# Patient Record
Sex: FEMALE | Race: WHITE | HISPANIC OR LATINO | Employment: FULL TIME | URBAN - METROPOLITAN AREA
[De-identification: names, ages, dates, MRNs, and addresses within clinical notes are randomized per-mention and may not be internally consistent; named-entity substitution may affect disease eponyms.]

---

## 2023-05-21 ENCOUNTER — HOSPITAL ENCOUNTER (INPATIENT)
Facility: HOSPITAL | Age: 63
LOS: 15 days | Discharge: HOME | DRG: 988 | End: 2023-06-05
Attending: EMERGENCY MEDICINE | Admitting: INTERNAL MEDICINE
Payer: COMMERCIAL

## 2023-05-21 ENCOUNTER — APPOINTMENT (EMERGENCY)
Dept: CT IMAGING | Facility: HOSPITAL | Age: 63
DRG: 988 | End: 2023-05-21
Payer: COMMERCIAL

## 2023-05-21 DIAGNOSIS — Z79.899 ON HIGH DOSE OPIOID DRUG THERAPY: ICD-10-CM

## 2023-05-21 DIAGNOSIS — R10.9 ABDOMINAL PAIN: Primary | ICD-10-CM

## 2023-05-21 DIAGNOSIS — E83.42 HYPOMAGNESEMIA: ICD-10-CM

## 2023-05-21 DIAGNOSIS — D72.829 LEUKOCYTOSIS: ICD-10-CM

## 2023-05-21 DIAGNOSIS — R93.89 ABNORMAL CT SCAN: ICD-10-CM

## 2023-05-21 DIAGNOSIS — G89.3 CANCER ASSOCIATED PAIN: ICD-10-CM

## 2023-05-21 DIAGNOSIS — D50.9 IRON DEFICIENCY ANEMIA, UNSPECIFIED IRON DEFICIENCY ANEMIA TYPE: ICD-10-CM

## 2023-05-21 DIAGNOSIS — R11.2 NAUSEA AND VOMITING, UNSPECIFIED VOMITING TYPE: ICD-10-CM

## 2023-05-21 DIAGNOSIS — D64.9 ANEMIA: ICD-10-CM

## 2023-05-21 PROBLEM — R19.7 DIARRHEA: Status: ACTIVE | Noted: 2023-05-21

## 2023-05-21 LAB
ALBUMIN SERPL BCP-MCNC: 4.2 G/DL (ref 3.5–5)
ALP SERPL-CCNC: 65 U/L (ref 34–104)
ALT SERPL W P-5'-P-CCNC: 7 U/L (ref 7–52)
ANION GAP SERPL CALCULATED.3IONS-SCNC: 11 MMOL/L (ref 4–13)
APTT PPP: 26 SECONDS (ref 23–37)
AST SERPL W P-5'-P-CCNC: 9 U/L (ref 13–39)
ATRIAL RATE: 108 BPM
BACTERIA UR QL AUTO: ABNORMAL /HPF
BASOPHILS # BLD AUTO: 0.1 THOUSANDS/ÂΜL (ref 0–0.1)
BASOPHILS NFR BLD AUTO: 1 % (ref 0–1)
BILIRUB SERPL-MCNC: 0.24 MG/DL (ref 0.2–1)
BILIRUB UR QL STRIP: NEGATIVE
BUN SERPL-MCNC: 13 MG/DL (ref 5–25)
CALCIUM SERPL-MCNC: 9.8 MG/DL (ref 8.4–10.2)
CARDIAC TROPONIN I PNL SERPL HS: 3 NG/L
CHLORIDE SERPL-SCNC: 100 MMOL/L (ref 96–108)
CLARITY UR: CLEAR
CO2 SERPL-SCNC: 23 MMOL/L (ref 21–32)
COLOR UR: YELLOW
CREAT SERPL-MCNC: 0.67 MG/DL (ref 0.6–1.3)
EOSINOPHIL # BLD AUTO: 0.03 THOUSAND/ÂΜL (ref 0–0.61)
EOSINOPHIL NFR BLD AUTO: 0 % (ref 0–6)
ERYTHROCYTE [DISTWIDTH] IN BLOOD BY AUTOMATED COUNT: 15.5 % (ref 11.6–15.1)
GFR SERPL CREATININE-BSD FRML MDRD: 94 ML/MIN/1.73SQ M
GLUCOSE SERPL-MCNC: 195 MG/DL (ref 65–140)
GLUCOSE UR STRIP-MCNC: NEGATIVE MG/DL
HCT VFR BLD AUTO: 32.2 % (ref 34.8–46.1)
HGB BLD-MCNC: 9.5 G/DL (ref 11.5–15.4)
HGB UR QL STRIP.AUTO: ABNORMAL
IMM GRANULOCYTES # BLD AUTO: 0.1 THOUSAND/UL (ref 0–0.2)
IMM GRANULOCYTES NFR BLD AUTO: 1 % (ref 0–2)
INR PPP: 1.03 (ref 0.84–1.19)
KETONES UR STRIP-MCNC: NEGATIVE MG/DL
LACTATE SERPL-SCNC: 1.4 MMOL/L (ref 0.5–2)
LEUKOCYTE ESTERASE UR QL STRIP: NEGATIVE
LIPASE SERPL-CCNC: 197 U/L (ref 11–82)
LYMPHOCYTES # BLD AUTO: 2.19 THOUSANDS/ÂΜL (ref 0.6–4.47)
LYMPHOCYTES NFR BLD AUTO: 10 % (ref 14–44)
MAGNESIUM SERPL-MCNC: 1.5 MG/DL (ref 1.9–2.7)
MCH RBC QN AUTO: 23.2 PG (ref 26.8–34.3)
MCHC RBC AUTO-ENTMCNC: 29.5 G/DL (ref 31.4–37.4)
MCV RBC AUTO: 79 FL (ref 82–98)
MONOCYTES # BLD AUTO: 0.89 THOUSAND/ÂΜL (ref 0.17–1.22)
MONOCYTES NFR BLD AUTO: 4 % (ref 4–12)
MUCOUS THREADS UR QL AUTO: ABNORMAL
NEUTROPHILS # BLD AUTO: 18.6 THOUSANDS/ÂΜL (ref 1.85–7.62)
NEUTS SEG NFR BLD AUTO: 84 % (ref 43–75)
NITRITE UR QL STRIP: NEGATIVE
NON-SQ EPI CELLS URNS QL MICRO: ABNORMAL /HPF
NRBC BLD AUTO-RTO: 0 /100 WBCS
P AXIS: 60 DEGREES
PH UR STRIP.AUTO: 6.5 [PH]
PLATELET # BLD AUTO: 573 THOUSANDS/UL (ref 149–390)
PMV BLD AUTO: 8.4 FL (ref 8.9–12.7)
POTASSIUM SERPL-SCNC: 4 MMOL/L (ref 3.5–5.3)
PR INTERVAL: 128 MS
PROCALCITONIN SERPL-MCNC: <0.05 NG/ML
PROT SERPL-MCNC: 7.6 G/DL (ref 6.4–8.4)
PROT UR STRIP-MCNC: ABNORMAL MG/DL
PROTHROMBIN TIME: 13.5 SECONDS (ref 11.6–14.5)
QRS AXIS: 76 DEGREES
QRSD INTERVAL: 92 MS
QT INTERVAL: 358 MS
QTC INTERVAL: 479 MS
RBC # BLD AUTO: 4.1 MILLION/UL (ref 3.81–5.12)
RBC #/AREA URNS AUTO: ABNORMAL /HPF
SODIUM SERPL-SCNC: 134 MMOL/L (ref 135–147)
SP GR UR STRIP.AUTO: 1.01
T WAVE AXIS: 43 DEGREES
UROBILINOGEN UR QL STRIP.AUTO: 0.2 E.U./DL
VENTRICULAR RATE: 108 BPM
WBC # BLD AUTO: 21.91 THOUSAND/UL (ref 4.31–10.16)
WBC #/AREA URNS AUTO: ABNORMAL /HPF

## 2023-05-21 PROCEDURE — 80053 COMPREHEN METABOLIC PANEL: CPT | Performed by: EMERGENCY MEDICINE

## 2023-05-21 PROCEDURE — 74177 CT ABD & PELVIS W/CONTRAST: CPT

## 2023-05-21 PROCEDURE — 83735 ASSAY OF MAGNESIUM: CPT | Performed by: EMERGENCY MEDICINE

## 2023-05-21 PROCEDURE — 36415 COLL VENOUS BLD VENIPUNCTURE: CPT | Performed by: EMERGENCY MEDICINE

## 2023-05-21 PROCEDURE — 84484 ASSAY OF TROPONIN QUANT: CPT | Performed by: EMERGENCY MEDICINE

## 2023-05-21 PROCEDURE — G1004 CDSM NDSC: HCPCS

## 2023-05-21 PROCEDURE — 83605 ASSAY OF LACTIC ACID: CPT | Performed by: EMERGENCY MEDICINE

## 2023-05-21 PROCEDURE — 87040 BLOOD CULTURE FOR BACTERIA: CPT | Performed by: EMERGENCY MEDICINE

## 2023-05-21 PROCEDURE — 83690 ASSAY OF LIPASE: CPT | Performed by: EMERGENCY MEDICINE

## 2023-05-21 PROCEDURE — 96375 TX/PRO/DX INJ NEW DRUG ADDON: CPT

## 2023-05-21 PROCEDURE — 85730 THROMBOPLASTIN TIME PARTIAL: CPT | Performed by: EMERGENCY MEDICINE

## 2023-05-21 PROCEDURE — 99223 1ST HOSP IP/OBS HIGH 75: CPT

## 2023-05-21 PROCEDURE — 93010 ELECTROCARDIOGRAM REPORT: CPT | Performed by: INTERNAL MEDICINE

## 2023-05-21 PROCEDURE — 99285 EMERGENCY DEPT VISIT HI MDM: CPT

## 2023-05-21 PROCEDURE — 93005 ELECTROCARDIOGRAM TRACING: CPT

## 2023-05-21 PROCEDURE — 81001 URINALYSIS AUTO W/SCOPE: CPT | Performed by: EMERGENCY MEDICINE

## 2023-05-21 PROCEDURE — 85610 PROTHROMBIN TIME: CPT | Performed by: EMERGENCY MEDICINE

## 2023-05-21 PROCEDURE — 85025 COMPLETE CBC W/AUTO DIFF WBC: CPT | Performed by: EMERGENCY MEDICINE

## 2023-05-21 PROCEDURE — 71260 CT THORAX DX C+: CPT

## 2023-05-21 PROCEDURE — 96365 THER/PROPH/DIAG IV INF INIT: CPT

## 2023-05-21 PROCEDURE — C9113 INJ PANTOPRAZOLE SODIUM, VIA: HCPCS

## 2023-05-21 PROCEDURE — 84145 PROCALCITONIN (PCT): CPT | Performed by: EMERGENCY MEDICINE

## 2023-05-21 RX ORDER — MAGNESIUM SULFATE HEPTAHYDRATE 40 MG/ML
4 INJECTION, SOLUTION INTRAVENOUS ONCE
Status: COMPLETED | OUTPATIENT
Start: 2023-05-21 | End: 2023-05-22

## 2023-05-21 RX ORDER — VALSARTAN AND HYDROCHLOROTHIAZIDE 160; 12.5 MG/1; MG/1
1 TABLET, FILM COATED ORAL DAILY
COMMUNITY
End: 2023-06-05

## 2023-05-21 RX ORDER — LOSARTAN POTASSIUM 50 MG/1
100 TABLET ORAL DAILY
Status: DISCONTINUED | OUTPATIENT
Start: 2023-05-22 | End: 2023-06-05 | Stop reason: HOSPADM

## 2023-05-21 RX ORDER — BUTALBITAL, ASPIRIN, AND CAFFEINE 50; 325; 40 MG/1; MG/1; MG/1
1 CAPSULE ORAL EVERY 4 HOURS PRN
COMMUNITY

## 2023-05-21 RX ORDER — HYDROCODONE BITARTRATE AND ACETAMINOPHEN 5; 325 MG/1; MG/1
1 TABLET ORAL EVERY 6 HOURS PRN
Status: DISCONTINUED | OUTPATIENT
Start: 2023-05-21 | End: 2023-05-24

## 2023-05-21 RX ORDER — PRAVASTATIN SODIUM 40 MG
80 TABLET ORAL
Status: DISCONTINUED | OUTPATIENT
Start: 2023-05-21 | End: 2023-06-05 | Stop reason: HOSPADM

## 2023-05-21 RX ORDER — CEFEPIME HYDROCHLORIDE 2 G/50ML
2000 INJECTION, SOLUTION INTRAVENOUS ONCE
Status: COMPLETED | OUTPATIENT
Start: 2023-05-21 | End: 2023-05-21

## 2023-05-21 RX ORDER — ACETAMINOPHEN 325 MG/1
650 TABLET ORAL EVERY 6 HOURS PRN
Status: DISCONTINUED | OUTPATIENT
Start: 2023-05-21 | End: 2023-06-05 | Stop reason: HOSPADM

## 2023-05-21 RX ORDER — PANTOPRAZOLE SODIUM 40 MG/10ML
40 INJECTION, POWDER, LYOPHILIZED, FOR SOLUTION INTRAVENOUS
Status: DISCONTINUED | OUTPATIENT
Start: 2023-05-21 | End: 2023-06-05 | Stop reason: HOSPADM

## 2023-05-21 RX ORDER — ROSUVASTATIN CALCIUM 10 MG/1
10 TABLET, COATED ORAL DAILY
COMMUNITY

## 2023-05-21 RX ORDER — SODIUM CHLORIDE, SODIUM GLUCONATE, SODIUM ACETATE, POTASSIUM CHLORIDE, MAGNESIUM CHLORIDE, SODIUM PHOSPHATE, DIBASIC, AND POTASSIUM PHOSPHATE .53; .5; .37; .037; .03; .012; .00082 G/100ML; G/100ML; G/100ML; G/100ML; G/100ML; G/100ML; G/100ML
100 INJECTION, SOLUTION INTRAVENOUS CONTINUOUS
Status: DISCONTINUED | OUTPATIENT
Start: 2023-05-21 | End: 2023-05-26

## 2023-05-21 RX ORDER — FENTANYL CITRATE 50 UG/ML
50 INJECTION, SOLUTION INTRAMUSCULAR; INTRAVENOUS ONCE
Status: COMPLETED | OUTPATIENT
Start: 2023-05-21 | End: 2023-05-21

## 2023-05-21 RX ORDER — ONDANSETRON 2 MG/ML
4 INJECTION INTRAMUSCULAR; INTRAVENOUS ONCE
Status: COMPLETED | OUTPATIENT
Start: 2023-05-21 | End: 2023-05-21

## 2023-05-21 RX ORDER — BUTALBITAL, ACETAMINOPHEN AND CAFFEINE 50; 325; 40 MG/1; MG/1; MG/1
1 TABLET ORAL EVERY 4 HOURS PRN
Status: DISCONTINUED | OUTPATIENT
Start: 2023-05-21 | End: 2023-06-05 | Stop reason: HOSPADM

## 2023-05-21 RX ORDER — ONDANSETRON 2 MG/ML
4 INJECTION INTRAMUSCULAR; INTRAVENOUS EVERY 6 HOURS PRN
Status: DISCONTINUED | OUTPATIENT
Start: 2023-05-21 | End: 2023-05-31

## 2023-05-21 RX ADMIN — BUTALBITAL, ACETAMINOPHEN AND CAFFEINE 1 TABLET: 50; 325; 40 TABLET ORAL at 16:31

## 2023-05-21 RX ADMIN — FENTANYL CITRATE 50 MCG: 50 INJECTION, SOLUTION INTRAMUSCULAR; INTRAVENOUS at 13:47

## 2023-05-21 RX ADMIN — HYDROCODONE BITARTRATE AND ACETAMINOPHEN 1 TABLET: 5; 325 TABLET ORAL at 21:46

## 2023-05-21 RX ADMIN — MAGNESIUM SULFATE HEPTAHYDRATE 4 G: 40 INJECTION, SOLUTION INTRAVENOUS at 20:55

## 2023-05-21 RX ADMIN — PANTOPRAZOLE SODIUM 40 MG: 40 INJECTION, POWDER, FOR SOLUTION INTRAVENOUS at 15:56

## 2023-05-21 RX ADMIN — CEFEPIME HYDROCHLORIDE 2000 MG: 2 INJECTION, SOLUTION INTRAVENOUS at 13:51

## 2023-05-21 RX ADMIN — ONDANSETRON 4 MG: 2 INJECTION INTRAMUSCULAR; INTRAVENOUS at 21:46

## 2023-05-21 RX ADMIN — SODIUM CHLORIDE 1000 ML: 0.9 INJECTION, SOLUTION INTRAVENOUS at 13:21

## 2023-05-21 RX ADMIN — SODIUM CHLORIDE, SODIUM GLUCONATE, SODIUM ACETATE, POTASSIUM CHLORIDE AND MAGNESIUM CHLORIDE 100 ML/HR: 526; 502; 368; 37; 30 INJECTION, SOLUTION INTRAVENOUS at 15:45

## 2023-05-21 RX ADMIN — VANCOMYCIN HYDROCHLORIDE 1500 MG: 1 INJECTION, POWDER, LYOPHILIZED, FOR SOLUTION INTRAVENOUS at 14:21

## 2023-05-21 RX ADMIN — ONDANSETRON 4 MG: 2 INJECTION INTRAMUSCULAR; INTRAVENOUS at 13:47

## 2023-05-21 RX ADMIN — MORPHINE SULFATE 2 MG: 2 INJECTION, SOLUTION INTRAMUSCULAR; INTRAVENOUS at 20:27

## 2023-05-21 RX ADMIN — PRAVASTATIN SODIUM 80 MG: 40 TABLET ORAL at 16:31

## 2023-05-21 RX ADMIN — IOHEXOL 100 ML: 350 INJECTION, SOLUTION INTRAVENOUS at 13:15

## 2023-05-21 RX ADMIN — MORPHINE SULFATE 2 MG: 2 INJECTION, SOLUTION INTRAMUSCULAR; INTRAVENOUS at 16:31

## 2023-05-21 NOTE — ASSESSMENT & PLAN NOTE
· Patient presented to ED with complaints of right upper and lower quadrant abdominal pain with nausea, vomiting and diarrhea that started this a m    · CT scan chest abdomen pelvis see below  · For assessment/treatment plan see abnormal CT scan

## 2023-05-21 NOTE — ASSESSMENT & PLAN NOTE
· Patient complains of frequent bouts of diarrhea since 6 AM today  · Obtain stool studies  · IV fluids as ordered

## 2023-05-21 NOTE — H&P
[facility] [address]  H&P  Name: Wolf Clifford 58 y o  female I MRN: [id number]  Unit/Bed#: -01 I Date of Admission: 5/21/2023   Date of Service: 5/21/2023 I Hospital Day: 0      Assessment/Plan   * Abdominal pain  Assessment & Plan  · Patient presented to ED with complaints of right upper and lower quadrant abdominal pain with nausea, vomiting and diarrhea that started this a m  · CT scan chest abdomen pelvis see below  · For assessment/treatment plan see abnormal CT scan    Abnormal CT scan  Assessment & Plan  · Patient presented to the ED with plaints of right upper lower quadrant abdominal pain with nausea vomiting and diarrhea that started at 6 AM today  · Leukocytosis with WBC 21 9  · Procalcitonin 0 05  · Lactic acid 1 4  · Serum lipase 197  · CT chest abdomen pelvis: Heterogeneously enhancing right suprahilar lesion with abrupt cut off of the right mainstem bronchus suspicious for malignancy    There is secondary complete atelectasis of the right lung with heterogeneous density  Small to moderate right pleural effusion is noted  Few subcentimeter left lung nodules are noted  Evidence of mediastinal and right supraclavicular adenopathy  Heterogeneous, rim-enhancing lesion at the proximal pancreatic body, possible metastatic focus  Pathologically enlarged celiac axis lymph nodes  Focal annular lesion involving the ascending colon, suspicious for metastatic focus versus primary malignancy  · Patient received IV cefepime and Vanco in the ED, will hold antibiotic for now given negative procalcitonin and afebrile  · Repeat procalcitonin in a m , repeat CBC in a m    · Consult GI  · Consult pulmonology  · Consult hematology/oncology    Hypomagnesemia  Assessment & Plan  · Presented with serum magnesium 1 5  · Likely secondary to frequent nausea vomiting and diarrhea  · Replete with 4 g IV magnesium   · Repeat magnesium at 2000    Diarrhea  Assessment & Plan  · Patient complains of frequent "bouts of diarrhea since 6 AM today  · Obtain stool studies  · IV fluids as ordered    Nausea & vomiting  Assessment & Plan  · Patient states started with nausea and vomiting 6 AM today, with right upper and lower quadrant pain  · CT imaging see above  · Zofran as needed  · IV fluids  · Clear liquids as tolerated        VTE Prophylaxis: VTE prophylaxis not indicated, ambulatory  / sequential compression device   Code Status: Full  POLST: POLST is not applicable to this patient  Discussion with family: Treatment plan discussed with patient understands the plan and assessment explained is agreeable to plan as stated  All questions answered to satisfaction  Anticipated Length of Stay:  Patient will be admitted on an Inpatient basis with an anticipated length of stay of greater than 2 midnights  Justification for Hospital Stay: Abdominal pain with nausea vomiting and diarrhea, abnormal CT chest abdomen pelvis, pending consults to GI, pulmonary and heme-onc    Total Time for Visit, including Counseling / Coordination of Care: 45 minutes  Greater than 50% of this total time spent on direct patient counseling and coordination of care  Chief Complaint:   Abdominal pain, nausea vomiting and diarrhea    History of Present Illness:    Cholo Mishra is a 58 y o  female who presents with abdominal pain that started 2 days ago and nausea vomiting and diarrhea that started at 6 AM today  States that abdominal pain is right upper and lower quadrant, cramping in nature, varying in intensity but \"always there\"  Denies any fever or chills, denies loss of appetite, denies any recent weight loss  Patient states she started with nausea vomiting and diarrhea 6 AM today  CT scan abdomen pelvis on arrival showed lesion right suprahilar region with a abrupt cut off of right mainstem bronchus suspicious for malignancy as well as atelectasis of the right lower lung with heterogeneous density    Small to moderate right pleural " effusion and left lung nodules noted as well as mediastinal and right supraclavicular adenopathy  It also noted a rim-enhancing lesion of the proximal pancreatic body suspicious for metastasis and enlarged celiac lymph nodes with focal annular lesion involving the ascending colon suspicious for metastatic focus versus primary malignancy  Patient states she has never had a colonoscopy, EGD, Pap smear or mammogram   Does not have a primary care physician  She reports she is a former smoker quit 6 months ago prior to that smoked just under a pack a day for 15 years  Consults were placed to pulmonology, GI, and hematology oncology  Patient was also noted to have hypomagnesemia with serum magnesium 1 5 arrival, replete and repeat labs this evening  Patient will be admitted to [facility] service  Review of Systems:    Review of Systems   Constitutional: Positive for chills  Negative for activity change, appetite change, diaphoresis, fatigue, fever and unexpected weight change  HENT: Negative  Eyes: Negative  Respiratory: Negative  Negative for cough, chest tightness, shortness of breath and wheezing  Cardiovascular: Negative  Negative for chest pain and palpitations  Gastrointestinal: Positive for abdominal pain, diarrhea, nausea and vomiting  Negative for blood in stool  Endocrine: Negative  Genitourinary: Negative  Musculoskeletal: Negative  Skin: Negative  Allergic/Immunologic: Negative  Neurological: Negative  Hematological: Negative  Psychiatric/Behavioral: Negative  Past Medical and Surgical History:     Past Medical History:   Diagnosis Date   • Asthma    • Diabetes mellitus ([facility] [address] )        History reviewed  No pertinent surgical history  Meds/Allergies:    Prior to Admission medications    Not on File     I have reviewed home medications with patient personally  Allergies:    Allergies   Allergen Reactions   • Penicillins Rash       Social History:     Marital "Status: /Civil Union   Occupation:   Patient Pre-hospital Living Situation: Home alone  Patient Pre-hospital Level of Mobility: Independent without assistive devices   Patient Pre-hospital Diet Restrictions: None  Substance Use History:   Social History     Substance and Sexual Activity   Alcohol Use Yes    Comment: couple times a year     Social History     Tobacco Use   Smoking Status Former   • Packs/day: 0 50   • Years: 15 00   • Pack years: 7 50   • Types: Cigarettes   • Quit date: 2022   • Years since quittin 5   Smokeless Tobacco Never     Social History     Substance and Sexual Activity   Drug Use Never       Family History:    History reviewed  No pertinent family history  Physical Exam:     Vitals:   Blood Pressure: 150/72 (23 1534)  Pulse: 103 (23 1534)  Temperature: 99 1 °F (37 3 °C) (23 1534)  Temp Source: Tympanic (23 1207)  Respirations: 20 (23 1534)  Height: 5' 2\" (157 5 cm) (23 1535)  Weight - Scale: 59 7 kg (131 lb 9 8 oz) (23 1535)  SpO2: 97 % (23 1535)    Physical Exam  Vitals and nursing note reviewed  Constitutional:       General: She is not in acute distress  Appearance: She is normal weight  She is ill-appearing  HENT:      Head: Normocephalic and atraumatic  Nose: Nose normal       Mouth/Throat:      Mouth: Mucous membranes are dry  Cardiovascular:      Rate and Rhythm: Normal rate and regular rhythm  Pulses: Normal pulses  Heart sounds: Normal heart sounds  Pulmonary:      Effort: Pulmonary effort is normal  No respiratory distress  Breath sounds: Normal breath sounds  No stridor  No wheezing or rales  Abdominal:      General: Bowel sounds are normal  There is no distension  Palpations: Abdomen is soft  There is no mass  Tenderness: There is abdominal tenderness  There is no guarding        Comments: Right upper or lower quadrant tenderness on palpation   Musculoskeletal:         " General: Normal range of motion  Cervical back: Normal range of motion and neck supple  Skin:     General: Skin is warm and dry  Capillary Refill: Capillary refill takes less than 2 seconds  Neurological:      General: No focal deficit present  Mental Status: She is alert and oriented to person, place, and time  Mental status is at baseline  Cranial Nerves: No cranial nerve deficit  Sensory: No sensory deficit  Motor: No weakness  Gait: Gait normal    Psychiatric:         Mood and Affect: Mood normal          Behavior: Behavior normal          Thought Content: Thought content normal          Judgment: Judgment normal            Additional Data:     Lab Results: I have personally reviewed pertinent reports  Results from last 7 days   Lab Units 05/21/23  1209   WBC Thousand/uL 21 91*   HEMOGLOBIN g/dL 9 5*   HEMATOCRIT % 32 2*   PLATELETS Thousands/uL 573*   NEUTROS PCT % 84*   LYMPHS PCT % 10*   MONOS PCT % 4   EOS PCT % 0     Results from last 7 days   Lab Units 05/21/23  1209   SODIUM mmol/L 134*   POTASSIUM mmol/L 4 0   CHLORIDE mmol/L 100   CO2 mmol/L 23   BUN mg/dL 13   CREATININE mg/dL 0 67   ANION GAP mmol/L 11   CALCIUM mg/dL 9 8   ALBUMIN g/dL 4 2   TOTAL BILIRUBIN mg/dL 0 24   ALK PHOS U/L 65   ALT U/L 7   AST U/L 9*   GLUCOSE RANDOM mg/dL 195*     Results from last 7 days   Lab Units 05/21/23  1209   INR  1 03             Results from last 7 days   Lab Units 05/21/23  1228 05/21/23  1209   LACTIC ACID mmol/L 1 4  --    PROCALCITONIN ng/ml  --  <0 05       Imaging: I have personally reviewed pertinent reports  CT chest abdomen pelvis w contrast   Final Result by Yanet Bhat MD (05/21 7822)   1  Heterogeneously enhancing right suprahilar lesion with abrupt cut off of the right mainstem bronchus suspicious for malignancy    There is secondary complete atelectasis of the right lung with heterogeneous density   Small to moderate right pleural    effusion is noted  Few subcentimeter left lung nodules are noted  Evidence of mediastinal and right supraclavicular adenopathy  2  Heterogeneous, rim-enhancing lesion at the proximal pancreatic body, possible metastatic focus  Pathologically enlarged celiac axis lymph nodes  3  Focal annular lesion involving the ascending colon, suspicious for metastatic focus versus primary malignancy           I personally discussed this study with [id number] y III on 5/21/2023 1:56 PM             Workstation performed: [id number]             EKG, Pathology, and Other Studies Reviewed on Admission:   · EKG: No EKG on this visit to review    Allscripts / Epic Records Reviewed: No     ** Please Note: This note has been constructed using a voice recognition syste

## 2023-05-21 NOTE — ASSESSMENT & PLAN NOTE
· Patient presented to the ED with plaints of right upper lower quadrant abdominal pain with nausea vomiting and diarrhea that started at 6 AM today  · Leukocytosis with WBC 21 9  · Procalcitonin 0 05  · Lactic acid 1 4  · Serum lipase 197  · CT chest abdomen pelvis: Heterogeneously enhancing right suprahilar lesion with abrupt cut off of the right mainstem bronchus suspicious for malignancy    There is secondary complete atelectasis of the right lung with heterogeneous density  Small to moderate right pleural effusion is noted  Few subcentimeter left lung nodules are noted  Evidence of mediastinal and right supraclavicular adenopathy  Heterogeneous, rim-enhancing lesion at the proximal pancreatic body, possible metastatic focus  Pathologically enlarged celiac axis lymph nodes  Focal annular lesion involving the ascending colon, suspicious for metastatic focus versus primary malignancy  · Patient received IV cefepime and Vanco in the ED, will hold antibiotic for now given negative procalcitonin and afebrile  · Repeat procalcitonin in a m , repeat CBC in a m    · Consult GI  · Consult pulmonology  · Consult hematology/oncology

## 2023-05-21 NOTE — ED NOTES
Pt ambulating to bathroom at this time, steady gait noted       Georgi Zhong, [facility]  05/21/23 [id number]

## 2023-05-21 NOTE — ED PROVIDER NOTES
"History  Chief Complaint   Patient presents with   • Abdominal Pain   • Vomiting     Diagnosed with gastritis 2 weeks ago ( similar event) vomiting and diarrhea started this morning around 6 am      HPI      This is a pleasant, 66-year-old female, appears older than stated age, presents emergency department via EMS, S, with a chief complaint of abrupt onset of generalized abdominal pain confined to the right side of the abdomen with no nauseated migration of pain with nausea vomiting diarrhea which started at 6 AM today  Patient was feeling \"queasy\" yesterday  Patient lives in [place] and is currently residing in a camp locally  She was seen evaluated for \"gastritis\" 2 weeks ago at an outside urgent care  Patient is a diabetic with a history of hypertension high cholesterol, no relevant abdominal surgeries  Patient's had numerous episodes of nausea vomiting diarrhea since the onset at 6 AM   She took Zofran which was leftover from her urgent care evaluation 2 weeks ago with minimal relief  No evidence by history of bloody diarrhea  Denies any chest pain, syncope, upper back pain or dysuria or frequency of urination  Patient consumes alcohol on a very infrequent basis approximately 2-3 times a year if that  No recent antibiotic use  Prior to Admission Medications   Prescriptions Last Dose Informant Patient Reported? Taking?    butalbital-aspirin-caffeine (FIORINAL) -40 mg per capsule   Yes Yes   Sig: Take 1 capsule by mouth every 4 (four) hours as needed for headaches   metFORMIN (GLUCOPHAGE) 1000 MG tablet 5/20/2023  Yes Yes   Sig: Take 1,000 mg by mouth 2 (two) times a day with meals   rosuvastatin (CRESTOR) 10 MG tablet Past Week  Yes Yes   Sig: Take 10 mg by mouth daily   valsartan-hydrochlorothiazide (DIOVAN-HCT) 160-12 5 MG per tablet Past Week  Yes Yes   Sig: Take 1 tablet by mouth daily      Facility-Administered Medications: None       Past Medical History:   Diagnosis Date   • Asthma  " • Diabetes mellitus ([facility] [address] )        History reviewed  No pertinent surgical history  History reviewed  No pertinent family history  I have reviewed and agree with the history as documented  E-Cigarette/Vaping     E-Cigarette/Vaping Substances     Social History     Tobacco Use   • Smoking status: Former     Packs/day: 0 50     Years: 15 00     Pack years: 7 50     Types: Cigarettes     Quit date: 2022     Years since quittin 5   • Smokeless tobacco: Never   Substance Use Topics   • Alcohol use: Yes     Comment: couple times a year   • Drug use: Never       Review of Systems   Constitutional: Negative  Negative for chills, fever and unexpected weight change  HENT: Negative  Negative for dental problem  Eyes: Negative  Respiratory: Negative  Negative for cough, chest tightness and stridor  Cardiovascular: Negative  Negative for chest pain, palpitations and leg swelling  Gastrointestinal: Positive for abdominal pain, diarrhea and vomiting  Negative for nausea  Endocrine: Negative  Genitourinary: Negative  Musculoskeletal: Negative  Skin: Negative  Negative for rash  Allergic/Immunologic: Negative  Neurological: Negative  Hematological: Negative  Psychiatric/Behavioral: Negative  Physical Exam  Physical Exam  Vitals and nursing note reviewed  Constitutional:       General: She is not in acute distress  Appearance: She is well-developed  She is obese  She is ill-appearing and diaphoretic  She is not toxic-appearing  HENT:      Head: Normocephalic and atraumatic  Eyes:      Extraocular Movements: Extraocular movements intact  Pupils: Pupils are equal, round, and reactive to light  Cardiovascular:      Rate and Rhythm: Normal rate and regular rhythm  Heart sounds: Normal heart sounds  Pulmonary:      Effort: Pulmonary effort is normal       Breath sounds: Normal breath sounds  Abdominal:      General: Abdomen is flat   Bowel sounds are normal       Palpations: Abdomen is soft  Tenderness: There is abdominal tenderness in the right upper quadrant, right lower quadrant, epigastric area and periumbilical area  Skin:     General: Skin is warm  Capillary Refill: Capillary refill takes less than 2 seconds  Neurological:      General: No focal deficit present  Mental Status: She is alert and oriented to person, place, and time     Psychiatric:         Mood and Affect: Mood normal          Behavior: Behavior normal          Vital Signs  ED Triage Vitals   Temperature Pulse Respirations Blood Pressure SpO2   05/21/23 1207 05/21/23 1207 05/21/23 1207 05/21/23 1207 05/21/23 1207   (!) 97 °F (36 1 °C) (!) 108 22 (!) 200/87 97 %      Temp Source Heart Rate Source Patient Position - Orthostatic VS BP Location FiO2 (%)   05/21/23 1207 05/21/23 1207 05/21/23 1300 05/21/23 1207 --   Tympanic Monitor Lying Left arm       Pain Score       05/21/23 1207       8           Vitals:    05/21/23 1400 05/21/23 1430 05/21/23 1534 05/21/23 1534   BP: 158/87 (!) 174/85 150/72 150/72   Pulse: 102 103  103   Patient Position - Orthostatic VS: Lying Lying           Visual Acuity      ED Medications  Medications   magnesium sulfate 4 g/100 mL IVPB (premix) 4 g (has no administration in time range)   multi-electrolyte (PLASMALYTE-A/ISOLYTE-S PH 7 4) IV solution (100 mL/hr Intravenous New Bag 5/21/23 1545)   acetaminophen (TYLENOL) tablet 650 mg (has no administration in time range)   ondansetron (ZOFRAN) injection 4 mg (has no administration in time range)   pantoprazole (PROTONIX) injection 40 mg (40 mg Intravenous Given 5/21/23 1556)   HYDROcodone-acetaminophen (NORCO) 5-325 mg per tablet 1 tablet (has no administration in time range)   morphine injection 2 mg (2 mg Intravenous Given 5/21/23 1631)   pravastatin (PRAVACHOL) tablet 80 mg (80 mg Oral Given 5/21/23 1631)   losartan (COZAAR) tablet 100 mg (has no administration in time range) butalbital-acetaminophen-caffeine (FIORICET,ESGIC) -40 mg per tablet 1 tablet (1 tablet Oral Given 5/21/23 1631)   sodium chloride 0 9 % bolus 1,000 mL (0 mL Intravenous Stopped 5/21/23 1421)   iohexol (OMNIPAQUE) 350 MG/ML injection (SINGLE-DOSE) 100 mL (100 mL Intravenous Given 5/21/23 1315)   fentanyl citrate (PF) 100 MCG/2ML 50 mcg (50 mcg Intravenous Given 5/21/23 1347)   ondansetron (ZOFRAN) injection 4 mg (4 mg Intravenous Given 5/21/23 1347)   cefepime (MAXIPIME) IVPB (premix in dextrose) 2,000 mg 50 mL (0 mg Intravenous Stopped 5/21/23 1421)   vancomycin (VANCOCIN) 1500 mg in sodium chloride 0 9% 250 mL IVPB (1,500 mg Intravenous New Bag 5/21/23 1421)       Diagnostic Studies  Results Reviewed     Procedure Component Value Units Date/Time    UA w Reflex to Microscopic w Reflex to Culture [[id number]] Collected: 05/21/23 1555    Lab Status: No result Specimen: Urine, Clean Catch     Stool Enteric Bacterial Panel by PCR [[id number]]     Lab Status: No result Specimen: Stool     Clostridium difficile toxin by PCR with EIA [[id number]]     Lab Status: No result Specimen: Stool     Giardia lamblia, EIA and Ova and Parasites Examination [[id number]]     Lab Status: No result Specimen: Stool     Procalcitonin [[id number]]  (Normal) Collected: 05/21/23 1209    Lab Status: Final result Specimen: Blood from Arm, Right Updated: 05/21/23 1308     Procalcitonin <0 05 ng/ml     Lactic acid [[id number]]  (Normal) Collected: 05/21/23 1228    Lab Status: Final result Specimen: Blood from Arm, Right Updated: 05/21/23 1259     LACTIC ACID 1 4 mmol/L     Narrative:      Result may be elevated if tourniquet was used during collection  HS Troponin 0hr (reflex protocol) [[id number]]  (Normal) Collected: 05/21/23 1209    Lab Status: Final result Specimen: Blood from Arm, Right Updated: 05/21/23 1256     hs TnI 0hr 3 ng/L     Blood culture #1 [[id number]] Collected: 05/21/23 1236    Lab Status:  In process Specimen: Blood from Hand, Left Updated: 05/21/23 1248    Comprehensive metabolic panel [[id number]]  (Abnormal) Collected: 05/21/23 1209    Lab Status: Final result Specimen: Blood from Arm, Right Updated: 05/21/23 1247     Sodium 134 mmol/L      Potassium 4 0 mmol/L      Chloride 100 mmol/L      CO2 23 mmol/L      ANION GAP 11 mmol/L      BUN 13 mg/dL      Creatinine 0 67 mg/dL      Glucose 195 mg/dL      Calcium 9 8 mg/dL      AST 9 U/L      ALT 7 U/L      Alkaline Phosphatase 65 U/L      Total Protein 7 6 g/dL      Albumin 4 2 g/dL      Total Bilirubin 0 24 mg/dL      eGFR 94 ml/min/1 73sq m     Narrative:      [place] guidelines for Chronic Kidney Disease (CKD):   •  Stage 1 with normal or high GFR (GFR > 90 mL/min/1 73 square meters)  •  Stage 2 Mild CKD (GFR = 60-89 mL/min/1 73 square meters)  •  Stage 3A Moderate CKD (GFR = 45-59 mL/min/1 73 square meters)  •  Stage 3B Moderate CKD (GFR = 30-44 mL/min/1 73 square meters)  •  Stage 4 Severe CKD (GFR = 15-29 mL/min/1 73 square meters)  •  Stage 5 End Stage CKD (GFR <15 mL/min/1 73 square meters)  Note: GFR calculation is accurate only with a steady state creatinine    Magnesium [[id number]]  (Abnormal) Collected: 05/21/23 1209    Lab Status: Final result Specimen: Blood from Arm, Right Updated: 05/21/23 1247     Magnesium 1 5 mg/dL     Lipase [[id number]]  (Abnormal) Collected: 05/21/23 1209    Lab Status: Final result Specimen: Blood from Arm, Right Updated: 05/21/23 1247     Lipase 197 u/L     Blood culture #2 [[id number]] Collected: 05/21/23 1228    Lab Status:  In process Specimen: Blood from Hand, Right Updated: 05/21/23 1247    Protime-INR [[id number]]  (Normal) Collected: 05/21/23 1209    Lab Status: Final result Specimen: Blood from Arm, Right Updated: 05/21/23 1242     Protime 13 5 seconds      INR 1 03    APTT [[id number]]  (Normal) Collected: 05/21/23 1209    Lab Status: Final result Specimen: Blood from Arm, Right Updated: 05/21/23 1242     PTT 26 seconds     CBC and differential [[id number]]  (Abnormal) Collected: 05/21/23 1209    Lab Status: Final result Specimen: Blood from Arm, Right Updated: 05/21/23 1219     WBC 21 91 Thousand/uL      RBC 4 10 Million/uL      Hemoglobin 9 5 g/dL      Hematocrit 32 2 %      MCV 79 fL      MCH 23 2 pg      MCHC 29 5 g/dL      RDW 15 5 %      MPV 8 4 fL      Platelets 267 Thousands/uL      nRBC 0 /100 WBCs      Neutrophils Relative 84 %      Immat GRANS % 1 %      Lymphocytes Relative 10 %      Monocytes Relative 4 %      Eosinophils Relative 0 %      Basophils Relative 1 %      Neutrophils Absolute 18 60 Thousands/µL      Immature Grans Absolute 0 10 Thousand/uL      Lymphocytes Absolute 2 19 Thousands/µL      Monocytes Absolute 0 89 Thousand/µL      Eosinophils Absolute 0 03 Thousand/µL      Basophils Absolute 0 10 Thousands/µL                  CT chest abdomen pelvis w contrast   Final Result by Hilda Ortiz MD (05/21 2960)   1  Heterogeneously enhancing right suprahilar lesion with abrupt cut off of the right mainstem bronchus suspicious for malignancy    There is secondary complete atelectasis of the right lung with heterogeneous density  Small to moderate right pleural    effusion is noted  Few subcentimeter left lung nodules are noted  Evidence of mediastinal and right supraclavicular adenopathy  2  Heterogeneous, rim-enhancing lesion at the proximal pancreatic body, possible metastatic focus  Pathologically enlarged celiac axis lymph nodes  3  Focal annular lesion involving the ascending colon, suspicious for metastatic focus versus primary malignancy           I personally discussed this study with Ángel Sanchez III on 5/21/2023 1:56 PM             Workstation performed: [id number]                    Procedures  ECG 12 Lead Documentation Only    Date/Time: 5/21/2023 12:10 PM  Performed by: Cyndie Bone DO  Authorized by: Alana Arriaga III, DO     Indications / Diagnosis: Abdominal pain  ECG reviewed by me, the ED Provider: yes    Patient location:  ED  Comments:      I personally reviewed this EKG that was performed the patient May 21, 2023, sinus tachycardia with a ventricular rate of 108 bpm, portion intervals within normal limits, appears the patient has a incomplete right bundle branch block with no acute ST abnormalities  No Prior EKGs to compare to  No diffuse elevations to indicate pericarditis  No coved ST elevations greater than 2mm with negative T waves in V1-3 to indicate concern for brugada  No biphasic T waves in V2, V3 to indicate Wellens (critical stenosis of LAD)  No elevation in aVR or deviation when compared to V1 (can be associated with ST depression in I,II, V4-6 when left main occlusion is present)  ED Course  ED Course as of 05/21/23 1639   Sun May 21, 2023   1205 Patient seen and evaluated  Abrupt onset of nausea vomiting diarrhea without fever, open surgical history  Differential diagnosis in this patient is as follows: Nonspecific nausea vomiting diarrheal illness, viral gastritis versus biliary colic gallbladder disease versus infectious diarrhea versus pancreatitis  1223 Noted patient is tachycardic and has significant leukocytosis, sepsis alert initiated  1305 At this point the etiology of the patient's sensations unclear warrants antibiotics, the elevated white count could be acute phase reactant  CT results to determine if antibiotics are warranted  1400 Received call from radiologist Jose Mercado MD called, see report, there is that a primary lung cancer could be the waiting factor to the significant CT findings  26 Reviewed CT imaging results with the patient and her wife at the bedside, wife reports that the patient's had a gradual decline in terms of her physical capacity over the last 3 years, quit smoking approximately 6 months ago    They are aware that the primary finding on the CAT scan is suspicious for cancer  [address] text sent to general surgery: Dr Kaykay Gonzalez  1425 As per Dr Kaykay Gonzalez from general surgery: I agree with plan  Wandy Suarez will need biopsies of pancreas, potential bronchoscopy/[place] for biopsy of all the lung stuff or potential, just biopsy of the pleural fluid with either IR, pulmonology, or thoracic  And then G  I  will need to scope her to see what's going on the lesion in the colon  Agree with omit the medicine and they can get everybody else on board to figure out what's primary and what's metastatic  I will pass this along to the team for tomorrow  1431 Case reviewed: [facility], set the patient onto the hospital service under the care of Dr Junior Salguero  HEART Risk Score    Flowsheet Row Most Recent Value   Heart Score Risk Calculator    History 0 Filed at: 05/21/2023 1259   ECG 0 Filed at: 05/21/2023 1259   Age 1 Filed at: 05/21/2023 1259   Risk Factors 1 Filed at: 05/21/2023 1259   Troponin 0 Filed at: 05/21/2023 1259   HEART Score 2 Filed at: 05/21/2023 1259                        SBIRT 20yo+    Flowsheet Row Most Recent Value   Initial Alcohol Screen: US AUDIT-C     1  How often do you have a drink containing alcohol? 1 Filed at: 05/21/2023 1216   2  How many drinks containing alcohol do you have on a typical day you are drinking? 0 Filed at: 05/21/2023 1216   3a  Male UNDER 65: How often do you have five or more drinks on one occasion? 0 Filed at: 05/21/2023 1216   3b  FEMALE Any Age, or MALE 65+: How often do you have 4 or more drinks on one occassion? 0 Filed at: 05/21/2023 1216   Audit-C Score 1 Filed at: 05/21/2023 1216   NENA: How many times in the past year have you    Used an illegal drug or used a prescription medication for non-medical reasons?  Never Filed at: 05/21/2023 1216                    Medical Decision Making  Prior right-sided abdominal pain over the last 2 weeks, seen evaluated in outside urgent care clinic diagnosed with gastritis, patient has "significant CT findings which was reviewed with general surgery and the hospital service, patient will require IR biopsy of the pleural effusion that was documented on CT imaging, both significant other and patient aware of the significant CT findings  Portions of the record may have been created with voice recognition software  Occasional wrong word or \"sound a like\" substitutions may have occurred due to the inherent limitations of voice recognition software  Read the chart carefully and recognize, using context, where substitutions have occurred  Amount and/or Complexity of Data Reviewed  Labs: ordered  Radiology: ordered  Risk  OTC drugs  Prescription drug management  Decision regarding hospitalization  Disposition  Final diagnoses:   Abdominal pain   Leukocytosis   Anemia   Abnormal CT scan     Time reflects when diagnosis was documented in both MDM as applicable and the Disposition within this note     Time User Action Codes Description Comment    5/21/2023 12:27 PM Jose Crisp Add [R10 9] Abdominal pain     5/21/2023 12:27 PM Jose GuÃ¡nica Add [E06 279] Leukocytosis     5/21/2023 12:27 PM Flakita Chi [D64 9] Anemia     5/21/2023  3:14 PM Rubina Kirk [R93 89] Abnormal CT scan       ED Disposition     ED Disposition   Admit    Condition   Stable    Date/Time   Sun May 21, 2023  2:33 PM    Comment   Case was discussed with BRITTNY Sheppard and the patient's admission status was agreed to be Admission Status: inpatient status to the service of Dr Wu Lacks              Follow-up Information    None         Current Discharge Medication List      CONTINUE these medications which have NOT CHANGED    Details   butalbital-aspirin-caffeine (FIORINAL) -40 mg per capsule Take 1 capsule by mouth every 4 (four) hours as needed for headaches      metFORMIN (GLUCOPHAGE) 1000 MG tablet Take 1,000 mg by mouth 2 (two) times a day with meals      rosuvastatin (CRESTOR) 10 MG " tablet Take 10 mg by mouth daily      valsartan-hydrochlorothiazide (DIOVAN-HCT) 160-12 5 MG per tablet Take 1 tablet by mouth daily             No discharge procedures on file      PDMP Review     None          ED Provider  Electronically Signed by           Taylor Paulson III, DO  05/21/23 8248

## 2023-05-21 NOTE — ASSESSMENT & PLAN NOTE
· Patient states started with nausea and vomiting 6 AM today, with right upper and lower quadrant pain  · CT imaging see above  · Zofran as needed  · IV fluids  · Clear liquids as tolerated

## 2023-05-21 NOTE — ASSESSMENT & PLAN NOTE
· Presented with serum magnesium 1 5  · Likely secondary to frequent nausea vomiting and diarrhea  · Replete with 4 g IV magnesium   · Repeat magnesium at 2000

## 2023-05-21 NOTE — ED NOTES
Delay in obtaining blood culture as patient currently in bathroom - will obtain weight and height when able       Kd Saha RN  05/21/23 4184

## 2023-05-21 NOTE — ED NOTES
Patient transported to [address] [place] [address] [address], RN  05/21/23 1907

## 2023-05-22 ENCOUNTER — APPOINTMENT (INPATIENT)
Dept: INTERVENTIONAL RADIOLOGY/VASCULAR | Facility: HOSPITAL | Age: 63
DRG: 988 | End: 2023-05-22
Attending: RADIOLOGY
Payer: COMMERCIAL

## 2023-05-22 LAB
ALBUMIN SERPL BCP-MCNC: 3.8 G/DL (ref 3.5–5)
ALP SERPL-CCNC: 59 U/L (ref 34–104)
ALT SERPL W P-5'-P-CCNC: 6 U/L (ref 7–52)
ANION GAP SERPL CALCULATED.3IONS-SCNC: 11 MMOL/L (ref 4–13)
AST SERPL W P-5'-P-CCNC: 11 U/L (ref 13–39)
BASOPHILS # BLD AUTO: 0.05 THOUSANDS/ÂΜL (ref 0–0.1)
BASOPHILS NFR BLD AUTO: 0 % (ref 0–1)
BILIRUB SERPL-MCNC: 0.24 MG/DL (ref 0.2–1)
BUN SERPL-MCNC: 12 MG/DL (ref 5–25)
CALCIUM SERPL-MCNC: 8.8 MG/DL (ref 8.4–10.2)
CHLORIDE SERPL-SCNC: 101 MMOL/L (ref 96–108)
CO2 SERPL-SCNC: 23 MMOL/L (ref 21–32)
CREAT SERPL-MCNC: 0.67 MG/DL (ref 0.6–1.3)
EOSINOPHIL # BLD AUTO: 0.02 THOUSAND/ÂΜL (ref 0–0.61)
EOSINOPHIL NFR BLD AUTO: 0 % (ref 0–6)
ERYTHROCYTE [DISTWIDTH] IN BLOOD BY AUTOMATED COUNT: 15.5 % (ref 11.6–15.1)
FERRITIN SERPL-MCNC: 173 NG/ML (ref 11–307)
GFR SERPL CREATININE-BSD FRML MDRD: 94 ML/MIN/1.73SQ M
GLUCOSE SERPL-MCNC: 133 MG/DL (ref 65–140)
HCT VFR BLD AUTO: 27.7 % (ref 34.8–46.1)
HGB BLD-MCNC: 8.4 G/DL (ref 11.5–15.4)
IMM GRANULOCYTES # BLD AUTO: 0.05 THOUSAND/UL (ref 0–0.2)
IMM GRANULOCYTES NFR BLD AUTO: 0 % (ref 0–2)
IRON SATN MFR SERPL: 5 % (ref 15–50)
IRON SERPL-MCNC: 21 UG/DL (ref 50–170)
LIPASE SERPL-CCNC: 139 U/L (ref 11–82)
LYMPHOCYTES # BLD AUTO: 2.48 THOUSANDS/ÂΜL (ref 0.6–4.47)
LYMPHOCYTES NFR BLD AUTO: 16 % (ref 14–44)
MAGNESIUM SERPL-MCNC: 3 MG/DL (ref 1.9–2.7)
MCH RBC QN AUTO: 23.5 PG (ref 26.8–34.3)
MCHC RBC AUTO-ENTMCNC: 30.3 G/DL (ref 31.4–37.4)
MCV RBC AUTO: 78 FL (ref 82–98)
MONOCYTES # BLD AUTO: 1.73 THOUSAND/ÂΜL (ref 0.17–1.22)
MONOCYTES NFR BLD AUTO: 11 % (ref 4–12)
NEUTROPHILS # BLD AUTO: 11.34 THOUSANDS/ÂΜL (ref 1.85–7.62)
NEUTS SEG NFR BLD AUTO: 73 % (ref 43–75)
NRBC BLD AUTO-RTO: 0 /100 WBCS
PHOSPHATE SERPL-MCNC: 3.9 MG/DL (ref 2.3–4.1)
PLATELET # BLD AUTO: 453 THOUSANDS/UL (ref 149–390)
PMV BLD AUTO: 8.5 FL (ref 8.9–12.7)
POTASSIUM SERPL-SCNC: 4 MMOL/L (ref 3.5–5.3)
PROCALCITONIN SERPL-MCNC: 0.23 NG/ML
PROT SERPL-MCNC: 6.9 G/DL (ref 6.4–8.4)
RBC # BLD AUTO: 3.57 MILLION/UL (ref 3.81–5.12)
SODIUM SERPL-SCNC: 135 MMOL/L (ref 135–147)
TIBC SERPL-MCNC: 390 UG/DL (ref 250–450)
WBC # BLD AUTO: 15.67 THOUSAND/UL (ref 4.31–10.16)

## 2023-05-22 PROCEDURE — 38505 NEEDLE BIOPSY LYMPH NODES: CPT | Performed by: RADIOLOGY

## 2023-05-22 PROCEDURE — NC001 PR NO CHARGE: Performed by: RADIOLOGY

## 2023-05-22 PROCEDURE — 88342 IMHCHEM/IMCYTCHM 1ST ANTB: CPT | Performed by: PATHOLOGY

## 2023-05-22 PROCEDURE — 83735 ASSAY OF MAGNESIUM: CPT

## 2023-05-22 PROCEDURE — 80053 COMPREHEN METABOLIC PANEL: CPT

## 2023-05-22 PROCEDURE — 88341 IMHCHEM/IMCYTCHM EA ADD ANTB: CPT | Performed by: PATHOLOGY

## 2023-05-22 PROCEDURE — C9113 INJ PANTOPRAZOLE SODIUM, VIA: HCPCS

## 2023-05-22 PROCEDURE — 38505 NEEDLE BIOPSY LYMPH NODES: CPT

## 2023-05-22 PROCEDURE — 76942 ECHO GUIDE FOR BIOPSY: CPT | Performed by: RADIOLOGY

## 2023-05-22 PROCEDURE — 99233 SBSQ HOSP IP/OBS HIGH 50: CPT | Performed by: NURSE PRACTITIONER

## 2023-05-22 PROCEDURE — 0DBN8ZZ EXCISION OF SIGMOID COLON, VIA NATURAL OR ARTIFICIAL OPENING ENDOSCOPIC: ICD-10-PCS | Performed by: INTERNAL MEDICINE

## 2023-05-22 PROCEDURE — 84145 PROCALCITONIN (PCT): CPT

## 2023-05-22 PROCEDURE — 07B13ZX EXCISION OF RIGHT NECK LYMPHATIC, PERCUTANEOUS APPROACH, DIAGNOSTIC: ICD-10-PCS | Performed by: RADIOLOGY

## 2023-05-22 PROCEDURE — 85025 COMPLETE CBC W/AUTO DIFF WBC: CPT

## 2023-05-22 PROCEDURE — 83690 ASSAY OF LIPASE: CPT

## 2023-05-22 PROCEDURE — 99448 NTRPROF PH1/NTRNET/EHR 21-30: CPT | Performed by: NURSE PRACTITIONER

## 2023-05-22 PROCEDURE — 82728 ASSAY OF FERRITIN: CPT | Performed by: FAMILY MEDICINE

## 2023-05-22 PROCEDURE — 84100 ASSAY OF PHOSPHORUS: CPT

## 2023-05-22 PROCEDURE — 83540 ASSAY OF IRON: CPT | Performed by: FAMILY MEDICINE

## 2023-05-22 PROCEDURE — 99223 1ST HOSP IP/OBS HIGH 75: CPT | Performed by: INTERNAL MEDICINE

## 2023-05-22 PROCEDURE — 76942 ECHO GUIDE FOR BIOPSY: CPT

## 2023-05-22 PROCEDURE — 99222 1ST HOSP IP/OBS MODERATE 55: CPT | Performed by: INTERNAL MEDICINE

## 2023-05-22 PROCEDURE — 88305 TISSUE EXAM BY PATHOLOGIST: CPT | Performed by: PATHOLOGY

## 2023-05-22 PROCEDURE — 83550 IRON BINDING TEST: CPT | Performed by: FAMILY MEDICINE

## 2023-05-22 RX ORDER — LORAZEPAM 2 MG/ML
1 INJECTION INTRAMUSCULAR ONCE
Status: COMPLETED | OUTPATIENT
Start: 2023-05-22 | End: 2023-05-23

## 2023-05-22 RX ORDER — LIDOCAINE HYDROCHLORIDE 10 MG/ML
INJECTION, SOLUTION EPIDURAL; INFILTRATION; INTRACAUDAL; PERINEURAL AS NEEDED
Status: COMPLETED | OUTPATIENT
Start: 2023-05-22 | End: 2023-05-22

## 2023-05-22 RX ORDER — BISACODYL 5 MG/1
10 TABLET, DELAYED RELEASE ORAL ONCE
Status: COMPLETED | OUTPATIENT
Start: 2023-05-22 | End: 2023-05-22

## 2023-05-22 RX ADMIN — BUTALBITAL, ACETAMINOPHEN AND CAFFEINE 1 TABLET: 50; 325; 40 TABLET ORAL at 06:12

## 2023-05-22 RX ADMIN — MORPHINE SULFATE 2 MG: 2 INJECTION, SOLUTION INTRAMUSCULAR; INTRAVENOUS at 09:37

## 2023-05-22 RX ADMIN — PANTOPRAZOLE SODIUM 40 MG: 40 INJECTION, POWDER, FOR SOLUTION INTRAVENOUS at 09:38

## 2023-05-22 RX ADMIN — LIDOCAINE HYDROCHLORIDE 10 ML: 10 INJECTION, SOLUTION EPIDURAL; INFILTRATION; INTRACAUDAL; PERINEURAL at 14:43

## 2023-05-22 RX ADMIN — BUTALBITAL, ACETAMINOPHEN AND CAFFEINE 1 TABLET: 50; 325; 40 TABLET ORAL at 11:41

## 2023-05-22 RX ADMIN — MORPHINE SULFATE 2 MG: 2 INJECTION, SOLUTION INTRAMUSCULAR; INTRAVENOUS at 18:02

## 2023-05-22 RX ADMIN — ONDANSETRON 4 MG: 2 INJECTION INTRAMUSCULAR; INTRAVENOUS at 06:19

## 2023-05-22 RX ADMIN — LOSARTAN POTASSIUM 100 MG: 50 TABLET, FILM COATED ORAL at 09:37

## 2023-05-22 RX ADMIN — MORPHINE SULFATE 2 MG: 2 INJECTION, SOLUTION INTRAMUSCULAR; INTRAVENOUS at 22:21

## 2023-05-22 RX ADMIN — MORPHINE SULFATE 2 MG: 2 INJECTION, SOLUTION INTRAMUSCULAR; INTRAVENOUS at 04:06

## 2023-05-22 RX ADMIN — POLYETHYLENE GLYCOL 3350, SODIUM SULFATE ANHYDROUS, SODIUM BICARBONATE, SODIUM CHLORIDE, POTASSIUM CHLORIDE 4000 ML: 236; 22.74; 6.74; 5.86; 2.97 POWDER, FOR SOLUTION ORAL at 17:36

## 2023-05-22 RX ADMIN — SODIUM CHLORIDE, SODIUM GLUCONATE, SODIUM ACETATE, POTASSIUM CHLORIDE AND MAGNESIUM CHLORIDE 100 ML/HR: 526; 502; 368; 37; 30 INJECTION, SOLUTION INTRAVENOUS at 09:41

## 2023-05-22 RX ADMIN — HYDROCODONE BITARTRATE AND ACETAMINOPHEN 1 TABLET: 5; 325 TABLET ORAL at 20:08

## 2023-05-22 RX ADMIN — PRAVASTATIN SODIUM 80 MG: 40 TABLET ORAL at 16:28

## 2023-05-22 RX ADMIN — BISACODYL 10 MG: 5 TABLET, COATED ORAL at 16:28

## 2023-05-22 RX ADMIN — MORPHINE SULFATE 2 MG: 2 INJECTION, SOLUTION INTRAMUSCULAR; INTRAVENOUS at 12:57

## 2023-05-22 RX ADMIN — SODIUM CHLORIDE, SODIUM GLUCONATE, SODIUM ACETATE, POTASSIUM CHLORIDE AND MAGNESIUM CHLORIDE 100 ML/HR: 526; 502; 368; 37; 30 INJECTION, SOLUTION INTRAVENOUS at 20:08

## 2023-05-22 NOTE — CONSULTS
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  This is most apparent on coronal images  This is the classic appearance of a primary, advanced stage, lesion of the colon  It would be very unusual for metastatic disease to have such an appearance  There is also a very large lesion on the right mediastinum/lung  This is occluding the right mainstem bronchus  The entire lung has a chronic drowned appearance  I do not think there is any hope that this lung will undergo normal gas exchange of endobronchial obstruction is relieved  This mass is large and there is associated mediastinal and neck adenopathy  The chest mass is significantly larger than the colon mass  This makes metastatic disease less likely  Colon cancer should typically metastasized to the regional lymph nodes in the mesentery, then to the liver  It is possible, but uncommon for colon and rectal cancer to skip the liver  Therefore, it is possible, and probably unlikely, that she has a primary lung and a primary colon lesion  Given the complexity of evaluating multiple sites, I think it is reasonable to do the neck biopsy as an inpatient  The colon processes probably near occlusive, and would probably warrant colonoscopy and biopsy, even if the neck biopsy shows lung cancer  Medical Problems     Problem List     * (Principal) Abdominal pain    Nausea & vomiting    Diarrhea    Hypomagnesemia    Abnormal CT scan            Subjective:     Patient ID: Katarina Montenegro is a 58 y o  female  History of Present Illness  Masses of the right chest which would be consistent with a 3B non-small cell lung cancer  Mass of the right colon which is circumferential, and probably have advanced age as well      Review of Systems      Past Medical History:   Diagnosis Date   • Asthma    • [facility]       " History reviewed  No pertinent surgical history       Social History     Tobacco Use   Smoking Status Former   • Packs/day: 0 50   • Years: 15 00   • Pack years: 7 50   • Types: Cigarettes   • Quit date: 2022   • Years since quittin 5   Smokeless Tobacco Never        Social History     Substance and Sexual Activity   Alcohol Use Yes    Comment: couple times a year        Social History     Substance and Sexual Activity   Drug Use Never        Allergies   Allergen Reactions   • Penicillins Rash       Current Facility-Administered Medications   Medication Dose Route Frequency Provider Last Rate Last Admin   • acetaminophen (TYLENOL) tablet 650 mg  650 mg Oral Q6H PRN BRITTNY Sheppard       • butalbital-acetaminophen-caffeine (FIORICET,ESGIC) -40 mg per tablet 1 tablet  1 tablet Oral Q4H PRN BRITTNY Sheppard   1 tablet at 23 1141   • HYDROcodone-acetaminophen (NORCO) 5-325 mg per tablet 1 tablet  1 tablet Oral Q6H PRN BRITTNY Sheppard   1 tablet at 23 2146   • losartan (COZAAR) tablet 100 mg  100 mg Oral Daily BRITTNY Sheppard   100 mg at 23 2497   • morphine injection 2 mg  2 mg Intravenous Q4H PRN BRITTNY Sheppard   2 mg at 23 0986   • multi-electrolyte (PLASMALYTE-A/ISOLYTE-S PH 7 4) IV solution  100 mL/hr Intravenous Continuous BRITTNY Sheppard 100 mL/hr at 23 0941 100 mL/hr at 23 0941   • ondansetron (ZOFRAN) injection 4 mg  4 mg Intravenous Q6H PRN BRITTNY Sheppard   4 mg at 23 0552   • pantoprazole (PROTONIX) injection 40 mg  40 mg Intravenous Q24H [address] BRITTNY Sheppard   40 mg at 23 0403   • pravastatin (PRAVACHOL) tablet 80 mg  80 mg Oral Daily With [employer], CRNP   80 mg at 23 1631          Objective:    Vitals:    23 1534 23 1535 23 2234 23 0750   BP: 150/72  146/68 140/75   BP Location:       Pulse: 103  (!) 107 93   Resp: 20  19 20   Temp: 99 1 "°F (37 3 °C)  98 7 °F (37 1 °C) 98 °F (36 7 °C)   TempSrc:       SpO2: 97% 97% 95% 95%   Weight:  59 7 kg (131 lb 9 8 oz)     Height:  5' 2\" (1 575 m)          Physical Exam  I spent about 35 minutes communicating with the clinical team to coordinate care  No results found for: BNP   Lab Results   Component Value Date    WBC 15 67 (H) 05/22/2023    HGB 8 4 (L) 05/22/2023    HCT 27 7 (L) 05/22/2023    MCV 78 (L) 05/22/2023     (H) 05/22/2023     Lab Results   Component Value Date    INR 1 03 05/21/2023    PROTIME 13 5 05/21/2023     Lab Results   Component Value Date    PTT 26 05/21/2023         I have personally reviewed pertinent imaging and laboratory results  Code Status: Level 1 - Full Code  Advance Directive and Living Will:      Power of :    POLST:      IR has been consulted to evaluate the patient, determine the appropriate procedure, and whether or not a procedure can and should be performed  Thank you for allowing me to participate in the care of Alin Bryan  Please don't hesitate to call, text, email, or TigerText with any questions  This text is generated with voice recognition software  There may be translation, syntax,  or grammatical errors  If you have any questions, please contact the dictating provider         "

## 2023-05-22 NOTE — PLAN OF CARE
Problem: Potential for Falls  Goal: Patient will remain free of falls  Description: INTERVENTIONS:  - Educate patient/family on patient safety including physical limitations  - Instruct patient to call for assistance with activity   - Consult OT/PT to assist with strengthening/mobility   - Keep Call bell within reach  - Keep bed low and locked with side rails adjusted as appropriate  - Keep care items and personal belongings within reach  - Initiate and maintain comfort rounds  - Make Fall Risk Sign visible to staff  - Offer Toileting in advance of need  - Initiate/Maintain bed alarm  - Obtain necessary fall risk management equipment  - Apply yellow socks and bracelet for high fall risk patients  - Consider moving patient to room near nurses station  Outcome: Progressing     Problem: SAFETY ADULT  Goal: Patient will remain free of falls  Description: INTERVENTIONS:  - Educate patient/family on patient safety including physical limitations  - Instruct patient to call for assistance with activity   - Consult OT/PT to assist with strengthening/mobility   - Keep Call bell within reach  - Keep bed low and locked with side rails adjusted as appropriate  - Keep care items and personal belongings within reach  - Initiate and maintain comfort rounds  - Make Fall Risk Sign visible to staff  - Offer Toileting in advance of need  - Initiate/Maintain bed alarm  - Obtain necessary fall risk management equipment  - Apply yellow socks and bracelet for high fall risk patients  - Consider moving patient to room near nurses station  Outcome: Progressing  Goal: Maintain or return to baseline ADL function  Description: INTERVENTIONS:  - Educate patient/family on patient safety including physical limitations  - Instruct patient to call for assistance with activity   - Consult OT/PT to assist with strengthening/mobility   - Keep Call bell within reach  - Keep bed low and locked with side rails adjusted as appropriate  - Keep care items and personal belongings within reach  - Initiate and maintain comfort rounds  - Make Fall Risk Sign visible to staff  - Offer Toileting in advance of need  - Initiate/Maintain bed alarm  - Obtain necessary fall risk management equipment:   - Apply yellow socks and bracelet for high fall risk patients  - Consider moving patient to room near nurses station  Outcome: Progressing

## 2023-05-22 NOTE — PLAN OF CARE
Problem: Potential for Falls  Goal: Patient will remain free of falls  Description: INTERVENTIONS:  - Educate patient/family on patient safety including physical limitations  - Instruct patient to call for assistance with activity   - Consult OT/PT to assist with strengthening/mobility   - Keep Call bell within reach  - Keep bed low and locked with side rails adjusted as appropriate  - Keep care items and personal belongings within reach  - Initiate and maintain comfort rounds  - Make Fall Risk Sign visible to staff  - Offer Toileting in advance of need  - Initiate/Maintain bed alarm  - Obtain necessary fall risk management equipment  - Apply yellow socks and bracelet for high fall risk patients  - Consider moving patient to room near nurses station  Outcome: Progressing     Problem: PAIN - ADULT  Goal: Verbalizes/displays adequate comfort level or baseline comfort level  Description: Interventions:  - Encourage patient to monitor pain and request assistance  - Assess pain using appropriate pain scale  - Administer analgesics based on type and severity of pain and evaluate response  - Implement non-pharmacological measures as appropriate and evaluate response  - Consider cultural and social influences on pain and pain management  - Notify physician/advanced practitioner if interventions unsuccessful or patient reports new pain  Outcome: Progressing     Problem: INFECTION - ADULT  Goal: Absence or prevention of progression during hospitalization  Description: INTERVENTIONS:  - Assess and monitor for signs and symptoms of infection  - Monitor lab/diagnostic results  - Monitor all insertion sites, i e  indwelling lines, tubes, and drains  - Monitor endotracheal if appropriate and nasal secretions for changes in amount and color  - Simms appropriate cooling/warming therapies per order  - Administer medications as ordered  - Instruct and encourage patient and family to use good hand hygiene technique  - Identify and instruct in appropriate isolation precautions for identified infection/condition  Outcome: Progressing  Goal: Absence of fever/infection during neutropenic period  Description: INTERVENTIONS:  - Monitor WBC    Outcome: Progressing     Problem: SAFETY ADULT  Goal: Patient will remain free of falls  Description: INTERVENTIONS:  - Educate patient/family on patient safety including physical limitations  - Instruct patient to call for assistance with activity   - Consult OT/PT to assist with strengthening/mobility   - Keep Call bell within reach  - Keep bed low and locked with side rails adjusted as appropriate  - Keep care items and personal belongings within reach  - Initiate and maintain comfort rounds  - Make Fall Risk Sign visible to staff  - Offer Toileting in advance of need  - Initiate/Maintain bed alarm  - Obtain necessary fall risk management equipment  - Apply yellow socks and bracelet for high fall risk patients  - Consider moving patient to room near nurses station  Outcome: Progressing  Goal: Maintain or return to baseline ADL function  Description: INTERVENTIONS:  - Educate patient/family on patient safety including physical limitations  - Instruct patient to call for assistance with activity   - Consult OT/PT to assist with strengthening/mobility   - Keep Call bell within reach  - Keep bed low and locked with side rails adjusted as appropriate  - Keep care items and personal belongings within reach  - Initiate and maintain comfort rounds  - Make Fall Risk Sign visible to staff  - Offer Toileting in advance of need  - Initiate/Maintain bed alarm  - Obtain necessary fall risk management equipment:   - Apply yellow socks and bracelet for high fall risk patients  - Consider moving patient to room near nurses station  Outcome: Progressing     Problem: DISCHARGE PLANNING  Goal: Discharge to home or other facility with appropriate resources  Description: INTERVENTIONS:  - Identify barriers to discharge w/patient and caregiver  - Arrange for needed discharge resources and transportation as appropriate  - Identify discharge learning needs (meds, wound care, etc )  - Refer to Case Management Department for coordinating discharge planning if the patient needs post-hospital services based on physician/advanced practitioner order or complex needs related to functional status, cognitive ability, or social support system  Outcome: Progressing     Problem: Knowledge Deficit  Goal: Patient/family/caregiver demonstrates understanding of disease process, treatment plan, medications, and discharge instructions  Description: Complete learning assessment and assess knowledge base    Interventions:  - Provide teaching at level of understanding  - Provide teaching via preferred learning methods  Outcome: Progressing     Problem: GASTROINTESTINAL - ADULT  Goal: Minimal or absence of nausea and/or vomiting  Description: INTERVENTIONS:  - Administer IV fluids if ordered to ensure adequate hydration  - Maintain NPO status until nausea and vomiting are resolved  - Nasogastric tube if ordered  - Administer ordered antiemetic medications as needed  - Provide nonpharmacologic comfort measures as appropriate  - Advance diet as tolerated, if ordered  - Consider nutrition services referral to assist patient with adequate nutrition and appropriate food choices  Outcome: Progressing  Goal: Maintains or returns to baseline bowel function  Description: INTERVENTIONS:  - Assess bowel function  - Encourage oral fluids to ensure adequate hydration  - Administer IV fluids if ordered to ensure adequate hydration  - Administer ordered medications as needed  - Encourage mobilization and activity  - Consider nutritional services referral to assist patient with adequate nutrition and appropriate food choices  Outcome: Progressing  Goal: Maintains adequate nutritional intake  Description: INTERVENTIONS:  - Monitor percentage of each meal consumed  - Identify factors contributing to decreased intake, treat as appropriate  - Assist with meals as needed  - Monitor I&O, weight, and lab values if indicated  - Obtain nutrition services referral as needed  Outcome: Progressing  Goal: Oral mucous membranes remain intact  Description: INTERVENTIONS  - Assess oral mucosa and hygiene practices  - Implement preventative oral hygiene regimen  - Implement oral medicated treatments as ordered  - Initiate Nutrition services referral as needed  Outcome: Progressing     Problem: GENITOURINARY - ADULT  Goal: Maintains or returns to baseline urinary function  Description: INTERVENTIONS:  - Assess urinary function  - Encourage oral fluids to ensure adequate hydration if ordered  - Administer IV fluids as ordered to ensure adequate hydration  - Administer ordered medications as needed  - Offer frequent toileting  - Follow urinary retention protocol if ordered  Outcome: Progressing     Problem: METABOLIC, FLUID AND ELECTROLYTES - ADULT  Goal: Electrolytes maintained within normal limits  Description: INTERVENTIONS:  - Monitor labs and assess patient for signs and symptoms of electrolyte imbalances  - Administer electrolyte replacement as ordered  - Monitor response to electrolyte replacements, including repeat lab results as appropriate  - Instruct patient on fluid and nutrition as appropriate  Outcome: Progressing  Goal: Fluid balance maintained  Description: INTERVENTIONS:  - Monitor labs   - Monitor I/O and WT  - Instruct patient on fluid and nutrition as appropriate  - Assess for signs & symptoms of volume excess or deficit  Outcome: Progressing  Goal: Glucose maintained within target range  Description: INTERVENTIONS:  - Monitor Blood Glucose as ordered  - Assess for signs and symptoms of hyperglycemia and hypoglycemia  - Administer ordered medications to maintain glucose within target range  - Assess nutritional intake and initiate nutrition service referral as needed  Outcome: Progressing

## 2023-05-22 NOTE — ASSESSMENT & PLAN NOTE
· Presentedwith nausea and vomiting and right upper and lower quadrant pain  · CT imaging see above  · Zofran as needed  · IV fluids  · Clear liquids as tolerated

## 2023-05-22 NOTE — ASSESSMENT & PLAN NOTE
· Presented with serum magnesium 1 5  · Likely secondary to vomiting and diarrhea  · Repleted with 4 g IV magnesium   · Monitor magnesium periodically

## 2023-05-22 NOTE — NUTRITION
"   05/22/23 1557   Biochemical Data,Medical Tests, and Procedures   Biochemical Data/Medical Tests/Procedures Lab values reviewed; Meds reviewed   Labs (Comment) 5/22/23 iron saturation 5, iron 21, AST 11, ALT 6, Mg 3 0, lipase 139, H&H 8 4/27 7   Meds (Comment) protonix, pravastatin, magnesium sulfate   Nutrition-Focused Physical Exam   Nutrition-Focused Physical Exam Findings RN skin assessment reviewed; No edema documented; No skin issues documented   Nutrition-Focused Physical Exam Findings pt with blankets pulled up, lights dim, experiencing abdominal pain - NFPE deferred to follow up   Medical-Related Concerns gastritis vs gastroenteritis, new cancer suspected, type 2 diabetes   Current PO Intake   Current Diet Order Clear Liquids   Current Meal Intake Other (Comment)  (Clear liquid diet)   Estimated calorie intake compared to estimated need Nutrient needs are not met  PES Statement   Oral or Nutritional Support Intake (2) Inadequate oral intake NI-2 1   Related to Increased energy needs; Increased PRO needs   As evidenced by: Intake < estimated needs; Other (Comment)  (new cancer diagnosis)   Recommendations/Interventions   Malnutrition/BMI Present No  (will continue to monitor)   Summary Nutrition Risk - weight loss, poor PO  Presents with abdominal pain and vomiting - diagnosed with gastritis 2 weeks ago  Past medical history significant for diabetes, HTN  Unknown krish history per EMR  Per 5/22 GI note, \"She has had an approximate 40 lb weight loss over the last 4 years, but with the use of Ozempic  \" Skin integrity intact, no pressure areas noted per flow sheets  Ordered for Clear Liquid diet  Pt reports her appetite is fine usually  Decreased at present  She has small consistent meals throughout the day, \"I graze  \" Pt endorses food allergy to shellfish and stone fruits  Pt with type 2 diabetes, on home regimen of Metformin and is mindful for carbohydrate intake  Reports usual body weight at 120#   States she " was ~115# around [place] time and has gained 5# in past months - desirable weight increase  Pt is agreeable to all nutrition supplements as this time - prefers chocolate flavor  Will order Ensure Clear BID  When diet is advanced, recommend Glucerna as tolerated  RD to follow closely  Interventions/Recommendations Diet to advance; Supplement initiate;Monitor I & O's   Education Assessment   Education Education not indicated at this time;Patient/caregiver not appropriate for education at this time   Patient Nutrition Goals   Goal Tolerate PO diet; Adequate hydration

## 2023-05-22 NOTE — ASSESSMENT & PLAN NOTE
· Presented for right upper lower quadrant abdominal pain with nausea vomiting and diarrhea   · Leukocytosis with WBC 21 9  · Procalcitonin 0 05  · Lactic acid 1 4  · Serum lipase 197  · CT chest abdomen pelvis: Heterogeneously enhancing right suprahilar lesion with abrupt cut off of the right mainstem bronchus suspicious for malignancy    There is secondary complete atelectasis of the right lung with heterogeneous density  Small to moderate right pleural effusion is noted  Few subcentimeter left lung nodules are noted  Evidence of mediastinal and right supraclavicular adenopathy  Heterogeneous, rim-enhancing lesion at the proximal pancreatic body, possible metastatic focus  Pathologically enlarged celiac axis lymph nodes  Focal annular lesion involving the ascending colon, suspicious for metastatic focus versus primary malignancy  · Patient received IV cefepime and Vanco in the ED, will hold antibiotic for now given negative procalcitonin and afebrile  · Repeat procalcitonin 0 23  This is within defined limits, increased value could be part due to malignancy  Will recheck again tomorrow  · Monitor labs and vital signs  · GI recommendations appreciated  Patient to undergo EGD and colonoscopy  · Pulmonology recommendations appreciated  Recommendation is biopsy of large right supraclavicular node  Discussed with IR  · Oncology recommendations appreciated    Due to headaches obtain MRI brain with and without contrast

## 2023-05-22 NOTE — UTILIZATION REVIEW
"Initial Clinical Review    Admission: Date/Time/Statement:   Admission Orders (From admission, onward)     Ordered        05/21/23 1434  [address] [place] [place]  Once                      Orders Placed This Encounter   Procedures   • INPATIENT ADMISSION     Standing Status:   Standing     Number of Occurrences:   1     Order Specific Question:   Level of Care     Answer:   Med Surg [16]     Order Specific Question:   Estimated length of stay     Answer:   More than 2 Midnights     Order Specific Question:   Certification     Answer:   I certify that inpatient services are medically necessary for this patient for a duration of greater than two midnights  See H&P and MD Progress Notes for additional information about the patient's course of treatment  ED Arrival Information     Expected   -    Arrival   5/21/2023 12:03    Acuity   Urgent            Means of arrival   Ambulance    Escorted by   [facility]    Service   Hospitalist    Admission type   Emergency            Arrival complaint   Abdominal pain           Chief Complaint   Patient presents with   • Abdominal Pain   • Vomiting     Diagnosed with gastritis 2 weeks ago ( similar event) vomiting and diarrhea started this morning around 6 am        Initial Presentation: 58 y o  female with PMH of asthma and DM who presents with abdominal pain that started 2 days ago and nausea vomiting and diarrhea that started at 6 AM today  States that abdominal pain is right upper and lower quadrant, cramping in nature, varying in intensity but \"always there\"  Denies any fever or chills, denies loss of appetite, denies any recent weight loss  Patient states she has never had a colonoscopy, EGD, Pap smear or mammogram  Does not have a primary care physician  She reports she is a former smoker quit 6 months ago prior to that smoked just under a pack a day for 15 years  CT chest/abd/pelvis suspicious for malignancy   Plan: Inpatient admission for evaluation and treatment of " "abdominal pain, abnormal CT scan, hypomagnesemia, diarrhea, nausea/vopmiting: repeat procal and CBC in am, consult GI, Pulmonology and Hem/Onc, replete Mg and recheck level, obtain stool studies, IV fluids, clear liquid diet  Date: 5/22   Day 2:     GI consult: clear liquid diet, IV fluids, anti emetics, follow blood cultures, plan for colonoscopy, monitor Hgb and transfuse for Hgb < 7, abd exams, monitor stools for bleeding  Pulmonology consult: There is a large right supraclavicular lymph node that is likely the best target for biopsy  Recommend IR consultation for core biopsy or surgical consult for excisional biopsy which would also be least invasive  I recommend Radiation oncology consultation for the right lung given the complete collapse  Also recommend MRI brain w/ and w/o contrast since she is complaining of migraines  IR consult: Classic \"apple core\" lesion of the right colon  This is most apparent on coronal images  This is the classic appearance of a primary, advanced stage, lesion of the colon  It would be very unusual for metastatic disease to have such an appearance  There is also a very large lesion on the right mediastinum/lung  This is occluding the right mainstem bronchus  The entire lung has a chronic drowned appearance  I do not think there is any hope that this lung will undergo normal gas exchange of endobronchial obstruction is relieved  This mass is large and there is associated mediastinal and neck adenopathy  The chest mass is significantly larger than the colon mass  This makes metastatic disease less likely  Colon cancer should typically metastasized to the regional lymph nodes in the mesentery, then to the liver  It is possible, but uncommon for colon and rectal cancer to skip the liver  Therefore, it is possible, and probably likely, that she has a primary lung and a primary colon lesion   Given the complexity of evaluating multiple sites, I think it is reasonable to do the neck " biopsy as an inpatient  Hem/Onc consult: MRI brain w/ and w/o contrast since she is complaining of migraines  Will need colonoscopy with biopsy to obtain tissue diagnosis  GI also planning to pursue upper endoscopy as well  Follow-up on pancreatic lesion which could be metastatic versus primary  Agree with checking tumor markers CEA, CA 19-9 and alpha-fetoprotein which were already ordered and pending  ED Triage Vitals   Temperature Pulse Respirations Blood Pressure SpO2   05/21/23 1207 05/21/23 1207 05/21/23 1207 05/21/23 1207 05/21/23 1207   (!) 97 °F (36 1 °C) (!) 108 22 (!) 200/87 97 %      Temp Source Heart Rate Source Patient Position - Orthostatic VS BP Location FiO2 (%)   05/21/23 1207 05/21/23 1207 05/21/23 1300 05/21/23 1207 --   Tympanic Monitor Lying Left arm       Pain Score       05/21/23 1207       8          Wt Readings from Last 1 Encounters:   05/21/23 59 7 kg (131 lb 9 8 oz)     Additional Vital Signs:     Date/Time Temp Pulse Resp BP MAP (mmHg) SpO2 O2 Device   05/22/23 07:50:35 98 °F (36 7 °C) 93 20 140/75 97 95 % --   05/21/23 22:34:27 98 7 °F (37 1 °C) 107 Abnormal  19 146/68 94 95 % --   05/21/23 1535 -- -- -- -- -- 97 % None (Room air)   05/21/23 15:34:47 99 1 °F (37 3 °C) 103 20 150/72 98 97 % --   05/21/23 15:34:28 99 1 °F (37 3 °C) -- 20 150/72 98 -- --   05/21/23 1430 -- 103 16 174/85 Abnormal  122 95 % None (Room air)   05/21/23 1400 -- 102 18 158/87 -- 98 % None (Room air)   05/21/23 1330 -- 107 Abnormal  18 160/73 105 97 % None (Room air)   05/21/23 1300 -- 101 18 161/71 102 97 % None (Room air)   05/21/23 1236 -- 107 Abnormal  19 180/85 Abnormal  122 -- --     Pertinent Labs/Diagnostic Test Results:   CT chest abdomen pelvis w contrast   Final Result by Luis Brar MD (05/21 5868)   1  Heterogeneously enhancing right suprahilar lesion with abrupt cut off of the right mainstem bronchus suspicious for malignancy    There is secondary complete atelectasis of the right lung with heterogeneous density  Small to moderate right pleural    effusion is noted  Few subcentimeter left lung nodules are noted  Evidence of mediastinal and right supraclavicular adenopathy  2  Heterogeneous, rim-enhancing lesion at the proximal pancreatic body, possible metastatic focus  Pathologically enlarged celiac axis lymph nodes  3  Focal annular lesion involving the ascending colon, suspicious for metastatic focus versus primary malignancy           I personally discussed this study with Clara Reed III on 5/21/2023 1:56 PM             Workstation performed: [id number]           5/21 EKG:  Sinus tachycardia  Incomplete right bundle branch block  Low voltage QRS  Otherwise normal ECG  No previous ECGs available      Results from last 7 days   Lab Units 05/22/23  0421 05/21/23  1209   WBC Thousand/uL 15 67* 21 91*   HEMOGLOBIN g/dL 8 4* 9 5*   HEMATOCRIT % 27 7* 32 2*   PLATELETS Thousands/uL 453* 573*   NEUTROS ABS Thousands/µL 11 34* 18 60*         Results from last 7 days   Lab Units 05/22/23  0421 05/21/23  1209   SODIUM mmol/L 135 134*   POTASSIUM mmol/L 4 0 4 0   CHLORIDE mmol/L 101 100   CO2 mmol/L 23 23   ANION GAP mmol/L 11 11   BUN mg/dL 12 13   CREATININE mg/dL 0 67 0 67   EGFR ml/min/1 73sq m 94 94   CALCIUM mg/dL 8 8 9 8   MAGNESIUM mg/dL 3 0* 1 5*   PHOSPHORUS mg/dL 3 9  --      Results from last 7 days   Lab Units 05/22/23  0421 05/21/23  1209   AST U/L 11* 9*   ALT U/L 6* 7   ALK PHOS U/L 59 65   TOTAL PROTEIN g/dL 6 9 7 6   ALBUMIN g/dL 3 8 4 2   TOTAL BILIRUBIN mg/dL 0 24 0 24         Results from last 7 days   Lab Units 05/22/23  0421 05/21/23  1209   GLUCOSE RANDOM mg/dL 133 195*           Results from last 7 days   Lab Units 05/21/23  1209   HS TNI 0HR ng/L 3         Results from last 7 days   Lab Units 05/21/23  1209   PROTIME seconds 13 5   INR  1 03   PTT seconds 26         Results from last 7 days   Lab Units 05/22/23  0421 05/21/23  1209   PROCALCITONIN ng/ml 0 23 <0 05     Results from last 7 days   Lab Units 05/21/23  1228   LACTIC ACID mmol/L 1 4           Results from last 7 days   Lab Units 05/22/23  0421 05/21/23  1209   LIPASE u/L 139* 197*           Results from last 7 days   Lab Units 05/21/23  1555   CLARITY UA  Clear   COLOR UA  Yellow   SPEC GRAV UA  1 010   PH UA  6 5   GLUCOSE UA mg/dl Negative   KETONES UA mg/dl Negative   BLOOD UA  Trace-Intact*   PROTEIN UA mg/dl Trace*   NITRITE UA  Negative   BILIRUBIN UA  Negative   UROBILINOGEN UA E U /dl 0 2   LEUKOCYTES UA  Negative   WBC UA /hpf None Seen   RBC UA /hpf 0-1   BACTERIA UA /hpf None Seen   EPITHELIAL CELLS WET PREP /hpf Occasional   MUCUS THREADS  Occasional*           Results from last 7 days   Lab Units 05/21/23  1236 05/21/23  1228   BLOOD CULTURE  Received in Microbiology Lab  Culture in Progress  Received in Microbiology Lab  Culture in Progress           ED Treatment:   Medication Administration from 05/21/2023 1203 to 05/21/2023 1523       Date/Time Order Dose Route Action     05/21/2023 1321 EDT sodium chloride 0 9 % bolus 1,000 mL 1,000 mL Intravenous New Bag     05/21/2023 1315 EDT iohexol (OMNIPAQUE) 350 MG/ML injection (SINGLE-DOSE) 100 mL 100 mL Intravenous Given     05/21/2023 1347 EDT fentanyl citrate (PF) 100 MCG/2ML 50 mcg 50 mcg Intravenous Given     05/21/2023 1347 EDT ondansetron (ZOFRAN) injection 4 mg 4 mg Intravenous Given     05/21/2023 1351 EDT cefepime (MAXIPIME) IVPB (premix in dextrose) 2,000 mg 50 mL 2,000 mg Intravenous New Bag     05/21/2023 1421 EDT vancomycin (VANCOCIN) 1500 mg in sodium chloride 0 9% 250 mL IVPB 1,500 mg Intravenous New Bag        Past Medical History:   Diagnosis Date   • Asthma    • Diabetes mellitus ([facility] [address] )      Present on Admission:  • Abdominal pain  • Abnormal CT scan      Admitting Diagnosis: Leukocytosis [D72 829]  Vomiting [R11 10]  Abdominal pain [R10 9]  Anemia [D64 9]  Abnormal CT scan [R93 89]  Age/Sex: 58 y o  female  Admission Orders:  Scheduled Medications:  losartan, 100 mg, Oral, Daily  pantoprazole, 40 mg, Intravenous, Q24H MAGALI  pravastatin, 80 mg, Oral, Daily With Dinner      Continuous IV Infusions:  multi-electrolyte, 100 mL/hr, Intravenous, Continuous      PRN Meds:  acetaminophen, 650 mg, Oral, Q6H PRN  butalbital-acetaminophen-caffeine, 1 tablet, Oral, Q4H PRN  HYDROcodone-acetaminophen, 1 tablet, Oral, Q6H PRN  morphine injection, 2 mg, Intravenous, Q4H PRN  ondansetron, 4 mg, Intravenous, Q6H PRN        IP CONSULT TO GASTROENTEROLOGY  IP CONSULT TO PULMONOLOGY  IP CONSULT TO ONCOLOGY  INPATIENT CONSULT TO IR    Network Utilization Review Department  ATTENTION: Please call with any questions or concerns to [phone] and carefully listen to the prompts so that you are directed to the right person  All voicemails are confidential   Young Adjutant all requests for admission clinical reviews, approved or denied determinations and any other requests to dedicated fax number below belonging to the campus where the patient is receiving treatment   List of dedicated fax numbers for the Facilities:  [address] DENIALS (Administrative/Medical Necessity) [phone]   [address] [place] [address] (Maternity/NICU/Pediatrics) [phone]   [address] V[address]leta [address]man [phone]   [facility] [phone]   [address] [address] [facility] [phone] 1550 [facility] 134 815 [facility] [phone]

## 2023-05-22 NOTE — CONSULTS
e-Consult (IPC)   Shahla Lilly 58 y o  female MRN: [id number]  Unit/Bed#: -01 Encounter: [id number]      Reason for Consult / Principal Problem: Abnormal CT scan highly suspicious for malignant process    Consults  05/22/23      ASSESSMENT:  Patient is a 60-year-old female with past medical history of diabetes, asthma, hypertension, hyperlipidemia and history of tobacco abuse x15 years quit about 6 months ago 11/2022  Reportedly according to H&P has never had screening colonoscopy, GYN exam or screening mammography  Resides in [place] and is camping locally at present moment  Presented to the emergency department yesterday with reports of significant right abdominal pain, nausea, vomiting and diarrhea that started yesterday morning  Reportedly had similar episode about 2 weeks ago was evaluated in [place] and told she had gastroenteritis  CT scan chest abdomen and pelvis with contrast during ER evaluation 5/21/2023 showed:  IMPRESSION:  1  Heterogeneously enhancing right suprahilar lesion with abrupt cut off of the right mainstem bronchus suspicious for malignancy    There is secondary complete atelectasis of the right lung with heterogeneous density  Small to moderate right pleural   effusion is noted  Few subcentimeter left lung nodules are noted  Evidence of mediastinal and right supraclavicular adenopathy  2  Heterogeneous, rim-enhancing lesion at the proximal pancreatic body, possible metastatic focus  Pathologically enlarged celiac axis lymph nodes  3  Focal annular lesion involving the ascending colon, suspicious for metastatic focus versus primary malignancy  Labs this morning showed leukocytosis trending down WBC 15 67 with neutrophilia/monocytosis, she has microcytic hypochromic anemia H&H 8 4/27 7, MCV 78, MCH 23 5, mild thrombocytosis platelet count 594  Creatinine 0 67, GFR 94, liver enzymes are essentially normal   Lipase was slightly elevated    Complete iron panel is still pending however she seems to be iron deficient iron saturation 5%, serum iron 21, ferritin pending  Coags yesterday were normal     RECOMMENDATIONS:  1  Lung mass: Revealed right suprahilar mass measuring about 6 7 cm highly suspicious for primary lung malignancy  Also noted left lung nodules, small to moderate pleural effusion and mediastinal lymphadenopathy  Was seen by pulmonology  Already evaluated by IR for biopsy who is planning to pursue lymph node biopsy from the neck today  Agree with pulmonology- would recommend MRI of the brain with and without contrast to rule out metastasis particularly since she is reporting migraines  She will need to follow-up with us once tissue diagnosis is established to complete work-up and discuss/pursue treatment options  PET CT scan will likely need to be pursued as an outpatient  2   Ascending colon mass: Patient reported with GI symptoms abdominal pain, nausea, vomiting and diarrhea  Highly suspicious for synchronous primary colon malignancy  Seen by GI  Will need colonoscopy with biopsy to obtain tissue diagnosis  They are also planning to pursue upper endoscopy as well  Follow-up on pancreatic lesion which could be metastatic versus primary  Agree with checking tumor markers CEA, CA 19-9 and alpha-fetoprotein which were already ordered and pending  3   Pancreatic lesion    4  Microcytic anemia: She seems to be iron deficient according to her preliminary iron panel which is the likely reason for her microcytic anemia  Possibly due to blood loss from colon mass  If infection is ruled out consider few doses of IV Venofer while inpatient  Will order limited additional anemia work-up to rule out other nutritional deficiencies/hemolysis  5   Leukocytosis: Trending downward  Infectious work-up in process  Suspect this is likely reactive in nature due to inflammatory process/malignancy  Check inflammatory markers  Continue to monitor      6  Thrombocytosis: Likely reactive in nature due to iron deficiency/inflammatory process  Reviewed patient's case and imaging/studies with Dr Celeste Ramirez who agrees with the above assessment and plan  We can assist with arranging for outpatient hematology/oncology follow-up once pathology/diagnosis is established- if she decides to get treated locally  Is reported patient lives in [place]  Please feel free to contact us with any further questions or concerns in the interim  21-30 minutes, >50% of the total time devoted to medical consultative verbal/EMR discussion between providers  Written report will be generated in the EMR      BRITTNY Irizarry

## 2023-05-22 NOTE — CONSULTS
Consultation - [facility]  Yamilet Oliva 58 y o  female MRN: [id number]  Unit/Bed#: -01 Encounter: [id number]        Inpatient consult to gastroenterology  Consult performed by: Maricel Jensen PA-C  Consult ordered by: BRITTNY Sheppard          Reason for Consult / Principal Problem:     Abdominal pain  Abnormal CT scan      ASSESSMENT AND PLAN:      Patient is a 58 y o  female with PMH significant for asthma, DM2 and hx of tobacco use admitted on 5/21 for right upper and lower abdominal pain with nausea, NB/NB vomiting and diarrhea  Patient had imaging notable for a right suprahilar lesion suspicious for malignancy in addition to a lesion of the proximal pancreas body c/f metastasis and a focal annular lesion of the ascending colon c/f metastasis vs primary malignancy  Also noted to have atelectasis, small-moderate right pleural effusion and mediastinal and right supraclavicular adenopathy  Serologies on admission notable for leukocytosis (21 91), microcytic anemia (hgb 9 5), thrombocytosis (573 K), hypomagnesia (1 5), elevated lipase (197)  Stool studies including stool enteric bacterial panel, C  difficile, Giardia and O&P ordered but not yet collected  BC x2 collected and in process  1  Abdominal pain  2  Nausea, vomiting and diarrhea  3  Elevated lipase  4  Elevated white count  5  Microcytic anemia  6  Abnormal CT scan of lungs, pancreas, and colon  Clinical picture is most concerning for a primary lung malignancy with metastasis to both the pancreas and colon vs additional primary colonic malignancy  Presenting symptoms are most likely a sequale of suspected malignancy with metastasis, but cannot definitively exclude an alternative etiology such as a viral gastroenteritis or other infectious etiology  Infectious stool studies were ordered upon admission   Elevated lipase is likely reactive in the absence of focal epigastric abdominal pain or radiographic findings of acute pancreatitis  Also noted to have new microcytic anemia in the absence of overt GI bleeding possibly secondary to occult GI bleeding  Ordered iron studies in addition to CEA and CA 19-9  Reports improved abdominal pain but persistent nausea  Given no previous endoscopic evaluation of any kind, she will likely require a colonoscopy with biopsies for confirmation of diagnosis  However, with ongoing nausea drinking a bowel prep may prove difficult  Pulmonology and oncology also consulted, appreciate recommendations  If oncology agrees with the need for a colonoscopy, will tentatively plan for an extended Golytely bowel prep in anticipation of colonoscopy either tomorrow or Wednesday - Depending on the quality of patient's bowel prep  - Clear liquid diet  - Continue IV fluid hydration  - Continue antiemetics/analgesics PRN  - Received abx in the ED but currently being held as per primary team  - Collect and follow-up stool studies if able  - Follow-up BCx2  - Ordered iron studies, CEA and CA 19-9  - Tentatively plan for extended Golytely bowel prep in anticipation of colonoscopy (timing TBD) following discussion with pulmonology, oncology and attending physician   - Monitor fever and WBC curve closely  - Monitor abdominal exams closely  - Monitor hgb; Transfuse for hgb <7 0   - Monitor stools for evidence of overt GI bleeding  - Pulmonology following, appreciate recommendations  - Oncology following, appreciate recommendations    GI will continue to follow  ______________________________________________________________________    HPI: Patient is a 58 y o  female with PMH significant for asthma and DM2 who presented to the ED on 5/21 for abdominal pain with nausea/vomiting and diarrhea  Patient reports right upper and lower quadrant abdominal pain x2 days  States her pain waxes and wanes in severity, but is always constant  Describes this as a cramping sensation   Also developed nausea with nonbloody/nonbilious vomiting and diarrhea prompting evaluation in the ED  States she had a similar episode 2 weeks prior, which was attributed to gastroenteritis  Denies recent travel outside the country, recent antibiotic use, eating undercooked/poorly prepared foods or others in the house with similar symptoms  Denies jaundice, fever/chills, constipation, rectal bleeding or other changes in her bowel habits  She has had an approximate 40 lb weight loss over the last 4 years, but with the use of Ozempic  Her wife, present at bedside, does state that she has noticed patient's health deteriorate over the last 3 years  Denies any previous endoscopic evaluation (EGD or colonoscopy)  States she is adopted and her family history is unknown, but did complete a 23 and Me notable for a genetic predisposition to ?blood disorders  +Hx of tobacco use, previously smoking 1PPD x15 years, quit 6 months prior  CT C/A/P notable for a heterogeneously enhancing right suprahilar lesion with abrupt cut off of the right mainstream bronchus suspicious for malignancy, atelectasis and small-moderate right pleural effusion, mediastinal and right supraclavicular adenopathy, heterogeneous rim-enhancing lesion in the proximal pancreas body c/f metastasis and a focal annular lesion involving the ascending colon c/f metastasis vs primary malignancy  Serologies on admission notable for leukocytosis (21 91), microcytic anemia (hgb 9 5), thrombocytosis (573 K), hypomagnesia (1 5), elevated lipase (197)  Stool studies including stool enteric bacterial panel, C  difficile, Giardia and O&P ordered but not yet collected  BC x2 collected and in process  Previous labs (2021) grossly within normal limits with the exception of leukocytosis (14K)  REVIEW OF SYSTEMS:    CONSTITUTIONAL: Denies any fever, chills, rigors, and weight loss  HEENT: No earache or tinnitus  Denies hearing loss or visual disturbances    CARDIOVASCULAR: No chest pain or palpitations  RESPIRATORY: Denies any cough, hemoptysis, shortness of breath or dyspnea on exertion  GASTROINTESTINAL: As noted in the History of Present Illness  GENITOURINARY: No problems with urination  Denies any hematuria or dysuria  NEUROLOGIC: No dizziness or vertigo, denies headaches  MUSCULOSKELETAL: Denies any muscle or joint pain  SKIN: Denies skin rashes or itching  ENDOCRINE: Denies excessive thirst  Denies intolerance to heat or cold  PSYCHOSOCIAL: Denies depression or anxiety  Denies any recent memory loss  Historical Information   Past Medical History:   Diagnosis Date   • Asthma    • Diabetes mellitus ([facility] [address] )      History reviewed  No pertinent surgical history  Social History   Social History     Substance and Sexual Activity   Alcohol Use Yes    Comment: couple times a year     Social History     Substance and Sexual Activity   Drug Use Never     Social History     Tobacco Use   Smoking Status Former   • Packs/day: 0 50   • Years: 15 00   • Pack years: 7 50   • Types: Cigarettes   • Quit date: 2022   • Years since quittin 5   Smokeless Tobacco Never     History reviewed  No pertinent family history      Meds/Allergies     Medications Prior to Admission   Medication   • butalbital-aspirin-caffeine (FIORINAL) -40 mg per capsule   • metFORMIN (GLUCOPHAGE) 1000 MG tablet   • rosuvastatin (CRESTOR) 10 MG tablet   • valsartan-hydrochlorothiazide (DIOVAN-HCT) 160-12 5 MG per tablet     Current Facility-Administered Medications   Medication Dose Route Frequency   • acetaminophen (TYLENOL) tablet 650 mg  650 mg Oral Q6H PRN   • butalbital-acetaminophen-caffeine (FIORICET,ESGIC) -40 mg per tablet 1 tablet  1 tablet Oral Q4H PRN   • HYDROcodone-acetaminophen (NORCO) 5-325 mg per tablet 1 tablet  1 tablet Oral Q6H PRN   • losartan (COZAAR) tablet 100 mg  100 mg Oral Daily   • morphine injection 2 mg  2 mg Intravenous Q4H PRN   • multi-electrolyte "(PLASMALYTE-A/ISOLYTE-S PH 7 4) IV solution  100 mL/hr Intravenous Continuous   • ondansetron (ZOFRAN) injection 4 mg  4 mg Intravenous Q6H PRN   • pantoprazole (PROTONIX) injection 40 mg  40 mg Intravenous Q24H MAGALI   • pravastatin (PRAVACHOL) tablet 80 mg  80 mg Oral Daily With Dinner       Allergies   Allergen Reactions   • Penicillins Rash           Objective     Blood pressure 140/75, pulse 93, temperature 98 °F (36 7 °C), resp  rate 20, height 5' 2\" (1 575 m), weight 59 7 kg (131 lb 9 8 oz), SpO2 95 %  Body mass index is 24 07 kg/m²  Intake/Output Summary (Last 24 hours) at 5/22/2023 0834  Last data filed at 5/22/2023 0750  Gross per 24 hour   Intake 1170 ml   Output 100 ml   Net 1070 ml         PHYSICAL EXAM:      General Appearance:   Alert, cooperative, no distress   HEENT:   Normocephalic, atraumatic, anicteric  Neck:  Supple, symmetrical, trachea midline   Lungs:   Clear to auscultation bilaterally; no rales, rhonchi or wheezing; respirations unlabored    Heart[de-identified]   Regular rate and rhythm; no murmur, rub, or gallop     Abdomen:   Soft, non-tender, non-distended; normal bowel sounds; no masses, no organomegaly    Genitalia:   Deferred    Rectal:   Deferred    Extremities:  No cyanosis, clubbing or edema    Pulses:  2+ and symmetric all extremities    Skin:  No jaundice, rashes, or lesions    Lymph nodes:  No palpable cervical lymphadenopathy        Lab Results:   Admission on 05/21/2023   Component Date Value   • Protime 05/21/2023 13 5    • INR 05/21/2023 1 03    • PTT 05/21/2023 26    • WBC 05/21/2023 21 91 (H)    • RBC 05/21/2023 4 10    • Hemoglobin 05/21/2023 9 5 (L)    • Hematocrit 05/21/2023 32 2 (L)    • MCV 05/21/2023 79 (L)    • MCH 05/21/2023 23 2 (L)    • MCHC 05/21/2023 29 5 (L)    • RDW 05/21/2023 15 5 (H)    • MPV 05/21/2023 8 4 (L)    • Platelets 38/68/8931 573 (H)    • nRBC 05/21/2023 0    • Neutrophils Relative 05/21/2023 84 (H)    • Immat GRANS % 05/21/2023 1    • Lymphocytes " Relative 05/21/2023 10 (L)    • Monocytes Relative 05/21/2023 4    • Eosinophils Relative 05/21/2023 0    • Basophils Relative 05/21/2023 1    • Neutrophils Absolute 05/21/2023 18 60 (H)    • Immature Grans Absolute 05/21/2023 0 10    • Lymphocytes Absolute 05/21/2023 2 19    • Monocytes Absolute 05/21/2023 0 89    • Eosinophils Absolute 05/21/2023 0 03    • Basophils Absolute 05/21/2023 0 10    • Sodium 05/21/2023 134 (L)    • Potassium 05/21/2023 4 0    • Chloride 05/21/2023 100    • CO2 05/21/2023 23    • ANION GAP 05/21/2023 11    • BUN 05/21/2023 13    • Creatinine 05/21/2023 0 67    • Glucose 05/21/2023 195 (H)    • Calcium 05/21/2023 9 8    • AST 05/21/2023 9 (L)    • ALT 05/21/2023 7    • Alkaline Phosphatase 05/21/2023 65    • Total Protein 05/21/2023 7 6    • Albumin 05/21/2023 4 2    • Total Bilirubin 05/21/2023 0 24    • eGFR 05/21/2023 94    • Magnesium 05/21/2023 1 5 (L)    • Lipase 05/21/2023 197 (H)    • hs TnI 0hr 05/21/2023 3    • Color, UA 05/21/2023 Yellow    • Clarity, UA 05/21/2023 Clear    • Specific Gravity, UA 05/21/2023 1 010    • pH, UA 05/21/2023 6 5    • Leukocytes, UA 05/21/2023 Negative    • Nitrite, UA 05/21/2023 Negative    • Protein, UA 05/21/2023 Trace (A)    • Glucose, UA 05/21/2023 Negative    • Ketones, UA 05/21/2023 Negative    • Urobilinogen, UA 05/21/2023 0 2    • Bilirubin, UA 05/21/2023 Negative    • Occult Blood, UA 05/21/2023 Trace-Intact (A)    • Ventricular Rate 05/21/2023 108    • Atrial Rate 05/21/2023 108    • [place] Interval 05/21/2023 128    • QRSD Interval 05/21/2023 92    • QT Interval 05/21/2023 358    • QTC Interval 05/21/2023 479    • P Axis 05/21/2023 60    • QRS Axis 05/21/2023 76    • T Wave Axis 05/21/2023 43    • LACTIC ACID 05/21/2023 1 4    • Procalcitonin 05/21/2023 <0 05    • Blood Culture 05/21/2023 Received in Microbiology Lab  Culture in Progress  • Blood Culture 05/21/2023 Received in Microbiology Lab  Culture in Progress      • RBC, UA 05/21/2023 0-1    • WBC, UA 05/21/2023 None Seen    • Epithelial Cells 05/21/2023 Occasional    • Bacteria, UA 05/21/2023 None Seen    • MUCUS THREADS 05/21/2023 Occasional (A)    • Sodium 05/22/2023 135    • Potassium 05/22/2023 4 0    • Chloride 05/22/2023 101    • CO2 05/22/2023 23    • ANION GAP 05/22/2023 11    • BUN 05/22/2023 12    • Creatinine 05/22/2023 0 67    • Glucose 05/22/2023 133    • Calcium 05/22/2023 8 8    • AST 05/22/2023 11 (L)    • ALT 05/22/2023 6 (L)    • Alkaline Phosphatase 05/22/2023 59    • Total Protein 05/22/2023 6 9    • Albumin 05/22/2023 3 8    • Total Bilirubin 05/22/2023 0 24    • eGFR 05/22/2023 94    • Magnesium 05/22/2023 3 0 (H)    • Phosphorus 05/22/2023 3 9    • Lipase 05/22/2023 139 (H)    • Procalcitonin 05/22/2023 0 23    • WBC 05/22/2023 15 67 (H)    • RBC 05/22/2023 3 57 (L)    • Hemoglobin 05/22/2023 8 4 (L)    • Hematocrit 05/22/2023 27 7 (L)    • MCV 05/22/2023 78 (L)    • MCH 05/22/2023 23 5 (L)    • MCHC 05/22/2023 30 3 (L)    • RDW 05/22/2023 15 5 (H)    • MPV 05/22/2023 8 5 (L)    • Platelets 40/97/4914 453 (H)    • nRBC 05/22/2023 0    • Neutrophils Relative 05/22/2023 73    • Immat GRANS % 05/22/2023 0    • Lymphocytes Relative 05/22/2023 16    • Monocytes Relative 05/22/2023 11    • Eosinophils Relative 05/22/2023 0    • Basophils Relative 05/22/2023 0    • Neutrophils Absolute 05/22/2023 11 34 (H)    • Immature Grans Absolute 05/22/2023 0 05    • Lymphocytes Absolute 05/22/2023 2 48    • Monocytes Absolute 05/22/2023 1 73 (H)    • Eosinophils Absolute 05/22/2023 0 02    • Basophils Absolute 05/22/2023 0 05        Imaging Studies: I have personally reviewed pertinent imaging studies  **Please note:  Dictation voice to text software may have been used in the creation of this record  Occasional wrong word or “sound alike” substitutions may have occurred due to the inherent limitations of voice recognition software    Read the chart carefully and recognize, using context, where substitutions have occurred  **

## 2023-05-22 NOTE — CASE MANAGEMENT
Case Management Assessment & Discharge Planning Note    Patient name Kleber Davies  Location /-01 MRN [id number]  : 1960 Date 2023       Current Admission Date: 2023  Current Admission Diagnosis:Abdominal pain   Patient Active Problem List    Diagnosis Date Noted   • Abdominal pain 2023   • Nausea & vomiting 2023   • Diarrhea 2023   • Hypomagnesemia 2023   • Abnormal CT scan 2023      LOS (days): 1  Geometric Mean LOS (GMLOS) (days):   Days to GMLOS:     OBJECTIVE:    Risk of Unplanned Readmission Score: 9 63     Current admission status: Inpatient  Preferred Pharmacy: No Pharmacies Listed  Primary Care Provider: No primary care provider on file  Primary Insurance: [employer]  Secondary Insurance:     ASSESSMENT:  [place] Proxies    There are no active Health Care Proxies on file  Advance Directives  Does patient have a [address]?: Yes  Does patient have Advance Directives?: Yes  Advance Directives: Living will  Primary Contact: Carolina RAI  Readmission Root Cause  30 Day Readmission: No    Patient Information  Admitted from[de-identified] Home  Mental Status: Alert  During Assessment patient was accompanied by: Spouse  Assessment information provided by[de-identified] Patient  Primary Caregiver: Self  Support Systems: Spouse/significant other  What city do you live in?: [facility] entry access options   Select all that apply : No steps to enter home  Type of Current Residence: [place]  In the last 12 months, was there a time when you were not able to pay the mortgage or rent on time?: No  In the last 12 months, how many places have you lived?: 1  In the last 12 months, was there a time when you did not have a steady place to sleep or slept in a shelter (including now)?: No  Homeless/housing insecurity resource given?: N/A  Living Arrangements: Lives w/ Spouse/significant other  Is patient a ?: No    Activities of Daily Living Prior to Admission  Functional Status: Independent  Completes ADLs independently?: Yes  Ambulates independently?: Yes  Does patient use assisted devices?: No  Does patient currently own DME?: No  Does patient have a history of Outpatient Therapy (PT/OT)?: No  Does the patient have a history of Short-Term Rehab?: No  Does patient have a history of HHC?: No  Does patient currently have [address]?: No    Patient Information Continued  Income Source: Employed  Does patient have prescription coverage?: Yes  Within the past 12 months, you worried that your food would run out before you got the money to buy more : Never true  Within the past 12 months, the food you bought just didn't last and you didn't have money to get more : Never true  Food insecurity resource given?: N/A  Does patient receive dialysis treatments?: No  Does patient have a history of Mental Health Diagnosis?: No    PHQ 2/9 Screening   Reviewed PHQ 2/9 Depression Screening Score?: No    Means of Transportation  Means of Transport to Appts[de-identified] Drives Self  In the past 12 months, has lack of transportation kept you from medical appointments or from getting medications?: No  In the past 12 months, has lack of transportation kept you from meetings, work, or from getting things needed for daily living?: No  Was application for public transport provided?: N/A  DISCHARGE DETAILS:    Discharge planning discussed with[de-identified] Pt, SO and daughter    Contacts  Patient Contacts: Carolina RAI  Relationship to Patient[de-identified] Family  Contact Method:  In Person  Reason/Outcome: Discharge [address]         Is the patient interested in [address] at discharge?: No    DME Referral Provided  Referral made for DME?: No    Would you like to participate in our [address] service program?  : No - Declined    Treatment Team Recommendation: Home  Discharge Destination Plan[de-identified] Home  Transport at Discharge : Family

## 2023-05-22 NOTE — CONSULTS
Consult Note - Pulmonary   Yesy Vera 58 y o  female MRN: [id number]  Unit/Bed#: -01 Encounter: [id number]      Reason for consultation: Abnormal CT chest    Requesting physician: BRITTNY Cristina    Assessment:    1  Heterogenous right lung mass- suspicious for malignancy w/ complete collapse of the right lung  2  Obstructive atelectasis of the right lung- from the lung mass  3  Lymphadenopathy- mediastinum, abdominal and right supraclavicular  4  History of tobacco abuse- 15 pack years and quit in 11/2022    Plan:    · There is a large right supraclavicular lymph node that is likely the best target for biopsy  Recommend IR consultation for core biopsy or surgical consult for excisional biopsy which would also be least invasive  · CEA, CA 19-9 pending, I will add on AFP and CA 27-29  · I recommend Radiation oncology consultation for the right lung given the complete collapse   · Also recommend MRI brain w/ and w/o contrast since she is complaining of migraines  · Recommend palliative care consult as well, family bedside agreeable    History of Present Illness   HPI:  Yesy Vera is a 58 y o  female who presents to the hospital with complaint of diarrhea and abdominal pain for 2 weeks  She had a CT performed in the ED which showed multiple masses in the pancreat, colon, lung with lymphadenopathy all suspicious for malignancy  Pulmonary was consulted for the lung mass  She has a 15 pack year smoking  History and quit in 11/2022  She works as a , denies drugs or alcohol abuse  She is adopted and unfamiliar with her family history  She reports minimal shortness of breath on exertion, no cough, denies hemopytsis, no hematochezia or melena reported  Review of Systems   Constitutional: Positive for fatigue  Negative for chills and fever  HENT: Negative for congestion, postnasal drip and rhinorrhea  Eyes: Negative for itching  Respiratory: Positive for shortness of breath  Negative for cough, wheezing and stridor  Cardiovascular: Negative for chest pain, palpitations and leg swelling  Gastrointestinal: Positive for abdominal pain  Negative for abdominal distention, nausea and vomiting  Genitourinary: Negative for dysuria and flank pain  Musculoskeletal: Negative for arthralgias and myalgias  Skin: Negative for color change  Neurological: Negative for dizziness, light-headedness and headaches  Psychiatric/Behavioral: Negative  Historical Information   Past Medical History:   Diagnosis Date   • Asthma    • Diabetes mellitus ([facility] [address] )      History reviewed  No pertinent surgical history  History reviewed  No pertinent family history       Family history: adopted and therefore unable to obtain family medical history per patient    Occupational History:  for drug and alcohol    Social History: 15 pack year smoking history and quit in 11/2022, denies drugs or alcohol abuse    Meds/Allergies   Current Facility-Administered Medications   Medication Dose Route Frequency   • acetaminophen (TYLENOL) tablet 650 mg  650 mg Oral Q6H PRN   • butalbital-acetaminophen-caffeine (FIORICET,ESGIC) -40 mg per tablet 1 tablet  1 tablet Oral Q4H PRN   • HYDROcodone-acetaminophen (NORCO) 5-325 mg per tablet 1 tablet  1 tablet Oral Q6H PRN   • losartan (COZAAR) tablet 100 mg  100 mg Oral Daily   • morphine injection 2 mg  2 mg Intravenous Q4H PRN   • multi-electrolyte (PLASMALYTE-A/ISOLYTE-S PH 7 4) IV solution  100 mL/hr Intravenous Continuous   • ondansetron (ZOFRAN) injection 4 mg  4 mg Intravenous Q6H PRN   • pantoprazole (PROTONIX) injection 40 mg  40 mg Intravenous Q24H [facility]   • pravastatin (PRAVACHOL) tablet 80 mg  80 mg Oral Daily With [employer]     Medications Prior to Admission   Medication   • butalbital-aspirin-caffeine (FIORINAL) -40 mg per capsule   • metFORMIN (GLUCOPHAGE) 1000 MG tablet   • rosuvastatin (CRESTOR) 10 MG tablet   • "valsartan-hydrochlorothiazide (DIOVAN-HCT) 160-12 5 MG per tablet     Allergies   Allergen Reactions   • Penicillins Rash       Vitals:    05/21/23 1534 05/21/23 1535 05/21/23 2234 05/22/23 0750   BP: 150/72  146/68 140/75   BP Location:       Pulse: 103  (!) 107 93   Resp: 20  19 20   Temp: 99 1 °F (37 3 °C)  98 7 °F (37 1 °C) 98 °F (36 7 °C)   TempSrc:       SpO2: 97% 97% 95% 95%   Weight:  59 7 kg (131 lb 9 8 oz)     Height:  5' 2\" (1 575 m)         Physical Exam  Constitutional:       General: She is not in acute distress  Appearance: She is not diaphoretic  HENT:      Head: Normocephalic and atraumatic  Nose: Nose normal       Mouth/Throat:      Pharynx: No oropharyngeal exudate  Eyes:      General: No scleral icterus  Conjunctiva/sclera: Conjunctivae normal       Pupils: Pupils are equal, round, and reactive to light  Neck:      Thyroid: No thyromegaly  Vascular: No JVD  Trachea: No tracheal deviation  Cardiovascular:      Rate and Rhythm: Normal rate and regular rhythm  Heart sounds: Normal heart sounds  No murmur heard  No friction rub  No gallop  Pulmonary:      Effort: Pulmonary effort is normal  No respiratory distress  Breath sounds: No stridor  No wheezing or rales  Comments: Absent right sided breath sounds  Abdominal:      General: Bowel sounds are normal  There is no distension  Palpations: Abdomen is soft  Tenderness: There is generalized abdominal tenderness  There is no guarding or rebound  Musculoskeletal:         General: No deformity  Normal range of motion  Cervical back: Normal range of motion and neck supple  Lymphadenopathy:      Cervical: No cervical adenopathy  Skin:     General: Skin is warm  Findings: No erythema or rash  Neurological:      Mental Status: She is alert and oriented to person, place, and time  Cranial Nerves: No cranial nerve deficit  Sensory: No sensory deficit  Labs:  I have " personally reviewed pertinent lab results  Results from last 7 days   Lab Units 05/22/23  0421 05/21/23  1209   WBC Thousand/uL 15 67* 21 91*   HEMOGLOBIN g/dL 8 4* 9 5*   HEMATOCRIT % 27 7* 32 2*   PLATELETS Thousands/uL 453* 573*   NEUTROS PCT % 73 84*   MONOS PCT % 11 4      Results from last 7 days   Lab Units 05/22/23  0421 05/21/23  1209   POTASSIUM mmol/L 4 0 4 0   CHLORIDE mmol/L 101 100   CO2 mmol/L 23 23   BUN mg/dL 12 13   CREATININE mg/dL 0 67 0 67   CALCIUM mg/dL 8 8 9 8   ALK PHOS U/L 59 65   ALT U/L 6* 7   AST U/L 11* 9*     Results from last 7 days   Lab Units 05/22/23  0421 05/21/23  1209   MAGNESIUM mg/dL 3 0* 1 5*     Results from last 7 days   Lab Units 05/22/23  0421   PHOSPHORUS mg/dL 3 9      Results from last 7 days   Lab Units 05/21/23  1209   INR  1 03   PTT seconds 26     Results from last 7 days   Lab Units 05/21/23  1228   LACTIC ACID mmol/L 1 4     No results found for: TROPONINI    Imaging and other studies: I have personally reviewed pertinent reports  and I have personally reviewed pertinent films in PACS    Pulmonary function testing: none on file    EKG, Pathology, and Other Studies: I have personally reviewed pertinent reports     and I have personally reviewed pertinent films in PACS    Code Status: Level 1 - Full Radha Mcbride MD  Pulmonary & Critical Care Fellow, [facility]

## 2023-05-22 NOTE — ASSESSMENT & PLAN NOTE
· Patient complains of frequent bouts of diarrhea since 6 AM  · Obtain stool studies  · IV fluids as ordered

## 2023-05-22 NOTE — PROGRESS NOTES
[facility] 45  Progress Note  Name: Frederick Rodrigues  MRN: [id number]  Unit/Bed#: -01 I Date of Admission: 5/21/2023   Date of Service: 5/22/2023 I Hospital Day: 1    Assessment/Plan      Abnormal CT scan  Assessment & Plan  · Presented for right upper lower quadrant abdominal pain with nausea vomiting and diarrhea   · Leukocytosis with WBC 21 9  · Procalcitonin 0 05  · Lactic acid 1 4  · Serum lipase 197  · CT chest abdomen pelvis: Heterogeneously enhancing right suprahilar lesion with abrupt cut off of the right mainstem bronchus suspicious for malignancy    There is secondary complete atelectasis of the right lung with heterogeneous density  Small to moderate right pleural effusion is noted  Few subcentimeter left lung nodules are noted  Evidence of mediastinal and right supraclavicular adenopathy  Heterogeneous, rim-enhancing lesion at the proximal pancreatic body, possible metastatic focus  Pathologically enlarged celiac axis lymph nodes  Focal annular lesion involving the ascending colon, suspicious for metastatic focus versus primary malignancy  · Patient received IV cefepime and Vanco in the ED, will hold antibiotic for now given negative procalcitonin and afebrile  · Repeat procalcitonin 0 23  This is within defined limits, increased value could be part due to malignancy  Will recheck again tomorrow  · Monitor labs and vital signs  · GI recommendations appreciated  Patient to undergo EGD and colonoscopy  · Pulmonology recommendations appreciated  Recommendation is biopsy of large right supraclavicular node  Discussed with IR  · Oncology recommendations appreciated    Due to headaches obtain MRI brain with and without contrast    Hypomagnesemia  Assessment & Plan  · Presented with serum magnesium 1 5  · Likely secondary to vomiting and diarrhea  · Repleted with 4 g IV magnesium   · Monitor magnesium periodically    Diarrhea  Assessment & Plan  · Patient complains of frequent bouts of diarrhea since 6 AM  · Obtain stool studies  · IV fluids as ordered    Nausea & vomiting  Assessment & Plan  · Presentedwith nausea and vomiting and right upper and lower quadrant pain  · CT imaging see above  · Zofran as needed  · IV fluids  · Clear liquids as tolerated    * Abdominal pain  Assessment & Plan  · Patient presented to ED with complaints of right upper and lower quadrant abdominal pain with nausea, vomiting and diarrhea that started this a m  · CT scan chest abdomen pelvis see below  · For assessment/treatment plan see abnormal CT scan         VTE Pharmacologic Prophylaxis: VTE Score: 2 Moderate Risk (Score 3-4) - Pharmacological DVT Prophylaxis Contraindicated  Sequential Compression Devices Ordered  Patient Centered Rounds: I performed bedside rounds with nursing staff today  Discussions with Specialists or Other Care Team Provider: IR, pulmonology    Education and Discussions with Family / Patient: Patient declined call to   Total Time Spent on Date of Encounter in care of patient: 55 minutes This time was spent on one or more of the following: performing physical exam; counseling and coordination of care; obtaining or reviewing history; documenting in the medical record; reviewing/ordering tests, medications or procedures; communicating with other healthcare professionals and discussing with patient's family/caregivers  Current Length of Stay: 1 day(s)  Current Patient Status: Inpatient   Certification Statement: The patient will continue to require additional inpatient hospital stay due to ongoing work-up  Discharge Plan: Anticipate discharge in 24-48 hrs to home  Code Status: Level 1 - Full Code    Subjective:   Patient seen and examined  She is tolerating clear liquids but says she feels nauseous and does not want to try any food  Diarrhea has resolved  Zofran helped with the nausea       Objective:     Vitals:   Temp (24hrs), Av 6 °F (37 °C), Min:98 °F (36 7 °C), Max:99 1 °F (37 3 °C)    Temp:  [98 °F (36 7 °C)-99 1 °F (37 3 °C)] 99 1 °F (37 3 °C)  HR:  [] 98  Resp:  [19-20] 20  BP: (115-146)/(58-75) 115/58  SpO2:  [92 %-95 %] 92 %  Body mass index is 24 07 kg/m²  Input and Output Summary (last 24 hours): Intake/Output Summary (Last 24 hours) at 5/22/2023 1804  Last data filed at 5/22/2023 1700  Gross per 24 hour   Intake 2033 33 ml   Output --   Net 2033 33 ml       Physical Exam:   Physical Exam  Vitals and nursing note reviewed  Constitutional:       Appearance: She is ill-appearing  HENT:      Head: Normocephalic and atraumatic  Mouth/Throat:      Mouth: Mucous membranes are dry  Pharynx: Oropharynx is clear  Eyes:      Pupils: Pupils are equal, round, and reactive to light  Cardiovascular:      Rate and Rhythm: Normal rate  Pulmonary:      Effort: Pulmonary effort is normal  No respiratory distress  Breath sounds: Normal breath sounds  Abdominal:      General: Bowel sounds are normal       Palpations: Abdomen is soft  Tenderness: There is abdominal tenderness  Musculoskeletal:      Cervical back: Neck supple  Skin:     General: Skin is warm and dry  Capillary Refill: Capillary refill takes less than 2 seconds  Neurological:      General: No focal deficit present  Mental Status: She is alert          Additional Data:     Labs:  Results from last 7 days   Lab Units 05/22/23  0421   WBC Thousand/uL 15 67*   HEMOGLOBIN g/dL 8 4*   HEMATOCRIT % 27 7*   PLATELETS Thousands/uL 453*   NEUTROS PCT % 73   LYMPHS PCT % 16   MONOS PCT % 11   EOS PCT % 0     Results from last 7 days   Lab Units 05/22/23  0421   SODIUM mmol/L 135   POTASSIUM mmol/L 4 0   CHLORIDE mmol/L 101   CO2 mmol/L 23   BUN mg/dL 12   CREATININE mg/dL 0 67   ANION GAP mmol/L 11   CALCIUM mg/dL 8 8   ALBUMIN g/dL 3 8   TOTAL BILIRUBIN mg/dL 0 24   ALK PHOS U/L 59   ALT U/L 6*   AST U/L 11*   GLUCOSE RANDOM mg/dL 133     Results from last 7 days   Lab Units 05/21/23  1209   INR  1 03             Results from last 7 days   Lab Units 05/22/23  0421 05/21/23  1228 05/21/23  1209   LACTIC ACID mmol/L  --  1 4  --    PROCALCITONIN ng/ml 0 23  --  <0 05       Lines/Drains:  Invasive Devices     Peripheral Intravenous Line  Duration           Peripheral IV 05/21/23 Left Forearm 1 day    Peripheral IV 05/21/23 Right Antecubital 1 day                      Imaging: Reviewed radiology reports from this admission including: abdominal/pelvic CT    Recent Cultures (last 7 days):   Results from last 7 days   Lab Units 05/21/23  1236 05/21/23  1228   BLOOD CULTURE  Received in Microbiology Lab  Culture in Progress  Received in Microbiology Lab  Culture in Progress  Last 24 Hours Medication List:   Current Facility-Administered Medications   Medication Dose Route Frequency Provider Last Rate   • acetaminophen  650 mg Oral Q6H PRN BRITTNY Sheppard     • butalbital-acetaminophen-caffeine  1 tablet Oral Q4H PRN BRITTNY Sheppard     • HYDROcodone-acetaminophen  1 tablet Oral Q6H PRN BRITTNY Sheppard     • lidocaine (PF)    PRN Nadia Galo MD     • losartan  100 mg Oral Daily BRITTNY Sheppard     • morphine injection  2 mg Intravenous Q4H PRN BRITTNY Sheppard     • multi-electrolyte  100 mL/hr Intravenous Continuous BRITTNY Sheppard 100 mL/hr (05/22/23 0941)   • ondansetron  4 mg Intravenous Q6H PRN BRITTNY Sheppard     • pantoprazole  40 mg Intravenous Q24H [facility] BRITTNY Sheppard     • pravastatin  80 mg Oral Daily With [employer]          Today, Patient Was Seen By: BRITTNY Murcia    **Please Note: This note may have been constructed using a voice recognition system  **

## 2023-05-23 ENCOUNTER — APPOINTMENT (INPATIENT)
Dept: MRI IMAGING | Facility: HOSPITAL | Age: 63
DRG: 988 | End: 2023-05-23
Payer: COMMERCIAL

## 2023-05-23 PROBLEM — D50.9 IRON DEFICIENCY ANEMIA: Status: ACTIVE | Noted: 2023-05-23

## 2023-05-23 LAB
AFP-TM SERPL-MCNC: 1.07 NG/ML (ref 0–9)
CEA SERPL-MCNC: 7.1 NG/ML (ref 0–3)
CRP SERPL QL: 42 MG/L
DAT POLY-SP REAG RBC QL: NEGATIVE
ERYTHROCYTE [DISTWIDTH] IN BLOOD BY AUTOMATED COUNT: 15.5 % (ref 11.6–15.1)
ERYTHROCYTE [SEDIMENTATION RATE] IN BLOOD: 49 MM/HOUR (ref 0–29)
FOLATE SERPL-MCNC: 7.4 NG/ML
HCT VFR BLD AUTO: 24.5 % (ref 34.8–46.1)
HGB BLD-MCNC: 7.3 G/DL (ref 11.5–15.4)
LDH SERPL-CCNC: 136 U/L (ref 140–271)
LYMPHOCYTES NFR BLD: 21 % (ref 14–44)
MCH RBC QN AUTO: 23 PG (ref 26.8–34.3)
MCHC RBC AUTO-ENTMCNC: 29.8 G/DL (ref 31.4–37.4)
MCV RBC AUTO: 77 FL (ref 82–98)
MONOCYTES NFR BLD AUTO: 8 % (ref 4–12)
NEUTS SEG NFR BLD AUTO: 70 % (ref 45–77)
NRBC BLD AUTO-RTO: 0 /100 WBCS
PLATELET # BLD AUTO: 435 THOUSANDS/UL (ref 149–390)
PLATELET BLD QL SMEAR: ABNORMAL
PMV BLD AUTO: 8.3 FL (ref 8.9–12.7)
PROCALCITONIN SERPL-MCNC: 0.25 NG/ML
RBC # BLD AUTO: 3.17 MILLION/UL (ref 3.81–5.12)
RBC MORPH BLD: NORMAL
RETICS # AUTO: NORMAL 10*3/UL (ref 14097–95744)
RETICS # CALC: 1.25 % (ref 0.37–1.87)
TOTAL CELLS COUNTED SPEC: 99
VIT B12 SERPL-MCNC: 305 PG/ML (ref 180–914)
WBC # BLD AUTO: 13.53 THOUSAND/UL (ref 4.31–10.16)

## 2023-05-23 PROCEDURE — 86880 COOMBS TEST DIRECT: CPT | Performed by: NURSE PRACTITIONER

## 2023-05-23 PROCEDURE — 84145 PROCALCITONIN (PCT): CPT | Performed by: NURSE PRACTITIONER

## 2023-05-23 PROCEDURE — 83010 ASSAY OF HAPTOGLOBIN QUANT: CPT | Performed by: NURSE PRACTITIONER

## 2023-05-23 PROCEDURE — 99223 1ST HOSP IP/OBS HIGH 75: CPT | Performed by: PHYSICIAN ASSISTANT

## 2023-05-23 PROCEDURE — 86300 IMMUNOASSAY TUMOR CA 15-3: CPT | Performed by: INTERNAL MEDICINE

## 2023-05-23 PROCEDURE — 85007 BL SMEAR W/DIFF WBC COUNT: CPT | Performed by: NURSE PRACTITIONER

## 2023-05-23 PROCEDURE — 83615 LACTATE (LD) (LDH) ENZYME: CPT | Performed by: NURSE PRACTITIONER

## 2023-05-23 PROCEDURE — 99497 ADVNCD CARE PLAN 30 MIN: CPT | Performed by: NURSE PRACTITIONER

## 2023-05-23 PROCEDURE — 70553 MRI BRAIN STEM W/O & W/DYE: CPT

## 2023-05-23 PROCEDURE — A9585 GADOBUTROL INJECTION: HCPCS | Performed by: NURSE PRACTITIONER

## 2023-05-23 PROCEDURE — 99232 SBSQ HOSP IP/OBS MODERATE 35: CPT | Performed by: INTERNAL MEDICINE

## 2023-05-23 PROCEDURE — 86301 IMMUNOASSAY TUMOR CA 19-9: CPT | Performed by: FAMILY MEDICINE

## 2023-05-23 PROCEDURE — 82607 VITAMIN B-12: CPT | Performed by: NURSE PRACTITIONER

## 2023-05-23 PROCEDURE — 86140 C-REACTIVE PROTEIN: CPT | Performed by: NURSE PRACTITIONER

## 2023-05-23 PROCEDURE — 82105 ALPHA-FETOPROTEIN SERUM: CPT | Performed by: INTERNAL MEDICINE

## 2023-05-23 PROCEDURE — 85045 AUTOMATED RETICULOCYTE COUNT: CPT | Performed by: NURSE PRACTITIONER

## 2023-05-23 PROCEDURE — C9113 INJ PANTOPRAZOLE SODIUM, VIA: HCPCS

## 2023-05-23 PROCEDURE — 99233 SBSQ HOSP IP/OBS HIGH 50: CPT | Performed by: NURSE PRACTITIONER

## 2023-05-23 PROCEDURE — 85652 RBC SED RATE AUTOMATED: CPT | Performed by: NURSE PRACTITIONER

## 2023-05-23 PROCEDURE — 82378 CARCINOEMBRYONIC ANTIGEN: CPT | Performed by: FAMILY MEDICINE

## 2023-05-23 PROCEDURE — 82746 ASSAY OF FOLIC ACID SERUM: CPT | Performed by: NURSE PRACTITIONER

## 2023-05-23 RX ORDER — HYDROXYZINE PAMOATE 25 MG/1
25 CAPSULE ORAL DAILY PRN
COMMUNITY
Start: 2023-04-25

## 2023-05-23 RX ORDER — BUPROPION HYDROCHLORIDE 300 MG/1
300 TABLET ORAL EVERY MORNING
COMMUNITY
Start: 2023-05-06

## 2023-05-23 RX ORDER — BUPROPION HYDROCHLORIDE 100 MG/1
150 TABLET ORAL 2 TIMES DAILY
Status: DISCONTINUED | OUTPATIENT
Start: 2023-05-23 | End: 2023-06-05 | Stop reason: HOSPADM

## 2023-05-23 RX ORDER — HYDROMORPHONE HCL/PF 1 MG/ML
1 SYRINGE (ML) INJECTION ONCE
Status: COMPLETED | OUTPATIENT
Start: 2023-05-23 | End: 2023-05-23

## 2023-05-23 RX ORDER — LANOLIN ALCOHOL/MO/W.PET/CERES
400 CREAM (GRAM) TOPICAL DAILY
Status: DISCONTINUED | OUTPATIENT
Start: 2023-05-24 | End: 2023-06-05 | Stop reason: HOSPADM

## 2023-05-23 RX ORDER — HYDROXYZINE HYDROCHLORIDE 25 MG/1
25 TABLET, FILM COATED ORAL
Status: DISCONTINUED | OUTPATIENT
Start: 2023-05-23 | End: 2023-05-27

## 2023-05-23 RX ADMIN — MORPHINE SULFATE 2 MG: 2 INJECTION, SOLUTION INTRAMUSCULAR; INTRAVENOUS at 21:39

## 2023-05-23 RX ADMIN — BUPROPION HYDROCHLORIDE TABLETS 150 MG: 100 TABLET, FILM COATED ORAL at 18:41

## 2023-05-23 RX ADMIN — LORAZEPAM 1 MG: 2 INJECTION INTRAMUSCULAR; INTRAVENOUS at 09:22

## 2023-05-23 RX ADMIN — PANTOPRAZOLE SODIUM 40 MG: 40 INJECTION, POWDER, FOR SOLUTION INTRAVENOUS at 08:36

## 2023-05-23 RX ADMIN — MORPHINE SULFATE 2 MG: 2 INJECTION, SOLUTION INTRAMUSCULAR; INTRAVENOUS at 06:08

## 2023-05-23 RX ADMIN — LOSARTAN POTASSIUM 100 MG: 50 TABLET, FILM COATED ORAL at 08:36

## 2023-05-23 RX ADMIN — PRAVASTATIN SODIUM 80 MG: 40 TABLET ORAL at 18:41

## 2023-05-23 RX ADMIN — MORPHINE SULFATE 2 MG: 2 INJECTION, SOLUTION INTRAMUSCULAR; INTRAVENOUS at 17:11

## 2023-05-23 RX ADMIN — SODIUM CHLORIDE, SODIUM GLUCONATE, SODIUM ACETATE, POTASSIUM CHLORIDE AND MAGNESIUM CHLORIDE 100 ML/HR: 526; 502; 368; 37; 30 INJECTION, SOLUTION INTRAVENOUS at 23:20

## 2023-05-23 RX ADMIN — GADOBUTROL 7 ML: 604.72 INJECTION INTRAVENOUS at 09:56

## 2023-05-23 RX ADMIN — HYDROCODONE BITARTRATE AND ACETAMINOPHEN 1 TABLET: 5; 325 TABLET ORAL at 20:40

## 2023-05-23 RX ADMIN — HYDROMORPHONE HYDROCHLORIDE 1 MG: 1 INJECTION, SOLUTION INTRAMUSCULAR; INTRAVENOUS; SUBCUTANEOUS at 07:13

## 2023-05-23 RX ADMIN — SODIUM CHLORIDE, SODIUM GLUCONATE, SODIUM ACETATE, POTASSIUM CHLORIDE AND MAGNESIUM CHLORIDE 100 ML/HR: 526; 502; 368; 37; 30 INJECTION, SOLUTION INTRAVENOUS at 08:44

## 2023-05-23 RX ADMIN — ONDANSETRON 4 MG: 2 INJECTION INTRAMUSCULAR; INTRAVENOUS at 06:37

## 2023-05-23 RX ADMIN — MORPHINE SULFATE 2 MG: 2 INJECTION, SOLUTION INTRAMUSCULAR; INTRAVENOUS at 02:09

## 2023-05-23 RX ADMIN — HYDROXYZINE HYDROCHLORIDE 25 MG: 25 TABLET, FILM COATED ORAL at 21:39

## 2023-05-23 NOTE — CONSULTS
Consultation - Palliative and Supportive Care   Sada Stewart 58 y o  female [id number]  Assessment  Abnormal CT scan highly suspicious for malignancy  Abdominal pain  Nausea and vomiting  Diarrhea  Palliative care consult  Goals of care counseling    Plan  1  Symptom management  • Defer to primary treatment team    2  Goals  • Level 1 code status  Full code  Disease focused care  No limits placed  o Patient would like continued disease focused care at this time  She wants to know more about her condition and is agreeable to all interventions at this point  o Patient wants time to rethink her goals and code status  Per wife she was DNR/DNI in her living will but patient is unsure of this and would like to remain full code  o Discussed palliative and hospice care  Will revisit [address] on Thursday with patient and family  She would like continued follow up with palliative care  o Patient was still sedate from her medications today for her pain and MRI but able to answer questions appropriately  She welcomes further [address] counseling when she is more alert  3   Social support  • Patient is supported by her wife Sarah Henson, her daughter, mother, 2 brothers and 1 sister, and uncle  · Supportive listening provided  · Normalized experience of patient/family  · Provided anxiety containment  · Provided anticipatory guidance    4  Follow up    • Palliative Care will continue to follow for ongoing goals of care discussions  Will return Thursday if patient is still admitted  Patient appropriate for outpatient follow up and will be added to hospital follow up list    • Please reach out to on-call provider via [employer] if questions or concerns arise  I have reviewed the patient's controlled substance dispensing history in the Prescription Drug Monitoring Program in compliance with the [facility] regulations before prescribing any controlled substances    Last refills  No recent data    Decisional apparatus: "Patient is competent on exam today  If competency is lost, patient's substitute decision maker would default to spouse by [place]  Advance Directive/Living Will: Yes, daughter will email copy to patients spouse today  POLST: No  POA Forms: No    We appreciate the invitation to be involved in this patient's care  We will continue to follow throughout this hospitalization  Please do not hesitate to reach our on call provider through our clinic answering service at  should you have acute symptom control concerns  Bisi Pride PA-C  Palliative and Supportive Care  Clinic/Answering Service: [phone]  You can find me on [employer] Identification:  Inpatient consult to Palliative Care  Consult performed by: Bisi Pride PA-C  Consult ordered by: BRITTNY Silva      Physician Requesting Consult:Eveline Díaz MD  Reason for Consult / Principal Problem: GOC counseling and SM secondary to newly discovered widespread disease concerning for malignancy  Pancreas, lung, colon lesions  Date of admission:5/21/2023  Length of stay:2    History of Present Illness    Sanjuanita Mays is a 58 y o  female who presents with a palliative diagnosis of newly discovered widespread disease concerning for malignancy  Pancreas, lung, colon lesions on CT scan  She was brought into the ED at [facility] on 05/21/2023 secondary to abdominal pain, nausea and vomiting  CT scan revealed lesion obstructing right mainstem bronchus suspicious for malignancy with secondary complete atelectasis of right lung and pleural effusion  Also noted is mediastinal and supraclavicular lymphadenopathy, lesion of pancreas and ascending colon concerning for mets  When visiting patient was very sedated after receiving medications for pain and for her MRI  MRI of brain ordered to r/o intracranial pathology  Patient is visiting from [place] with her wife of 23 years   [place] [facility] [place] that they visit every weekend  They stay in a 5th wheel camper with access to all utilities  Her wife has noted a decline in the patients health both mentally and physically over the past 3 years  Patient does not seek regular medical follow up and does not have a PCP  Spouse says that she has a Living Will that states the patient is DNR/DNI  The patient has states previously that if she were to be offered cancer treatments that she would accept radiation but not chemo  Patient currently unsure about her goals of care because she feels she needs more information on the disease process before making anymore decisions and is accepting of all indicated treatments at this point  Discussed hospice and palliative services  Patient is interested in following with palliative care and/or hospice care going forward depending on the outcomes of her hospital workup  She would like to stay here most likely rather than returning to [place] though waiting to know more to make further decisions  Review of Systems   Constitutional: Negative for fever  HENT: Negative  Respiratory: Negative for shortness of breath  Cardiovascular: Negative for chest pain  Gastrointestinal: Positive for abdominal pain, diarrhea, nausea and vomiting  Negative for constipation  Genitourinary: Negative  Musculoskeletal: Negative  Skin: Negative  Neurological: Negative  Hematological: Negative  Psychiatric/Behavioral: Positive for agitation (spouse reports increased agitation over the past 3 years)  Past Medical History:   Diagnosis Date   • Asthma    • Diabetes mellitus ([facility] [address] )      History reviewed  No pertinent surgical history    Social History     Socioeconomic History   • Marital status: /Civil Union     Spouse name: Not on file   • Number of children: Not on file   • Years of education: Not on file   • Highest education level: Not on file   Occupational History   • Not on file   Tobacco Use   • " Smoking status: Former     Packs/day: 0 50     Years: 15 00     Pack years: 7 50     Types: Cigarettes     Quit date: 2022     Years since quittin 5   • Smokeless tobacco: Never   Substance and Sexual Activity   • Alcohol use: Yes     Comment: couple times a year   • Drug use: Never   • Sexual activity: Not on file   Other Topics Concern   • Not on file   Social History Narrative   • Not on file     Social Determinants of Health     Financial Resource Strain: Not on file   Food Insecurity: No Food Insecurity   • Worried About [place] in the Last Year: Never true   • Ran Out of Food in the Last Year: Never true   Transportation Needs: No Transportation Needs   • Lack of Transportation (Medical): No   • Lack of Transportation (Non-Medical): No   Physical Activity: Not on file   Stress: Not on file   Social Connections: Not on file   Intimate Partner Violence: Not on file   Housing Stability: Low Risk    • Unable to Pay for Housing in the Last Year: No   • Number of Places Lived in the Last Year: 1   • Unstable Housing in the Last Year: No     History reviewed  No pertinent family history      Medications:  all current active meds have been reviewed and current meds:   Current Facility-Administered Medications   Medication Dose Route Frequency   • acetaminophen (TYLENOL) tablet 650 mg  650 mg Oral Q6H PRN   • butalbital-acetaminophen-caffeine (FIORICET,ESGIC) -40 mg per tablet 1 tablet  1 tablet Oral Q4H PRN   • HYDROcodone-acetaminophen (NORCO) 5-325 mg per tablet 1 tablet  1 tablet Oral Q6H PRN   • lidocaine (PF) (XYLOCAINE-MPF) 1 % injection    PRN   • losartan (COZAAR) tablet 100 mg  100 mg Oral Daily   • morphine injection 2 mg  2 mg Intravenous Q4H PRN   • multi-electrolyte (PLASMALYTE-A/ISOLYTE-S PH 7 4) IV solution  100 mL/hr Intravenous Continuous   • ondansetron (ZOFRAN) injection 4 mg  4 mg Intravenous Q6H PRN   • pantoprazole (PROTONIX) injection 40 mg  40 mg Intravenous Q24H [address] "  • pravastatin (PRAVACHOL) tablet 80 mg  80 mg Oral Daily With Dinner       Allergies   Allergen Reactions   • Neosporin [Bacitracin-Polymyxin B] Rash   • Penicillins Rash   • Shellfish-Derived Products - Food Allergy Rash and Facial Swelling       Objective:  /78 (BP Location: Left arm)   Pulse (!) 127   Temp 98 3 °F (36 8 °C) (Oral)   Resp 17   Ht 5' 2\" (1 575 m)   Wt 59 7 kg (131 lb 9 8 oz)   SpO2 (!) 85%   BMI 24 07 kg/m²     Physical Exam  Vitals and nursing note reviewed  Constitutional:       General: She is not in acute distress  Appearance: She is well-developed  She is not ill-appearing  HENT:      Head: Normocephalic and atraumatic  Right Ear: External ear normal       Left Ear: External ear normal       Nose: Nose normal       Mouth/Throat:      Pharynx: Oropharynx is clear  Eyes:      Extraocular Movements: Extraocular movements intact  Cardiovascular:      Rate and Rhythm: Normal rate  Pulmonary:      Effort: Pulmonary effort is normal    Abdominal:      Tenderness: There is abdominal tenderness  Musculoskeletal:         General: Normal range of motion  Skin:     General: Skin is warm and dry  Coloration: Skin is not pale  Findings: No rash  Neurological:      Mental Status: She is oriented to person, place, and time  She is lethargic  Psychiatric:         Mood and Affect: Mood normal          Behavior: Behavior normal  Behavior is cooperative  Thought Content: Thought content normal          Judgment: Judgment normal          Lab Results:   I have personally reviewed pertinent labs    CBC:   Lab Results   Component Value Date    WBC 13 53 (H) 05/23/2023    HGB 7 3 (L) 05/23/2023    HCT 24 5 (L) 05/23/2023    MCV 77 (L) 05/23/2023     (H) 05/23/2023    MCH 23 0 (L) 05/23/2023    MCHC 29 8 (L) 05/23/2023    RDW 15 5 (H) 05/23/2023    MPV 8 3 (L) 05/23/2023    NRBC 0 05/23/2023   Imaging Studies: I have personally reviewed pertinent " "reports  CT chest abdomen pelvis w contrast  Result Date: 5/21/2023  Impression: 1  Heterogeneously enhancing right suprahilar lesion with abrupt cut off of the right mainstem bronchus suspicious for malignancy    There is secondary complete atelectasis of the right lung with heterogeneous density  Small to moderate right pleural effusion is noted  Few subcentimeter left lung nodules are noted  Evidence of mediastinal and right supraclavicular adenopathy  2  Heterogeneous, rim-enhancing lesion at the proximal pancreatic body, possible metastatic focus  Pathologically enlarged celiac axis lymph nodes  3  Focal annular lesion involving the ascending colon, suspicious for metastatic focus versus primary malignancy  I personally discussed this study with Dieudonne Melendrez III on 5/21/2023 1:56 PM  Workstation performed: [id number]        EKG, Pathology, and Other Studies: I have personally reviewed pertinent reports  Latest Reference Range & Units 05/23/23 04:30   AFP-TUMOR MARKER 0 00 - 9 00 ng/mL 1 07   CARCINOEMBRYONIC ANTIGEN 0 0 - 3 0 ng/mL 7 1 (H)      Latest Reference Range & Units 05/23/23 04:30   C-REACTIVE PROTEIN <3 0 mg/L 42 0 (H)        05/22/23 14:53   TISSUE EXAM in process       Counseling / Coordination of Care  Total floor / unit time spent today 60 minutes  Greater than 50% of total time was spent with the patient and / or family counseling and / or coordination of care  A description of the counseling / coordination of care: Reviewed chart, provided medical updates, determined goals of care, discussed palliative care and symptom management, discussed comfort care and hospice care, discussed code status, provided anticipatory guidance, determined competency and POA/HCA, determined social/family support, provided psychosocial support  Reviewed with AHSAN GUADARRAMA      Portions of this document may have been created using dictation software and as such some \"sound alike\" terms may have been generated by " the system  Do not hesitate to contact me with any questions or clarifications

## 2023-05-23 NOTE — PLAN OF CARE
Problem: PAIN - ADULT  Goal: Verbalizes/displays adequate comfort level or baseline comfort level  Description: Interventions:  - Encourage patient to monitor pain and request assistance  - Assess pain using appropriate pain scale  - Administer analgesics based on type and severity of pain and evaluate response  - Implement non-pharmacological measures as appropriate and evaluate response  - Consider cultural and social influences on pain and pain management  - Notify physician/advanced practitioner if interventions unsuccessful or patient reports new pain  Outcome: Progressing     Problem: INFECTION - ADULT  Goal: Absence or prevention of progression during hospitalization  Description: INTERVENTIONS:  - Assess and monitor for signs and symptoms of infection  - Monitor lab/diagnostic results  - Monitor all insertion sites, i e  indwelling lines, tubes, and drains  - Monitor endotracheal if appropriate and nasal secretions for changes in amount and color  - [place] appropriate cooling/warming therapies per order  - Administer medications as ordered  - Instruct and encourage patient and family to use good hand hygiene technique  - Identify and instruct in appropriate isolation precautions for identified infection/condition  Outcome: Progressing     Problem: SAFETY ADULT  Goal: Patient will remain free of falls  Description: INTERVENTIONS:  - Educate patient/family on patient safety including physical limitations  - Instruct patient to call for assistance with activity   - Consult OT/PT to assist with strengthening/mobility   - Keep Call bell within reach  - Keep bed low and locked with side rails adjusted as appropriate  - Keep care items and personal belongings within reach  - Initiate and maintain comfort rounds  - Make Fall Risk Sign visible to staff  - Offer Toileting in advance of need  - Initiate/Maintain bed alarm  - Obtain necessary fall risk management equipment  - Apply yellow socks and bracelet for high fall risk patients  - Consider moving patient to room near nurses station  Outcome: Progressing

## 2023-05-23 NOTE — ASSESSMENT & PLAN NOTE
· Presented for right upper lower quadrant abdominal pain with nausea vomiting and diarrhea   · Leukocytosis with WBC 21 9  · CT chest abdomen pelvis: Heterogeneously enhancing right suprahilar lesion with abrupt cut off of the right mainstem bronchus suspicious for malignancy    There is secondary complete atelectasis of the right lung with heterogeneous density  Small to moderate right pleural effusion is noted  Few subcentimeter left lung nodules are noted  Evidence of mediastinal and right supraclavicular adenopathy  Heterogeneous, rim-enhancing lesion at the proximal pancreatic body, possible metastatic focus  Pathologically enlarged celiac axis lymph nodes  Focal annular lesion involving the ascending colon, suspicious for metastatic focus versus primary malignancy  · GI recommendations appreciated  Patient to undergo EGD and colonoscopy  GoLytely prep has been started  · Pulmonology recommendations appreciated  Recommendation is biopsy of large right supraclavicular node  Discussed with IR  Results pending  · Oncology recommendations appreciated  Due to headaches MRI brain with and without contrast obtained: No acute infarction, edema, or mass effect  Few punctate hyperintense T2/FLAIR foci are noted within the periventricular and subcortical white matter which are nonspecific and can be seen with vasculitis, migrainous angiopathy, Lyme's disease or microangiopathic changes     · Palliative input appreciated

## 2023-05-23 NOTE — UTILIZATION REVIEW
Continued Stay Review    Date: 5/23/23                          Current Patient Class: IP  Current Level of Care: Med Surg    HPI:62 y o  female initially admitted on 5/21     Assessment/Plan: Patient to undergo EGD and colonoscopy  GoLytely prep has been started  Pulmonology recommendation is  biopsy of large right supraclavicular node  Discussed with IR  Hemoglobin trended down from 9 5 to 7 3  Daily CBC and trend hemoglobin  Transfuse with PRBCs for below 7  Stool studies pending  Continue IV fluids  Vital Signs:     Date/Time Temp Pulse Resp BP MAP (mmHg) SpO2 O2 Device   05/23/23 1128 -- 118 Abnormal  -- -- -- -- --   05/23/23 08:00:05 98 3 °F (36 8 °C) 127 Abnormal  17 132/78 96 85 % Abnormal  None (Room air)   05/22/23 22:05:30 98 8 °F (37 1 °C) 102 16 125/54 78 92 % --   05/22/23 15:57:08 99 1 °F (37 3 °C) 98 20 115/58 77 92 % --   05/22/23 07:50:35 98 °F (36 7 °C) 93 20 140/75 97 95 % --   05/21/23 22:34:27 98 7 °F (37 1 °C) 107 Abnormal  19 146/68 94 95 % --   05/21/23 1535 -- -- -- -- -- 97 % None (Room air)   05/21/23 15:34:47 99 1 °F (37 3 °C) 103 20 150/72 98 97 % --   05/21/23 15:34:28 99 1 °F (37 3 °C) -- 20 150/72 98 -- --       Pertinent Labs/Diagnostic Results:     5/23 MRI brain:  IMPRESSION:     1  No acute infarction, edema, or mass effect      2  Few punctate hyperintense T2/FLAIR foci are noted within the periventricular and subcortical white matter which are nonspecific and can be seen with vasculitis, migrainous angiopathy, Lyme's disease or microangiopathic changes        Results from last 7 days   Lab Units 05/23/23  0430 05/22/23  0421 05/21/23  1209   WBC Thousand/uL 13 53* 15 67* 21 91*   HEMOGLOBIN g/dL 7 3* 8 4* 9 5*   HEMATOCRIT % 24 5* 27 7* 32 2*   PLATELETS Thousands/uL 435* 453* 573*   NEUTROS ABS Thousands/µL  --  11 34* 18 60*     Results from last 7 days   Lab Units 05/23/23  0430   RETIC CT ABS  39,600   RETIC CT PCT % 1 25     Results from last 7 days   Lab Units 05/22/23  0421 05/21/23  1209   SODIUM mmol/L 135 134*   POTASSIUM mmol/L 4 0 4 0   CHLORIDE mmol/L 101 100   CO2 mmol/L 23 23   ANION GAP mmol/L 11 11   BUN mg/dL 12 13   CREATININE mg/dL 0 67 0 67   EGFR ml/min/1 73sq m 94 94   CALCIUM mg/dL 8 8 9 8   MAGNESIUM mg/dL 3 0* 1 5*   PHOSPHORUS mg/dL 3 9  --      Results from last 7 days   Lab Units 05/22/23  0421 05/21/23  1209   AST U/L 11* 9*   ALT U/L 6* 7   ALK PHOS U/L 59 65   TOTAL PROTEIN g/dL 6 9 7 6   ALBUMIN g/dL 3 8 4 2   TOTAL BILIRUBIN mg/dL 0 24 0 24         Results from last 7 days   Lab Units 05/22/23  0421 05/21/23  1209   GLUCOSE RANDOM mg/dL 133 195*           Results from last 7 days   Lab Units 05/21/23  1209   HS TNI 0HR ng/L 3         Results from last 7 days   Lab Units 05/21/23  1209   PROTIME seconds 13 5   INR  1 03   PTT seconds 26         Results from last 7 days   Lab Units 05/23/23  0430 05/22/23  0421 05/21/23  1209   PROCALCITONIN ng/ml 0 25 0 23 <0 05     Results from last 7 days   Lab Units 05/21/23  1228   LACTIC ACID mmol/L 1 4                 Results from last 7 days   Lab Units 05/22/23  0421   FERRITIN ng/mL 173   IRON SATURATION % 5*   IRON ug/dL 21*   TIBC ug/dL 390                 Results from last 7 days   Lab Units 05/22/23  0421 05/21/23  1209   LIPASE u/L 139* 197*     Results from last 7 days   Lab Units 05/23/23  0430   CRP mg/L 42 0*   SED RATE mm/hour 49*     Results from last 7 days   Lab Units 05/23/23  0430   CEA ng/mL 7 1*         Results from last 7 days   Lab Units 05/21/23  1555   CLARITY UA  Clear   COLOR UA  Yellow   SPEC GRAV UA  1 010   PH UA  6 5   GLUCOSE UA mg/dl Negative   KETONES UA mg/dl Negative   BLOOD UA  Trace-Intact*   PROTEIN UA mg/dl Trace*   NITRITE UA  Negative   BILIRUBIN UA  Negative   UROBILINOGEN UA E U /dl 0 2   LEUKOCYTES UA  Negative   WBC UA /hpf None Seen   RBC UA /hpf 0-1   BACTERIA UA /hpf None Seen   EPITHELIAL CELLS WET PREP /hpf Occasional   MUCUS THREADS  Occasional* Results from last 7 days   Lab Units 05/21/23  1236 05/21/23  1228   BLOOD CULTURE  No Growth at 24 hrs  No Growth at 24 hrs  Results from last 7 days   Lab Units 05/23/23  0430   TOTAL COUNTED  99             Medications:   Scheduled Medications:  losartan, 100 mg, Oral, Daily  pantoprazole, 40 mg, Intravenous, Q24H MAGALI  pravastatin, 80 mg, Oral, Daily With Dinner      Continuous IV Infusions:  multi-electrolyte, 100 mL/hr, Intravenous, Continuous      PRN Meds:  acetaminophen, 650 mg, Oral, Q6H PRN  butalbital-acetaminophen-caffeine, 1 tablet, Oral, Q4H PRN  HYDROcodone-acetaminophen, 1 tablet, Oral, Q6H PRN  lidocaine (PF), , , PRN  morphine injection, 2 mg, Intravenous, Q4H PRN  ondansetron, 4 mg, Intravenous, Q6H PRN        Discharge Plan: D    Network Utilization Review Department  ATTENTION: Please call with any questions or concerns to [phone] and carefully listen to the prompts so that you are directed to the right person  All voicemails are confidential   Ignacio Curran all requests for admission clinical reviews, approved or denied determinations and any other requests to dedicated fax number below belonging to the campus where the patient is receiving treatment   List of dedicated fax numbers for the Facilities:  [address] DENIALS (Administrative/Medical Necessity) [phone]   [address] (Maternity/NICU/Pediatrics) [phone]   [address] [phone]   [place] [address] [phone]   [facility] [address] [address] [address] [phone] [address] [facility] [phone] 9204 [facility] [phone]

## 2023-05-23 NOTE — PLAN OF CARE
Problem: Potential for Falls  Goal: Patient will remain free of falls  Description: INTERVENTIONS:  - Educate patient/family on patient safety including physical limitations  - Instruct patient to call for assistance with activity   - Consult OT/PT to assist with strengthening/mobility   - Keep Call bell within reach  - Keep bed low and locked with side rails adjusted as appropriate  - Keep care items and personal belongings within reach  - Initiate and maintain comfort rounds  - Make Fall Risk Sign visible to staff  - Offer Toileting in advance of need  - Initiate/Maintain bed alarm  - Obtain necessary fall risk management equipment  - Apply yellow socks and bracelet for high fall risk patients  - Consider moving patient to room near nurses station  Outcome: Progressing     Problem: PAIN - ADULT  Goal: Verbalizes/displays adequate comfort level or baseline comfort level  Description: Interventions:  - Encourage patient to monitor pain and request assistance  - Assess pain using appropriate pain scale  - Administer analgesics based on type and severity of pain and evaluate response  - Implement non-pharmacological measures as appropriate and evaluate response  - Consider cultural and social influences on pain and pain management  - Notify physician/advanced practitioner if interventions unsuccessful or patient reports new pain  Outcome: Progressing     Problem: INFECTION - ADULT  Goal: Absence or prevention of progression during hospitalization  Description: INTERVENTIONS:  - Assess and monitor for signs and symptoms of infection  - Monitor lab/diagnostic results  - Monitor all insertion sites, i e  indwelling lines, tubes, and drains  - Monitor endotracheal if appropriate and nasal secretions for changes in amount and color  - [place] appropriate cooling/warming therapies per order  - Administer medications as ordered  - Instruct and encourage patient and family to use good hand hygiene technique  - Identify and instruct in appropriate isolation precautions for identified infection/condition  Outcome: Progressing  Goal: Absence of fever/infection during neutropenic period  Description: INTERVENTIONS:  - Monitor WBC    Outcome: Progressing     Problem: SAFETY ADULT  Goal: Patient will remain free of falls  Description: INTERVENTIONS:  - Educate patient/family on patient safety including physical limitations  - Instruct patient to call for assistance with activity   - Consult OT/PT to assist with strengthening/mobility   - Keep Call bell within reach  - Keep bed low and locked with side rails adjusted as appropriate  - Keep care items and personal belongings within reach  - Initiate and maintain comfort rounds  - Make Fall Risk Sign visible to staff  - Offer Toileting in advance of need  - Initiate/Maintain bed alarm  - Obtain necessary fall risk management equipment  - Apply yellow socks and bracelet for high fall risk patients  - Consider moving patient to room near nurses station  Outcome: Progressing  Goal: Maintain or return to baseline ADL function  Description: INTERVENTIONS:  - Educate patient/family on patient safety including physical limitations  - Instruct patient to call for assistance with activity   - Consult OT/PT to assist with strengthening/mobility   - Keep Call bell within reach  - Keep bed low and locked with side rails adjusted as appropriate  - Keep care items and personal belongings within reach  - Initiate and maintain comfort rounds  - Make Fall Risk Sign visible to staff  - Offer Toileting in advance of need  - Initiate/Maintain bed alarm  - Obtain necessary fall risk management equipment:   - Apply yellow socks and bracelet for high fall risk patients  - Consider moving patient to room near nurses station  Outcome: Progressing     Problem: DISCHARGE PLANNING  Goal: Discharge to home or other facility with appropriate resources  Description: INTERVENTIONS:  - Identify barriers to discharge w/patient and caregiver  - Arrange for needed discharge resources and transportation as appropriate  - Identify discharge learning needs (meds, wound care, etc )  - Refer to Case Management Department for coordinating discharge planning if the patient needs post-hospital services based on physician/advanced practitioner order or complex needs related to functional status, cognitive ability, or social support system  Outcome: Progressing     Problem: Knowledge Deficit  Goal: Patient/family/caregiver demonstrates understanding of disease process, treatment plan, medications, and discharge instructions  Description: Complete learning assessment and assess knowledge base    Interventions:  - Provide teaching at level of understanding  - Provide teaching via preferred learning methods  Outcome: Progressing     Problem: GASTROINTESTINAL - ADULT  Goal: Minimal or absence of nausea and/or vomiting  Description: INTERVENTIONS:  - Administer IV fluids if ordered to ensure adequate hydration  - Maintain NPO status until nausea and vomiting are resolved  - Nasogastric tube if ordered  - Administer ordered antiemetic medications as needed  - Provide nonpharmacologic comfort measures as appropriate  - Advance diet as tolerated, if ordered  - Consider nutrition services referral to assist patient with adequate nutrition and appropriate food choices  Outcome: Progressing  Goal: Maintains or returns to baseline bowel function  Description: INTERVENTIONS:  - Assess bowel function  - Encourage oral fluids to ensure adequate hydration  - Administer IV fluids if ordered to ensure adequate hydration  - Administer ordered medications as needed  - Encourage mobilization and activity  - Consider nutritional services referral to assist patient with adequate nutrition and appropriate food choices  Outcome: Progressing  Goal: Maintains adequate nutritional intake  Description: INTERVENTIONS:  - Monitor percentage of each meal consumed  - Identify factors contributing to decreased intake, treat as appropriate  - Assist with meals as needed  - Monitor I&O, weight, and lab values if indicated  - Obtain nutrition services referral as needed  Outcome: Progressing  Goal: Oral mucous membranes remain intact  Description: INTERVENTIONS  - Assess oral mucosa and hygiene practices  - Implement preventative oral hygiene regimen  - Implement oral medicated treatments as ordered  - Initiate Nutrition services referral as needed  Outcome: Progressing     Problem: GENITOURINARY - ADULT  Goal: Maintains or returns to baseline urinary function  Description: INTERVENTIONS:  - Assess urinary function  - Encourage oral fluids to ensure adequate hydration if ordered  - Administer IV fluids as ordered to ensure adequate hydration  - Administer ordered medications as needed  - Offer frequent toileting  - Follow urinary retention protocol if ordered  Outcome: Progressing     Problem: METABOLIC, FLUID AND ELECTROLYTES - ADULT  Goal: Electrolytes maintained within normal limits  Description: INTERVENTIONS:  - Monitor labs and assess patient for signs and symptoms of electrolyte imbalances  - Administer electrolyte replacement as ordered  - Monitor response to electrolyte replacements, including repeat lab results as appropriate  - Instruct patient on fluid and nutrition as appropriate  Outcome: Progressing  Goal: Fluid balance maintained  Description: INTERVENTIONS:  - Monitor labs   - Monitor I/O and WT  - Instruct patient on fluid and nutrition as appropriate  - Assess for signs & symptoms of volume excess or deficit  Outcome: Progressing  Goal: Glucose maintained within target range  Description: INTERVENTIONS:  - Monitor Blood Glucose as ordered  - Assess for signs and symptoms of hyperglycemia and hypoglycemia  - Administer ordered medications to maintain glucose within target range  - Assess nutritional intake and initiate nutrition service referral as needed  Outcome: Progressing     Problem: Nutrition/Hydration-ADULT  Goal: Nutrient/Hydration intake appropriate for improving, restoring or maintaining nutritional needs  Description: Monitor and assess patient's nutrition/hydration status for malnutrition  Collaborate with interdisciplinary team and initiate plan and interventions as ordered  Monitor patient's weight and dietary intake as ordered or per policy  Utilize nutrition screening tool and intervene as necessary  Determine patient's food preferences and provide high-protein, high-caloric foods as appropriate       INTERVENTIONS:  - Monitor oral intake, urinary output, labs, and treatment plans  - Assess nutrition and hydration status and recommend course of action  - Evaluate amount of meals eaten  - Assist patient with eating if necessary   - Allow adequate time for meals  - Recommend/ encourage appropriate diets, oral nutritional supplements, and vitamin/mineral supplements  - Order, calculate, and assess calorie counts as needed  - Recommend, monitor, and adjust tube feedings and TPN/PPN based on assessed needs  - Assess need for intravenous fluids  - Provide specific nutrition/hydration education as appropriate  - Include patient/family/caregiver in decisions related to nutrition  Outcome: Progressing

## 2023-05-23 NOTE — PROGRESS NOTES
Progress Note- Beltran Gusman 58 y o  female MRN: [id number]    Unit/Bed#: -01 Encounter: [id number]      Assessment and Plan:    Patient is a 58 y o  female with PMH significant for asthma, DM2 and hx of tobacco use admitted on 5/21 for right upper and lower abdominal pain with nausea, NB/NB vomiting and diarrhea  Patient had imaging notable for a right suprahilar lesion suspicious for malignancy in addition to a lesion of the proximal pancreas body and a focal annular lesion of the ascending colon  Also noted to have atelectasis, small-moderate right pleural effusion and mediastinal and right supraclavicular adenopathy  Serologies on admission notable for leukocytosis (21 91), microcytic anemia (hgb 9 5), thrombocytosis (573 K)  Stool studies including stool enteric bacterial panel and Giardia and O&P ordered but not yet collected       1  Abdominal pain  2  Nausea, vomiting and diarrhea  5  Microcytic anemia  6  Abnormal CT scan of lungs, pancreas, and colon  Patient with improving leukocytosis and downtrending hgb (8 4-7 3)  CEA elevated (7 1)  BC with NGx24  No evidence of overt GI bleeding  Patient has been evaluated by pulmonology and oncology, appreciate recommendations  Palliative care has also been consulted  She scheduled for lymph node biopsy from the neck by IR today and MRI brain to evaluate for additional metastasis  Per pulmonology consult, 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  Hold GoLytely bowel prep for discussion between attending and pulmonology regarding patient's respiratory status and previously planned endoscopic procedures  Further recommendations to follow   In regards to sampling of her pancreatic lesion, this can be done via EUS with FNA in the outpatient setting      - Clear liquid diet  - Continue IV fluid hydration  - Continue antiemetics/analgesics PRN  - Collect and follow-up stool studies if able  - CA 19-9, CA 27 29 in process  - Follow-up MRI cristobal and IR lymph node bx  - Monitor fever and WBC curve closely  - Monitor abdominal exams closely  - Monitor hgb; Transfuse for hgb <7 0   - Monitor stools for evidence of overt GI bleeding  - Pulmonology following, appreciate recommendations  - Oncology following, appreciate recommendations     GI will continue to follow  ______________________________________________________________________    Subjective:     Patient found resting comfortably in bed  No acute overnight events  Patient was on her way to MRI during initial interview  Provider returned and patient was asleep and difficult to arouse after Ativan required for MRI brain  Patient could not participate in ROS   Patient's wife was present at bedisde but states patient has been sleeping since her arrival      Medication Administration - last 24 hours from 05/22/2023 0853 to 05/23/2023 0853       Date/Time Order Dose Route Action Action by     05/23/2023 0844 EDT multi-electrolyte (PLASMALYTE-A/ISOLYTE-S PH 7 4) IV solution 100 mL/hr Intravenous New Bag Michelle Rehabdiel     05/22/2023 2008 EDT multi-electrolyte (PLASMALYTE-A/ISOLYTE-S PH 7 4) IV solution 100 mL/hr Intravenous New Bag Dolores Session, RN     05/22/2023 3540 EDT multi-electrolyte (PLASMALYTE-A/ISOLYTE-S PH 7 4) IV solution 100 mL/hr Intravenous New Bag Jackye Smoke, RN     05/22/2023 1140 EDT acetaminophen (TYLENOL) tablet 650 mg 650 mg Oral Refused Jackye Smoke, RN     05/23/2023 9833 EDT ondansetron (ZOFRAN) injection 4 mg 4 mg Intravenous Given Dolores Session, RN     05/23/2023 9057 EDT pantoprazole (PROTONIX) injection 40 mg 40 mg Intravenous Given Michelle Rehrig     05/22/2023 8016 EDT pantoprazole (PROTONIX) injection 40 mg 40 mg Intravenous Given Jackye Smoke, RN     05/22/2023 2008 EDT HYDROcodone-acetaminophen (NORCO) 5-325 mg per tablet 1 tablet 1 tablet Oral Given Dolores Session, RN     05/23/2023 2028 EDT morphine injection 2 mg 2 mg Intravenous Given Karina Flores "Belle Patel RN     05/23/2023 0209 EDT morphine injection 2 mg 2 mg Intravenous Given Ally Mulligan RN     05/22/2023 2221 EDT morphine injection 2 mg 2 mg Intravenous Given Ally Mulligan RN     05/22/2023 1802 EDT morphine injection 2 mg 2 mg Intravenous Given Magaly Mike RN     05/22/2023 0065 EDT morphine injection 2 mg 2 mg Intravenous Given Magaly Mike RN     05/22/2023 1628 EDT pravastatin (PRAVACHOL) tablet 80 mg 80 mg Oral Given Magaly Mike RN     05/23/2023 9283 EDT losartan (COZAAR) tablet 100 mg 100 mg Oral Given Michelle Gibson     05/22/2023 0795 EDT losartan (COZAAR) tablet 100 mg 100 mg Oral Given Magaly Mike RN     05/22/2023 1141 EDT butalbital-acetaminophen-caffeine (FIORICET,ESGIC) -40 mg per tablet 1 tablet 1 tablet Oral Given Magaly Mike RN     05/22/2023 1257 EDT morphine injection 2 mg 2 mg Intravenous Given Magaly Mike RN     05/22/2023 1736 EDT polyethylene glycol (GOLYTELY) bowel prep 4,000 mL 4,000 mL Oral Given Magaly Mike RN     05/22/2023 1628 EDT bisacodyl (DULCOLAX) EC tablet 10 mg 10 mg Oral Given Magaly Mike RN     05/22/2023 1443 EDT lidocaine (PF) (XYLOCAINE-MPF) 1 % injection 10 mL Infiltration Given Ishmael Paulson MD     05/22/2023 1942 EDT LORazepam (ATIVAN) injection 1 mg 0 mg Intravenous Hold Ally Mulligan RN     05/23/2023 5550 EDT HYDROmorphone (DILAUDID) injection 1 mg 1 mg Intravenous Given Ally Mulligan RN          Objective:     Vitals: Blood pressure 132/78, pulse (!) 127, temperature 98 3 °F (36 8 °C), temperature source Oral, resp  rate 17, height 5' 2\" (1 575 m), weight 59 7 kg (131 lb 9 8 oz), SpO2 (!) 85 %  ,Body mass index is 24 07 kg/m²        Intake/Output Summary (Last 24 hours) at 5/23/2023 0853  Last data filed at 5/23/2023 0844  Gross per 24 hour   Intake 3958 33 ml   Output 400 ml   Net 3558 33 ml       Physical Exam:   General Appearance: Awake and alert, in no acute distress  Abdomen: Soft, non-tender, " non-distended; bowel sounds normal; no masses or no organomegaly    Invasive Devices     Peripheral Intravenous Line  Duration           Peripheral IV 05/21/23 Left Forearm 1 day    Peripheral IV 05/21/23 Right Antecubital 1 day                Lab Results:  Admission on 05/21/2023   Component Date Value   • Protime 05/21/2023 13 5    • INR 05/21/2023 1 03    • PTT 05/21/2023 26    • WBC 05/21/2023 21 91 (H)    • RBC 05/21/2023 4 10    • Hemoglobin 05/21/2023 9 5 (L)    • Hematocrit 05/21/2023 32 2 (L)    • MCV 05/21/2023 79 (L)    • MCH 05/21/2023 23 2 (L)    • MCHC 05/21/2023 29 5 (L)    • RDW 05/21/2023 15 5 (H)    • MPV 05/21/2023 8 4 (L)    • Platelets 52/56/0623 573 (H)    • nRBC 05/21/2023 0    • Neutrophils Relative 05/21/2023 84 (H)    • Immat GRANS % 05/21/2023 1    • Lymphocytes Relative 05/21/2023 10 (L)    • Monocytes Relative 05/21/2023 4    • Eosinophils Relative 05/21/2023 0    • Basophils Relative 05/21/2023 1    • Neutrophils Absolute 05/21/2023 18 60 (H)    • Immature Grans Absolute 05/21/2023 0 10    • Lymphocytes Absolute 05/21/2023 2 19    • Monocytes Absolute 05/21/2023 0 89    • Eosinophils Absolute 05/21/2023 0 03    • Basophils Absolute 05/21/2023 0 10    • Sodium 05/21/2023 134 (L)    • Potassium 05/21/2023 4 0    • Chloride 05/21/2023 100    • CO2 05/21/2023 23    • ANION GAP 05/21/2023 11    • BUN 05/21/2023 13    • Creatinine 05/21/2023 0 67    • Glucose 05/21/2023 195 (H)    • Calcium 05/21/2023 9 8    • AST 05/21/2023 9 (L)    • ALT 05/21/2023 7    • Alkaline Phosphatase 05/21/2023 65    • Total Protein 05/21/2023 7 6    • Albumin 05/21/2023 4 2    • Total Bilirubin 05/21/2023 0 24    • eGFR 05/21/2023 94    • Magnesium 05/21/2023 1 5 (L)    • Lipase 05/21/2023 197 (H)    • hs TnI 0hr 05/21/2023 3    • Color, UA 05/21/2023 Yellow    • Clarity, UA 05/21/2023 Clear    • Specific Gravity, UA 05/21/2023 1 010    • pH, UA 05/21/2023 6 5    • Leukocytes, UA 05/21/2023 Negative    • Nitrite, UA 05/21/2023 Negative    • Protein, UA 05/21/2023 Trace (A)    • Glucose, UA 05/21/2023 Negative    • Ketones, UA 05/21/2023 Negative    • Urobilinogen, UA 05/21/2023 0 2    • Bilirubin, UA 05/21/2023 Negative    • Occult Blood, UA 05/21/2023 Trace-Intact (A)    • Ventricular Rate 05/21/2023 108    • Atrial Rate 05/21/2023 108    • CA Interval 05/21/2023 128    • QRSD Interval 05/21/2023 92    • QT Interval 05/21/2023 358    • QTC Interval 05/21/2023 479    • P Axis 05/21/2023 60    • QRS Axis 05/21/2023 76    • T Wave Axis 05/21/2023 43    • LACTIC ACID 05/21/2023 1 4    • Procalcitonin 05/21/2023 <0 05    • Blood Culture 05/21/2023 No Growth at 24 hrs  • Blood Culture 05/21/2023 No Growth at 24 hrs      • RBC, UA 05/21/2023 0-1    • WBC, UA 05/21/2023 None Seen    • Epithelial Cells 05/21/2023 Occasional    • Bacteria, UA 05/21/2023 None Seen    • MUCUS THREADS 05/21/2023 Occasional (A)    • Sodium 05/22/2023 135    • Potassium 05/22/2023 4 0    • Chloride 05/22/2023 101    • CO2 05/22/2023 23    • ANION GAP 05/22/2023 11    • BUN 05/22/2023 12    • Creatinine 05/22/2023 0 67    • Glucose 05/22/2023 133    • Calcium 05/22/2023 8 8    • AST 05/22/2023 11 (L)    • ALT 05/22/2023 6 (L)    • Alkaline Phosphatase 05/22/2023 59    • Total Protein 05/22/2023 6 9    • Albumin 05/22/2023 3 8    • Total Bilirubin 05/22/2023 0 24    • eGFR 05/22/2023 94    • Magnesium 05/22/2023 3 0 (H)    • Phosphorus 05/22/2023 3 9    • Lipase 05/22/2023 139 (H)    • Procalcitonin 05/22/2023 0 23    • WBC 05/22/2023 15 67 (H)    • RBC 05/22/2023 3 57 (L)    • Hemoglobin 05/22/2023 8 4 (L)    • Hematocrit 05/22/2023 27 7 (L)    • MCV 05/22/2023 78 (L)    • MCH 05/22/2023 23 5 (L)    • MCHC 05/22/2023 30 3 (L)    • RDW 05/22/2023 15 5 (H)    • MPV 05/22/2023 8 5 (L)    • Platelets 36/39/9869 453 (H)    • nRBC 05/22/2023 0    • Neutrophils Relative 05/22/2023 73    • Immat GRANS % 05/22/2023 0    • Lymphocytes Relative 05/22/2023 16    • Monocytes Relative 05/22/2023 11    • Eosinophils Relative 05/22/2023 0    • Basophils Relative 05/22/2023 0    • Neutrophils Absolute 05/22/2023 11 34 (H)    • Immature Grans Absolute 05/22/2023 0 05    • Lymphocytes Absolute 05/22/2023 2 48    • Monocytes Absolute 05/22/2023 1 73 (H)    • Eosinophils Absolute 05/22/2023 0 02    • Basophils Absolute 05/22/2023 0 05    • Iron Saturation 05/22/2023 5 (L)    • TIBC 05/22/2023 390    • Iron 05/22/2023 21 (L)    • Ferritin 05/22/2023 173    • AFP TUMOR MARKER 05/23/2023 1 07    • LD 05/23/2023 136 (L)    • WBC 05/23/2023 13 53 (H)    • RBC 05/23/2023 3 17 (L)    • Hemoglobin 05/23/2023 7 3 (L)    • Hematocrit 05/23/2023 24 5 (L)    • MCV 05/23/2023 77 (L)    • MCH 05/23/2023 23 0 (L)    • MCHC 05/23/2023 29 8 (L)    • RDW 05/23/2023 15 5 (H)    • MPV 05/23/2023 8 3 (L)    • Platelets 25/58/3977 435 (H)    • nRBC 05/23/2023 0    • Sed Rate 05/23/2023 49 (H)    • CRP 05/23/2023 42 0 (H)    • Retic Ct Abs 05/23/2023 39,600    • Retic Ct Pct 05/23/2023 1 25    • Segmented Neutrophils Ma* 05/23/2023 70    • Lymphocytes Manual 05/23/2023 21    • Monocytes Manual 05/23/2023 8    • Total Counted 05/23/2023 99    • RBC Morphology 05/23/2023 Normal    • Platelet Estimate 38/86/5752 Increased (A)    • Procalcitonin 05/23/2023 0 25    • DIRECT TAVO 05/23/2023 Negative        Imaging Studies: I have personally reviewed pertinent imaging studies  **Please note:  Dictation voice to text software may have been used in the creation of this record  Occasional wrong word or “sound alike” substitutions may have occurred due to the inherent limitations of voice recognition software  Read the chart carefully and recognize, using context, where substitutions have occurred  **

## 2023-05-23 NOTE — PROGRESS NOTES
[facility]  Progress Note  Name: Kyle Wilks  MRN: [id number]  Unit/Bed#: -01 I Date of Admission: 5/21/2023   Date of Service: 5/23/2023 I Hospital Day: 2    Assessment/Plan      Abdominal pain  Assessment & Plan  · Patient presented to ED with complaints of right upper and lower quadrant abdominal pain with nausea, vomiting and diarrhea that started this a m  · CT scan chest abdomen pelvis see below  · For assessment/treatment plan see abnormal CT scan    * Abnormal CT scan  Assessment & Plan  · Presented for right upper lower quadrant abdominal pain with nausea vomiting and diarrhea   · Leukocytosis with WBC 21 9  · CT chest abdomen pelvis: Heterogeneously enhancing right suprahilar lesion with abrupt cut off of the right mainstem bronchus suspicious for malignancy    There is secondary complete atelectasis of the right lung with heterogeneous density  Small to moderate right pleural effusion is noted  Few subcentimeter left lung nodules are noted  Evidence of mediastinal and right supraclavicular adenopathy  Heterogeneous, rim-enhancing lesion at the proximal pancreatic body, possible metastatic focus  Pathologically enlarged celiac axis lymph nodes  Focal annular lesion involving the ascending colon, suspicious for metastatic focus versus primary malignancy  · GI recommendations appreciated  Patient to undergo EGD and colonoscopy  GoLytely prep has been started  · Pulmonology recommendations appreciated  Recommendation is biopsy of large right supraclavicular node  Discussed with IR  Results pending  · Oncology recommendations appreciated  Due to headaches MRI brain with and without contrast obtained: No acute infarction, edema, or mass effect   Few punctate hyperintense T2/FLAIR foci are noted within the periventricular and subcortical white matter which are nonspecific and can be seen with vasculitis, migrainous angiopathy, Lyme's disease or microangiopathic changes  · Palliative input appreciated    Iron deficiency anemia  Assessment & Plan  · Hemoglobin trended down from 9 5 to 7 3  This could be due to chronic blood loss and/or hemodilution  · Daily CBC and trend hemoglobin  · Iron panel results reviewed  · Patient to undergo EGD and colonoscopy tomorrow  · Obtain type and screen  Transfuse with PRBCs for below 7  · Monitor closely for bleeding    Hypomagnesemia  Assessment & Plan  · Presented with serum magnesium 1 5  · Likely secondary to vomiting and diarrhea  · Repleted with 4 g IV magnesium   · Monitor magnesium periodically    Diarrhea  Assessment & Plan  · Patient complains of frequent bouts of diarrhea   · Stool studies pending  · IV fluids as ordered    Nausea & vomiting  Assessment & Plan  · Presented with nausea and vomiting and right upper and lower quadrant pain  · CT imaging see above  · Zofran as needed  · IV fluids  · Clear liquids as tolerated         VTE Pharmacologic Prophylaxis: VTE Score: 2 Moderate Risk (Score 3-4) - Pharmacological DVT Prophylaxis Contraindicated  Sequential Compression Devices Ordered  Patient Centered Rounds: I performed bedside rounds with nursing staff today  Discussions with Specialists or Other Care Team Provider: Palliative    Education and Discussions with Family / Patient: Updated  (wife) at bedside  Total Time Spent on Date of Encounter in care of patient: 55 minutes This time was spent on one or more of the following: performing physical exam; counseling and coordination of care; obtaining or reviewing history; documenting in the medical record; reviewing/ordering tests, medications or procedures; communicating with other healthcare professionals and discussing with patient's family/caregivers      Current Length of Stay: 2 day(s)  Current Patient Status: Inpatient   Certification Statement: The patient will continue to require additional inpatient hospital stay due to anemia, EGD and colonoscopy, abnormal CAT scan  Care coordination  Discharge Plan: to be determined  Code Status: Level 1 - Full Code    Subjective:   Patient seen and examined  She is very sleepy from medication  Revisited her later in the afternoon to discuss findings and goals of care and offer emotional support  Objective:     Vitals:   Temp (24hrs), Av 7 °F (37 1 °C), Min:98 3 °F (36 8 °C), Max:99 °F (37 2 °C)    Temp:  [98 3 °F (36 8 °C)-99 °F (37 2 °C)] 99 °F (37 2 °C)  HR:  [102-127] 119  Resp:  [16-17] 17  BP: (125-142)/(54-82) 142/82  SpO2:  [85 %-92 %] 89 %  Body mass index is 24 07 kg/m²  Input and Output Summary (last 24 hours): Intake/Output Summary (Last 24 hours) at 2023 1913  Last data filed at 2023 1843  Gross per 24 hour   Intake 2625 ml   Output 650 ml   Net 1975 ml       Physical Exam:   Physical Exam  Vitals and nursing note reviewed  Constitutional:       Appearance: She is ill-appearing  HENT:      Head: Normocephalic and atraumatic  Mouth/Throat:      Mouth: Mucous membranes are dry  Pharynx: Oropharynx is clear  Eyes:      Pupils: Pupils are equal, round, and reactive to light  Cardiovascular:      Rate and Rhythm: Normal rate  Pulses: Normal pulses  Pulmonary:      Effort: Pulmonary effort is normal  No respiratory distress  Breath sounds: Normal breath sounds  Abdominal:      General: Bowel sounds are normal       Palpations: Abdomen is soft  Tenderness: There is abdominal tenderness  Musculoskeletal:      Cervical back: Neck supple  Right lower leg: No edema  Left lower leg: No edema  Skin:     General: Skin is warm and dry  Capillary Refill: Capillary refill takes less than 2 seconds  Neurological:      General: No focal deficit present  Mental Status: She is alert  Mental status is at baseline            Additional Data:     Labs:  Results from last 7 days   Lab Units 23  0430 23  0421   WBC Thousand/uL 13 53* 15 67*   HEMOGLOBIN g/dL 7 3* 8 4*   HEMATOCRIT % 24 5* 27 7*   PLATELETS Thousands/uL 435* 453*   NEUTROS PCT %  --  73   LYMPHS PCT %  --  16   MONOS PCT %  --  11   EOS PCT %  --  0     Results from last 7 days   Lab Units 05/22/23  0421   SODIUM mmol/L 135   POTASSIUM mmol/L 4 0   CHLORIDE mmol/L 101   CO2 mmol/L 23   BUN mg/dL 12   CREATININE mg/dL 0 67   ANION GAP mmol/L 11   CALCIUM mg/dL 8 8   ALBUMIN g/dL 3 8   TOTAL BILIRUBIN mg/dL 0 24   ALK PHOS U/L 59   ALT U/L 6*   AST U/L 11*   GLUCOSE RANDOM mg/dL 133     Results from last 7 days   Lab Units 05/21/23  1209   INR  1 03             Results from last 7 days   Lab Units 05/23/23  0430 05/22/23  0421 05/21/23  1228 05/21/23  1209   LACTIC ACID mmol/L  --   --  1 4  --    PROCALCITONIN ng/ml 0 25 0 23  --  <0 05       Lines/Drains:  Invasive Devices     Peripheral Intravenous Line  Duration           Peripheral IV 05/21/23 Left Forearm 2 days    Peripheral IV 05/21/23 Right Antecubital 2 days                      Imaging: Reviewed radiology reports from this admission including: MRI brain    Recent Cultures (last 7 days):   Results from last 7 days   Lab Units 05/21/23  1236 05/21/23  1228   BLOOD CULTURE  No Growth at 24 hrs  No Growth at 24 hrs         Last 24 Hours Medication List:   Current Facility-Administered Medications   Medication Dose Route Frequency Provider Last Rate   • acetaminophen  650 mg Oral Q6H PRN BRITTNY Sheppard     • buPROPion  150 mg Oral BID BRITTNY Kaba     • butalbital-acetaminophen-caffeine  1 tablet Oral Q4H PRN BRITTNY Sheppard     • [START ON 5/24/2023] cyanocobalamin  500 mcg Oral Daily BRITTNY Platt     • [START ON 1/53/1380] folic acid  738 mcg Oral Daily BRITTNY Platt     • HYDROcodone-acetaminophen  1 tablet Oral Q6H PRN BRITTNY Sheppard     • hydrOXYzine HCL  25 mg Oral HS BRITTNY Kaba     • lidocaine (PF)    PRN Vangie Rangel MD     • losartan  100 mg Oral Daily BRITTNY Sehppard     • morphine injection  2 mg Intravenous Q4H PRN BRITTNY Sheppard     • multi-electrolyte  100 mL/hr Intravenous Continuous BRITTNY Sheppard 100 mL/hr (05/23/23 0844)   • ondansetron  4 mg Intravenous Q6H PRN BRITTNY Sheppard     • pantoprazole  40 mg Intravenous Q24H [address] BRITTNY Sheppard     • pravastatin  80 mg Oral Daily With [employer], BRITTNY          Today, Patient Was Seen By: BRITTNY Garza    **Please Note: This note may have been constructed using a voice recognition system  **

## 2023-05-23 NOTE — ASSESSMENT & PLAN NOTE
· Hemoglobin trended down from 9 5 to 7 3  This could be due to chronic blood loss and/or hemodilution  · Daily CBC and trend hemoglobin  · Iron panel results reviewed  · Patient to undergo EGD and colonoscopy tomorrow  · Obtain type and screen    Transfuse with PRBCs for below 7  · Monitor closely for bleeding

## 2023-05-23 NOTE — ACP (ADVANCE CARE PLANNING)
Advanced Care Planning Progress Note  Serious Illness Conversation      1  What is your understanding now of where you are with your illness? Prognostic Understanding: appropriate understanding of prognosis     2  How much information about what is likely to be ahead with your illness would you like to have? Information: patient wants to be fully informed     3  What did you (clinician) communicate to the patient? Prognostic Communication: Time - I wish we were not in this situation, but I am worried that time may be as short as days to weeks  4  If your health situation worsens, what are your most important goals? Goals: have my medical decisions respected, be physically comfortable, be at home, provide support for family     5  What are the biggest fears and worries about the future and your health? Fears/Worries: other symptoms, pain, preparing for death, getting treatments I do not want     6  What abilities are so critical to your life that you cannot imagine living without them? Unacceptable Function: being in chronic severe pain, being in pain or very uncomfortable, being chronically confused or not being myself     7  What gives you strength as you think about the future with your illness? Family     8  If you become sicker, how much are you willing to go through for the possibility of gaining more time? Still determining goals  Patient previously had DNR, now thinking about changing it  Had a discussion with patient about palliation of symptoms to increase quality of remaining time  Encouraged her to explore options and consider opinions of specialists  9  How much does your proxy and family know about your priorities and wishes? 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 I will do everything I can to help you through this  Advanced directives         Serious Illness Conversation    No data filed         Jt Nieves, [address]

## 2023-05-23 NOTE — ASSESSMENT & PLAN NOTE
· Presented with nausea and vomiting and right upper and lower quadrant pain  · CT imaging see above  · Zofran as needed  · IV fluids  · Clear liquids as tolerated

## 2023-05-24 ENCOUNTER — APPOINTMENT (OUTPATIENT)
Dept: GASTROENTEROLOGY | Facility: HOSPITAL | Age: 63
DRG: 988 | End: 2023-05-24
Payer: COMMERCIAL

## 2023-05-24 LAB
ABO GROUP BLD: NORMAL
ABO GROUP BLD: NORMAL
ANION GAP SERPL CALCULATED.3IONS-SCNC: 10 MMOL/L (ref 4–13)
BASOPHILS # BLD AUTO: 0.05 THOUSANDS/ÂΜL (ref 0–0.1)
BASOPHILS NFR BLD AUTO: 0 % (ref 0–1)
BLD GP AB SCN SERPL QL: NEGATIVE
BUN SERPL-MCNC: 10 MG/DL (ref 5–25)
CALCIUM SERPL-MCNC: 8.2 MG/DL (ref 8.4–10.2)
CANCER AG19-9 SERPL-ACNC: 151 U/ML (ref 0–35)
CANCER AG27-29 SERPL-ACNC: 120.4 U/ML (ref 0–38.6)
CHLORIDE SERPL-SCNC: 99 MMOL/L (ref 96–108)
CO2 SERPL-SCNC: 26 MMOL/L (ref 21–32)
CREAT SERPL-MCNC: 0.57 MG/DL (ref 0.6–1.3)
EOSINOPHIL # BLD AUTO: 0.19 THOUSAND/ÂΜL (ref 0–0.61)
EOSINOPHIL NFR BLD AUTO: 1 % (ref 0–6)
ERYTHROCYTE [DISTWIDTH] IN BLOOD BY AUTOMATED COUNT: 15.4 % (ref 11.6–15.1)
GFR SERPL CREATININE-BSD FRML MDRD: 99 ML/MIN/1.73SQ M
GLUCOSE SERPL-MCNC: 138 MG/DL (ref 65–140)
HAPTOGLOB SERPL-MCNC: 404 MG/DL (ref 37–355)
HCT VFR BLD AUTO: 23 % (ref 34.8–46.1)
HGB BLD-MCNC: 7 G/DL (ref 11.5–15.4)
IMM GRANULOCYTES # BLD AUTO: 0.06 THOUSAND/UL (ref 0–0.2)
IMM GRANULOCYTES NFR BLD AUTO: 0 % (ref 0–2)
LYMPHOCYTES # BLD AUTO: 2.52 THOUSANDS/ÂΜL (ref 0.6–4.47)
LYMPHOCYTES NFR BLD AUTO: 17 % (ref 14–44)
MCH RBC QN AUTO: 23.4 PG (ref 26.8–34.3)
MCHC RBC AUTO-ENTMCNC: 30.4 G/DL (ref 31.4–37.4)
MCV RBC AUTO: 77 FL (ref 82–98)
MONOCYTES # BLD AUTO: 1.58 THOUSAND/ÂΜL (ref 0.17–1.22)
MONOCYTES NFR BLD AUTO: 11 % (ref 4–12)
NEUTROPHILS # BLD AUTO: 10.44 THOUSANDS/ÂΜL (ref 1.85–7.62)
NEUTS SEG NFR BLD AUTO: 71 % (ref 43–75)
NRBC BLD AUTO-RTO: 0 /100 WBCS
PLATELET # BLD AUTO: 429 THOUSANDS/UL (ref 149–390)
PMV BLD AUTO: 8 FL (ref 8.9–12.7)
POTASSIUM SERPL-SCNC: 3.3 MMOL/L (ref 3.5–5.3)
RBC # BLD AUTO: 2.99 MILLION/UL (ref 3.81–5.12)
RH BLD: POSITIVE
RH BLD: POSITIVE
SODIUM SERPL-SCNC: 135 MMOL/L (ref 135–147)
SPECIMEN EXPIRATION DATE: NORMAL
WBC # BLD AUTO: 14.84 THOUSAND/UL (ref 4.31–10.16)

## 2023-05-24 PROCEDURE — 85025 COMPLETE CBC W/AUTO DIFF WBC: CPT | Performed by: NURSE PRACTITIONER

## 2023-05-24 PROCEDURE — C9113 INJ PANTOPRAZOLE SODIUM, VIA: HCPCS

## 2023-05-24 PROCEDURE — 86901 BLOOD TYPING SEROLOGIC RH(D): CPT | Performed by: NURSE PRACTITIONER

## 2023-05-24 PROCEDURE — 99231 SBSQ HOSP IP/OBS SF/LOW 25: CPT | Performed by: INTERNAL MEDICINE

## 2023-05-24 PROCEDURE — 99233 SBSQ HOSP IP/OBS HIGH 50: CPT | Performed by: NURSE PRACTITIONER

## 2023-05-24 PROCEDURE — 80048 BASIC METABOLIC PNL TOTAL CA: CPT | Performed by: NURSE PRACTITIONER

## 2023-05-24 PROCEDURE — 86900 BLOOD TYPING SEROLOGIC ABO: CPT | Performed by: NURSE PRACTITIONER

## 2023-05-24 PROCEDURE — 86850 RBC ANTIBODY SCREEN: CPT | Performed by: NURSE PRACTITIONER

## 2023-05-24 RX ORDER — POLYETHYLENE GLYCOL 3350 17 G/17G
238 POWDER, FOR SOLUTION ORAL ONCE
Status: COMPLETED | OUTPATIENT
Start: 2023-05-24 | End: 2023-05-24

## 2023-05-24 RX ORDER — HYDROMORPHONE HCL/PF 1 MG/ML
0.5 SYRINGE (ML) INJECTION EVERY 4 HOURS PRN
Status: DISCONTINUED | OUTPATIENT
Start: 2023-05-24 | End: 2023-05-26

## 2023-05-24 RX ORDER — OXYCODONE HYDROCHLORIDE 5 MG/1
5 TABLET ORAL EVERY 4 HOURS PRN
Status: DISCONTINUED | OUTPATIENT
Start: 2023-05-24 | End: 2023-05-26

## 2023-05-24 RX ADMIN — MORPHINE SULFATE 2 MG: 2 INJECTION, SOLUTION INTRAMUSCULAR; INTRAVENOUS at 09:42

## 2023-05-24 RX ADMIN — HYDROCODONE BITARTRATE AND ACETAMINOPHEN 1 TABLET: 5; 325 TABLET ORAL at 10:54

## 2023-05-24 RX ADMIN — BUPROPION HYDROCHLORIDE TABLETS 150 MG: 100 TABLET, FILM COATED ORAL at 09:42

## 2023-05-24 RX ADMIN — ONDANSETRON 4 MG: 2 INJECTION INTRAMUSCULAR; INTRAVENOUS at 19:57

## 2023-05-24 RX ADMIN — BUPROPION HYDROCHLORIDE TABLETS 150 MG: 100 TABLET, FILM COATED ORAL at 17:21

## 2023-05-24 RX ADMIN — PANTOPRAZOLE SODIUM 40 MG: 40 INJECTION, POWDER, FOR SOLUTION INTRAVENOUS at 09:42

## 2023-05-24 RX ADMIN — OXYCODONE HYDROCHLORIDE 5 MG: 5 TABLET ORAL at 20:03

## 2023-05-24 RX ADMIN — HYDROCODONE BITARTRATE AND ACETAMINOPHEN 1 TABLET: 5; 325 TABLET ORAL at 04:46

## 2023-05-24 RX ADMIN — Medication 238 G: at 17:22

## 2023-05-24 RX ADMIN — HYDROMORPHONE HYDROCHLORIDE 0.5 MG: 1 INJECTION, SOLUTION INTRAMUSCULAR; INTRAVENOUS; SUBCUTANEOUS at 22:26

## 2023-05-24 RX ADMIN — SODIUM CHLORIDE, SODIUM GLUCONATE, SODIUM ACETATE, POTASSIUM CHLORIDE AND MAGNESIUM CHLORIDE 100 ML/HR: 526; 502; 368; 37; 30 INJECTION, SOLUTION INTRAVENOUS at 20:04

## 2023-05-24 RX ADMIN — OXYCODONE HYDROCHLORIDE 5 MG: 5 TABLET ORAL at 15:56

## 2023-05-24 RX ADMIN — PRAVASTATIN SODIUM 80 MG: 40 TABLET ORAL at 15:56

## 2023-05-24 RX ADMIN — HYDROMORPHONE HYDROCHLORIDE 0.5 MG: 1 INJECTION, SOLUTION INTRAMUSCULAR; INTRAVENOUS; SUBCUTANEOUS at 18:24

## 2023-05-24 RX ADMIN — LOSARTAN POTASSIUM 100 MG: 50 TABLET, FILM COATED ORAL at 09:42

## 2023-05-24 RX ADMIN — MORPHINE SULFATE 2 MG: 2 INJECTION, SOLUTION INTRAMUSCULAR; INTRAVENOUS at 02:40

## 2023-05-24 RX ADMIN — ONDANSETRON 4 MG: 2 INJECTION INTRAMUSCULAR; INTRAVENOUS at 10:54

## 2023-05-24 RX ADMIN — HYDROXYZINE HYDROCHLORIDE 25 MG: 25 TABLET, FILM COATED ORAL at 21:30

## 2023-05-24 RX ADMIN — ONDANSETRON 4 MG: 2 INJECTION INTRAMUSCULAR; INTRAVENOUS at 04:46

## 2023-05-24 RX ADMIN — SODIUM CHLORIDE, SODIUM GLUCONATE, SODIUM ACETATE, POTASSIUM CHLORIDE AND MAGNESIUM CHLORIDE 100 ML/HR: 526; 502; 368; 37; 30 INJECTION, SOLUTION INTRAVENOUS at 09:42

## 2023-05-24 NOTE — ASSESSMENT & PLAN NOTE
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  CXR Reviewed.  Leads in good postion.  Ready for discharge.  F/U reviewed in detail.

## 2023-05-24 NOTE — ASSESSMENT & PLAN NOTE
· Hemoglobin trended down from 9 5 to 7 0 This could be due to chronic blood loss and/or hemodilution  · Daily CBC and trend hemoglobin  · Iron panel results reviewed  · Patient to undergo EGD and colonoscopy tomorrow  · Obtain type and screen    Transfuse with PRBCs for hemoglobin below 7  · Monitor closely for bleeding

## 2023-05-24 NOTE — ASSESSMENT & PLAN NOTE
· Patient presented to ED with complaints of right upper and lower quadrant abdominal pain with nausea, vomiting and diarrhea  · CT scan chest abdomen pelvis see below  · For assessment/treatment plan see abnormal CT scan

## 2023-05-24 NOTE — QUICK NOTE
Quick Note- Daniel Huerta 58 y o  female MRN: [id number]    24-year-old female with past medical history significant for asthma, DM 2, history of tobacco use, who was admitted on 05/21/2023 with right upper and lower quadrant abdominal pain with nausea, nonbloody emesis, nonbloody diarrhea  Imaging on admission with right suprahilar lesion suspicious for malignancy in addition to a lesion in proximal pancreas body and a focal annular lesion of a ascending colon, with several additional incidental findings  EGD and colonoscopy were recommended for evaluation of CT findings as well as microcytic anemia  As of 5/24/2023, patient has been unable to complete GoLytely bowel preparation  Stool is loose and dark brown  Assessment and plan:     Microcytic anemia  Abnormal CT scan A/P     · Maintain IV access, monitor Hb, transfuse per protocol or for hemodynamic instability  Monitor for overt GI bleeding  · Defer bidirectional endoscopic evaluation until 5/25/2023, and we will trial the Gatorade/MiraLAX bowel prep today  · Okay for clear liquids until midnight tonight  · Continue with supportive care to include IV fluid hydration, analgesics, antiemetics etc   · May benefit from eventual EUS with biopsy of panc lesion in the future pending results of investigation thus far       Nicole Ferraro PA-C

## 2023-05-24 NOTE — UTILIZATION REVIEW
Continued Stay Review    Date: 5/24/23                          Current Patient Class: IP  Current Level of Care: Med Surg    HPI:62 y o  female initially admitted on 5/21     Assessment/Plan: Imaging on admission with right suprahilar lesion suspicious for malignancy in addition to a lesion in proximal pancreas body and a focal annular lesion of a ascending colon, with several additional incidental findings  EGD and colonoscopy were recommended for evaluation of CT findings as well as microcytic anemia  As of 5/24/2023, patient has been unable to complete GoLytely bowel preparation  Stool is loose and dark brown  Defer bidirectional endoscopic evaluation until 5/25/2023, and we will trial the Gatorade/MiraLAX bowel prep today  Okay for clear liquids until midnight tonight  Continue with supportive care to include IV fluid hydration, analgesics, antiemetics      Vital Signs:     Date/Time Temp Pulse Resp BP MAP (mmHg) SpO2 O2 Device   05/24/23 0942 -- -- -- -- -- -- None (Room air)   05/24/23 07:42:10 98 4 °F (36 9 °C) 112 Abnormal  20 130/87 101 93 % --   05/23/23 22:26:57 99 4 °F (37 4 °C) 103 18 108/67 81 91 % --   05/23/23 15:33:25 99 °F (37 2 °C) 119 Abnormal  17 142/82 102 89 % Abnormal  None (Room air)   05/23/23 1128 -- 118 Abnormal  -- -- -- -- --       Pertinent Labs/Diagnostic Results:       Results from last 7 days   Lab Units 05/24/23  0435 05/23/23  0430 05/22/23  0421 05/21/23  1209   HEMATOCRIT % 23 0* 24 5* 27 7* 32 2*   HEMOGLOBIN g/dL 7 0* 7 3* 8 4* 9 5*   NEUTROS ABS Thousands/µL 10 44*  --  11 34* 18 60*   PLATELETS Thousands/uL 429* 435* 453* 573*   WBC Thousand/uL 14 84* 13 53* 15 67* 21 91*     Results from last 7 days   Lab Units 05/23/23  0430   RETIC CT ABS  39,600   RETIC CT PCT % 1 25     Results from last 7 days   Lab Units 05/24/23  0435 05/22/23  0421 05/21/23  1209   ANION GAP mmol/L 10 11 11   BUN mg/dL 10 12 13   CALCIUM mg/dL 8 2* 8 8 9 8   CHLORIDE mmol/L 99 101 100   CO2 mmol/L 26 23 23   CREATININE mg/dL 0 57* 0 67 0 67   EGFR ml/min/1 73sq m 99 94 94   POTASSIUM mmol/L 3 3* 4 0 4 0   MAGNESIUM mg/dL  --  3 0* 1 5*   PHOSPHORUS mg/dL  --  3 9  --    SODIUM mmol/L 135 135 134*     Results from last 7 days   Lab Units 05/22/23  0421 05/21/23  1209   ALBUMIN g/dL 3 8 4 2   ALK PHOS U/L 59 65   ALT U/L 6* 7   AST U/L 11* 9*   TOTAL BILIRUBIN mg/dL 0 24 0 24   TOTAL PROTEIN g/dL 6 9 7 6         Results from last 7 days   Lab Units 05/24/23  0435 05/22/23  0421 05/21/23  1209   GLUCOSE RANDOM mg/dL 138 133 195*           Results from last 7 days   Lab Units 05/21/23  1209   HS TNI 0HR ng/L 3         Results from last 7 days   Lab Units 05/21/23  1209   INR  1 03   PROTIME seconds 13 5   PTT seconds 26         Results from last 7 days   Lab Units 05/23/23  0430 05/22/23  0421 05/21/23  1209   PROCALCITONIN ng/ml 0 25 0 23 <0 05     Results from last 7 days   Lab Units 05/21/23  1228   LACTIC ACID mmol/L 1 4           Results from last 7 days   Lab Units 05/22/23  0421   IRON SATURATION % 5*   FERRITIN ng/mL 173   IRON ug/dL 21*   TIBC ug/dL 390           Results from last 7 days   Lab Units 05/22/23  0421 05/21/23  1209   LIPASE u/L 139* 197*     Results from last 7 days   Lab Units 05/23/23  0430   CRP mg/L 42 0*   SED RATE mm/hour 49*     Results from last 7 days   Lab Units 05/23/23  0430   CEA ng/mL 7 1*         Results from last 7 days   Lab Units 05/21/23  1555   BACTERIA UA /hpf None Seen   BILIRUBIN UA  Negative   BLOOD UA  Trace-Intact*   CLARITY UA  Clear   COLOR UA  Yellow   EPITHELIAL CELLS WET PREP /hpf Occasional   GLUCOSE UA mg/dl Negative   KETONES UA mg/dl Negative   LEUKOCYTES UA  Negative   MUCUS THREADS  Occasional*   NITRITE UA  Negative   PH UA  6 5   PROTEIN UA mg/dl Trace*   RBC UA /hpf 0-1   SPEC GRAV UA  1 010   UROBILINOGEN UA E U /dl 0 2   WBC UA /hpf None Seen           Results from last 7 days   Lab Units 05/21/23  1236 05/21/23  1228   BLOOD CULTURE  No Growth at 48 hrs  No Growth at 48 hrs  Results from last 7 days   Lab Units 05/23/23  0430   TOTAL COUNTED  99         Medications:   Scheduled Medications:  buPROPion, 150 mg, Oral, BID  cyanocobalamin, 500 mcg, Oral, Daily  folic acid, 333 mcg, Oral, Daily  hydrOXYzine HCL, 25 mg, Oral, HS  losartan, 100 mg, Oral, Daily  pantoprazole, 40 mg, Intravenous, Q24H MAGALI  polyethylene glycol, 238 g, Oral, Once  pravastatin, 80 mg, Oral, Daily With Dinner      Continuous IV Infusions:  multi-electrolyte, 100 mL/hr, Intravenous, Continuous      PRN Meds:  acetaminophen, 650 mg, Oral, Q6H PRN  butalbital-acetaminophen-caffeine, 1 tablet, Oral, Q4H PRN  HYDROmorphone, 0 5 mg, Intravenous, Q4H PRN  lidocaine (PF), , , PRN  ondansetron, 4 mg, Intravenous, Q6H PRN  oxyCODONE, 5 mg, Oral, Q4H PRN  oxyCODONE, 2 5 mg, Oral, Q4H PRN        Discharge Plan: D    Network Utilization Review Department  ATTENTION: Please call with any questions or concerns to [phone] and carefully listen to the prompts so that you are directed to the right person  All voicemails are confidential   Cyntha Fairly all requests for admission clinical reviews, approved or denied determinations and any other requests to dedicated fax number below belonging to the campus where the patient is receiving treatment   List of dedicated fax numbers for the Facilities:  [address] DENIALS (Administrative/Medical Necessity) [phone]   [address] (Maternity/NICU/Pediatrics) [phone]   [address] Violeta Bowman [phone]   [facility] [phone]   [address] [address] [address] [place] [place] [address] Tracy Pinzon 28 311 [facility] - [address] [phone]

## 2023-05-24 NOTE — PROGRESS NOTES
[facility] 45  Progress Note  Name: Alisson Pierre  MRN: [id number]  Unit/Bed#: -01 I Date of Admission: 5/21/2023   Date of Service: 5/24/2023 I Hospital Day: 3    Assessment/Plan      Abdominal pain  Assessment & Plan  · Patient presented to ED with complaints of right upper and lower quadrant abdominal pain with nausea, vomiting and diarrhea  · CT scan chest abdomen pelvis see below  · For assessment/treatment plan see abnormal CT scan    * Abnormal CT scan  Assessment & Plan  · Presented for right upper lower quadrant abdominal pain with nausea vomiting and diarrhea   · Leukocytosis with WBC 21 9  · CT chest abdomen pelvis: Heterogeneously enhancing right suprahilar lesion with abrupt cut off of the right mainstem bronchus suspicious for malignancy    There is secondary complete atelectasis of the right lung with heterogeneous density  Small to moderate right pleural effusion is noted  Few subcentimeter left lung nodules are noted  Evidence of mediastinal and right supraclavicular adenopathy  Heterogeneous, rim-enhancing lesion at the proximal pancreatic body, possible metastatic focus  Pathologically enlarged celiac axis lymph nodes  Focal annular lesion involving the ascending colon, suspicious for metastatic focus versus primary malignancy  · GI recommendations appreciated  Patient to undergo EGD and colonoscopy  Was not able to complete GoLytely prep, so MiraLAX Gatorade prep was initiated   · Pulmonology recommendations appreciated  Recommendation is biopsy of large right supraclavicular node  Discussed with IR  Results pending  · Oncology recommendations appreciated  Due to headaches MRI brain with and without contrast obtained: No acute infarction, edema, or mass effect   Few punctate hyperintense T2/FLAIR foci are noted within the periventricular and subcortical white matter which are nonspecific and can be seen with vasculitis, migrainous angiopathy, Lyme's disease or microangiopathic changes  · Palliative input appreciated    Iron deficiency anemia  Assessment & Plan  · Hemoglobin trended down from 9 5 to 7 0 This could be due to chronic blood loss and/or hemodilution  · Daily CBC and trend hemoglobin  · Iron panel results reviewed  · Patient to undergo EGD and colonoscopy tomorrow  · Obtain type and screen  Transfuse with PRBCs for hemoglobin below 7  · Monitor closely for bleeding    Hypomagnesemia  Assessment & Plan  · Presented with serum magnesium 1 5  · Likely secondary to vomiting and diarrhea  · Repleted with 4 g IV magnesium   · Monitor magnesium periodically    Diarrhea  Assessment & Plan  · Patient complains of frequent bouts of diarrhea   · Stool studies pending  · IV fluids as ordered    Nausea & vomiting  Assessment & Plan  · Presented with nausea and vomiting and right upper and lower quadrant pain  · CT imaging see above  · Zofran as needed  · IV fluids  · Clear liquids as tolerated         VTE Pharmacologic Prophylaxis: VTE Score: 2 Low Risk (Score 0-2) - Encourage Ambulation  Patient Centered Rounds: I performed bedside rounds with nursing staff today  Discussions with Specialists or Other Care Team Provider: GI    Education and Discussions with Family / Patient: Updated  (wife) at bedside  Total Time Spent on Date of Encounter in care of patient: 55 minutes This time was spent on one or more of the following: performing physical exam; counseling and coordination of care; obtaining or reviewing history; documenting in the medical record; reviewing/ordering tests, medications or procedures; communicating with other healthcare professionals and discussing with patient's family/caregivers  Current Length of Stay: 3 day(s)  Current Patient Status: Inpatient   Certification Statement: The patient will continue to require additional inpatient hospital stay due to EGD, colonoscopy    Discharge Plan: Anticipate discharge in 48 hrs to TBD     Code Status: Level 1 - Full Code    Subjective:   Patient seen and examined  She feels a little better than yesterday, not so tired  Still nauseous though and says the morphine is not really working for her pain  Objective:     Vitals:   Temp (24hrs), Av 9 °F (37 2 °C), Min:98 4 °F (36 9 °C), Max:99 4 °F (37 4 °C)    Temp:  [98 4 °F (36 9 °C)-99 4 °F (37 4 °C)] 98 8 °F (37 1 °C)  HR:  [103-112] 107  Resp:  [18-20] 20  BP: (108-153)/(67-87) 153/83  SpO2:  [91 %-93 %] 92 %  Body mass index is 24 07 kg/m²  Input and Output Summary (last 24 hours): Intake/Output Summary (Last 24 hours) at 2023 1705  Last data filed at 2023 0900  Gross per 24 hour   Intake 1580 ml   Output 500 ml   Net 1080 ml       Physical Exam:   Physical Exam  Vitals and nursing note reviewed  HENT:      Head: Normocephalic and atraumatic  Mouth/Throat:      Mouth: Mucous membranes are dry  Pharynx: Oropharynx is clear  Eyes:      Pupils: Pupils are equal, round, and reactive to light  Cardiovascular:      Rate and Rhythm: Normal rate and regular rhythm  Pulses: Normal pulses  Pulmonary:      Effort: Pulmonary effort is normal  No respiratory distress  Breath sounds: Normal breath sounds  Abdominal:      General: Bowel sounds are normal       Palpations: Abdomen is soft  Tenderness: There is abdominal tenderness  Musculoskeletal:      Cervical back: Neck supple  Right lower leg: No edema  Left lower leg: No edema  Skin:     General: Skin is warm and dry  Capillary Refill: Capillary refill takes less than 2 seconds  Neurological:      General: No focal deficit present  Mental Status: She is alert and oriented to person, place, and time            Additional Data:     Labs:  Results from last 7 days   Lab Units 23  0435   EOS PCT % 1   HEMATOCRIT % 23 0*   HEMOGLOBIN g/dL 7 0*   LYMPHS PCT % 17   MONOS PCT % 11   NEUTROS PCT % 71   PLATELETS Thousands/uL 429*   WBC Thousand/uL 14 84*     Results from last 7 days   Lab Units 05/24/23  0435 05/22/23  0421   ANION GAP mmol/L 10 11   ALBUMIN g/dL  --  3 8   ALK PHOS U/L  --  59   ALT U/L  --  6*   AST U/L  --  11*   BUN mg/dL 10 12   CALCIUM mg/dL 8 2* 8 8   CHLORIDE mmol/L 99 101   CO2 mmol/L 26 23   CREATININE mg/dL 0 57* 0 67   GLUCOSE RANDOM mg/dL 138 133   POTASSIUM mmol/L 3 3* 4 0   SODIUM mmol/L 135 135   TOTAL BILIRUBIN mg/dL  --  0 24     Results from last 7 days   Lab Units 05/21/23  1209   INR  1 03             Results from last 7 days   Lab Units 05/23/23  0430 05/22/23  0421 05/21/23  1228 05/21/23  1209   LACTIC ACID mmol/L  --   --  1 4  --    PROCALCITONIN ng/ml 0 25 0 23  --  <0 05       Lines/Drains:  Invasive Devices     Peripheral Intravenous Line  Duration           Peripheral IV 05/21/23 Right Antecubital 3 days                      Imaging: Reviewed radiology reports from this admission including: abdominal/pelvic CT    Recent Cultures (last 7 days):   Results from last 7 days   Lab Units 05/21/23  1236 05/21/23  1228   BLOOD CULTURE  No Growth at 48 hrs  No Growth at 48 hrs         Last 24 Hours Medication List:   Current Facility-Administered Medications   Medication Dose Route Frequency Provider Last Rate   • acetaminophen  650 mg Oral Q6H PRN BRITTNY Sheppard     • buPROPion  150 mg Oral BID BRITTNY Wray     • butalbital-acetaminophen-caffeine  1 tablet Oral Q4H PRN BRITTNY Sheppard     • cyanocobalamin  500 mcg Oral Daily BRITTNY Sanabria     • folic acid  649 mcg Oral Daily BRITTNY Sanabria     • HYDROmorphone  0 5 mg Intravenous Q4H PRN BRITTNY Wray     • hydrOXYzine HCL  25 mg Oral HS BRITTNY Wray     • lidocaine (PF)    PRN Cornelio Doty MD     • losartan  100 mg Oral Daily BRITTNY Sheppard     • multi-electrolyte  100 mL/hr Intravenous Continuous BRITTNY Sheppard 100 mL/hr (05/24/23 7078)   • ondansetron  4 mg Intravenous Q6H PRN BRITTNY Sheppard     • oxyCODONE  5 mg Oral Q4H PRN BRITTNY Pino     • oxyCODONE  2 5 mg Oral Q4H PRN BRITTNY Pino     • pantoprazole  40 mg Intravenous Q24H [facility] BRITTNY Sheppard     • polyethylene glycol  238 g Oral Once Daryle Husbands, DENG     • pravastatin  80 mg Oral Daily With [employer], BRITTNY          Today, Patient Was Seen By: BRITTNY Pino    **Please Note: This note may have been constructed using a voice recognition system  **

## 2023-05-24 NOTE — PLAN OF CARE
Problem: Potential for Falls  Goal: Patient will remain free of falls  Description: INTERVENTIONS:  - Educate patient/family on patient safety including physical limitations  - Instruct patient to call for assistance with activity   - Consult OT/PT to assist with strengthening/mobility   - Keep Call bell within reach  - Keep bed low and locked with side rails adjusted as appropriate  - Keep care items and personal belongings within reach  - Initiate and maintain comfort rounds  - Make Fall Risk Sign visible to staff  - Offer Toileting in advance of need  - Initiate/Maintain bed alarm  - Obtain necessary fall risk management equipment  - Apply yellow socks and bracelet for high fall risk patients  - Consider moving patient to room near nurses station  Outcome: Progressing     Problem: PAIN - ADULT  Goal: Verbalizes/displays adequate comfort level or baseline comfort level  Description: Interventions:  - Encourage patient to monitor pain and request assistance  - Assess pain using appropriate pain scale  - Administer analgesics based on type and severity of pain and evaluate response  - Implement non-pharmacological measures as appropriate and evaluate response  - Consider cultural and social influences on pain and pain management  - Notify physician/advanced practitioner if interventions unsuccessful or patient reports new pain  Outcome: Progressing     Problem: INFECTION - ADULT  Goal: Absence or prevention of progression during hospitalization  Description: INTERVENTIONS:  - Assess and monitor for signs and symptoms of infection  - Monitor lab/diagnostic results  - Monitor all insertion sites, i e  indwelling lines, tubes, and drains  - Monitor endotracheal if appropriate and nasal secretions for changes in amount and color  - [place] appropriate cooling/warming therapies per order  - Administer medications as ordered  - Instruct and encourage patient and family to use good hand hygiene technique  - Identify and instruct in appropriate isolation precautions for identified infection/condition  Outcome: Progressing  Goal: Absence of fever/infection during neutropenic period  Description: INTERVENTIONS:  - Monitor WBC    Outcome: Progressing     Problem: SAFETY ADULT  Goal: Patient will remain free of falls  Description: INTERVENTIONS:  - Educate patient/family on patient safety including physical limitations  - Instruct patient to call for assistance with activity   - Consult OT/PT to assist with strengthening/mobility   - Keep Call bell within reach  - Keep bed low and locked with side rails adjusted as appropriate  - Keep care items and personal belongings within reach  - Initiate and maintain comfort rounds  - Make Fall Risk Sign visible to staff  - Offer Toileting in advance of need  - Initiate/Maintain bed alarm  - Obtain necessary fall risk management equipment  - Apply yellow socks and bracelet for high fall risk patients  - Consider moving patient to room near nurses station  Outcome: Progressing  Goal: Maintain or return to baseline ADL function  Description: INTERVENTIONS:  - Educate patient/family on patient safety including physical limitations  - Instruct patient to call for assistance with activity   - Consult OT/PT to assist with strengthening/mobility   - Keep Call bell within reach  - Keep bed low and locked with side rails adjusted as appropriate  - Keep care items and personal belongings within reach  - Initiate and maintain comfort rounds  - Make Fall Risk Sign visible to staff  - Offer Toileting in advance of need  - Initiate/Maintain bed alarm  - Obtain necessary fall risk management equipment:   - Apply yellow socks and bracelet for high fall risk patients  - Consider moving patient to room near nurses station  Outcome: Progressing     Problem: DISCHARGE PLANNING  Goal: Discharge to home or other facility with appropriate resources  Description: INTERVENTIONS:  - Identify barriers to discharge w/patient and caregiver  - Arrange for needed discharge resources and transportation as appropriate  - Identify discharge learning needs (meds, wound care, etc )  - Refer to Case Management Department for coordinating discharge planning if the patient needs post-hospital services based on physician/advanced practitioner order or complex needs related to functional status, cognitive ability, or social support system  Outcome: Progressing     Problem: Knowledge Deficit  Goal: Patient/family/caregiver demonstrates understanding of disease process, treatment plan, medications, and discharge instructions  Description: Complete learning assessment and assess knowledge base    Interventions:  - Provide teaching at level of understanding  - Provide teaching via preferred learning methods  Outcome: Progressing     Problem: GASTROINTESTINAL - ADULT  Goal: Minimal or absence of nausea and/or vomiting  Description: INTERVENTIONS:  - Administer IV fluids if ordered to ensure adequate hydration  - Maintain NPO status until nausea and vomiting are resolved  - Nasogastric tube if ordered  - Administer ordered antiemetic medications as needed  - Provide nonpharmacologic comfort measures as appropriate  - Advance diet as tolerated, if ordered  - Consider nutrition services referral to assist patient with adequate nutrition and appropriate food choices  Outcome: Progressing  Goal: Maintains or returns to baseline bowel function  Description: INTERVENTIONS:  - Assess bowel function  - Encourage oral fluids to ensure adequate hydration  - Administer IV fluids if ordered to ensure adequate hydration  - Administer ordered medications as needed  - Encourage mobilization and activity  - Consider nutritional services referral to assist patient with adequate nutrition and appropriate food choices  Outcome: Progressing  Goal: Maintains adequate nutritional intake  Description: INTERVENTIONS:  - Monitor percentage of each meal consumed  - Identify factors contributing to decreased intake, treat as appropriate  - Assist with meals as needed  - Monitor I&O, weight, and lab values if indicated  - Obtain nutrition services referral as needed  Outcome: Progressing  Goal: Oral mucous membranes remain intact  Description: INTERVENTIONS  - Assess oral mucosa and hygiene practices  - Implement preventative oral hygiene regimen  - Implement oral medicated treatments as ordered  - Initiate Nutrition services referral as needed  Outcome: Progressing     Problem: GENITOURINARY - ADULT  Goal: Maintains or returns to baseline urinary function  Description: INTERVENTIONS:  - Assess urinary function  - Encourage oral fluids to ensure adequate hydration if ordered  - Administer IV fluids as ordered to ensure adequate hydration  - Administer ordered medications as needed  - Offer frequent toileting  - Follow urinary retention protocol if ordered  Outcome: Progressing     Problem: METABOLIC, FLUID AND ELECTROLYTES - ADULT  Goal: Electrolytes maintained within normal limits  Description: INTERVENTIONS:  - Monitor labs and assess patient for signs and symptoms of electrolyte imbalances  - Administer electrolyte replacement as ordered  - Monitor response to electrolyte replacements, including repeat lab results as appropriate  - Instruct patient on fluid and nutrition as appropriate  Outcome: Progressing  Goal: Fluid balance maintained  Description: INTERVENTIONS:  - Monitor labs   - Monitor I/O and WT  - Instruct patient on fluid and nutrition as appropriate  - Assess for signs & symptoms of volume excess or deficit  Outcome: Progressing  Goal: Glucose maintained within target range  Description: INTERVENTIONS:  - Monitor Blood Glucose as ordered  - Assess for signs and symptoms of hyperglycemia and hypoglycemia  - Administer ordered medications to maintain glucose within target range  - Assess nutritional intake and initiate nutrition service referral as needed  Outcome: Progressing     Problem: Nutrition/Hydration-ADULT  Goal: Nutrient/Hydration intake appropriate for improving, restoring or maintaining nutritional needs  Description: Monitor and assess patient's nutrition/hydration status for malnutrition  Collaborate with interdisciplinary team and initiate plan and interventions as ordered  Monitor patient's weight and dietary intake as ordered or per policy  Utilize nutrition screening tool and intervene as necessary  Determine patient's food preferences and provide high-protein, high-caloric foods as appropriate       INTERVENTIONS:  - Monitor oral intake, urinary output, labs, and treatment plans  - Assess nutrition and hydration status and recommend course of action  - Evaluate amount of meals eaten  - Assist patient with eating if necessary   - Allow adequate time for meals  - Recommend/ encourage appropriate diets, oral nutritional supplements, and vitamin/mineral supplements  - Order, calculate, and assess calorie counts as needed  - Recommend, monitor, and adjust tube feedings and TPN/PPN based on assessed needs  - Assess need for intravenous fluids  - Provide specific nutrition/hydration education as appropriate  - Include patient/family/caregiver in decisions related to nutrition  Outcome: Progressing

## 2023-05-24 NOTE — NUTRITION
05/24/23 1444   Biochemical Data,Medical Tests, and Procedures   Biochemical Data/Medical Tests/Procedures Lab values reviewed; Meds reviewed   Labs (Comment) 5/24/23 K 3 3, creat 0 57, Ca 8 2, H&H 7 0/23 0   Meds (Comment) vitamin T03, folic acid, protonix, pravastatin, plasmalyte, zofran   Nutrition-Focused Physical Exam   Nutrition-Focused Physical Exam Findings RN skin assessment reviewed; No edema documented; No skin issues documented   Nutrition-Focused Physical Exam Findings catheter entry/exit at right neck   Medical-Related Concerns PMH reviewed   Current PO Intake   Current Diet Order Clear Liquids, CCD2   Current Meal Intake Inadequate   Estimated calorie intake compared to estimated need Nutrient needs are not met  PES Statement   Problem Continue previous diagnosis   Recommendations/Interventions   Malnutrition/BMI Present No  (day 3 known inadequate PO intake)   Summary Nutrition follow up assessment  Planned for EGD and colonoscopy - prep has started  Ordered for Clear Liquid diet, CCD2  Ensure Clear BID  Nutrient needs are not met in setting of procedure prep  Pt reports her appetite is intact  Enjoys both perez and apple flavors of Ensure Clear  Recommend transitioning to Glucerna chocolate BID once diet is advanced  Day 3 known inadequate PO intake  RD continues following  Interventions/Recommendations Diet to advance; Supplement continue;Monitor I & O's   Education Assessment   Education Patient/caregiver not appropriate for education at this time   Patient Nutrition Goals   Goal Tolerate PO diet; Adequate hydration; Adequate intake

## 2023-05-24 NOTE — ASSESSMENT & PLAN NOTE
· Presented for right upper lower quadrant abdominal pain with nausea vomiting and diarrhea   · Leukocytosis with WBC 21 9  · CT chest abdomen pelvis: Heterogeneously enhancing right suprahilar lesion with abrupt cut off of the right mainstem bronchus suspicious for malignancy    There is secondary complete atelectasis of the right lung with heterogeneous density  Small to moderate right pleural effusion is noted  Few subcentimeter left lung nodules are noted  Evidence of mediastinal and right supraclavicular adenopathy  Heterogeneous, rim-enhancing lesion at the proximal pancreatic body, possible metastatic focus  Pathologically enlarged celiac axis lymph nodes  Focal annular lesion involving the ascending colon, suspicious for metastatic focus versus primary malignancy  · GI recommendations appreciated  Patient to undergo EGD and colonoscopy  Was not able to complete GoLytely prep, so MiraLAX Gatorade prep was initiated   · Pulmonology recommendations appreciated  Recommendation is biopsy of large right supraclavicular node  Discussed with IR  Results pending  · Oncology recommendations appreciated  Due to headaches MRI brain with and without contrast obtained: No acute infarction, edema, or mass effect  Few punctate hyperintense T2/FLAIR foci are noted within the periventricular and subcortical white matter which are nonspecific and can be seen with vasculitis, migrainous angiopathy, Lyme's disease or microangiopathic changes     · Palliative input appreciated

## 2023-05-25 ENCOUNTER — APPOINTMENT (OUTPATIENT)
Dept: GASTROENTEROLOGY | Facility: HOSPITAL | Age: 63
DRG: 988 | End: 2023-05-25
Payer: COMMERCIAL

## 2023-05-25 ENCOUNTER — ANESTHESIA EVENT (INPATIENT)
Dept: GASTROENTEROLOGY | Facility: HOSPITAL | Age: 63
End: 2023-05-25

## 2023-05-25 ENCOUNTER — ANESTHESIA (INPATIENT)
Dept: GASTROENTEROLOGY | Facility: HOSPITAL | Age: 63
End: 2023-05-25

## 2023-05-25 LAB
ANION GAP SERPL CALCULATED.3IONS-SCNC: 13 MMOL/L (ref 4–13)
BASOPHILS # BLD AUTO: 0.01 THOUSANDS/ÂΜL (ref 0–0.1)
BASOPHILS NFR BLD AUTO: 0 % (ref 0–1)
BUN SERPL-MCNC: 6 MG/DL (ref 5–25)
CALCIUM SERPL-MCNC: 8.9 MG/DL (ref 8.4–10.2)
CHLORIDE SERPL-SCNC: 94 MMOL/L (ref 96–108)
CO2 SERPL-SCNC: 25 MMOL/L (ref 21–32)
CREAT SERPL-MCNC: 0.51 MG/DL (ref 0.6–1.3)
EOSINOPHIL # BLD AUTO: 0 THOUSAND/ÂΜL (ref 0–0.61)
EOSINOPHIL NFR BLD AUTO: 0 % (ref 0–6)
ERYTHROCYTE [DISTWIDTH] IN BLOOD BY AUTOMATED COUNT: 15 % (ref 11.6–15.1)
GFR SERPL CREATININE-BSD FRML MDRD: 103 ML/MIN/1.73SQ M
GLUCOSE SERPL-MCNC: 112 MG/DL (ref 65–140)
GLUCOSE SERPL-MCNC: 164 MG/DL (ref 65–140)
HCT VFR BLD AUTO: 25 % (ref 34.8–46.1)
HGB BLD-MCNC: 7.6 G/DL (ref 11.5–15.4)
IMM GRANULOCYTES # BLD AUTO: 0.04 THOUSAND/UL (ref 0–0.2)
IMM GRANULOCYTES NFR BLD AUTO: 0 % (ref 0–2)
LYMPHOCYTES # BLD AUTO: 0.71 THOUSANDS/ÂΜL (ref 0.6–4.47)
LYMPHOCYTES NFR BLD AUTO: 5 % (ref 14–44)
MCH RBC QN AUTO: 23.2 PG (ref 26.8–34.3)
MCHC RBC AUTO-ENTMCNC: 30.4 G/DL (ref 31.4–37.4)
MCV RBC AUTO: 76 FL (ref 82–98)
MONOCYTES # BLD AUTO: 0.75 THOUSAND/ÂΜL (ref 0.17–1.22)
MONOCYTES NFR BLD AUTO: 6 % (ref 4–12)
NEUTROPHILS # BLD AUTO: 12.03 THOUSANDS/ÂΜL (ref 1.85–7.62)
NEUTS SEG NFR BLD AUTO: 89 % (ref 43–75)
NRBC BLD AUTO-RTO: 0 /100 WBCS
PLATELET # BLD AUTO: 427 THOUSANDS/UL (ref 149–390)
PMV BLD AUTO: 7.7 FL (ref 8.9–12.7)
POTASSIUM SERPL-SCNC: 3.3 MMOL/L (ref 3.5–5.3)
RBC # BLD AUTO: 3.28 MILLION/UL (ref 3.81–5.12)
SODIUM SERPL-SCNC: 132 MMOL/L (ref 135–147)
WBC # BLD AUTO: 13.54 THOUSAND/UL (ref 4.31–10.16)

## 2023-05-25 PROCEDURE — 43239 EGD BIOPSY SINGLE/MULTIPLE: CPT | Performed by: INTERNAL MEDICINE

## 2023-05-25 PROCEDURE — 99232 SBSQ HOSP IP/OBS MODERATE 35: CPT | Performed by: INTERNAL MEDICINE

## 2023-05-25 PROCEDURE — 82948 REAGENT STRIP/BLOOD GLUCOSE: CPT

## 2023-05-25 PROCEDURE — 88341 IMHCHEM/IMCYTCHM EA ADD ANTB: CPT | Performed by: PATHOLOGY

## 2023-05-25 PROCEDURE — 80048 BASIC METABOLIC PNL TOTAL CA: CPT | Performed by: NURSE PRACTITIONER

## 2023-05-25 PROCEDURE — C9113 INJ PANTOPRAZOLE SODIUM, VIA: HCPCS

## 2023-05-25 PROCEDURE — 85025 COMPLETE CBC W/AUTO DIFF WBC: CPT | Performed by: NURSE PRACTITIONER

## 2023-05-25 PROCEDURE — 88305 TISSUE EXAM BY PATHOLOGIST: CPT | Performed by: STUDENT IN AN ORGANIZED HEALTH CARE EDUCATION/TRAINING PROGRAM

## 2023-05-25 PROCEDURE — 88342 IMHCHEM/IMCYTCHM 1ST ANTB: CPT | Performed by: PATHOLOGY

## 2023-05-25 PROCEDURE — 99233 SBSQ HOSP IP/OBS HIGH 50: CPT | Performed by: NURSE PRACTITIONER

## 2023-05-25 PROCEDURE — 99232 SBSQ HOSP IP/OBS MODERATE 35: CPT | Performed by: PHYSICIAN ASSISTANT

## 2023-05-25 PROCEDURE — 45385 COLONOSCOPY W/LESION REMOVAL: CPT | Performed by: INTERNAL MEDICINE

## 2023-05-25 PROCEDURE — 0DB98ZX EXCISION OF DUODENUM, VIA NATURAL OR ARTIFICIAL OPENING ENDOSCOPIC, DIAGNOSTIC: ICD-10-PCS | Performed by: INTERNAL MEDICINE

## 2023-05-25 PROCEDURE — 0DB78ZX EXCISION OF STOMACH, PYLORUS, VIA NATURAL OR ARTIFICIAL OPENING ENDOSCOPIC, DIAGNOSTIC: ICD-10-PCS | Performed by: INTERNAL MEDICINE

## 2023-05-25 PROCEDURE — 88305 TISSUE EXAM BY PATHOLOGIST: CPT | Performed by: PATHOLOGY

## 2023-05-25 RX ORDER — PROPOFOL 10 MG/ML
INJECTION, EMULSION INTRAVENOUS AS NEEDED
Status: DISCONTINUED | OUTPATIENT
Start: 2023-05-25 | End: 2023-05-25

## 2023-05-25 RX ORDER — SODIUM CHLORIDE, SODIUM LACTATE, POTASSIUM CHLORIDE, CALCIUM CHLORIDE 600; 310; 30; 20 MG/100ML; MG/100ML; MG/100ML; MG/100ML
INJECTION, SOLUTION INTRAVENOUS CONTINUOUS PRN
Status: DISCONTINUED | OUTPATIENT
Start: 2023-05-25 | End: 2023-05-25

## 2023-05-25 RX ORDER — LIDOCAINE HYDROCHLORIDE 20 MG/ML
INJECTION, SOLUTION EPIDURAL; INFILTRATION; INTRACAUDAL; PERINEURAL AS NEEDED
Status: DISCONTINUED | OUTPATIENT
Start: 2023-05-25 | End: 2023-05-25

## 2023-05-25 RX ORDER — OXYCODONE HYDROCHLORIDE 5 MG/1
5 TABLET ORAL 3 TIMES DAILY
Status: DISCONTINUED | OUTPATIENT
Start: 2023-05-25 | End: 2023-05-26

## 2023-05-25 RX ORDER — ONDANSETRON 2 MG/ML
4 INJECTION INTRAMUSCULAR; INTRAVENOUS ONCE AS NEEDED
Status: DISCONTINUED | OUTPATIENT
Start: 2023-05-25 | End: 2023-05-25 | Stop reason: HOSPADM

## 2023-05-25 RX ADMIN — HYDROMORPHONE HYDROCHLORIDE 0.5 MG: 1 INJECTION, SOLUTION INTRAMUSCULAR; INTRAVENOUS; SUBCUTANEOUS at 21:51

## 2023-05-25 RX ADMIN — OXYCODONE HYDROCHLORIDE 5 MG: 5 TABLET ORAL at 15:51

## 2023-05-25 RX ADMIN — OXYCODONE HYDROCHLORIDE 5 MG: 5 TABLET ORAL at 00:42

## 2023-05-25 RX ADMIN — HYDROXYZINE HYDROCHLORIDE 25 MG: 25 TABLET, FILM COATED ORAL at 21:51

## 2023-05-25 RX ADMIN — HYDROMORPHONE HYDROCHLORIDE 0.5 MG: 1 INJECTION, SOLUTION INTRAMUSCULAR; INTRAVENOUS; SUBCUTANEOUS at 10:12

## 2023-05-25 RX ADMIN — HYDROMORPHONE HYDROCHLORIDE 0.5 MG: 1 INJECTION, SOLUTION INTRAMUSCULAR; INTRAVENOUS; SUBCUTANEOUS at 02:28

## 2023-05-25 RX ADMIN — SODIUM CHLORIDE, SODIUM LACTATE, POTASSIUM CHLORIDE, AND CALCIUM CHLORIDE: .6; .31; .03; .02 INJECTION, SOLUTION INTRAVENOUS at 12:54

## 2023-05-25 RX ADMIN — SODIUM CHLORIDE, SODIUM GLUCONATE, SODIUM ACETATE, POTASSIUM CHLORIDE AND MAGNESIUM CHLORIDE 100 ML/HR: 526; 502; 368; 37; 30 INJECTION, SOLUTION INTRAVENOUS at 15:54

## 2023-05-25 RX ADMIN — PROPOFOL 50 MG: 10 INJECTION, EMULSION INTRAVENOUS at 13:32

## 2023-05-25 RX ADMIN — BUPROPION HYDROCHLORIDE TABLETS 150 MG: 100 TABLET, FILM COATED ORAL at 08:42

## 2023-05-25 RX ADMIN — OXYCODONE HYDROCHLORIDE 5 MG: 5 TABLET ORAL at 08:43

## 2023-05-25 RX ADMIN — BUPROPION HYDROCHLORIDE TABLETS 150 MG: 100 TABLET, FILM COATED ORAL at 17:16

## 2023-05-25 RX ADMIN — PROPOFOL 150 MG: 10 INJECTION, EMULSION INTRAVENOUS at 13:12

## 2023-05-25 RX ADMIN — OXYCODONE HYDROCHLORIDE 5 MG: 5 TABLET ORAL at 20:09

## 2023-05-25 RX ADMIN — ONDANSETRON 4 MG: 2 INJECTION INTRAMUSCULAR; INTRAVENOUS at 02:22

## 2023-05-25 RX ADMIN — PRAVASTATIN SODIUM 80 MG: 40 TABLET ORAL at 15:51

## 2023-05-25 RX ADMIN — Medication 400 MCG: at 08:42

## 2023-05-25 RX ADMIN — PROPOFOL 100 MG: 10 INJECTION, EMULSION INTRAVENOUS at 13:21

## 2023-05-25 RX ADMIN — SODIUM CHLORIDE, SODIUM GLUCONATE, SODIUM ACETATE, POTASSIUM CHLORIDE AND MAGNESIUM CHLORIDE 100 ML/HR: 526; 502; 368; 37; 30 INJECTION, SOLUTION INTRAVENOUS at 05:46

## 2023-05-25 RX ADMIN — LOSARTAN POTASSIUM 100 MG: 50 TABLET, FILM COATED ORAL at 08:43

## 2023-05-25 RX ADMIN — ONDANSETRON 4 MG: 2 INJECTION INTRAMUSCULAR; INTRAVENOUS at 08:43

## 2023-05-25 RX ADMIN — PROPOFOL 50 MG: 10 INJECTION, EMULSION INTRAVENOUS at 13:42

## 2023-05-25 RX ADMIN — LIDOCAINE HYDROCHLORIDE 100 MG: 20 INJECTION, SOLUTION EPIDURAL; INFILTRATION; INTRACAUDAL; PERINEURAL at 13:12

## 2023-05-25 RX ADMIN — HYDROMORPHONE HYDROCHLORIDE 0.5 MG: 1 INJECTION, SOLUTION INTRAMUSCULAR; INTRAVENOUS; SUBCUTANEOUS at 17:49

## 2023-05-25 RX ADMIN — PANTOPRAZOLE SODIUM 40 MG: 40 INJECTION, POWDER, FOR SOLUTION INTRAVENOUS at 08:42

## 2023-05-25 RX ADMIN — CYANOCOBALAMIN TAB 500 MCG 500 MCG: 500 TAB at 08:43

## 2023-05-25 NOTE — PROGRESS NOTES
Progress Note - Palliative & Supportive Care  Velora Cough  58 y o   female  /-01   MRN: [id number]  Encounter: [id number]     Assessment  Abnormal CT scan with findings highly suspicious for malignancy  Abdominal pain  Nausea and vomiting  Palliative care consult  Goals of care by specialist    Plan  1  Symptom management  • Abdominal pain - Patient having consistent pain in her abdomen, gets relief from IV dilaudid and Oxy 5mg though feels she hasnt been able to get this medication until her pain is severe  Was requesting if this medication could be given to her on a schedule  Discussed goal to decrease need for IV pain medication  o Start Oxycodone 5mg PO TID SCHED  o Continue Oxycodone 2 5-5mg PO q 4 hrs PRN mod-severe pain  o Continue Dilaudid 0 5mg IV q4hrs PRN breakthrough pain    2  Goals  Level 1 code status  Full code  Disease focused care  No limits placed  • No changes to goals today, patient remains diseased focused and would like all indicated cares at this point  • She is unsure whether or not she will be returning to home in [place] to seek car yoselin ring or staying here  • Reviewed palliative care services, patient interested in continued follow up  Will add patient to hospital follow up call list       3   Social support  • Patient is supported by her wife Prerna Jimenez, her daughter, mother, 2 brothers and 1 sister, and uncle  • Supportive listening provided  • Normalized experience of patient/family  • Provided anxiety containment  • Provided anticipatory guidance    4  Follow up  • Palliative Care will continue to follow for symptom management and goals of care discussions will be ongoing  • Please reach out to on-call provider via [facility] if questions or concerns arise  Interval History  Chart reviewed before visit  She reports feeling better today  States she is more awake  She is currently awaiting colonoscopy and is on clear liquid diet   reports constant abdominal pain but gets some relief from dilaudid and oxycodone  States however she would like this medication on a schedule as her pain is usually severe before she is able to get her medication  Reports nausea that is controlled with zofran  Current pain location(s): abdomen  Pain Scale:   10  PRN Use of Pain Medications: Oxy x3, Dil IV x3  24 hour Total OME for 05/24/23 - 05/25/23 [id number]: 52 5    Review of Systems   All other systems reviewed and are negative      Medications    Current Facility-Administered Medications:   •  acetaminophen (TYLENOL) tablet 650 mg, 650 mg, Oral, Q6H PRN, BRITTNY Sheppard  •  buPROPion (WELLBUTRIN) tablet 150 mg, 150 mg, Oral, BID, BRITTNY Boyce, 150 mg at 05/25/23 7465  •  butalbital-acetaminophen-caffeine (FIORICET,ESGIC) -40 mg per tablet 1 tablet, 1 tablet, Oral, Q4H PRN, BRITTNY Sheppard, 1 tablet at 05/22/23 1141  •  cyanocobalamin (VITAMIN B-12) tablet 500 mcg, 500 mcg, Oral, Daily, BRITTNY Medina, 500 mcg at 79/34/26 1921  •  folic acid (FOLVITE) tablet 400 mcg, 400 mcg, Oral, Daily, BRITTNY Medina, 400 mcg at 05/25/23 0073  •  HYDROmorphone (DILAUDID) injection 0 5 mg, 0 5 mg, Intravenous, Q4H PRN, BRITTNY Boyce, 0 5 mg at 05/25/23 1012  •  hydrOXYzine HCL (ATARAX) tablet 25 mg, 25 mg, Oral, HS, BRITTNY Boyce, 25 mg at 05/24/23 2130  •  lidocaine (PF) (XYLOCAINE-MPF) 1 % injection, , , PRN, Misael Waldron MD, 10 mL at 05/22/23 1443  •  losartan (COZAAR) tablet 100 mg, 100 mg, Oral, Daily, BRITTNY Sheppard, 100 mg at 05/25/23 0843  •  multi-electrolyte (PLASMALYTE-A/ISOLYTE-S PH 7 4) IV solution, 100 mL/hr, Intravenous, Continuous, BRITTNY Sheppard, Last Rate: 100 mL/hr at 05/25/23 0546, 100 mL/hr at 05/25/23 0546  •  ondansetron (ZOFRAN) injection 4 mg, 4 mg, Intravenous, Q6H PRN, BRITTNY Sheppard, 4 mg at 05/25/23 0843  •  oxyCODONE (ROXICODONE) IR tablet 5 mg, 5 mg, Oral, Q4H PRN, "Katherine [place], CRNP, 5 mg at 23 0895  •  oxyCODONE (ROXICODONE) IR tablet 5 mg, 5 mg, Oral, TID, Sang Mercado PA-C  •  oxyCODONE (ROXICODONE) split tablet 2 5 mg, 2 5 mg, Oral, Q4H PRN, Katherine Rivera, CRNP  •  pantoprazole (PROTONIX) injection 40 mg, 40 mg, Intravenous, Q24H MAGALI, Rubina VickzeinataoBRITTNY, 40 mg at 23 3648  •  pravastatin (PRAVACHOL) tablet 80 mg, 80 mg, Oral, Daily With Dinner, Rubina BRITTNY Kirk, 80 mg at 23 1556    Objective  /73   Pulse (!) 113   Temp 98 1 °F (36 7 °C)   Resp 18   Ht 5' 2\" (1 575 m)   Wt 59 7 kg (131 lb 9 8 oz)   SpO2 95%   BMI 24 07 kg/m²   Physical Exam  Vitals and nursing note reviewed  Constitutional:       General: She is not in acute distress  Appearance: She is ill-appearing  HENT:      Head: Normocephalic and atraumatic  Right Ear: External ear normal       Left Ear: External ear normal       Mouth/Throat:      Pharynx: Oropharynx is clear  Eyes:      Extraocular Movements: Extraocular movements intact  Cardiovascular:      Rate and Rhythm: Normal rate  Pulmonary:      Effort: Pulmonary effort is normal    Abdominal:      Tenderness: There is abdominal tenderness  Musculoskeletal:         General: No deformity  Skin:     General: Skin is warm and dry  Findings: No rash  Neurological:      Mental Status: She is alert and oriented to person, place, and time  Psychiatric:         Mood and Affect: Mood normal          Behavior: Behavior normal          Thought Content: Thought content normal          Judgment: Judgment normal        Lab Results:   I have personally reviewed pertinent labs  ,   CBC:   Lab Results   Component Value Date    HCT 25 0 (L) 2023    HGB 7 6 (L) 2023    MCH 23 2 (L) 2023    MCHC 30 4 (L) 2023    MCV 76 (L) 2023    MPV 7 7 (L) 2023    NRBC 0 2023     (H) 2023    RBC 3 28 (L) 2023    RDW 15 0 2023    WBC 13 54 (H) " "05/25/2023   , CMP:   Lab Results   Component Value Date    BUN 6 05/25/2023    CALCIUM 8 9 05/25/2023    CL 94 (L) 05/25/2023    CO2 25 05/25/2023    CREATININE 0 51 (L) 05/25/2023    EGFR 103 05/25/2023    K 3 3 (L) 05/25/2023    SODIUM 132 (L) 05/25/2023     Imaging Studies: I have personally reviewed pertinent reports  MRI brain w wo contrast  Result Date: 5/23/2023Impression: 1  No acute infarction, edema, or mass effect  2  Few punctate hyperintense T2/FLAIR foci are noted within the periventricular and subcortical white matter which are nonspecific and can be seen with vasculitis, migrainous angiopathy, Lyme's disease or microangiopathic changes  Workstation performed: [id number]     EKG, Pathology, and Other Studies: I have personally reviewed pertinent reports  IR biopsy neck  Performed: 5/22/2023  Indication: Enlarged lymph node base of right neck  Impression:Technically successful ultrasound-guided biopsy   05/22/23 14:53   TISSUE EXAM In process       Counseling / Coordination of Care  Total floor / unit time spent today 30 minutes  Greater than 50% of total time was spent with the patient and / or family counseling and / or coordinating of care  A description of the counseling / coordination of care: Chart reviewed, determined goals of care, discussed palliative care and symptom management, discussed code status, discussed comfort care and hospice, provided anticipatory guidance,  determined social/family support, provided psychosocial support  Reviewed with THIERRY Hylton PA-C  Palliative and Supportive Care  Clinic/Answering Service: [phone]  You can find me on [employer]! Portions of this document may have been created using dictation software and as such some \"sound alike\" terms may have been generated by the system  Do not hesitate to contact me with any questions or clarifications      "

## 2023-05-25 NOTE — ANESTHESIA POSTPROCEDURE EVALUATION
Post-Op Assessment Note    CV Status:  Stable  Pain Score: 0    Pain management: adequate     Mental Status:  Alert and awake   Hydration Status:  Euvolemic   PONV Controlled:  Controlled   Airway Patency:  Patent      Post Op Vitals Reviewed: Yes      Staff: Anesthesiologist, CRNA         No notable events documented      /60 (05/25/23 1418)    Temp      Pulse 93 (05/25/23 1418)   Resp      SpO2 96 % (05/25/23 1418)

## 2023-05-25 NOTE — ASSESSMENT & PLAN NOTE
· Presented for right upper lower quadrant abdominal pain with nausea vomiting and diarrhea   · Leukocytosis with WBC 21 9  · CT chest abdomen pelvis: Heterogeneously enhancing right suprahilar lesion with abrupt cut off of the right mainstem bronchus suspicious for malignancy    There is secondary complete atelectasis of the right lung with heterogeneous density  Small to moderate right pleural effusion is noted  Few subcentimeter left lung nodules are noted  Evidence of mediastinal and right supraclavicular adenopathy  Heterogeneous, rim-enhancing lesion at the proximal pancreatic body, possible metastatic focus  Pathologically enlarged celiac axis lymph nodes  Focal annular lesion involving the ascending colon, suspicious for metastatic focus versus primary malignancy  · GI recommendations appreciated  Patient to undergo EGD and colonoscopy today after modified prep  Per verbal report, EGD: mild gastritis  Colon: Medium sized polyp removed from sigmoid colon otherwise no mass in the ascending colon  · Pulmonology recommendations appreciated  Recommendation is biopsy of large right supraclavicular node  Performed by IR, final results: Metastatic carcinoma, compatible with adenocarcinoma of lung origin  · Oncology recommendations appreciated  Due to headaches MRI brain with and without contrast obtained: No acute infarction, edema, or mass effect  Few punctate hyperintense T2/FLAIR foci are noted within the periventricular and subcortical white matter which are nonspecific and can be seen with vasculitis, migrainous angiopathy, Lyme's disease or microangiopathic changes     · Palliative input appreciated

## 2023-05-25 NOTE — PROGRESS NOTES
[facility]  Progress Note  Name: Levin Goldberg  MRN: [id number]  Unit/Bed#: -01 I Date of Admission: 5/21/2023   Date of Service: 5/25/2023 I Hospital Day: 4    Assessment/Plan      Abdominal pain  Assessment & Plan  · Patient presented to ED with complaints of right upper and lower quadrant abdominal pain with nausea, vomiting and diarrhea  · CT scan chest abdomen pelvis see below  · For assessment/treatment plan see abnormal CT scan    * Abnormal CT scan  Assessment & Plan  · Presented for right upper lower quadrant abdominal pain with nausea vomiting and diarrhea   · Leukocytosis with WBC 21 9  · CT chest abdomen pelvis: Heterogeneously enhancing right suprahilar lesion with abrupt cut off of the right mainstem bronchus suspicious for malignancy    There is secondary complete atelectasis of the right lung with heterogeneous density  Small to moderate right pleural effusion is noted  Few subcentimeter left lung nodules are noted  Evidence of mediastinal and right supraclavicular adenopathy  Heterogeneous, rim-enhancing lesion at the proximal pancreatic body, possible metastatic focus  Pathologically enlarged celiac axis lymph nodes  Focal annular lesion involving the ascending colon, suspicious for metastatic focus versus primary malignancy  · GI recommendations appreciated  Patient to undergo EGD and colonoscopy today after modified prep  Per verbal report, EGD: mild gastritis  Colon: Medium sized polyp removed from sigmoid colon otherwise no mass in the ascending colon  · Pulmonology recommendations appreciated  Recommendation is biopsy of large right supraclavicular node  Performed by IR, final results: Metastatic carcinoma, compatible with adenocarcinoma of lung origin  · Oncology recommendations appreciated  Due to headaches MRI brain with and without contrast obtained: No acute infarction, edema, or mass effect   Few punctate hyperintense T2/FLAIR foci are noted within the periventricular and subcortical white matter which are nonspecific and can be seen with vasculitis, migrainous angiopathy, Lyme's disease or microangiopathic changes  · Palliative input appreciated    Iron deficiency anemia  Assessment & Plan  · Hemoglobin trended down from 9 5 to 7 0 This could be due to chronic blood loss and/or hemodilution  · Daily CBC and trend hemoglobin  · Iron panel results reviewed  · Patient to undergo EGD and colonoscopy tomorrow  · Obtain type and screen  Transfuse with PRBCs for hemoglobin below 7  · Monitor closely for bleeding    Hypomagnesemia  Assessment & Plan  · Presented with serum magnesium 1 5  · Likely secondary to vomiting and diarrhea  · Repleted with 4 g IV magnesium   · Monitor magnesium periodically    Diarrhea  Assessment & Plan  · Patient complains of frequent bouts of diarrhea   · Stool studies pending  · IV fluids as ordered    Nausea & vomiting  Assessment & Plan  · Presented with nausea and vomiting and right upper and lower quadrant pain  · CT imaging see above  · Zofran as needed  · IV fluids  · Clear liquids as tolerated         VTE Pharmacologic Prophylaxis: VTE Score: 2 Low Risk (Score 0-2) - Encourage Ambulation  Patient Centered Rounds: I performed bedside rounds with nursing staff today  Discussions with Specialists or Other Care Team Provider: GI    Education and Discussions with Family / Patient: Updated  (wife) via phone  Total Time Spent on Date of Encounter in care of patient: 55 minutes This time was spent on one or more of the following: performing physical exam; counseling and coordination of care; obtaining or reviewing history; documenting in the medical record; reviewing/ordering tests, medications or procedures; communicating with other healthcare professionals and discussing with patient's family/caregivers  Current Length of Stay: 4 day(s)  Current Patient Status: Inpatient   Certification Statement:  The patient will continue to require additional inpatient hospital stay due to abnormal CT, workup for malignancy  Discharge Plan: TBD  Code Status: Level 1 - Full Code    Subjective:   Patient status post endoscopy and colonoscopy  Sleepy  Unable to obtain review of systems  Objective:     Vitals:   Temp (24hrs), Av °F (36 7 °C), Min:97 7 °F (36 5 °C), Max:98 1 °F (36 7 °C)    Temp:  [97 7 °F (36 5 °C)-98 1 °F (36 7 °C)] 98 1 °F (36 7 °C)  HR:  [] 110  Resp:  [16-18] 18  BP: ()/(50-84) 146/84  SpO2:  [93 %-98 %] 96 %  Body mass index is 22 13 kg/m²  Input and Output Summary (last 24 hours): Intake/Output Summary (Last 24 hours) at 2023 1747  Last data filed at 2023 1332  Gross per 24 hour   Intake 50 ml   Output --   Net 50 ml       Physical Exam:   Physical Exam  Vitals and nursing note reviewed  HENT:      Head: Normocephalic and atraumatic  Mouth/Throat:      Pharynx: Oropharynx is clear  Eyes:      Pupils: Pupils are equal, round, and reactive to light  Cardiovascular:      Rate and Rhythm: Normal rate and regular rhythm  Pulses: Normal pulses  Pulmonary:      Effort: Pulmonary effort is normal  No respiratory distress  Breath sounds: Normal breath sounds  Abdominal:      General: Bowel sounds are normal       Palpations: Abdomen is soft  Tenderness: There is no abdominal tenderness  Musculoskeletal:      Cervical back: Neck supple  Right lower leg: No edema  Left lower leg: No edema  Skin:     General: Skin is warm and dry  Capillary Refill: Capillary refill takes less than 2 seconds  Neurological:      General: No focal deficit present  Mental Status: She is alert  Mental status is at baseline            Additional Data:     Labs:  Results from last 7 days   Lab Units 23  0549   EOS PCT % 0   HEMATOCRIT % 25 0*   HEMOGLOBIN g/dL 7 6*   LYMPHS PCT % 5*   MONOS PCT % 6   NEUTROS PCT % 89*   PLATELETS Thousands/uL 427*   WBC Thousand/uL 13 54*     Results from last 7 days   Lab Units 05/25/23  0549 05/24/23  0435 05/22/23  0421   ANION GAP mmol/L 13   < > 11   ALBUMIN g/dL  --   --  3 8   ALK PHOS U/L  --   --  59   ALT U/L  --   --  6*   AST U/L  --   --  11*   BUN mg/dL 6   < > 12   CALCIUM mg/dL 8 9   < > 8 8   CHLORIDE mmol/L 94*   < > 101   CO2 mmol/L 25   < > 23   CREATININE mg/dL 0 51*   < > 0 67   GLUCOSE RANDOM mg/dL 164*   < > 133   POTASSIUM mmol/L 3 3*   < > 4 0   SODIUM mmol/L 132*   < > 135   TOTAL BILIRUBIN mg/dL  --   --  0 24    < > = values in this interval not displayed  Results from last 7 days   Lab Units 05/21/23  1209   INR  1 03     Results from last 7 days   Lab Units 05/25/23  1236   POC GLUCOSE mg/dl 112         Results from last 7 days   Lab Units 05/23/23  0430 05/22/23  0421 05/21/23  1228 05/21/23  1209   LACTIC ACID mmol/L  --   --  1 4  --    PROCALCITONIN ng/ml 0 25 0 23  --  <0 05       Lines/Drains:  Invasive Devices     Peripheral Intravenous Line  Duration           Peripheral IV 05/25/23 Distal;Left;Ventral (anterior) Forearm <1 day                      Imaging: Reviewed radiology reports from this admission including: procedure reports    Recent Cultures (last 7 days):   Results from last 7 days   Lab Units 05/21/23  1236 05/21/23  1228   BLOOD CULTURE  No Growth at 72 hrs  No Growth at 72 hrs         Last 24 Hours Medication List:   Current Facility-Administered Medications   Medication Dose Route Frequency Provider Last Rate   • acetaminophen  650 mg Oral Q6H PRN Morena Zapata MD     • buPROPion  150 mg Oral BID Morena Zapata MD     • butalbital-acetaminophen-caffeine  1 tablet Oral Q4H PRN Morena Zapata MD     • cyanocobalamin  500 mcg Oral Daily Morena Zapata MD     • folic acid  538 mcg Oral Daily Morena Zapata MD     • HYDROmorphone  0 5 mg Intravenous Q4H PRN Morena Zapata MD     • hydrOXYzine HCL  25 mg Oral HS Morena Zapata MD     • losartan  100 mg Oral Daily Markus Menezes MD     • multi-electrolyte  100 mL/hr Intravenous Continuous Markus Menezes  mL/hr (05/25/23 9215)   • ondansetron  4 mg Intravenous Q6H PRN Markus Menezes MD     • oxyCODONE  5 mg Oral Q4H PRN Markus Menezes MD     • oxyCODONE  5 mg Oral TID Markus Menezes MD     • oxyCODONE  2 5 mg Oral Q4H PRN Markus Menezes MD     • pantoprazole  40 mg Intravenous Q24H [facility] Markus Menezes MD     • pravastatin  80 mg Oral Daily With Shy Cheung MD          Today, Patient Was Seen By: BRITTNY Bell    **Please Note: This note may have been constructed using a voice recognition system  **

## 2023-05-25 NOTE — PLAN OF CARE
Problem: Potential for Falls  Goal: Patient will remain free of falls  Description: INTERVENTIONS:  - Educate patient/family on patient safety including physical limitations  - Instruct patient to call for assistance with activity   - Consult OT/PT to assist with strengthening/mobility   - Keep Call bell within reach  - Keep bed low and locked with side rails adjusted as appropriate  - Keep care items and personal belongings within reach  - Initiate and maintain comfort rounds  - Make Fall Risk Sign visible to staff  - Offer Toileting in advance of need  - Initiate/Maintain bed alarm  - Obtain necessary fall risk management equipment  - Apply yellow socks and bracelet for high fall risk patients  - Consider moving patient to room near nurses station  Outcome: Progressing     Problem: PAIN - ADULT  Goal: Verbalizes/displays adequate comfort level or baseline comfort level  Description: Interventions:  - Encourage patient to monitor pain and request assistance  - Assess pain using appropriate pain scale  - Administer analgesics based on type and severity of pain and evaluate response  - Implement non-pharmacological measures as appropriate and evaluate response  - Consider cultural and social influences on pain and pain management  - Notify physician/advanced practitioner if interventions unsuccessful or patient reports new pain  Outcome: Progressing     Problem: INFECTION - ADULT  Goal: Absence or prevention of progression during hospitalization  Description: INTERVENTIONS:  - Assess and monitor for signs and symptoms of infection  - Monitor lab/diagnostic results  - Monitor all insertion sites, i e  indwelling lines, tubes, and drains  - Monitor endotracheal if appropriate and nasal secretions for changes in amount and color  - [place] appropriate cooling/warming therapies per order  - Administer medications as ordered  - Instruct and encourage patient and family to use good hand hygiene technique  - Identify and instruct in appropriate isolation precautions for identified infection/condition  Outcome: Progressing  Goal: Absence of fever/infection during neutropenic period  Description: INTERVENTIONS:  - Monitor WBC    Outcome: Progressing     Problem: SAFETY ADULT  Goal: Patient will remain free of falls  Description: INTERVENTIONS:  - Educate patient/family on patient safety including physical limitations  - Instruct patient to call for assistance with activity   - Consult OT/PT to assist with strengthening/mobility   - Keep Call bell within reach  - Keep bed low and locked with side rails adjusted as appropriate  - Keep care items and personal belongings within reach  - Initiate and maintain comfort rounds  - Make Fall Risk Sign visible to staff  - Offer Toileting in advance of need  - Initiate/Maintain bed alarm  - Obtain necessary fall risk management equipment  - Apply yellow socks and bracelet for high fall risk patients  - Consider moving patient to room near nurses station  Outcome: Progressing  Goal: Maintain or return to baseline ADL function  Description: INTERVENTIONS:  - Educate patient/family on patient safety including physical limitations  - Instruct patient to call for assistance with activity   - Consult OT/PT to assist with strengthening/mobility   - Keep Call bell within reach  - Keep bed low and locked with side rails adjusted as appropriate  - Keep care items and personal belongings within reach  - Initiate and maintain comfort rounds  - Make Fall Risk Sign visible to staff  - Offer Toileting in advance of need  - Initiate/Maintain bed alarm  - Obtain necessary fall risk management equipment:   - Apply yellow socks and bracelet for high fall risk patients  - Consider moving patient to room near nurses station  Outcome: Progressing     Problem: DISCHARGE PLANNING  Goal: Discharge to home or other facility with appropriate resources  Description: INTERVENTIONS:  - Identify barriers to discharge w/patient and caregiver  - Arrange for needed discharge resources and transportation as appropriate  - Identify discharge learning needs (meds, wound care, etc )  - Refer to Case Management Department for coordinating discharge planning if the patient needs post-hospital services based on physician/advanced practitioner order or complex needs related to functional status, cognitive ability, or social support system  Outcome: Progressing     Problem: Knowledge Deficit  Goal: Patient/family/caregiver demonstrates understanding of disease process, treatment plan, medications, and discharge instructions  Description: Complete learning assessment and assess knowledge base    Interventions:  - Provide teaching at level of understanding  - Provide teaching via preferred learning methods  Outcome: Progressing     Problem: GASTROINTESTINAL - ADULT  Goal: Minimal or absence of nausea and/or vomiting  Description: INTERVENTIONS:  - Administer IV fluids if ordered to ensure adequate hydration  - Maintain NPO status until nausea and vomiting are resolved  - Nasogastric tube if ordered  - Administer ordered antiemetic medications as needed  - Provide nonpharmacologic comfort measures as appropriate  - Advance diet as tolerated, if ordered  - Consider nutrition services referral to assist patient with adequate nutrition and appropriate food choices  Outcome: Progressing  Goal: Maintains or returns to baseline bowel function  Description: INTERVENTIONS:  - Assess bowel function  - Encourage oral fluids to ensure adequate hydration  - Administer IV fluids if ordered to ensure adequate hydration  - Administer ordered medications as needed  - Encourage mobilization and activity  - Consider nutritional services referral to assist patient with adequate nutrition and appropriate food choices  Outcome: Progressing  Goal: Maintains adequate nutritional intake  Description: INTERVENTIONS:  - Monitor percentage of each meal consumed  - Identify factors contributing to decreased intake, treat as appropriate  - Assist with meals as needed  - Monitor I&O, weight, and lab values if indicated  - Obtain nutrition services referral as needed  Outcome: Progressing  Goal: Oral mucous membranes remain intact  Description: INTERVENTIONS  - Assess oral mucosa and hygiene practices  - Implement preventative oral hygiene regimen  - Implement oral medicated treatments as ordered  - Initiate Nutrition services referral as needed  Outcome: Progressing     Problem: GENITOURINARY - ADULT  Goal: Maintains or returns to baseline urinary function  Description: INTERVENTIONS:  - Assess urinary function  - Encourage oral fluids to ensure adequate hydration if ordered  - Administer IV fluids as ordered to ensure adequate hydration  - Administer ordered medications as needed  - Offer frequent toileting  - Follow urinary retention protocol if ordered  Outcome: Progressing     Problem: METABOLIC, FLUID AND ELECTROLYTES - ADULT  Goal: Electrolytes maintained within normal limits  Description: INTERVENTIONS:  - Monitor labs and assess patient for signs and symptoms of electrolyte imbalances  - Administer electrolyte replacement as ordered  - Monitor response to electrolyte replacements, including repeat lab results as appropriate  - Instruct patient on fluid and nutrition as appropriate  Outcome: Progressing  Goal: Fluid balance maintained  Description: INTERVENTIONS:  - Monitor labs   - Monitor I/O and WT  - Instruct patient on fluid and nutrition as appropriate  - Assess for signs & symptoms of volume excess or deficit  Outcome: Progressing  Goal: Glucose maintained within target range  Description: INTERVENTIONS:  - Monitor Blood Glucose as ordered  - Assess for signs and symptoms of hyperglycemia and hypoglycemia  - Administer ordered medications to maintain glucose within target range  - Assess nutritional intake and initiate nutrition service referral as needed  Outcome: Progressing     Problem: Nutrition/Hydration-ADULT  Goal: Nutrient/Hydration intake appropriate for improving, restoring or maintaining nutritional needs  Description: Monitor and assess patient's nutrition/hydration status for malnutrition  Collaborate with interdisciplinary team and initiate plan and interventions as ordered  Monitor patient's weight and dietary intake as ordered or per policy  Utilize nutrition screening tool and intervene as necessary  Determine patient's food preferences and provide high-protein, high-caloric foods as appropriate       INTERVENTIONS:  - Monitor oral intake, urinary output, labs, and treatment plans  - Assess nutrition and hydration status and recommend course of action  - Evaluate amount of meals eaten  - Assist patient with eating if necessary   - Allow adequate time for meals  - Recommend/ encourage appropriate diets, oral nutritional supplements, and vitamin/mineral supplements  - Order, calculate, and assess calorie counts as needed  - Recommend, monitor, and adjust tube feedings and TPN/PPN based on assessed needs  - Assess need for intravenous fluids  - Provide specific nutrition/hydration education as appropriate  - Include patient/family/caregiver in decisions related to nutrition  Outcome: Progressing     Problem: MOBILITY - ADULT  Goal: Maintain or return to baseline ADL function  Description: INTERVENTIONS:  - Educate patient/family on patient safety including physical limitations  - Instruct patient to call for assistance with activity   - Consult OT/PT to assist with strengthening/mobility   - Keep Call bell within reach  - Keep bed low and locked with side rails adjusted as appropriate  - Keep care items and personal belongings within reach  - Initiate and maintain comfort rounds  - Make Fall Risk Sign visible to staff  - Offer Toileting in advance of need  - Initiate/Maintain bed alarm  - Obtain necessary fall risk management equipment:   - Apply yellow socks and bracelet for high fall risk patients  - Consider moving patient to room near nurses station  Outcome: Progressing  Goal: Maintains/Returns to pre admission functional level  Description: INTERVENTIONS:  - Perform BMAT or MOVE assessment daily    - Set and communicate daily mobility goal to care team and patient/family/caregiver     - Collaborate with rehabilitation services on mobility goals if consulted  - Out of bed for toileting  - Record patient progress and toleration of activity level   Outcome: Progressing     Problem: Prexisting or High Potential for Compromised Skin Integrity  Goal: Skin integrity is maintained or improved  Description: INTERVENTIONS:  - Identify patients at risk for skin breakdown  - Assess and monitor skin integrity  - Assess and monitor nutrition and hydration status  - Monitor labs   - Assess for incontinence   - Turn and reposition patient  - Assist with mobility/ambulation  - Relieve pressure over bony prominences  - Avoid friction and shearing  - Provide appropriate hygiene as needed including keeping skin clean and dry  - Evaluate need for skin moisturizer/barrier cream  - Collaborate with interdisciplinary team   - Patient/family teaching  - Consider wound care consult   Outcome: Progressing

## 2023-05-25 NOTE — PLAN OF CARE
Problem: Potential for Falls  Goal: Patient will remain free of falls  Description: INTERVENTIONS:  - Educate patient/family on patient safety including physical limitations  - Instruct patient to call for assistance with activity   - Consult OT/PT to assist with strengthening/mobility   - Keep Call bell within reach  - Keep bed low and locked with side rails adjusted as appropriate  - Keep care items and personal belongings within reach  - Initiate and maintain comfort rounds  - Make Fall Risk Sign visible to staff  - Offer Toileting in advance of need  - Initiate/Maintain bed alarm  - Obtain necessary fall risk management equipment  - Apply yellow socks and bracelet for high fall risk patients  - Consider moving patient to room near nurses station  Outcome: Progressing     Problem: PAIN - ADULT  Goal: Verbalizes/displays adequate comfort level or baseline comfort level  Description: Interventions:  - Encourage patient to monitor pain and request assistance  - Assess pain using appropriate pain scale  - Administer analgesics based on type and severity of pain and evaluate response  - Implement non-pharmacological measures as appropriate and evaluate response  - Consider cultural and social influences on pain and pain management  - Notify physician/advanced practitioner if interventions unsuccessful or patient reports new pain  Outcome: Progressing     Problem: INFECTION - ADULT  Goal: Absence or prevention of progression during hospitalization  Description: INTERVENTIONS:  - Assess and monitor for signs and symptoms of infection  - Monitor lab/diagnostic results  - Monitor all insertion sites, i e  indwelling lines, tubes, and drains  - Monitor endotracheal if appropriate and nasal secretions for changes in amount and color  - [place] appropriate cooling/warming therapies per order  - Administer medications as ordered  - Instruct and encourage patient and family to use good hand hygiene technique  - Identify and instruct in appropriate isolation precautions for identified infection/condition  Outcome: Progressing  Goal: Absence of fever/infection during neutropenic period  Description: INTERVENTIONS:  - Monitor WBC    Outcome: Progressing     Problem: SAFETY ADULT  Goal: Patient will remain free of falls  Description: INTERVENTIONS:  - Educate patient/family on patient safety including physical limitations  - Instruct patient to call for assistance with activity   - Consult OT/PT to assist with strengthening/mobility   - Keep Call bell within reach  - Keep bed low and locked with side rails adjusted as appropriate  - Keep care items and personal belongings within reach  - Initiate and maintain comfort rounds  - Make Fall Risk Sign visible to staff  - Offer Toileting in advance of need  - Initiate/Maintain bed alarm  - Obtain necessary fall risk management equipment  - Apply yellow socks and bracelet for high fall risk patients  - Consider moving patient to room near nurses station  Outcome: Progressing  Goal: Maintain or return to baseline ADL function  Description: INTERVENTIONS:  - Educate patient/family on patient safety including physical limitations  - Instruct patient to call for assistance with activity   - Consult OT/PT to assist with strengthening/mobility   - Keep Call bell within reach  - Keep bed low and locked with side rails adjusted as appropriate  - Keep care items and personal belongings within reach  - Initiate and maintain comfort rounds  - Make Fall Risk Sign visible to staff  - Offer Toileting in advance of need  - Initiate/Maintain bed alarm  - Obtain necessary fall risk management equipment:   - Apply yellow socks and bracelet for high fall risk patients  - Consider moving patient to room near nurses station  Outcome: Progressing     Problem: DISCHARGE PLANNING  Goal: Discharge to home or other facility with appropriate resources  Description: INTERVENTIONS:  - Identify barriers to discharge w/patient and caregiver  - Arrange for needed discharge resources and transportation as appropriate  - Identify discharge learning needs (meds, wound care, etc )  - Refer to Case Management Department for coordinating discharge planning if the patient needs post-hospital services based on physician/advanced practitioner order or complex needs related to functional status, cognitive ability, or social support system  Outcome: Progressing     Problem: Knowledge Deficit  Goal: Patient/family/caregiver demonstrates understanding of disease process, treatment plan, medications, and discharge instructions  Description: Complete learning assessment and assess knowledge base    Interventions:  - Provide teaching at level of understanding  - Provide teaching via preferred learning methods  Outcome: Progressing     Problem: GASTROINTESTINAL - ADULT  Goal: Minimal or absence of nausea and/or vomiting  Description: INTERVENTIONS:  - Administer IV fluids if ordered to ensure adequate hydration  - Maintain NPO status until nausea and vomiting are resolved  - Nasogastric tube if ordered  - Administer ordered antiemetic medications as needed  - Provide nonpharmacologic comfort measures as appropriate  - Advance diet as tolerated, if ordered  - Consider nutrition services referral to assist patient with adequate nutrition and appropriate food choices  Outcome: Progressing  Goal: Maintains or returns to baseline bowel function  Description: INTERVENTIONS:  - Assess bowel function  - Encourage oral fluids to ensure adequate hydration  - Administer IV fluids if ordered to ensure adequate hydration  - Administer ordered medications as needed  - Encourage mobilization and activity  - Consider nutritional services referral to assist patient with adequate nutrition and appropriate food choices  Outcome: Progressing  Goal: Maintains adequate nutritional intake  Description: INTERVENTIONS:  - Monitor percentage of each meal consumed  - Identify factors contributing to decreased intake, treat as appropriate  - Assist with meals as needed  - Monitor I&O, weight, and lab values if indicated  - Obtain nutrition services referral as needed  Outcome: Progressing  Goal: Oral mucous membranes remain intact  Description: INTERVENTIONS  - Assess oral mucosa and hygiene practices  - Implement preventative oral hygiene regimen  - Implement oral medicated treatments as ordered  - Initiate Nutrition services referral as needed  Outcome: Progressing     Problem: GENITOURINARY - ADULT  Goal: Maintains or returns to baseline urinary function  Description: INTERVENTIONS:  - Assess urinary function  - Encourage oral fluids to ensure adequate hydration if ordered  - Administer IV fluids as ordered to ensure adequate hydration  - Administer ordered medications as needed  - Offer frequent toileting  - Follow urinary retention protocol if ordered  Outcome: Progressing     Problem: METABOLIC, FLUID AND ELECTROLYTES - ADULT  Goal: Electrolytes maintained within normal limits  Description: INTERVENTIONS:  - Monitor labs and assess patient for signs and symptoms of electrolyte imbalances  - Administer electrolyte replacement as ordered  - Monitor response to electrolyte replacements, including repeat lab results as appropriate  - Instruct patient on fluid and nutrition as appropriate  Outcome: Progressing  Goal: Fluid balance maintained  Description: INTERVENTIONS:  - Monitor labs   - Monitor I/O and WT  - Instruct patient on fluid and nutrition as appropriate  - Assess for signs & symptoms of volume excess or deficit  Outcome: Progressing  Goal: Glucose maintained within target range  Description: INTERVENTIONS:  - Monitor Blood Glucose as ordered  - Assess for signs and symptoms of hyperglycemia and hypoglycemia  - Administer ordered medications to maintain glucose within target range  - Assess nutritional intake and initiate nutrition service referral as needed  Outcome: Progressing     Problem: Nutrition/Hydration-ADULT  Goal: Nutrient/Hydration intake appropriate for improving, restoring or maintaining nutritional needs  Description: Monitor and assess patient's nutrition/hydration status for malnutrition  Collaborate with interdisciplinary team and initiate plan and interventions as ordered  Monitor patient's weight and dietary intake as ordered or per policy  Utilize nutrition screening tool and intervene as necessary  Determine patient's food preferences and provide high-protein, high-caloric foods as appropriate       INTERVENTIONS:  - Monitor oral intake, urinary output, labs, and treatment plans  - Assess nutrition and hydration status and recommend course of action  - Evaluate amount of meals eaten  - Assist patient with eating if necessary   - Allow adequate time for meals  - Recommend/ encourage appropriate diets, oral nutritional supplements, and vitamin/mineral supplements  - Order, calculate, and assess calorie counts as needed  - Recommend, monitor, and adjust tube feedings and TPN/PPN based on assessed needs  - Assess need for intravenous fluids  - Provide specific nutrition/hydration education as appropriate  - Include patient/family/caregiver in decisions related to nutrition  Outcome: Progressing     Problem: MOBILITY - ADULT  Goal: Maintain or return to baseline ADL function  Description: INTERVENTIONS:  - Educate patient/family on patient safety including physical limitations  - Instruct patient to call for assistance with activity   - Consult OT/PT to assist with strengthening/mobility   - Keep Call bell within reach  - Keep bed low and locked with side rails adjusted as appropriate  - Keep care items and personal belongings within reach  - Initiate and maintain comfort rounds  - Make Fall Risk Sign visible to staff  - Offer Toileting in advance of need  - Initiate/Maintain bed alarm  - Obtain necessary fall risk management equipment:   - Apply yellow socks and bracelet for high fall risk patients  - Consider moving patient to room near nurses station  Outcome: Progressing  Goal: Maintains/Returns to pre admission functional level  Description: INTERVENTIONS:  - Perform BMAT or MOVE assessment daily    - Set and communicate daily mobility goal to care team and patient/family/caregiver  - Collaborate with rehabilitation services on mobility goals if consulted  - Perform Range of Motion  times a day  - Reposition patient every  hours    - Dangle patient  times a day  - Stand patient  times a day  - Ambulate patient  times a day  - Out of bed to chair  times a day   - Out of bed for meals times a day  - Out of bed for toileting  - Record patient progress and toleration of activity level   Outcome: Progressing

## 2023-05-25 NOTE — ANESTHESIA PREPROCEDURE EVALUATION
Procedure:  COLONOSCOPY  EGD    Relevant Problems   HEMATOLOGY   (+) Anemia   (+) Iron deficiency anemia      Asthma  N/v, abd pain      Physical Exam    Airway    Mallampati score: II  TM Distance: >3 FB  Neck ROM: full     Dental   No notable dental hx     Cardiovascular  Cardiovascular exam normal    Pulmonary  Pulmonary exam normal     Other Findings        Anesthesia Plan  ASA Score- 3     Anesthesia Type- IV sedation with anesthesia with ASA Monitors  Additional Monitors:   Airway Plan:           Plan Factors-    Chart reviewed  EKG reviewed  Existing labs reviewed  Patient summary reviewed  Induction- intravenous  Postoperative Plan-     Informed Consent- Anesthetic plan and risks discussed with patient  I personally reviewed this patient with the CRNA  Discussed and agreed on the Anesthesia Plan with the CRNA  Monet Glynn

## 2023-05-25 NOTE — PROGRESS NOTES
"Progress Note - Pulmonary   Shira Shown 58 y o  female MRN: [id number]  Unit/Bed#: -Marc Encounter: [id number]      Assessment:  · Right lung mass with complete atelectasis of the right lung  · Right cervical lymphadenopathy status postbiopsy positive for metastatic adenocarcinoma from the lung  Plan:  · The patient is still to decide where she wants to have her medical care done here or abdominal work  · We will sign off  Please call with questions  Subjective: The patient had EGD and colonoscopy earlier today  She has mild cough  She denies shortness of breath  No chest pain  Vitals: Blood pressure 158/78, pulse 92, temperature 97 7 °F (36 5 °C), resp  rate 18, height 5' 2\" (1 575 m), weight 54 9 kg (121 lb), SpO2 94 %  , Body mass index is 22 13 kg/m²  Intake/Output Summary (Last 24 hours) at 5/25/2023 1654  Last data filed at 5/25/2023 1332  Gross per 24 hour   Intake 50 ml   Output --   Net 50 ml       Physical Exam  Gen: Awake, alert, oriented x 3, no acute distress  HEENT: Mucous membranes moist, no oral lesions, no thrush  NECK: No accessory muscle use, JVP not elevated  Cardiac: Regular, single S1, single S2, no murmurs, no rubs, no gallops  Lungs: No breath sounds on the right side  Clear breath sounds on the left    Abdomen: normoactive bowel sounds, soft nontender, nondistended, no rebound or rigidity, no guarding  Extremities: no cyanosis, no clubbing, no edema    Labs:   CBC:   Lab Results   Component Value Date    HCT 25 0 (L) 05/25/2023    HGB 7 6 (L) 05/25/2023    MCH 23 2 (L) 05/25/2023    MCHC 30 4 (L) 05/25/2023    MCV 76 (L) 05/25/2023    MPV 7 7 (L) 05/25/2023    NRBC 0 05/25/2023     (H) 05/25/2023    RBC 3 28 (L) 05/25/2023    RDW 15 0 05/25/2023    WBC 13 54 (H) 05/25/2023   , CMP:   Lab Results   Component Value Date    BUN 6 05/25/2023    CALCIUM 8 9 05/25/2023    CL 94 (L) 05/25/2023    CO2 25 05/25/2023    CREATININE 0 51 (L) 05/25/2023    EGFR 103 " 05/25/2023    K 3 3 (L) 05/25/2023    SODIUM 132 (L) 05/25/2023       Right supraclavicular lymph node fine-needle biopsy was positive for metastatic adenocarcinoma from the lung      [id number] Executive MD Kevin

## 2023-05-26 PROCEDURE — 99233 SBSQ HOSP IP/OBS HIGH 50: CPT | Performed by: NURSE PRACTITIONER

## 2023-05-26 PROCEDURE — C9113 INJ PANTOPRAZOLE SODIUM, VIA: HCPCS | Performed by: INTERNAL MEDICINE

## 2023-05-26 PROCEDURE — 99232 SBSQ HOSP IP/OBS MODERATE 35: CPT | Performed by: INTERNAL MEDICINE

## 2023-05-26 RX ORDER — HEPARIN SODIUM 5000 [USP'U]/ML
5000 INJECTION, SOLUTION INTRAVENOUS; SUBCUTANEOUS EVERY 8 HOURS SCHEDULED
Status: DISCONTINUED | OUTPATIENT
Start: 2023-05-26 | End: 2023-06-05 | Stop reason: HOSPADM

## 2023-05-26 RX ORDER — FERROUS SULFATE 325(65) MG
325 TABLET ORAL
Status: DISCONTINUED | OUTPATIENT
Start: 2023-05-27 | End: 2023-06-05 | Stop reason: HOSPADM

## 2023-05-26 RX ORDER — OXYCODONE HYDROCHLORIDE 5 MG/1
5 TABLET ORAL EVERY 4 HOURS PRN
Status: DISCONTINUED | OUTPATIENT
Start: 2023-05-26 | End: 2023-05-28

## 2023-05-26 RX ORDER — HYDROMORPHONE HCL/PF 1 MG/ML
1 SYRINGE (ML) INJECTION EVERY 4 HOURS PRN
Status: DISCONTINUED | OUTPATIENT
Start: 2023-05-26 | End: 2023-05-28

## 2023-05-26 RX ORDER — OXYCODONE HYDROCHLORIDE 10 MG/1
10 TABLET ORAL EVERY 4 HOURS PRN
Status: DISCONTINUED | OUTPATIENT
Start: 2023-05-26 | End: 2023-05-28

## 2023-05-26 RX ADMIN — BUPROPION HYDROCHLORIDE TABLETS 150 MG: 100 TABLET, FILM COATED ORAL at 17:47

## 2023-05-26 RX ADMIN — PANTOPRAZOLE SODIUM 40 MG: 40 INJECTION, POWDER, FOR SOLUTION INTRAVENOUS at 09:08

## 2023-05-26 RX ADMIN — OXYCODONE HYDROCHLORIDE 10 MG: 10 TABLET ORAL at 21:54

## 2023-05-26 RX ADMIN — SODIUM CHLORIDE, SODIUM GLUCONATE, SODIUM ACETATE, POTASSIUM CHLORIDE AND MAGNESIUM CHLORIDE 100 ML/HR: 526; 502; 368; 37; 30 INJECTION, SOLUTION INTRAVENOUS at 01:47

## 2023-05-26 RX ADMIN — OXYCODONE HYDROCHLORIDE 5 MG: 5 TABLET ORAL at 01:52

## 2023-05-26 RX ADMIN — PRAVASTATIN SODIUM 80 MG: 40 TABLET ORAL at 17:48

## 2023-05-26 RX ADMIN — HEPARIN SODIUM 5000 UNITS: 5000 INJECTION INTRAVENOUS; SUBCUTANEOUS at 21:54

## 2023-05-26 RX ADMIN — HYDROMORPHONE HYDROCHLORIDE 1 MG: 1 INJECTION, SOLUTION INTRAMUSCULAR; INTRAVENOUS; SUBCUTANEOUS at 13:28

## 2023-05-26 RX ADMIN — HEPARIN SODIUM 5000 UNITS: 5000 INJECTION INTRAVENOUS; SUBCUTANEOUS at 17:48

## 2023-05-26 RX ADMIN — ONDANSETRON 4 MG: 2 INJECTION INTRAMUSCULAR; INTRAVENOUS at 01:52

## 2023-05-26 RX ADMIN — CYANOCOBALAMIN TAB 500 MCG 500 MCG: 500 TAB at 09:09

## 2023-05-26 RX ADMIN — OXYCODONE HYDROCHLORIDE 5 MG: 5 TABLET ORAL at 07:38

## 2023-05-26 RX ADMIN — Medication 400 MCG: at 09:09

## 2023-05-26 RX ADMIN — OXYCODONE HYDROCHLORIDE 5 MG: 5 TABLET ORAL at 11:44

## 2023-05-26 RX ADMIN — OXYCODONE HYDROCHLORIDE 10 MG: 10 TABLET ORAL at 17:47

## 2023-05-26 RX ADMIN — ONDANSETRON 4 MG: 2 INJECTION INTRAMUSCULAR; INTRAVENOUS at 20:12

## 2023-05-26 RX ADMIN — LOSARTAN POTASSIUM 100 MG: 50 TABLET, FILM COATED ORAL at 09:09

## 2023-05-26 RX ADMIN — HYDROMORPHONE HYDROCHLORIDE 0.5 MG: 1 INJECTION, SOLUTION INTRAMUSCULAR; INTRAVENOUS; SUBCUTANEOUS at 03:51

## 2023-05-26 RX ADMIN — HYDROMORPHONE HYDROCHLORIDE 1 MG: 1 INJECTION, SOLUTION INTRAMUSCULAR; INTRAVENOUS; SUBCUTANEOUS at 18:43

## 2023-05-26 RX ADMIN — HYDROMORPHONE HYDROCHLORIDE 0.5 MG: 1 INJECTION, SOLUTION INTRAMUSCULAR; INTRAVENOUS; SUBCUTANEOUS at 09:11

## 2023-05-26 RX ADMIN — ONDANSETRON 4 MG: 2 INJECTION INTRAMUSCULAR; INTRAVENOUS at 07:39

## 2023-05-26 RX ADMIN — HYDROXYZINE HYDROCHLORIDE 25 MG: 25 TABLET, FILM COATED ORAL at 21:54

## 2023-05-26 RX ADMIN — BUPROPION HYDROCHLORIDE TABLETS 150 MG: 100 TABLET, FILM COATED ORAL at 09:09

## 2023-05-26 NOTE — ASSESSMENT & PLAN NOTE
· Presented with nausea and vomiting and right upper and lower quadrant pain  · CT imaging see above  · Zofran as needed  · S/p IV fluids  · Tolerating small amounts of regular diet

## 2023-05-26 NOTE — PROGRESS NOTES
[facility] 45  Progress Note  Name: Zora Hollins  MRN: [id number]  Unit/Bed#: -01 I Date of Admission: 5/21/2023   Date of Service: 5/26/2023 I Hospital Day: 5    Assessment/Plan      Abdominal pain  Assessment & Plan  · Patient presented to ED with complaints of right upper and lower quadrant abdominal pain with nausea, vomiting and diarrhea  · CT scan chest abdomen pelvis see below  · For assessment/treatment plan see abnormal CT scan    * Abnormal CT scan  Assessment & Plan  · Presented for right upper lower quadrant abdominal pain with nausea vomiting and diarrhea   · Leukocytosis with WBC 21 9  · CT chest abdomen pelvis: Heterogeneously enhancing right suprahilar lesion with abrupt cut off of the right mainstem bronchus suspicious for malignancy    There is secondary complete atelectasis of the right lung with heterogeneous density  Small to moderate right pleural effusion is noted  Few subcentimeter left lung nodules are noted  Evidence of mediastinal and right supraclavicular adenopathy  Heterogeneous, rim-enhancing lesion at the proximal pancreatic body, possible metastatic focus  Pathologically enlarged celiac axis lymph nodes  Focal annular lesion involving the ascending colon, suspicious for metastatic focus versus primary malignancy  · GI recommendations appreciated  Patient to undergo EGD and colonoscopy today after modified prep  Per verbal report, EGD: mild gastritis  Colon: Medium sized polyp removed from sigmoid colon otherwise no mass in the ascending colon  · Pulmonology recommendations appreciated  Recommendation is biopsy of large right supraclavicular node  Performed by IR, final results: Metastatic carcinoma, compatible with adenocarcinoma of lung origin  · Oncology recommendations appreciated  Due to headaches MRI brain with and without contrast obtained: No acute infarction, edema, or mass effect   Few punctate hyperintense T2/FLAIR foci are noted within the periventricular and subcortical white matter which are nonspecific and can be seen with vasculitis, migrainous angiopathy, Lyme's disease or microangiopathic changes  · Palliative input appreciated  · Pain control  Now will trial oxycodone 5 mg every 4 hours for moderate to 10 mg for severe and 1 mg Dilaudid IV for breakthrough    Iron deficiency anemia  Assessment & Plan  · Hemoglobin trended down from 9 5 to 7 0 This could be due to chronic blood loss and/or hemodilution  · Daily CBC and trend hemoglobin  · Iron panel results reviewed  · Patient to undergo EGD and colonoscopy tomorrow  · Obtain type and screen  Transfuse with PRBCs for hemoglobin below 7  · Monitor closely for bleeding    Anemia  Assessment & Plan  · Hemoglobin 7 6  · No evidence of active bleeding  · Start oral iron  · CBC and trend hemoglobin    Hypomagnesemia  Assessment & Plan  · Presented with serum magnesium 1 5  · Likely secondary to vomiting and diarrhea  · Repleted with 4 g IV magnesium   · Monitor magnesium periodically    Diarrhea  Assessment & Plan  · Patient complained of frequent bouts of diarrhea, now improved  · Stool studies pending collection      Nausea & vomiting  Assessment & Plan  · Presented with nausea and vomiting and right upper and lower quadrant pain  · CT imaging see above  · Zofran as needed  · S/p IV fluids  · Tolerating small amounts of regular diet         VTE Pharmacologic Prophylaxis: VTE Score: 2 High Risk (Score >/= 5) - Pharmacological DVT Prophylaxis Ordered: heparin  Sequential Compression Devices Ordered  Patient Centered Rounds: I performed bedside rounds with nursing staff today  Education and Discussions with Family / Patient: Updated  (wife) at bedside      Total Time Spent on Date of Encounter in care of patient: 55 minutes This time was spent on one or more of the following: performing physical exam; counseling and coordination of care; obtaining or reviewing history; documenting in the medical record; reviewing/ordering tests, medications or procedures; communicating with other healthcare professionals and discussing with patient's family/caregivers  Current Length of Stay: 5 day(s)  Current Patient Status: Inpatient   Certification Statement: The patient will continue to require additional inpatient hospital stay due to abnormal CT scan,pain control  Discharge Plan: Anticipate discharge in 24-48 hrs to TBD  Code Status: Level 1 - Full Code    Subjective:   Patient seen and examined  She states she feels better  He is tolerating small amounts of regular diet  No diarrhea  She asked for her IV fluids to be taken off because she says she is urinating frequently  No dysuria  She says she is still having abdominal pain and is asking for an increased amount of pain medication  Objective:     Vitals:   Temp (24hrs), Av 4 °F (36 9 °C), Min:97 9 °F (36 6 °C), Max:99 1 °F (37 3 °C)    Temp:  [97 9 °F (36 6 °C)-99 1 °F (37 3 °C)] 98 7 °F (37 1 °C)  HR:  [108-115] 111  Resp:  [16-20] 20  BP: (138-155)/(75-86) 139/84  SpO2:  [90 %-97 %] 91 %  Body mass index is 22 13 kg/m²  Input and Output Summary (last 24 hours): Intake/Output Summary (Last 24 hours) at 2023 1536  Last data filed at 2023 0900  Gross per 24 hour   Intake 1240 ml   Output 550 ml   Net 690 ml       Physical Exam:   Physical Exam  Vitals and nursing note reviewed  Constitutional:       Comments: Appears a little better today   HENT:      Head: Normocephalic and atraumatic  Mouth/Throat:      Mouth: Mucous membranes are moist       Pharynx: Oropharynx is clear  Eyes:      Pupils: Pupils are equal, round, and reactive to light  Cardiovascular:      Rate and Rhythm: Normal rate and regular rhythm  Pulses: Normal pulses  Pulmonary:      Effort: Pulmonary effort is normal  No respiratory distress  Breath sounds: Normal breath sounds     Abdominal:      General: Bowel sounds are normal       Palpations: Abdomen is soft  Tenderness: There is no abdominal tenderness  Musculoskeletal:      Cervical back: Neck supple  Right lower leg: No edema  Left lower leg: No edema  Skin:     General: Skin is warm and dry  Capillary Refill: Capillary refill takes less than 2 seconds  Neurological:      General: No focal deficit present  Mental Status: She is alert and oriented to person, place, and time  Additional Data:     Labs:  Results from last 7 days   Lab Units 05/25/23  0549   EOS PCT % 0   HEMATOCRIT % 25 0*   HEMOGLOBIN g/dL 7 6*   LYMPHS PCT % 5*   MONOS PCT % 6   NEUTROS PCT % 89*   PLATELETS Thousands/uL 427*   WBC Thousand/uL 13 54*     Results from last 7 days   Lab Units 05/25/23  0549 05/24/23  0435 05/22/23  0421   ANION GAP mmol/L 13   < > 11   ALBUMIN g/dL  --   --  3 8   ALK PHOS U/L  --   --  59   ALT U/L  --   --  6*   AST U/L  --   --  11*   BUN mg/dL 6   < > 12   CALCIUM mg/dL 8 9   < > 8 8   CHLORIDE mmol/L 94*   < > 101   CO2 mmol/L 25   < > 23   CREATININE mg/dL 0 51*   < > 0 67   GLUCOSE RANDOM mg/dL 164*   < > 133   POTASSIUM mmol/L 3 3*   < > 4 0   SODIUM mmol/L 132*   < > 135   TOTAL BILIRUBIN mg/dL  --   --  0 24    < > = values in this interval not displayed  Results from last 7 days   Lab Units 05/21/23  1209   INR  1 03     Results from last 7 days   Lab Units 05/25/23  1236   POC GLUCOSE mg/dl 112         Results from last 7 days   Lab Units 05/23/23  0430 05/22/23  0421 05/21/23  1228 05/21/23  1209   LACTIC ACID mmol/L  --   --  1 4  --    PROCALCITONIN ng/ml 0 25 0 23  --  <0 05       Lines/Drains:  Invasive Devices     Peripheral Intravenous Line  Duration           Peripheral IV 05/25/23 Distal;Left;Ventral (anterior) Forearm 1 day                Recent Cultures (last 7 days):   Results from last 7 days   Lab Units 05/21/23  1236 05/21/23  1228   BLOOD CULTURE  No Growth After 4 Days  No Growth After 4 Days  Last 24 Hours Medication List:   Current Facility-Administered Medications   Medication Dose Route Frequency Provider Last Rate   • acetaminophen  650 mg Oral Q6H PRN Alesia Andrea MD     • buPROPion  150 mg Oral BID Alesia Andrea MD     • butalbital-acetaminophen-caffeine  1 tablet Oral Q4H PRN Alesia Andrea MD     • cyanocobalamin  500 mcg Oral Daily Alesia Andrea MD     • [START ON 5/27/2023] ferrous sulfate  325 mg Oral Daily With Breakfast BRITTNY Queen     • folic acid  261 mcg Oral Daily Alesia Andrea MD     • heparin (porcine)  5,000 Units Subcutaneous [facility] BRITTNY Queen     • HYDROmorphone  1 mg Intravenous Q4H PRN BRITTNY Queen     • hydrOXYzine HCL  25 mg Oral HS Alesia Andrea MD     • losartan  100 mg Oral Daily Alesia Andrea MD     • ondansetron  4 mg Intravenous Q6H PRN Alesia Andrea MD     • oxyCODONE  10 mg Oral Q4H PRN BRITTNY Queen     • oxyCODONE  5 mg Oral Q4H PRN BRITTNY Queen     • pantoprazole  40 mg Intravenous Q24H [address] Alesia Andrea MD     • pravastatin  80 mg Oral Daily With Johana Blevins MD          Today, Patient Was Seen By: BRITTNY Queen    **Please Note: This note may have been constructed using a voice recognition system  **

## 2023-05-26 NOTE — ASSESSMENT & PLAN NOTE
· Presented for right upper lower quadrant abdominal pain with nausea vomiting and diarrhea   · Leukocytosis with WBC 21 9  · CT chest abdomen pelvis: Heterogeneously enhancing right suprahilar lesion with abrupt cut off of the right mainstem bronchus suspicious for malignancy    There is secondary complete atelectasis of the right lung with heterogeneous density  Small to moderate right pleural effusion is noted  Few subcentimeter left lung nodules are noted  Evidence of mediastinal and right supraclavicular adenopathy  Heterogeneous, rim-enhancing lesion at the proximal pancreatic body, possible metastatic focus  Pathologically enlarged celiac axis lymph nodes  Focal annular lesion involving the ascending colon, suspicious for metastatic focus versus primary malignancy  · GI recommendations appreciated  Patient to undergo EGD and colonoscopy today after modified prep  Per verbal report, EGD: mild gastritis  Colon: Medium sized polyp removed from sigmoid colon otherwise no mass in the ascending colon  · Pulmonology recommendations appreciated  Recommendation is biopsy of large right supraclavicular node  Performed by IR, final results: Metastatic carcinoma, compatible with adenocarcinoma of lung origin  · Oncology recommendations appreciated  Due to headaches MRI brain with and without contrast obtained: No acute infarction, edema, or mass effect  Few punctate hyperintense T2/FLAIR foci are noted within the periventricular and subcortical white matter which are nonspecific and can be seen with vasculitis, migrainous angiopathy, Lyme's disease or microangiopathic changes  · Palliative input appreciated  · Pain control    Now will trial oxycodone 5 mg every 4 hours for moderate to 10 mg for severe and 1 mg Dilaudid IV for breakthrough

## 2023-05-26 NOTE — ASSESSMENT & PLAN NOTE
· Patient complained of frequent bouts of diarrhea, now improved  · Stool studies pending collection

## 2023-05-26 NOTE — PROGRESS NOTES
Progress Note- Bg Erp 58 y o  female MRN: [id number]    Unit/Bed#: -01 Encounter: [id number]    Assessment and Plan:    59 y/o F w/ pmhx sig for asthma, DM 2, history of tobacco use, who was admitted on 5/21/2023 with right-sided abdominal pain and nausea, nonbloody emesis, nonbloody diarrhea  Imaging on admission with right suprahilar lesion suspicious for malignancy in addition to a lesion in the pancreatic body and focal annular lesion in the ascending colon  Colonoscopy completed on 5/25/2023 with no evidence of intraluminal lesion in the ascending colon that would correspond to CT scan  Lymph node biopsy on right side of neck with metastatic adenocarcinoma of lung origin  CEA 7 1, CA 19- 9 151, CA 27-29 120 4, AFP 1 07  Abnormal CT scan A/P  Right sided abdominal pain  Colon polyp     · GI completed bidirectional endoscopic evaluation given anemia as well as CT findings concerning for a right-sided colon mass  · As reported above, there is no intraluminal pathology that corresponded to CT findings  It is possible there is an extraluminal lesion though there was no external compression noted in the right side of the colon  · At this time, will defer additional care and management to primary team and heme-onc regarding findings most consistent with metastatic adenocarcinoma of the lung  · We will also follow-up on the results of polyp removed from colon and biopsies taken on upper endoscopy  · Recommend continue with supportive care to include antiemetics and analgesics  Okay to continue on antiemetics such as Zofran, would avoid Reglan given borderline QTc  Could consider swapping to Tigan if QTc interval remains a concern  Could trial scopolamine patch  Primary team shares they may trial Ativan for nausea as well  Microcytic anemia     · Maintain IV access, monitor Hb, transfuse per protocol or for hemodynamic instability    · No obvious source of bleeding noted on EGD or colonoscopy, we will follow-up on the results of the biopsy  · Pending patient's clinical course and goals of care, if her anemia persists and there is concern for occult losses from the GI tract, a capsule endoscopy would be the next step in definitive evaluation for GI source of blood loss  We will be available should any new issues arise    ______________________________________________________________________    Subjective:     Patient is a 58 y o  female with past medical history significant for asthma, DM 2, history of tobacco use, who was admitted on 5/21/2023 with right-sided abdominal pain and nausea, nonbloody emesis, nonbloody diarrhea  Imaging on admission with right suprahilar lesion suspicious for malignancy in addition to a lesion in the pancreatic body and focal annular lesion in the ascending colon  EGD and colonoscopy were completed on 5/25/2023    EGD commented on mild patchy erythematous mucosa in the antrum, otherwise normal   Colonoscopy commented on 1 pedunculated polyp in the sigmoid colon, scattered diverticula in the sigmoid colon, though otherwise was normal     Interval events:     Pt continues    Medication Administration - last 24 hours from 05/25/2023 1202 to 05/26/2023 1202       Date/Time Order Dose Route Action Action by     05/26/2023 0147 EDT multi-electrolyte (PLASMALYTE-A/ISOLYTE-S PH 7 4) IV solution 100 mL/hr Intravenous [facility]     05/25/2023 1554 EDT multi-electrolyte (PLASMALYTE-A/ISOLYTE-S PH 7 4) [facility] solution 100 mL[facility] [address]     05/26/2023 4718 EDT ondansetron (ZOFRAN) injection 4 mg 4 mg Intravenous Given Lisa Bardales RN     05/26/2023 0152 EDT ondansetron (ZOFRAN) injection 4 mg 4 mg Intravenous Given Harvey Packer RN     05/26/2023 0908 EDT pantoprazole (PROTONIX) injection 40 mg 40 mg Intravenous Given Lias Bardales RN     05/25/2023 1551 EDT pravastatin (PRAVACHOL) tablet 80 mg 80 mg Oral Given Keira Barth, RN     05/26/2023 0710 EDT losartan (COZAAR) tablet 100 mg 100 mg Oral Given Basia Marcum, RN     22/09/0432 2348 EDT folic acid (FOLVITE) tablet 400 mcg 400 mcg Oral Given Basia Macrum, RN     05/26/2023 7545 EDT cyanocobalamin (VITAMIN B-12) tablet 500 mcg 500 mcg Oral Given Basia Marcum, RN     05/25/2023 2151 EDT hydrOXYzine HCL (ATARAX) tablet 25 mg 25 mg Oral Given Dannie Rothman, RN     05/26/2023 3763 EDT buPROPion [facility]) tablet 150 mg 150 mg Oral Given Basia Marcum, RN     05/25/2023 1716 EDT buPROPion [facility]) tablet 150 mg 150 mg Oral Given Keira Barth, RN     05/26/2023 1145 EDT oxyCODONE (ROXICODONE) IR tablet 5 mg 0 mg Oral Hold Basia Marcum, RN     05/26/2023 5285 EDT oxyCODONE (ROXICODONE) IR tablet 5 mg 5 mg Oral Given Basia Marcum, RN     05/26/2023 0910 EDT oxyCODONE (ROXICODONE) IR tablet 5 mg 5 mg Oral Given Dannie Rothman, RN     05/25/2023 1551 EDT oxyCODONE (ROXICODONE) IR tablet 5 mg 5 mg Oral Given Keira Barth, RN     05/26/2023 5906 EDT HYDROmorphone (DILAUDID) injection 0 5 mg 0 5 mg Intravenous Given Basia Marcum, RN     05/26/2023 7657 EDT HYDROmorphone (DILAUDID) injection 0 5 mg 0 5 mg Intravenous Given Dannie Rothman, RN     05/25/2023 2151 EDT HYDROmorphone (DILAUDID) injection 0 5 mg 0 5 mg Intravenous Given Dannie Rothman, RN     05/25/2023 1749 EDT HYDROmorphone (DILAUDID) injection 0 5 mg 0 5 mg Intravenous Given Keira Barth, RN     05/26/2023 1144 EDT oxyCODONE (ROXICODONE) IR tablet 5 mg 5 mg Oral Given Basia Marcum, AHSAN     05/25/2023 2009 EDT oxyCODONE (ROXICODONE) IR tablet 5 mg 5 mg Oral Given Dannie Rothman, AHSAN     05/25/2023 1558 EDT oxyCODONE (ROXICODONE) IR tablet 5 mg 5 mg Oral Not Given Keira Barth RN     05/25/2023 1207 EDT oxyCODONE (ROXICODONE) IR tablet 5 mg 5 mg Oral Not Given Keira Barth RN     05/25/2023 1552 EDT lactated ringers infusion 0 mL/kg/hr "Intravenous [facility], RN     05/25/2023 1332 EDT lactated ringers infusion -- Intravenous Anesthesia Volume Adjustment Frandy Fuentes CRNA     05/25/2023 1254 EDT lactated ringers infusion -- Intravenous New Bag Frandy Fuentes CRNA        Review of Systems   Otherwise Per HPI    Objective:     Vitals: Blood pressure 155/86, pulse (!) 115, temperature 99 1 °F (37 3 °C), resp  rate 20, height 5' 2\" (1 575 m), weight 54 9 kg (121 lb), SpO2 93 %  ,Body mass index is 22 13 kg/m²  Intake/Output Summary (Last 24 hours) at 5/26/2023 1202  Last data filed at 5/26/2023 0900  Gross per 24 hour   Intake 1290 ml   Output 550 ml   Net 740 ml     Physical Exam  Vitals and nursing note reviewed  Constitutional:       Appearance: She is ill-appearing  HENT:      Head: Normocephalic and atraumatic  Pulmonary:      Effort: Pulmonary effort is normal  No respiratory distress  Abdominal:      General: Bowel sounds are normal       Palpations: There is no mass  Tenderness: There is abdominal tenderness  There is guarding (active)  There is no rebound  Skin:     General: Skin is warm and dry  Coloration: Skin is not jaundiced  Neurological:      General: No focal deficit present  Mental Status: She is oriented to person, place, and time  Psychiatric:         Mood and Affect: Mood normal          Behavior: Behavior normal        Invasive Devices     Peripheral Intravenous Line  Duration           Peripheral IV 05/25/23 Distal;Left;Ventral (anterior) Forearm <1 day              Lab Results:  No results displayed because visit has over 200 results  Imaging Studies: I have personally reviewed pertinent imaging studies  Berenice Arevalo PA-C    **Please note:  Dictation voice to text software may have been used in the creation of this record  Occasional wrong word or “sound alike” substitutions may have occurred due to the inherent limitations of voice recognition software    " Read the chart carefully and recognize, using context, where substitutions have occurred  **

## 2023-05-26 NOTE — PLAN OF CARE
Problem: Potential for Falls  Goal: Patient will remain free of falls  Description: INTERVENTIONS:  - Educate patient/family on patient safety including physical limitations  - Instruct patient to call for assistance with activity   - Consult OT/PT to assist with strengthening/mobility   - Keep Call bell within reach  - Keep bed low and locked with side rails adjusted as appropriate  - Keep care items and personal belongings within reach  - Initiate and maintain comfort rounds  - Make Fall Risk Sign visible to staff  - Offer Toileting in advance of need  - Initiate/Maintain bed alarm  - Obtain necessary fall risk management equipment  - Apply yellow socks and bracelet for high fall risk patients  - Consider moving patient to room near nurses station  Outcome: Progressing     Problem: PAIN - ADULT  Goal: Verbalizes/displays adequate comfort level or baseline comfort level  Description: Interventions:  - Encourage patient to monitor pain and request assistance  - Assess pain using appropriate pain scale  - Administer analgesics based on type and severity of pain and evaluate response  - Implement non-pharmacological measures as appropriate and evaluate response  - Consider cultural and social influences on pain and pain management  - Notify physician/advanced practitioner if interventions unsuccessful or patient reports new pain  Outcome: Progressing     Problem: INFECTION - ADULT  Goal: Absence or prevention of progression during hospitalization  Description: INTERVENTIONS:  - Assess and monitor for signs and symptoms of infection  - Monitor lab/diagnostic results  - Monitor all insertion sites, i e  indwelling lines, tubes, and drains  - Monitor endotracheal if appropriate and nasal secretions for changes in amount and color  - [place] appropriate cooling/warming therapies per order  - Administer medications as ordered  - Instruct and encourage patient and family to use good hand hygiene technique  - Identify and instruct in appropriate isolation precautions for identified infection/condition  Outcome: Progressing  Goal: Absence of fever/infection during neutropenic period  Description: INTERVENTIONS:  - Monitor WBC    Outcome: Progressing     Problem: SAFETY ADULT  Goal: Patient will remain free of falls  Description: INTERVENTIONS:  - Educate patient/family on patient safety including physical limitations  - Instruct patient to call for assistance with activity   - Consult OT/PT to assist with strengthening/mobility   - Keep Call bell within reach  - Keep bed low and locked with side rails adjusted as appropriate  - Keep care items and personal belongings within reach  - Initiate and maintain comfort rounds  - Make Fall Risk Sign visible to staff  - Offer Toileting in advance of need  - Initiate/Maintain bed alarm  - Obtain necessary fall risk management equipment  - Apply yellow socks and bracelet for high fall risk patients  - Consider moving patient to room near nurses station  Outcome: Progressing  Goal: Maintain or return to baseline ADL function  Description: INTERVENTIONS:  - Educate patient/family on patient safety including physical limitations  - Instruct patient to call for assistance with activity   - Consult OT/PT to assist with strengthening/mobility   - Keep Call bell within reach  - Keep bed low and locked with side rails adjusted as appropriate  - Keep care items and personal belongings within reach  - Initiate and maintain comfort rounds  - Make Fall Risk Sign visible to staff  - Offer Toileting in advance of need  - Initiate/Maintain bed alarm  - Obtain necessary fall risk management equipment:   - Apply yellow socks and bracelet for high fall risk patients  - Consider moving patient to room near nurses station  Outcome: Progressing     Problem: DISCHARGE PLANNING  Goal: Discharge to home or other facility with appropriate resources  Description: INTERVENTIONS:  - Identify barriers to discharge w/patient and caregiver  - Arrange for needed discharge resources and transportation as appropriate  - Identify discharge learning needs (meds, wound care, etc )  - Refer to Case Management Department for coordinating discharge planning if the patient needs post-hospital services based on physician/advanced practitioner order or complex needs related to functional status, cognitive ability, or social support system  Outcome: Progressing     Problem: Knowledge Deficit  Goal: Patient/family/caregiver demonstrates understanding of disease process, treatment plan, medications, and discharge instructions  Description: Complete learning assessment and assess knowledge base    Interventions:  - Provide teaching at level of understanding  - Provide teaching via preferred learning methods  Outcome: Progressing     Problem: GASTROINTESTINAL - ADULT  Goal: Minimal or absence of nausea and/or vomiting  Description: INTERVENTIONS:  - Administer IV fluids if ordered to ensure adequate hydration  - Maintain NPO status until nausea and vomiting are resolved  - Nasogastric tube if ordered  - Administer ordered antiemetic medications as needed  - Provide nonpharmacologic comfort measures as appropriate  - Advance diet as tolerated, if ordered  - Consider nutrition services referral to assist patient with adequate nutrition and appropriate food choices  Outcome: Progressing  Goal: Maintains or returns to baseline bowel function  Description: INTERVENTIONS:  - Assess bowel function  - Encourage oral fluids to ensure adequate hydration  - Administer IV fluids if ordered to ensure adequate hydration  - Administer ordered medications as needed  - Encourage mobilization and activity  - Consider nutritional services referral to assist patient with adequate nutrition and appropriate food choices  Outcome: Progressing  Goal: Maintains adequate nutritional intake  Description: INTERVENTIONS:  - Monitor percentage of each meal consumed  - Identify factors contributing to decreased intake, treat as appropriate  - Assist with meals as needed  - Monitor I&O, weight, and lab values if indicated  - Obtain nutrition services referral as needed  Outcome: Progressing  Goal: Oral mucous membranes remain intact  Description: INTERVENTIONS  - Assess oral mucosa and hygiene practices  - Implement preventative oral hygiene regimen  - Implement oral medicated treatments as ordered  - Initiate Nutrition services referral as needed  Outcome: Progressing     Problem: GENITOURINARY - ADULT  Goal: Maintains or returns to baseline urinary function  Description: INTERVENTIONS:  - Assess urinary function  - Encourage oral fluids to ensure adequate hydration if ordered  - Administer IV fluids as ordered to ensure adequate hydration  - Administer ordered medications as needed  - Offer frequent toileting  - Follow urinary retention protocol if ordered  Outcome: Progressing     Problem: METABOLIC, FLUID AND ELECTROLYTES - ADULT  Goal: Electrolytes maintained within normal limits  Description: INTERVENTIONS:  - Monitor labs and assess patient for signs and symptoms of electrolyte imbalances  - Administer electrolyte replacement as ordered  - Monitor response to electrolyte replacements, including repeat lab results as appropriate  - Instruct patient on fluid and nutrition as appropriate  Outcome: Progressing  Goal: Fluid balance maintained  Description: INTERVENTIONS:  - Monitor labs   - Monitor I/O and WT  - Instruct patient on fluid and nutrition as appropriate  - Assess for signs & symptoms of volume excess or deficit  Outcome: Progressing  Goal: Glucose maintained within target range  Description: INTERVENTIONS:  - Monitor Blood Glucose as ordered  - Assess for signs and symptoms of hyperglycemia and hypoglycemia  - Administer ordered medications to maintain glucose within target range  - Assess nutritional intake and initiate nutrition service referral as needed  Outcome: Progressing     Problem: Nutrition/Hydration-ADULT  Goal: Nutrient/Hydration intake appropriate for improving, restoring or maintaining nutritional needs  Description: Monitor and assess patient's nutrition/hydration status for malnutrition  Collaborate with interdisciplinary team and initiate plan and interventions as ordered  Monitor patient's weight and dietary intake as ordered or per policy  Utilize nutrition screening tool and intervene as necessary  Determine patient's food preferences and provide high-protein, high-caloric foods as appropriate       INTERVENTIONS:  - Monitor oral intake, urinary output, labs, and treatment plans  - Assess nutrition and hydration status and recommend course of action  - Evaluate amount of meals eaten  - Assist patient with eating if necessary   - Allow adequate time for meals  - Recommend/ encourage appropriate diets, oral nutritional supplements, and vitamin/mineral supplements  - Order, calculate, and assess calorie counts as needed  - Recommend, monitor, and adjust tube feedings and TPN/PPN based on assessed needs  - Assess need for intravenous fluids  - Provide specific nutrition/hydration education as appropriate  - Include patient/family/caregiver in decisions related to nutrition  Outcome: Progressing     Problem: MOBILITY - ADULT  Goal: Maintain or return to baseline ADL function  Description: INTERVENTIONS:  - Educate patient/family on patient safety including physical limitations  - Instruct patient to call for assistance with activity   - Consult OT/PT to assist with strengthening/mobility   - Keep Call bell within reach  - Keep bed low and locked with side rails adjusted as appropriate  - Keep care items and personal belongings within reach  - Initiate and maintain comfort rounds  - Make Fall Risk Sign visible to staff  - Offer Toileting in advance of need  - Initiate/Maintain bed alarm  - Obtain necessary fall risk management equipment:   - Apply yellow socks and bracelet for high fall risk patients  - Consider moving patient to room near nurses station  Outcome: Progressing  Goal: Maintains/Returns to pre admission functional level  Description: INTERVENTIONS:  - Perform BMAT or MOVE assessment daily    - Set and communicate daily mobility goal to care team and patient/family/caregiver  - Collaborate with rehabilitation services on mobility goals if consulted  - Perform Range of Motion 3 times a day  - Reposition patient every 2 hours    - Dangle patient 3 times a day  - Stand patient 3 times a day  - Ambulate patient 3 times a day  - Out of bed to chair 3 times a day   - Out of bed for meals 3 times a day  - Out of bed for toileting  - Record patient progress and toleration of activity level   Outcome: Progressing     Problem: Prexisting or High Potential for Compromised Skin Integrity  Goal: Skin integrity is maintained or improved  Description: INTERVENTIONS:  - Identify patients at risk for skin breakdown  - Assess and monitor skin integrity  - Assess and monitor nutrition and hydration status  - Monitor labs   - Assess for incontinence   - Turn and reposition patient  - Assist with mobility/ambulation  - Relieve pressure over bony prominences  - Avoid friction and shearing  - Provide appropriate hygiene as needed including keeping skin clean and dry  - Evaluate need for skin moisturizer/barrier cream  - Collaborate with interdisciplinary team   - Patient/family teaching  - Consider wound care consult   Outcome: Progressing

## 2023-05-27 PROBLEM — F41.9 ANXIETY: Status: ACTIVE | Noted: 2023-05-27

## 2023-05-27 LAB
ANION GAP SERPL CALCULATED.3IONS-SCNC: 11 MMOL/L (ref 4–13)
BACTERIA BLD CULT: NORMAL
BACTERIA BLD CULT: NORMAL
BASOPHILS # BLD AUTO: 0.04 THOUSANDS/ÂΜL (ref 0–0.1)
BASOPHILS NFR BLD AUTO: 0 % (ref 0–1)
BUN SERPL-MCNC: 8 MG/DL (ref 5–25)
CALCIUM SERPL-MCNC: 8.3 MG/DL (ref 8.4–10.2)
CHLORIDE SERPL-SCNC: 95 MMOL/L (ref 96–108)
CO2 SERPL-SCNC: 27 MMOL/L (ref 21–32)
CREAT SERPL-MCNC: 0.55 MG/DL (ref 0.6–1.3)
EOSINOPHIL # BLD AUTO: 0.21 THOUSAND/ÂΜL (ref 0–0.61)
EOSINOPHIL NFR BLD AUTO: 2 % (ref 0–6)
ERYTHROCYTE [DISTWIDTH] IN BLOOD BY AUTOMATED COUNT: 15 % (ref 11.6–15.1)
GFR SERPL CREATININE-BSD FRML MDRD: 100 ML/MIN/1.73SQ M
GLUCOSE SERPL-MCNC: 121 MG/DL (ref 65–140)
HCT VFR BLD AUTO: 22.4 % (ref 34.8–46.1)
HGB BLD-MCNC: 6.7 G/DL (ref 11.5–15.4)
IMM GRANULOCYTES # BLD AUTO: 0.04 THOUSAND/UL (ref 0–0.2)
IMM GRANULOCYTES NFR BLD AUTO: 0 % (ref 0–2)
LYMPHOCYTES # BLD AUTO: 1.85 THOUSANDS/ÂΜL (ref 0.6–4.47)
LYMPHOCYTES NFR BLD AUTO: 16 % (ref 14–44)
MCH RBC QN AUTO: 23.1 PG (ref 26.8–34.3)
MCHC RBC AUTO-ENTMCNC: 29.9 G/DL (ref 31.4–37.4)
MCV RBC AUTO: 77 FL (ref 82–98)
MONOCYTES # BLD AUTO: 1.38 THOUSAND/ÂΜL (ref 0.17–1.22)
MONOCYTES NFR BLD AUTO: 12 % (ref 4–12)
NEUTROPHILS # BLD AUTO: 7.74 THOUSANDS/ÂΜL (ref 1.85–7.62)
NEUTS SEG NFR BLD AUTO: 70 % (ref 43–75)
NRBC BLD AUTO-RTO: 0 /100 WBCS
PLATELET # BLD AUTO: 451 THOUSANDS/UL (ref 149–390)
PMV BLD AUTO: 8 FL (ref 8.9–12.7)
POTASSIUM SERPL-SCNC: 2.9 MMOL/L (ref 3.5–5.3)
RBC # BLD AUTO: 2.9 MILLION/UL (ref 3.81–5.12)
SODIUM SERPL-SCNC: 133 MMOL/L (ref 135–147)
WBC # BLD AUTO: 11.26 THOUSAND/UL (ref 4.31–10.16)

## 2023-05-27 PROCEDURE — 85025 COMPLETE CBC W/AUTO DIFF WBC: CPT | Performed by: NURSE PRACTITIONER

## 2023-05-27 PROCEDURE — 99233 SBSQ HOSP IP/OBS HIGH 50: CPT | Performed by: NURSE PRACTITIONER

## 2023-05-27 PROCEDURE — P9016 RBC LEUKOCYTES REDUCED: HCPCS

## 2023-05-27 PROCEDURE — 30233N1 TRANSFUSION OF NONAUTOLOGOUS RED BLOOD CELLS INTO PERIPHERAL VEIN, PERCUTANEOUS APPROACH: ICD-10-PCS | Performed by: INTERNAL MEDICINE

## 2023-05-27 PROCEDURE — C9113 INJ PANTOPRAZOLE SODIUM, VIA: HCPCS | Performed by: INTERNAL MEDICINE

## 2023-05-27 PROCEDURE — 86920 COMPATIBILITY TEST SPIN: CPT

## 2023-05-27 PROCEDURE — 80048 BASIC METABOLIC PNL TOTAL CA: CPT | Performed by: NURSE PRACTITIONER

## 2023-05-27 RX ORDER — POTASSIUM CHLORIDE 20 MEQ/1
40 TABLET, EXTENDED RELEASE ORAL ONCE
Status: COMPLETED | OUTPATIENT
Start: 2023-05-27 | End: 2023-05-27

## 2023-05-27 RX ORDER — SCOLOPAMINE TRANSDERMAL SYSTEM 1 MG/1
1 PATCH, EXTENDED RELEASE TRANSDERMAL
Status: DISCONTINUED | OUTPATIENT
Start: 2023-05-27 | End: 2023-06-05 | Stop reason: HOSPADM

## 2023-05-27 RX ORDER — HYDROXYZINE HYDROCHLORIDE 25 MG/1
25 TABLET, FILM COATED ORAL 3 TIMES DAILY
Status: DISCONTINUED | OUTPATIENT
Start: 2023-05-27 | End: 2023-05-27

## 2023-05-27 RX ORDER — POTASSIUM CHLORIDE 20 MEQ/1
20 TABLET, EXTENDED RELEASE ORAL DAILY
Status: DISCONTINUED | OUTPATIENT
Start: 2023-05-28 | End: 2023-06-05 | Stop reason: HOSPADM

## 2023-05-27 RX ORDER — HYDROXYZINE HYDROCHLORIDE 25 MG/1
25 TABLET, FILM COATED ORAL 2 TIMES DAILY
Status: DISCONTINUED | OUTPATIENT
Start: 2023-05-27 | End: 2023-05-27

## 2023-05-27 RX ORDER — HYDROXYZINE HYDROCHLORIDE 25 MG/1
25 TABLET, FILM COATED ORAL 3 TIMES DAILY PRN
Status: DISCONTINUED | OUTPATIENT
Start: 2023-05-27 | End: 2023-06-05 | Stop reason: HOSPADM

## 2023-05-27 RX ADMIN — HYDROXYZINE HYDROCHLORIDE 25 MG: 25 TABLET ORAL at 23:05

## 2023-05-27 RX ADMIN — OXYCODONE HYDROCHLORIDE 10 MG: 10 TABLET ORAL at 13:22

## 2023-05-27 RX ADMIN — HEPARIN SODIUM 5000 UNITS: 5000 INJECTION INTRAVENOUS; SUBCUTANEOUS at 05:43

## 2023-05-27 RX ADMIN — OXYCODONE HYDROCHLORIDE 10 MG: 10 TABLET ORAL at 17:36

## 2023-05-27 RX ADMIN — HYDROXYZINE HYDROCHLORIDE 25 MG: 25 TABLET ORAL at 12:04

## 2023-05-27 RX ADMIN — POTASSIUM CHLORIDE 40 MEQ: 1500 TABLET, EXTENDED RELEASE ORAL at 09:08

## 2023-05-27 RX ADMIN — BUPROPION HYDROCHLORIDE TABLETS 150 MG: 100 TABLET, FILM COATED ORAL at 09:10

## 2023-05-27 RX ADMIN — BUPROPION HYDROCHLORIDE TABLETS 150 MG: 100 TABLET, FILM COATED ORAL at 17:36

## 2023-05-27 RX ADMIN — OXYCODONE HYDROCHLORIDE 10 MG: 10 TABLET ORAL at 09:02

## 2023-05-27 RX ADMIN — HYDROMORPHONE HYDROCHLORIDE 1 MG: 1 INJECTION, SOLUTION INTRAMUSCULAR; INTRAVENOUS; SUBCUTANEOUS at 14:56

## 2023-05-27 RX ADMIN — HYDROMORPHONE HYDROCHLORIDE 1 MG: 1 INJECTION, SOLUTION INTRAMUSCULAR; INTRAVENOUS; SUBCUTANEOUS at 10:15

## 2023-05-27 RX ADMIN — CYANOCOBALAMIN TAB 500 MCG 500 MCG: 500 TAB at 09:09

## 2023-05-27 RX ADMIN — FERROUS SULFATE TAB 325 MG (65 MG ELEMENTAL FE) 325 MG: 325 (65 FE) TAB at 09:08

## 2023-05-27 RX ADMIN — HEPARIN SODIUM 5000 UNITS: 5000 INJECTION INTRAVENOUS; SUBCUTANEOUS at 14:51

## 2023-05-27 RX ADMIN — OXYCODONE HYDROCHLORIDE 10 MG: 10 TABLET ORAL at 23:05

## 2023-05-27 RX ADMIN — SCOPALAMINE 1 PATCH: 1 PATCH, EXTENDED RELEASE TRANSDERMAL at 14:51

## 2023-05-27 RX ADMIN — HYDROMORPHONE HYDROCHLORIDE 1 MG: 1 INJECTION, SOLUTION INTRAMUSCULAR; INTRAVENOUS; SUBCUTANEOUS at 05:43

## 2023-05-27 RX ADMIN — OXYCODONE HYDROCHLORIDE 10 MG: 10 TABLET ORAL at 04:00

## 2023-05-27 RX ADMIN — Medication 400 MCG: at 09:09

## 2023-05-27 RX ADMIN — PRAVASTATIN SODIUM 80 MG: 40 TABLET ORAL at 17:38

## 2023-05-27 RX ADMIN — HYDROMORPHONE HYDROCHLORIDE 1 MG: 1 INJECTION, SOLUTION INTRAMUSCULAR; INTRAVENOUS; SUBCUTANEOUS at 19:43

## 2023-05-27 RX ADMIN — PANTOPRAZOLE SODIUM 40 MG: 40 INJECTION, POWDER, FOR SOLUTION INTRAVENOUS at 09:10

## 2023-05-27 RX ADMIN — LOSARTAN POTASSIUM 100 MG: 50 TABLET, FILM COATED ORAL at 09:09

## 2023-05-27 RX ADMIN — ONDANSETRON 4 MG: 2 INJECTION INTRAMUSCULAR; INTRAVENOUS at 09:06

## 2023-05-27 RX ADMIN — HEPARIN SODIUM 5000 UNITS: 5000 INJECTION INTRAVENOUS; SUBCUTANEOUS at 21:39

## 2023-05-27 NOTE — PLAN OF CARE
Problem: Potential for Falls  Goal: Patient will remain free of falls  Description: INTERVENTIONS:  - Educate patient/family on patient safety including physical limitations  - Instruct patient to call for assistance with activity   - Consult OT/PT to assist with strengthening/mobility   - Keep Call bell within reach  - Keep bed low and locked with side rails adjusted as appropriate  - Keep care items and personal belongings within reach  - Initiate and maintain comfort rounds  - Make Fall Risk Sign visible to staff  - Offer Toileting in advance of need  - Initiate/Maintain bed alarm  - Obtain necessary fall risk management equipment  - Apply yellow socks and bracelet for high fall risk patients  - Consider moving patient to room near nurses station  Outcome: Progressing     Problem: PAIN - ADULT  Goal: Verbalizes/displays adequate comfort level or baseline comfort level  Description: Interventions:  - Encourage patient to monitor pain and request assistance  - Assess pain using appropriate pain scale  - Administer analgesics based on type and severity of pain and evaluate response  - Implement non-pharmacological measures as appropriate and evaluate response  - Consider cultural and social influences on pain and pain management  - Notify physician/advanced practitioner if interventions unsuccessful or patient reports new pain  Outcome: Progressing     Problem: INFECTION - ADULT  Goal: Absence or prevention of progression during hospitalization  Description: INTERVENTIONS:  - Assess and monitor for signs and symptoms of infection  - Monitor lab/diagnostic results  - Monitor all insertion sites, i e  indwelling lines, tubes, and drains  - Monitor endotracheal if appropriate and nasal secretions for changes in amount and color  - [place] appropriate cooling/warming therapies per order  - Administer medications as ordered  - Instruct and encourage patient and family to use good hand hygiene technique  - Identify and instruct in appropriate isolation precautions for identified infection/condition  Outcome: Progressing  Goal: Absence of fever/infection during neutropenic period  Description: INTERVENTIONS:  - Monitor WBC    Outcome: Progressing     Problem: SAFETY ADULT  Goal: Patient will remain free of falls  Description: INTERVENTIONS:  - Educate patient/family on patient safety including physical limitations  - Instruct patient to call for assistance with activity   - Consult OT/PT to assist with strengthening/mobility   - Keep Call bell within reach  - Keep bed low and locked with side rails adjusted as appropriate  - Keep care items and personal belongings within reach  - Initiate and maintain comfort rounds  - Make Fall Risk Sign visible to staff  - Offer Toileting in advance of need  - Initiate/Maintain bed alarm  - Obtain necessary fall risk management equipment  - Apply yellow socks and bracelet for high fall risk patients  - Consider moving patient to room near nurses station  Outcome: Progressing  Goal: Maintain or return to baseline ADL function  Description: INTERVENTIONS:  - Educate patient/family on patient safety including physical limitations  - Instruct patient to call for assistance with activity   - Consult OT/PT to assist with strengthening/mobility   - Keep Call bell within reach  - Keep bed low and locked with side rails adjusted as appropriate  - Keep care items and personal belongings within reach  - Initiate and maintain comfort rounds  - Make Fall Risk Sign visible to staff  - Offer Toileting in advance of need  - Initiate/Maintain bed alarm  - Obtain necessary fall risk management equipment:   - Apply yellow socks and bracelet for high fall risk patients  - Consider moving patient to room near nurses station  Outcome: Progressing     Problem: DISCHARGE PLANNING  Goal: Discharge to home or other facility with appropriate resources  Description: INTERVENTIONS:  - Identify barriers to discharge w/patient and caregiver  - Arrange for needed discharge resources and transportation as appropriate  - Identify discharge learning needs (meds, wound care, etc )  - Refer to Case Management Department for coordinating discharge planning if the patient needs post-hospital services based on physician/advanced practitioner order or complex needs related to functional status, cognitive ability, or social support system  Outcome: Progressing     Problem: Knowledge Deficit  Goal: Patient/family/caregiver demonstrates understanding of disease process, treatment plan, medications, and discharge instructions  Description: Complete learning assessment and assess knowledge base    Interventions:  - Provide teaching at level of understanding  - Provide teaching via preferred learning methods  Outcome: Progressing     Problem: GASTROINTESTINAL - ADULT  Goal: Minimal or absence of nausea and/or vomiting  Description: INTERVENTIONS:  - Administer IV fluids if ordered to ensure adequate hydration  - Maintain NPO status until nausea and vomiting are resolved  - Nasogastric tube if ordered  - Administer ordered antiemetic medications as needed  - Provide nonpharmacologic comfort measures as appropriate  - Advance diet as tolerated, if ordered  - Consider nutrition services referral to assist patient with adequate nutrition and appropriate food choices  Outcome: Progressing  Goal: Maintains or returns to baseline bowel function  Description: INTERVENTIONS:  - Assess bowel function  - Encourage oral fluids to ensure adequate hydration  - Administer IV fluids if ordered to ensure adequate hydration  - Administer ordered medications as needed  - Encourage mobilization and activity  - Consider nutritional services referral to assist patient with adequate nutrition and appropriate food choices  Outcome: Progressing  Goal: Maintains adequate nutritional intake  Description: INTERVENTIONS:  - Monitor percentage of each meal consumed  - Identify factors contributing to decreased intake, treat as appropriate  - Assist with meals as needed  - Monitor I&O, weight, and lab values if indicated  - Obtain nutrition services referral as needed  Outcome: Progressing  Goal: Oral mucous membranes remain intact  Description: INTERVENTIONS  - Assess oral mucosa and hygiene practices  - Implement preventative oral hygiene regimen  - Implement oral medicated treatments as ordered  - Initiate Nutrition services referral as needed  Outcome: Progressing     Problem: GENITOURINARY - ADULT  Goal: Maintains or returns to baseline urinary function  Description: INTERVENTIONS:  - Assess urinary function  - Encourage oral fluids to ensure adequate hydration if ordered  - Administer IV fluids as ordered to ensure adequate hydration  - Administer ordered medications as needed  - Offer frequent toileting  - Follow urinary retention protocol if ordered  Outcome: Progressing     Problem: METABOLIC, FLUID AND ELECTROLYTES - ADULT  Goal: Electrolytes maintained within normal limits  Description: INTERVENTIONS:  - Monitor labs and assess patient for signs and symptoms of electrolyte imbalances  - Administer electrolyte replacement as ordered  - Monitor response to electrolyte replacements, including repeat lab results as appropriate  - Instruct patient on fluid and nutrition as appropriate  Outcome: Progressing  Goal: Fluid balance maintained  Description: INTERVENTIONS:  - Monitor labs   - Monitor I/O and WT  - Instruct patient on fluid and nutrition as appropriate  - Assess for signs & symptoms of volume excess or deficit  Outcome: Progressing  Goal: Glucose maintained within target range  Description: INTERVENTIONS:  - Monitor Blood Glucose as ordered  - Assess for signs and symptoms of hyperglycemia and hypoglycemia  - Administer ordered medications to maintain glucose within target range  - Assess nutritional intake and initiate nutrition service referral as needed  Outcome: Progressing Problem: Nutrition/Hydration-ADULT  Goal: Nutrient/Hydration intake appropriate for improving, restoring or maintaining nutritional needs  Description: Monitor and assess patient's nutrition/hydration status for malnutrition  Collaborate with interdisciplinary team and initiate plan and interventions as ordered  Monitor patient's weight and dietary intake as ordered or per policy  Utilize nutrition screening tool and intervene as necessary  Determine patient's food preferences and provide high-protein, high-caloric foods as appropriate       INTERVENTIONS:  - Monitor oral intake, urinary output, labs, and treatment plans  - Assess nutrition and hydration status and recommend course of action  - Evaluate amount of meals eaten  - Assist patient with eating if necessary   - Allow adequate time for meals  - Recommend/ encourage appropriate diets, oral nutritional supplements, and vitamin/mineral supplements  - Order, calculate, and assess calorie counts as needed  - Recommend, monitor, and adjust tube feedings and TPN/PPN based on assessed needs  - Assess need for intravenous fluids  - Provide specific nutrition/hydration education as appropriate  - Include patient/family/caregiver in decisions related to nutrition  Outcome: Progressing     Problem: MOBILITY - ADULT  Goal: Maintain or return to baseline ADL function  Description: INTERVENTIONS:  - Educate patient/family on patient safety including physical limitations  - Instruct patient to call for assistance with activity   - Consult OT/PT to assist with strengthening/mobility   - Keep Call bell within reach  - Keep bed low and locked with side rails adjusted as appropriate  - Keep care items and personal belongings within reach  - Initiate and maintain comfort rounds  - Make Fall Risk Sign visible to staff  - Offer Toileting in advance of need  - Initiate/Maintain bed alarm  - Obtain necessary fall risk management equipment:   - Apply yellow socks and bracelet for high fall risk patients  - Consider moving patient to room near nurses station  Outcome: Progressing  Goal: Maintains/Returns to pre admission functional level  Description: INTERVENTIONS:  - Perform BMAT or MOVE assessment daily    - Set and communicate daily mobility goal to care team and patient/family/caregiver  - Collaborate with rehabilitation services on mobility goals if consulted  - Perform Range of Motion 3 times a day  - Reposition patient every 2 hours    - Dangle patient 3 times a day  - Stand patient 3 times a day  - Ambulate patient 3 times a day  - Out of bed to chair 3 times a day   - Out of bed for meals 3 times a day  - Out of bed for toileting  - Record patient progress and toleration of activity level   Outcome: Progressing     Problem: Prexisting or High Potential for Compromised Skin Integrity  Goal: Skin integrity is maintained or improved  Description: INTERVENTIONS:  - Identify patients at risk for skin breakdown  - Assess and monitor skin integrity  - Assess and monitor nutrition and hydration status  - Monitor labs   - Assess for incontinence   - Turn and reposition patient  - Assist with mobility/ambulation  - Relieve pressure over bony prominences  - Avoid friction and shearing  - Provide appropriate hygiene as needed including keeping skin clean and dry  - Evaluate need for skin moisturizer/barrier cream  - Collaborate with interdisciplinary team   - Patient/family teaching  - Consider wound care consult   Outcome: Progressing

## 2023-05-27 NOTE — ASSESSMENT & PLAN NOTE
· Hemoglobin trended down from 9 5 to 6 7  · Daily CBC and trend hemoglobin  · Iron panel results reviewed  · S/p EGD and colonoscopy  · Transfuse with PRBCs for hemoglobin below 7  · Monitor closely for bleeding

## 2023-05-27 NOTE — ASSESSMENT & PLAN NOTE
· Presented for right upper lower quadrant abdominal pain with nausea vomiting and diarrhea   · Leukocytosis with WBC 21 9  · CT chest abdomen pelvis: Heterogeneously enhancing right suprahilar lesion with abrupt cut off of the right mainstem bronchus suspicious for malignancy    There is secondary complete atelectasis of the right lung with heterogeneous density  Small to moderate right pleural effusion is noted  Few subcentimeter left lung nodules are noted  Evidence of mediastinal and right supraclavicular adenopathy  Heterogeneous, rim-enhancing lesion at the proximal pancreatic body, possible metastatic focus  Pathologically enlarged celiac axis lymph nodes  Focal annular lesion involving the ascending colon, suspicious for metastatic focus versus primary malignancy  · GI recommendations appreciated  Patient to undergo EGD and colonoscopy today after modified prep  Per verbal report, EGD: mild gastritis  Colon: Medium sized polyp removed from sigmoid colon otherwise no mass in the ascending colon  · Pulmonology recommendations appreciated  Recommendation is biopsy of large right supraclavicular node  Performed by IR, final results: Metastatic carcinoma, compatible with adenocarcinoma of lung origin  · Oncology recommendations appreciated  Due to headaches MRI brain with and without contrast obtained: No acute infarction, edema, or mass effect  Few punctate hyperintense T2/FLAIR foci are noted within the periventricular and subcortical white matter which are nonspecific and can be seen with vasculitis, migrainous angiopathy, Lyme's disease or microangiopathic changes  · Palliative input appreciated  · Symptom control  Continue oxycodone 5 mg every 4 hours for moderate to 10 mg for severe and 1 mg Dilaudid IV for breakthrough  Continue ondansetron  QTc noted to be 479  Will trial scopolamine patch

## 2023-05-27 NOTE — PROGRESS NOTES
[facility] 45  Progress Note  Name: Amelia Ortiz  MRN: [id number]  Unit/Bed#: -01 I Date of Admission: 5/21/2023   Date of Service: 5/27/2023 I Hospital Day: 6    Assessment/Plan      Abdominal pain  Assessment & Plan  · Patient presented to ED with complaints of right upper and lower quadrant abdominal pain with nausea, vomiting and diarrhea  · CT scan chest abdomen pelvis see below  · For assessment/treatment plan see abnormal CT scan    * Abnormal CT scan  Assessment & Plan  · Presented for right upper lower quadrant abdominal pain with nausea vomiting and diarrhea   · Leukocytosis with WBC 21 9  · CT chest abdomen pelvis: Heterogeneously enhancing right suprahilar lesion with abrupt cut off of the right mainstem bronchus suspicious for malignancy    There is secondary complete atelectasis of the right lung with heterogeneous density  Small to moderate right pleural effusion is noted  Few subcentimeter left lung nodules are noted  Evidence of mediastinal and right supraclavicular adenopathy  Heterogeneous, rim-enhancing lesion at the proximal pancreatic body, possible metastatic focus  Pathologically enlarged celiac axis lymph nodes  Focal annular lesion involving the ascending colon, suspicious for metastatic focus versus primary malignancy  · GI recommendations appreciated  Patient to undergo EGD and colonoscopy today after modified prep  Per verbal report, EGD: mild gastritis  Colon: Medium sized polyp removed from sigmoid colon otherwise no mass in the ascending colon  · Pulmonology recommendations appreciated  Recommendation is biopsy of large right supraclavicular node  Performed by IR, final results: Metastatic carcinoma, compatible with adenocarcinoma of lung origin  · Oncology recommendations appreciated  Due to headaches MRI brain with and without contrast obtained: No acute infarction, edema, or mass effect   Few punctate hyperintense T2/FLAIR foci are noted within the periventricular and subcortical white matter which are nonspecific and can be seen with vasculitis, migrainous angiopathy, Lyme's disease or microangiopathic changes  · Palliative input appreciated  · Symptom control  Continue oxycodone 5 mg every 4 hours for moderate to 10 mg for severe and 1 mg Dilaudid IV for breakthrough  Continue ondansetron  QTc noted to be 479  Will trial scopolamine patch  Iron deficiency anemia  Assessment & Plan  · Hemoglobin trended down from 9 5 to 6 7  · Daily CBC and trend hemoglobin  · Iron panel results reviewed  · S/p EGD and colonoscopy  · Transfuse with PRBCs for hemoglobin below 7  · Monitor closely for bleeding    Anxiety  Assessment & Plan  · She says she takes Vistaril TID for anxiety  · Continue Wellbutrin  · Supportive care    Hypomagnesemia  Assessment & Plan  · Presented with serum magnesium 1 5  · Likely secondary to vomiting and diarrhea  · Repleted with 4 g IV magnesium   · Monitor magnesium periodically    Diarrhea  Assessment & Plan  · Patient complained of frequent bouts of diarrhea, now improved  · Stool studies pending collection      Nausea & vomiting  Assessment & Plan  · Presented with nausea and vomiting and right upper and lower quadrant pain  · CT imaging see above  · Zofran as needed  · S/p IV fluids  · Tolerating small amounts of regular diet         VTE Pharmacologic Prophylaxis: VTE Score: 2 Low Risk (Score 0-2) - Encourage Ambulation  Patient Centered Rounds: I performed bedside rounds with nursing staff today  Discussions with Specialists or Other Care Team Provider: RN    Education and Discussions with Family / Patient: Updated  (wife) at bedside      Total Time Spent on Date of Encounter in care of patient: 55 minutes This time was spent on one or more of the following: performing physical exam; counseling and coordination of care; obtaining or reviewing history; documenting in the medical record; reviewing/ordering tests, medications or procedures; communicating with other healthcare professionals and discussing with patient's family/caregivers  Current Length of Stay: 6 day(s)  Current Patient Status: Inpatient   Certification Statement: The patient will continue to require additional inpatient hospital stay due to care coordination  Discharge Plan: TBD  Code Status: Level 1 - Full Code    Subjective:   Patient seen and examined  She says she still has intermittent pain and nausea  Tolerating diet  No diarrhea  No vomiting  Objective:     Vitals:   Temp (24hrs), Av 5 °F (36 9 °C), Min:97 5 °F (36 4 °C), Max:99 9 °F (37 7 °C)    Temp:  [97 5 °F (36 4 °C)-99 9 °F (37 7 °C)] 97 5 °F (36 4 °C)  HR:  [105-116] 110  Resp:  [18-20] 19  BP: (132-198)/() 163/93  SpO2:  [85 %-93 %] 93 %  Body mass index is 22 13 kg/m²  Input and Output Summary (last 24 hours): Intake/Output Summary (Last 24 hours) at 2023 1351  Last data filed at 2023 0546  Gross per 24 hour   Intake --   Output 700 ml   Net -700 ml       Physical Exam:   Physical Exam  Vitals and nursing note reviewed  Constitutional:       General: She is not in acute distress  HENT:      Head: Normocephalic and atraumatic  Mouth/Throat:      Mouth: Mucous membranes are moist       Pharynx: Oropharynx is clear  Eyes:      Pupils: Pupils are equal, round, and reactive to light  Cardiovascular:      Rate and Rhythm: Normal rate and regular rhythm  Pulses: Normal pulses  Pulmonary:      Effort: Pulmonary effort is normal  No respiratory distress  Breath sounds: Normal breath sounds  Abdominal:      General: Bowel sounds are normal       Palpations: Abdomen is soft  Tenderness: There is abdominal tenderness  Musculoskeletal:      Cervical back: Neck supple  Right lower leg: No edema  Left lower leg: No edema  Skin:     General: Skin is warm and dry        Capillary Refill: Capillary refill takes less than 2 seconds  Neurological:      General: No focal deficit present  Mental Status: She is alert and oriented to person, place, and time  Mental status is at baseline  Additional Data:     Labs:  Results from last 7 days   Lab Units 05/27/23  0540   EOS PCT % 2   HEMATOCRIT % 22 4*   HEMOGLOBIN g/dL 6 7*   LYMPHS PCT % 16   MONOS PCT % 12   NEUTROS PCT % 70   PLATELETS Thousands/uL 451*   WBC Thousand/uL 11 26*     Results from last 7 days   Lab Units 05/27/23  0540 05/24/23  0435 05/22/23  0421   ANION GAP mmol/L 11   < > 11   ALBUMIN g/dL  --   --  3 8   ALK PHOS U/L  --   --  59   ALT U/L  --   --  6*   AST U/L  --   --  11*   BUN mg/dL 8   < > 12   CALCIUM mg/dL 8 3*   < > 8 8   CHLORIDE mmol/L 95*   < > 101   CO2 mmol/L 27   < > 23   CREATININE mg/dL 0 55*   < > 0 67   GLUCOSE RANDOM mg/dL 121   < > 133   POTASSIUM mmol/L 2 9*   < > 4 0   SODIUM mmol/L 133*   < > 135   TOTAL BILIRUBIN mg/dL  --   --  0 24    < > = values in this interval not displayed  Results from last 7 days   Lab Units 05/21/23  1209   INR  1 03     Results from last 7 days   Lab Units 05/25/23  1236   POC GLUCOSE mg/dl 112         Results from last 7 days   Lab Units 05/23/23  0430 05/22/23  0421 05/21/23  1228 05/21/23  1209   LACTIC ACID mmol/L  --   --  1 4  --    PROCALCITONIN ng/ml 0 25 0 23  --  <0 05       Lines/Drains:  Invasive Devices     Peripheral Intravenous Line  Duration           Peripheral IV 05/25/23 Distal;Left;Ventral (anterior) Forearm 2 days                  Telemetry:  Telemetry Orders (From admission, onward)             24 Hour Telemetry Monitoring  Continuous x 24 Hours (Telem)        Question:  Reason for 24 Hour Telemetry  Answer:  Metabolic/electrolyte disturbance with high probability of dysrhythmia  K level <3 or >6 OR KCL infusion >10mEq/hr                 Telemetry Reviewed: Normal Sinus Rhythm  Indication for Continued Telemetry Use: No indication for continued use  Will discontinue  Recent Cultures (last 7 days):   Results from last 7 days   Lab Units 05/21/23  1236 05/21/23  1228   BLOOD CULTURE  No Growth After 5 Days  No Growth After 5 Days  Last 24 Hours Medication List:   Current Facility-Administered Medications   Medication Dose Route Frequency Provider Last Rate   • acetaminophen  650 mg Oral Q6H PRN Lashell Romano MD     • buPROPion  150 mg Oral BID Lashell Romano MD     • butalbital-acetaminophen-caffeine  1 tablet Oral Q4H PRN Lashell Romano MD     • cyanocobalamin  500 mcg Oral Daily Lashell Romano MD     • ferrous sulfate  325 mg Oral Daily With Breakfast BRITTNY Yang     • folic acid  899 mcg Oral Daily Lashell Romano MD     • heparin (porcine)  5,000 Units Subcutaneous [facility] BRITTNY Yang     • HYDROmorphone  1 mg Intravenous Q4H PRN BRITTNY Yang     • hydrOXYzine HCL  25 mg Oral TID PRN BRITTNY Yang     • losartan  100 mg Oral Daily Lashell Romano MD     • ondansetron  4 mg Intravenous Q6H PRN Lashell Romano MD     • oxyCODONE  10 mg Oral Q4H PRN BRITTNY Yang     • oxyCODONE  5 mg Oral Q4H PRN BRITTNY Yang     • pantoprazole  40 mg Intravenous Q24H [address] Lashell Romano MD     • [START ON 5/28/2023] potassium chloride  20 mEq Oral Daily Cholo Ash PA-C     • pravastatin  80 mg Oral Daily With Lilly South MD     • scopolamine  1 patch Transdermal Q72H BRITTNY Yang          Today, Patient Was Seen By: BRITTNY Yang    **Please Note: This note may have been constructed using a voice recognition system  **

## 2023-05-28 PROBLEM — C79.9 METASTATIC ADENOCARCINOMA (HCC): Status: ACTIVE | Noted: 2023-05-21

## 2023-05-28 LAB
ABO GROUP BLD BPU: NORMAL
ANION GAP SERPL CALCULATED.3IONS-SCNC: 8 MMOL/L (ref 4–13)
BASOPHILS # BLD AUTO: 0.04 THOUSANDS/ÂΜL (ref 0–0.1)
BASOPHILS NFR BLD AUTO: 0 % (ref 0–1)
BPU ID: NORMAL
BUN SERPL-MCNC: 7 MG/DL (ref 5–25)
CALCIUM SERPL-MCNC: 8.6 MG/DL (ref 8.4–10.2)
CHLORIDE SERPL-SCNC: 97 MMOL/L (ref 96–108)
CO2 SERPL-SCNC: 29 MMOL/L (ref 21–32)
CREAT SERPL-MCNC: 0.51 MG/DL (ref 0.6–1.3)
CROSSMATCH: NORMAL
EOSINOPHIL # BLD AUTO: 0.2 THOUSAND/ÂΜL (ref 0–0.61)
EOSINOPHIL NFR BLD AUTO: 2 % (ref 0–6)
ERYTHROCYTE [DISTWIDTH] IN BLOOD BY AUTOMATED COUNT: 15.5 % (ref 11.6–15.1)
GFR SERPL CREATININE-BSD FRML MDRD: 103 ML/MIN/1.73SQ M
GLUCOSE SERPL-MCNC: 131 MG/DL (ref 65–140)
HCT VFR BLD AUTO: 26.2 % (ref 34.8–46.1)
HGB BLD-MCNC: 8.2 G/DL (ref 11.5–15.4)
IMM GRANULOCYTES # BLD AUTO: 0.04 THOUSAND/UL (ref 0–0.2)
IMM GRANULOCYTES NFR BLD AUTO: 0 % (ref 0–2)
LYMPHOCYTES # BLD AUTO: 1.6 THOUSANDS/ÂΜL (ref 0.6–4.47)
LYMPHOCYTES NFR BLD AUTO: 13 % (ref 14–44)
MCH RBC QN AUTO: 24.5 PG (ref 26.8–34.3)
MCHC RBC AUTO-ENTMCNC: 31.3 G/DL (ref 31.4–37.4)
MCV RBC AUTO: 78 FL (ref 82–98)
MONOCYTES # BLD AUTO: 1.2 THOUSAND/ÂΜL (ref 0.17–1.22)
MONOCYTES NFR BLD AUTO: 10 % (ref 4–12)
NEUTROPHILS # BLD AUTO: 8.96 THOUSANDS/ÂΜL (ref 1.85–7.62)
NEUTS SEG NFR BLD AUTO: 75 % (ref 43–75)
NRBC BLD AUTO-RTO: 0 /100 WBCS
PLATELET # BLD AUTO: 429 THOUSANDS/UL (ref 149–390)
PMV BLD AUTO: 7.8 FL (ref 8.9–12.7)
POTASSIUM SERPL-SCNC: 3.4 MMOL/L (ref 3.5–5.3)
RBC # BLD AUTO: 3.35 MILLION/UL (ref 3.81–5.12)
SODIUM SERPL-SCNC: 134 MMOL/L (ref 135–147)
UNIT DISPENSE STATUS: NORMAL
UNIT PRODUCT CODE: NORMAL
UNIT PRODUCT VOLUME: 300 ML
UNIT RH: NORMAL
WBC # BLD AUTO: 12.04 THOUSAND/UL (ref 4.31–10.16)

## 2023-05-28 PROCEDURE — 85025 COMPLETE CBC W/AUTO DIFF WBC: CPT | Performed by: NURSE PRACTITIONER

## 2023-05-28 PROCEDURE — 80048 BASIC METABOLIC PNL TOTAL CA: CPT | Performed by: NURSE PRACTITIONER

## 2023-05-28 PROCEDURE — C9113 INJ PANTOPRAZOLE SODIUM, VIA: HCPCS | Performed by: INTERNAL MEDICINE

## 2023-05-28 PROCEDURE — 99233 SBSQ HOSP IP/OBS HIGH 50: CPT | Performed by: NURSE PRACTITIONER

## 2023-05-28 RX ORDER — HYDROMORPHONE HYDROCHLORIDE 2 MG/1
4 TABLET ORAL EVERY 4 HOURS PRN
Status: DISCONTINUED | OUTPATIENT
Start: 2023-05-28 | End: 2023-05-28

## 2023-05-28 RX ORDER — GUAIFENESIN/DEXTROMETHORPHAN 100-10MG/5
10 SYRUP ORAL EVERY 4 HOURS PRN
Status: DISCONTINUED | OUTPATIENT
Start: 2023-05-28 | End: 2023-06-01

## 2023-05-28 RX ORDER — HYDROMORPHONE HYDROCHLORIDE 2 MG/1
4 TABLET ORAL EVERY 4 HOURS PRN
Status: DISCONTINUED | OUTPATIENT
Start: 2023-05-28 | End: 2023-05-30

## 2023-05-28 RX ADMIN — HYDROMORPHONE HYDROCHLORIDE 1 MG: 1 INJECTION, SOLUTION INTRAMUSCULAR; INTRAVENOUS; SUBCUTANEOUS at 03:02

## 2023-05-28 RX ADMIN — BUPROPION HYDROCHLORIDE TABLETS 150 MG: 100 TABLET, FILM COATED ORAL at 17:00

## 2023-05-28 RX ADMIN — OXYCODONE HYDROCHLORIDE 10 MG: 10 TABLET ORAL at 10:20

## 2023-05-28 RX ADMIN — POTASSIUM CHLORIDE 20 MEQ: 1500 TABLET, EXTENDED RELEASE ORAL at 09:54

## 2023-05-28 RX ADMIN — HYDROMORPHONE HYDROCHLORIDE 1 MG: 1 INJECTION, SOLUTION INTRAMUSCULAR; INTRAVENOUS; SUBCUTANEOUS at 12:18

## 2023-05-28 RX ADMIN — HYDROMORPHONE HYDROCHLORIDE 1 MG: 1 INJECTION, SOLUTION INTRAMUSCULAR; INTRAVENOUS; SUBCUTANEOUS at 07:47

## 2023-05-28 RX ADMIN — HEPARIN SODIUM 5000 UNITS: 5000 INJECTION INTRAVENOUS; SUBCUTANEOUS at 21:02

## 2023-05-28 RX ADMIN — CYANOCOBALAMIN TAB 500 MCG 500 MCG: 500 TAB at 09:54

## 2023-05-28 RX ADMIN — LOSARTAN POTASSIUM 100 MG: 50 TABLET, FILM COATED ORAL at 09:54

## 2023-05-28 RX ADMIN — OXYCODONE HYDROCHLORIDE 10 MG: 10 TABLET ORAL at 06:11

## 2023-05-28 RX ADMIN — HYDROMORPHONE HYDROCHLORIDE 4 MG: 2 TABLET ORAL at 17:00

## 2023-05-28 RX ADMIN — BUPROPION HYDROCHLORIDE TABLETS 150 MG: 100 TABLET, FILM COATED ORAL at 09:54

## 2023-05-28 RX ADMIN — HYDROMORPHONE HYDROCHLORIDE 4 MG: 2 TABLET ORAL at 21:02

## 2023-05-28 RX ADMIN — HEPARIN SODIUM 5000 UNITS: 5000 INJECTION INTRAVENOUS; SUBCUTANEOUS at 06:09

## 2023-05-28 RX ADMIN — HYDROXYZINE HYDROCHLORIDE 25 MG: 25 TABLET ORAL at 10:01

## 2023-05-28 RX ADMIN — PANTOPRAZOLE SODIUM 40 MG: 40 INJECTION, POWDER, FOR SOLUTION INTRAVENOUS at 09:55

## 2023-05-28 RX ADMIN — Medication 400 MCG: at 09:54

## 2023-05-28 RX ADMIN — GUAIFENESIN AND DEXTROMETHORPHAN 10 ML: 100; 10 SYRUP ORAL at 21:02

## 2023-05-28 RX ADMIN — PRAVASTATIN SODIUM 80 MG: 40 TABLET ORAL at 17:00

## 2023-05-28 RX ADMIN — FERROUS SULFATE TAB 325 MG (65 MG ELEMENTAL FE) 325 MG: 325 (65 FE) TAB at 07:43

## 2023-05-28 NOTE — ASSESSMENT & PLAN NOTE
· Presented with nausea and vomiting and right upper and lower quadrant pain  · CT imaging see above  · Zofran as needed   Also, scopalamine patch  · S/p IV fluids  · Tolerating regular diet

## 2023-05-28 NOTE — ASSESSMENT & PLAN NOTE
· Patient presented to ED with complaints of right upper and lower quadrant abdominal pain with nausea, vomiting and diarrhea  · CT scan chest abdomen pelvis see above  · For assessment/treatment plan see abnormal CT scan above

## 2023-05-28 NOTE — PLAN OF CARE
Problem: Potential for Falls  Goal: Patient will remain free of falls  Description: INTERVENTIONS:  - Educate patient/family on patient safety including physical limitations  - Instruct patient to call for assistance with activity   - Consult OT/PT to assist with strengthening/mobility   - Keep Call bell within reach  - Keep bed low and locked with side rails adjusted as appropriate  - Keep care items and personal belongings within reach  - Initiate and maintain comfort rounds  - Make Fall Risk Sign visible to staff  - Offer Toileting in advance of need  - Initiate/Maintain bed alarm  - Obtain necessary fall risk management equipment  - Apply yellow socks and bracelet for high fall risk patients  - Consider moving patient to room near nurses station  Outcome: Progressing     Problem: PAIN - ADULT  Goal: Verbalizes/displays adequate comfort level or baseline comfort level  Description: Interventions:  - Encourage patient to monitor pain and request assistance  - Assess pain using appropriate pain scale  - Administer analgesics based on type and severity of pain and evaluate response  - Implement non-pharmacological measures as appropriate and evaluate response  - Consider cultural and social influences on pain and pain management  - Notify physician/advanced practitioner if interventions unsuccessful or patient reports new pain  Outcome: Progressing     Problem: INFECTION - ADULT  Goal: Absence or prevention of progression during hospitalization  Description: INTERVENTIONS:  - Assess and monitor for signs and symptoms of infection  - Monitor lab/diagnostic results  - Monitor all insertion sites, i e  indwelling lines, tubes, and drains  - Monitor endotracheal if appropriate and nasal secretions for changes in amount and color  - [place] appropriate cooling/warming therapies per order  - Administer medications as ordered  - Instruct and encourage patient and family to use good hand hygiene technique  - Identify and instruct in appropriate isolation precautions for identified infection/condition  Outcome: Progressing  Goal: Absence of fever/infection during neutropenic period  Description: INTERVENTIONS:  - Monitor WBC    Outcome: Progressing     Problem: SAFETY ADULT  Goal: Patient will remain free of falls  Description: INTERVENTIONS:  - Educate patient/family on patient safety including physical limitations  - Instruct patient to call for assistance with activity   - Consult OT/PT to assist with strengthening/mobility   - Keep Call bell within reach  - Keep bed low and locked with side rails adjusted as appropriate  - Keep care items and personal belongings within reach  - Initiate and maintain comfort rounds  - Make Fall Risk Sign visible to staff  - Offer Toileting in advance of need  - Initiate/Maintain bed alarm  - Obtain necessary fall risk management equipment  - Apply yellow socks and bracelet for high fall risk patients  - Consider moving patient to room near nurses station  Outcome: Progressing  Goal: Maintain or return to baseline ADL function  Description: INTERVENTIONS:  - Educate patient/family on patient safety including physical limitations  - Instruct patient to call for assistance with activity   - Consult OT/PT to assist with strengthening/mobility   - Keep Call bell within reach  - Keep bed low and locked with side rails adjusted as appropriate  - Keep care items and personal belongings within reach  - Initiate and maintain comfort rounds  - Make Fall Risk Sign visible to staff  - Offer Toileting in advance of need  - Initiate/Maintain bed alarm  - Obtain necessary fall risk management equipment:   - Apply yellow socks and bracelet for high fall risk patients  - Consider moving patient to room near nurses station  Outcome: Progressing     Problem: DISCHARGE PLANNING  Goal: Discharge to home or other facility with appropriate resources  Description: INTERVENTIONS:  - Identify barriers to discharge w/patient and caregiver  - Arrange for needed discharge resources and transportation as appropriate  - Identify discharge learning needs (meds, wound care, etc )  - Refer to Case Management Department for coordinating discharge planning if the patient needs post-hospital services based on physician/advanced practitioner order or complex needs related to functional status, cognitive ability, or social support system  Outcome: Progressing     Problem: Knowledge Deficit  Goal: Patient/family/caregiver demonstrates understanding of disease process, treatment plan, medications, and discharge instructions  Description: Complete learning assessment and assess knowledge base    Interventions:  - Provide teaching at level of understanding  - Provide teaching via preferred learning methods  Outcome: Progressing     Problem: GASTROINTESTINAL - ADULT  Goal: Minimal or absence of nausea and/or vomiting  Description: INTERVENTIONS:  - Administer IV fluids if ordered to ensure adequate hydration  - Maintain NPO status until nausea and vomiting are resolved  - Nasogastric tube if ordered  - Administer ordered antiemetic medications as needed  - Provide nonpharmacologic comfort measures as appropriate  - Advance diet as tolerated, if ordered  - Consider nutrition services referral to assist patient with adequate nutrition and appropriate food choices  Outcome: Progressing  Goal: Maintains or returns to baseline bowel function  Description: INTERVENTIONS:  - Assess bowel function  - Encourage oral fluids to ensure adequate hydration  - Administer IV fluids if ordered to ensure adequate hydration  - Administer ordered medications as needed  - Encourage mobilization and activity  - Consider nutritional services referral to assist patient with adequate nutrition and appropriate food choices  Outcome: Progressing  Goal: Maintains adequate nutritional intake  Description: INTERVENTIONS:  - Monitor percentage of each meal consumed  - Identify factors contributing to decreased intake, treat as appropriate  - Assist with meals as needed  - Monitor I&O, weight, and lab values if indicated  - Obtain nutrition services referral as needed  Outcome: Progressing  Goal: Oral mucous membranes remain intact  Description: INTERVENTIONS  - Assess oral mucosa and hygiene practices  - Implement preventative oral hygiene regimen  - Implement oral medicated treatments as ordered  - Initiate Nutrition services referral as needed  Outcome: Progressing     Problem: GENITOURINARY - ADULT  Goal: Maintains or returns to baseline urinary function  Description: INTERVENTIONS:  - Assess urinary function  - Encourage oral fluids to ensure adequate hydration if ordered  - Administer IV fluids as ordered to ensure adequate hydration  - Administer ordered medications as needed  - Offer frequent toileting  - Follow urinary retention protocol if ordered  Outcome: Progressing     Problem: METABOLIC, FLUID AND ELECTROLYTES - ADULT  Goal: Electrolytes maintained within normal limits  Description: INTERVENTIONS:  - Monitor labs and assess patient for signs and symptoms of electrolyte imbalances  - Administer electrolyte replacement as ordered  - Monitor response to electrolyte replacements, including repeat lab results as appropriate  - Instruct patient on fluid and nutrition as appropriate  Outcome: Progressing  Goal: Fluid balance maintained  Description: INTERVENTIONS:  - Monitor labs   - Monitor I/O and WT  - Instruct patient on fluid and nutrition as appropriate  - Assess for signs & symptoms of volume excess or deficit  Outcome: Progressing  Goal: Glucose maintained within target range  Description: INTERVENTIONS:  - Monitor Blood Glucose as ordered  - Assess for signs and symptoms of hyperglycemia and hypoglycemia  - Administer ordered medications to maintain glucose within target range  - Assess nutritional intake and initiate nutrition service referral as needed  Outcome: Progressing     Problem: Nutrition/Hydration-ADULT  Goal: Nutrient/Hydration intake appropriate for improving, restoring or maintaining nutritional needs  Description: Monitor and assess patient's nutrition/hydration status for malnutrition  Collaborate with interdisciplinary team and initiate plan and interventions as ordered  Monitor patient's weight and dietary intake as ordered or per policy  Utilize nutrition screening tool and intervene as necessary  Determine patient's food preferences and provide high-protein, high-caloric foods as appropriate       INTERVENTIONS:  - Monitor oral intake, urinary output, labs, and treatment plans  - Assess nutrition and hydration status and recommend course of action  - Evaluate amount of meals eaten  - Assist patient with eating if necessary   - Allow adequate time for meals  - Recommend/ encourage appropriate diets, oral nutritional supplements, and vitamin/mineral supplements  - Order, calculate, and assess calorie counts as needed  - Recommend, monitor, and adjust tube feedings and TPN/PPN based on assessed needs  - Assess need for intravenous fluids  - Provide specific nutrition/hydration education as appropriate  - Include patient/family/caregiver in decisions related to nutrition  Outcome: Progressing     Problem: MOBILITY - ADULT  Goal: Maintain or return to baseline ADL function  Description: INTERVENTIONS:  - Educate patient/family on patient safety including physical limitations  - Instruct patient to call for assistance with activity   - Consult OT/PT to assist with strengthening/mobility   - Keep Call bell within reach  - Keep bed low and locked with side rails adjusted as appropriate  - Keep care items and personal belongings within reach  - Initiate and maintain comfort rounds  - Make Fall Risk Sign visible to staff  - Offer Toileting in advance of need  - Initiate/Maintain bed alarm  - Obtain necessary fall risk management equipment:   - Apply yellow socks and bracelet for high fall risk patients  - Consider moving patient to room near nurses station  Outcome: Progressing  Goal: Maintains/Returns to pre admission functional level  Description: INTERVENTIONS:  - Perform BMAT or MOVE assessment daily    - Set and communicate daily mobility goal to care team and patient/family/caregiver  - Collaborate with rehabilitation services on mobility goals if consulted  - Perform Range of Motion 3 times a day  - Reposition patient every 2 hours    - Dangle patient 3 times a day  - Stand patient 3 times a day  - Ambulate patient 3 times a day  - Out of bed to chair 3 times a day   - Out of bed for meals 3 times a day  - Out of bed for toileting  - Record patient progress and toleration of activity level   Outcome: Progressing     Problem: Prexisting or High Potential for Compromised Skin Integrity  Goal: Skin integrity is maintained or improved  Description: INTERVENTIONS:  - Identify patients at risk for skin breakdown  - Assess and monitor skin integrity  - Assess and monitor nutrition and hydration status  - Monitor labs   - Assess for incontinence   - Turn and reposition patient  - Assist with mobility/ambulation  - Relieve pressure over bony prominences  - Avoid friction and shearing  - Provide appropriate hygiene as needed including keeping skin clean and dry  - Evaluate need for skin moisturizer/barrier cream  - Collaborate with interdisciplinary team   - Patient/family teaching  - Consider wound care consult   Outcome: Progressing

## 2023-05-28 NOTE — PROGRESS NOTES
Zachary U  66   Progress Note  Name: Garcia Tao  MRN: [id number]  Unit/Bed#: -01 I Date of Admission: 5/21/2023   Date of Service: 5/28/2023 I Hospital Day: 7    Assessment/Plan      * Metastatic adenocarcinoma [facility])  Assessment & Plan  · Presented for right upper lower quadrant abdominal pain with nausea vomiting and diarrhea   · Leukocytosis with WBC 21 9  · CT chest abdomen pelvis  · Heterogeneously enhancing right suprahilar lesion with abrupt cut off of the right mainstem bronchus suspicious for malignancy    There is secondary complete atelectasis of the right lung with heterogeneous density  Small to moderate right pleural effusion is noted  Few subcentimeter left lung nodules are noted  Evidence of mediastinal and right supraclavicular adenopathy  · Heterogeneous, rim-enhancing lesion at the proximal pancreatic body, possible metastatic focus  Pathologically enlarged celiac axis lymph nodes  Focal annular lesion involving the ascending colon, suspicious for metastatic focus versus primary malignancy  (this ascending colon lesion was not found on subsequent colonoscopy)  · GI recommendations appreciated  Per verbal report, EGD: mild gastritis  Colon: Medium sized polyp removed from sigmoid colon otherwise no mass in the ascending colon  · Pulmonology recommendations appreciated  Recommendation is biopsy of large right supraclavicular node  Performed by IR, final results: Metastatic carcinoma, compatible with adenocarcinoma of lung origin  Pulmonology has signed off  · Oncology recommendations appreciated  Due to headaches MRI brain with and without contrast obtained: No acute infarction, edema, or mass effect   Few punctate hyperintense T2/FLAIR foci are noted within the periventricular and subcortical white matter which are nonspecific and can be seen with vasculitis, migrainous angiopathy, Lyme's disease or microangiopathic changes  · Patient still to decide whether she wants to seek care here while living at the [facility] or return to [place] for care in [place]  She says she wants to try radiation first, but is now open to possible chemo  · Palliative input appreciated  · Symptom control  Oxycodone changed to hydromophone 4 mg Q4  Continue ondansetron  QTc noted to be 479  Scopolamine patch has been effective-continue    Abdominal pain  Assessment & Plan  · Patient presented to ED with complaints of right upper and lower quadrant abdominal pain with nausea, vomiting and diarrhea  · CT scan chest abdomen pelvis see above  · For assessment/treatment plan see abnormal CT scan above    Iron deficiency anemia  Assessment & Plan  · Hemoglobin trended down from 9 5 to 6 7  Today 8 2 after 1 unit of PRBCs 5/27/2023  · Daily CBC and trend hemoglobin  · Iron panel results reviewed  · S/p EGD and colonoscopy  · Transfuse for hemoglobin below 7  · Monitor closely for bleeding    Anxiety  Assessment & Plan  · She says she takes Vistaril TID for anxiety  · Continue Wellbutrin  · Supportive care    Hypomagnesemia  Assessment & Plan  · Presented with serum magnesium 1 5  · Likely secondary to vomiting and diarrhea  · Repleted with 4 g IV magnesium   · Monitor magnesium periodically    Diarrhea  Assessment & Plan  · Patient complained of frequent bouts of diarrhea, now improved  · Stool studies pending collection      Nausea & vomiting  Assessment & Plan  · Presented with nausea and vomiting and right upper and lower quadrant pain  · CT imaging see above  · Zofran as needed  Also, scopalamine patch  · S/p IV fluids  · Tolerating regular diet           VTE Pharmacologic Prophylaxis: VTE Score: 2 High Risk (Score >/= 5) - Pharmacological DVT Prophylaxis Ordered: heparin  Sequential Compression Devices Ordered  Patient Centered Rounds: I performed bedside rounds with nursing staff today      Education and Discussions with Family / Patient: Updated  (wife) at "bedside  Total Time Spent on Date of Encounter in care of patient: 55 minutes This time was spent on one or more of the following: performing physical exam; counseling and coordination of care; obtaining or reviewing history; documenting in the medical record; reviewing/ordering tests, medications or procedures; communicating with other healthcare professionals and discussing with patient's family/caregivers  Current Length of Stay: 7 day(s)  Current Patient Status: Inpatient   Certification Statement: The patient will continue to require additional inpatient hospital stay due to care coordination  Discharge Plan: Anticipate discharge in 48 hrs to TBD  Code Status: Level 1 - Full Code    Subjective:   Patient seen and examined  Denies shortness of breath over baseline  Still having right suded abdominal pain  Asking if her oxycodone can be changed to Dilaudid because oxycodone is 'doing nothing  \"  No diarrhea  Nausea improved  tolerating diet  Objective:     Vitals:   Temp (24hrs), Av 2 °F (36 8 °C), Min:97 °F (36 1 °C), Max:99 5 °F (37 5 °C)    Temp:  [97 °F (36 1 °C)-99 5 °F (37 5 °C)] 98 5 °F (36 9 °C)  HR:  [103-111] 111  Resp:  [18-20] 20  BP: (125-167)/(71-92) 125/71  SpO2:  [92 %-99 %] 92 %  Body mass index is 22 13 kg/m²  Input and Output Summary (last 24 hours): Intake/Output Summary (Last 24 hours) at 2023 1514  Last data filed at 2023 0747  Gross per 24 hour   Intake 561 67 ml   Output 600 ml   Net -38 33 ml       Physical Exam:   Physical Exam  Vitals and nursing note reviewed  Constitutional:       General: She is not in acute distress  HENT:      Head: Normocephalic and atraumatic  Mouth/Throat:      Mouth: Mucous membranes are moist       Pharynx: Oropharynx is clear  Eyes:      Pupils: Pupils are equal, round, and reactive to light  Cardiovascular:      Rate and Rhythm: Normal rate and regular rhythm  Pulses: Normal pulses     Pulmonary:      " Effort: Pulmonary effort is normal  No respiratory distress  Breath sounds: Normal breath sounds  Abdominal:      General: Bowel sounds are normal       Palpations: Abdomen is soft  Tenderness: There is no abdominal tenderness  Musculoskeletal:      Cervical back: Neck supple  Right lower leg: No edema  Left lower leg: No edema  Skin:     General: Skin is warm and dry  Capillary Refill: Capillary refill takes less than 2 seconds  Neurological:      General: No focal deficit present  Mental Status: She is alert and oriented to person, place, and time  Additional Data:     Labs:  Results from last 7 days   Lab Units 05/28/23  0520   EOS PCT % 2   HEMATOCRIT % 26 2*   HEMOGLOBIN g/dL 8 2*   LYMPHS PCT % 13*   MONOS PCT % 10   NEUTROS PCT % 75   PLATELETS Thousands/uL 429*   WBC Thousand/uL 12 04*     Results from last 7 days   Lab Units 05/28/23  0520 05/24/23  0435 05/22/23  0421   ANION GAP mmol/L 8   < > 11   ALBUMIN g/dL  --   --  3 8   ALK PHOS U/L  --   --  59   ALT U/L  --   --  6*   AST U/L  --   --  11*   BUN mg/dL 7   < > 12   CALCIUM mg/dL 8 6   < > 8 8   CHLORIDE mmol/L 97   < > 101   CO2 mmol/L 29   < > 23   CREATININE mg/dL 0 51*   < > 0 67   GLUCOSE RANDOM mg/dL 131   < > 133   POTASSIUM mmol/L 3 4*   < > 4 0   SODIUM mmol/L 134*   < > 135   TOTAL BILIRUBIN mg/dL  --   --  0 24    < > = values in this interval not displayed           Results from last 7 days   Lab Units 05/25/23  1236   POC GLUCOSE mg/dl 112         Results from last 7 days   Lab Units 05/23/23  0430 05/22/23  0421   PROCALCITONIN ng/ml 0 25 0 23       Lines/Drains:  Invasive Devices     Peripheral Intravenous Line  Duration           Peripheral IV 05/25/23 Distal;Left;Ventral (anterior) Forearm 3 days                  Telemetry:  Telemetry Orders (From admission, onward)             24 Hour Telemetry Monitoring  Continuous x 24 Hours (Telem)        Question:  Reason for 24 Hour Telemetry Answer:  Metabolic/electrolyte disturbance with high probability of dysrhythmia  K level <3 or >6 OR KCL infusion >10mEq/hr                          Recent Cultures (last 7 days):         Last 24 Hours Medication List:   Current Facility-Administered Medications   Medication Dose Route Frequency Provider Last Rate   • acetaminophen  650 mg Oral Q6H PRN Hardy Anglin MD     • buPROPion  150 mg Oral BID Hardy Anglin MD     • butalbital-acetaminophen-caffeine  1 tablet Oral Q4H PRN Hardy Anglin MD     • cyanocobalamin  500 mcg Oral Daily Hardy Anglin MD     • ferrous sulfate  325 mg Oral Daily With Breakfast JOYCE BarbosaNP     • folic acid  827 mcg Oral Daily Hardy Anglin MD     • heparin (porcine)  5,000 Units Subcutaneous [facility] JOYCE BarbosaNP     • HYDROmorphone  4 mg Oral Q4H PRN JOYCE BarbosaNP     • hydrOXYzine HCL  25 mg Oral TID PRN Ron Mcarthur CRNP     • losartan  100 mg Oral Daily Hardy Anglin MD     • naloxone  0 04 mg Intravenous Q1MIN PRN JOYCE BarbosaNP     • ondansetron  4 mg Intravenous Q6H PRN Hardy Anglin MD     • pantoprazole  40 mg Intravenous Q24H [address] Hardy Anglin MD     • potassium chloride  20 mEq Oral Daily Damion Up PA-C     • pravastatin  80 mg Oral Daily With Noni Guajardo MD     • scopolamine  1 patch Transdermal Q72H BRITTNY Barbosa          Today, Patient Was Seen By: BRITTNY Barbosa    **Please Note: This note may have been constructed using a voice recognition system  **

## 2023-05-28 NOTE — ASSESSMENT & PLAN NOTE
· Hemoglobin trended down from 9 5 to 6 7   Today 8 2 after 1 unit of PRBCs 5/27/2023  · Daily CBC and trend hemoglobin  · Iron panel results reviewed  · S/p EGD and colonoscopy  · Transfuse for hemoglobin below 7  · Monitor closely for bleeding

## 2023-05-28 NOTE — ASSESSMENT & PLAN NOTE
· Presented for right upper lower quadrant abdominal pain with nausea vomiting and diarrhea   · Leukocytosis with WBC 21 9  · CT chest abdomen pelvis  · Heterogeneously enhancing right suprahilar lesion with abrupt cut off of the right mainstem bronchus suspicious for malignancy    There is secondary complete atelectasis of the right lung with heterogeneous density  Small to moderate right pleural effusion is noted  Few subcentimeter left lung nodules are noted  Evidence of mediastinal and right supraclavicular adenopathy  · Heterogeneous, rim-enhancing lesion at the proximal pancreatic body, possible metastatic focus  Pathologically enlarged celiac axis lymph nodes  Focal annular lesion involving the ascending colon, suspicious for metastatic focus versus primary malignancy  (this ascending colon lesion was not found on subsequent colonoscopy)  · GI recommendations appreciated  Per verbal report, EGD: mild gastritis  Colon: Medium sized polyp removed from sigmoid colon otherwise no mass in the ascending colon  · Pulmonology recommendations appreciated  Recommendation is biopsy of large right supraclavicular node  Performed by IR, final results: Metastatic carcinoma, compatible with adenocarcinoma of lung origin  Pulmonology has signed off  · Oncology recommendations appreciated  Due to headaches MRI brain with and without contrast obtained: No acute infarction, edema, or mass effect  Few punctate hyperintense T2/FLAIR foci are noted within the periventricular and subcortical white matter which are nonspecific and can be seen with vasculitis, migrainous angiopathy, Lyme's disease or microangiopathic changes  · Patient still to decide whether she wants to seek care here while living at the [facility] or return to [place] for care in [place]  She says she wants to try radiation first, but is now open to possible chemo  · Palliative input appreciated  · Symptom control    Oxycodone changed to hydromophone 4 mg Q4  Continue ondansetron  QTc noted to be 479    Scopolamine patch has been effective-continue

## 2023-05-29 ENCOUNTER — APPOINTMENT (INPATIENT)
Dept: RADIOLOGY | Facility: HOSPITAL | Age: 63
DRG: 988 | End: 2023-05-29
Payer: COMMERCIAL

## 2023-05-29 LAB
ANION GAP SERPL CALCULATED.3IONS-SCNC: 9 MMOL/L (ref 4–13)
BASOPHILS # BLD AUTO: 0.04 THOUSANDS/ÂΜL (ref 0–0.1)
BASOPHILS NFR BLD AUTO: 0 % (ref 0–1)
BUN SERPL-MCNC: 8 MG/DL (ref 5–25)
CALCIUM SERPL-MCNC: 8.8 MG/DL (ref 8.4–10.2)
CHLORIDE SERPL-SCNC: 95 MMOL/L (ref 96–108)
CO2 SERPL-SCNC: 27 MMOL/L (ref 21–32)
CREAT SERPL-MCNC: 0.5 MG/DL (ref 0.6–1.3)
EOSINOPHIL # BLD AUTO: 0.02 THOUSAND/ÂΜL (ref 0–0.61)
EOSINOPHIL NFR BLD AUTO: 0 % (ref 0–6)
ERYTHROCYTE [DISTWIDTH] IN BLOOD BY AUTOMATED COUNT: 15.9 % (ref 11.6–15.1)
GFR SERPL CREATININE-BSD FRML MDRD: 104 ML/MIN/1.73SQ M
GLUCOSE SERPL-MCNC: 169 MG/DL (ref 65–140)
HCT VFR BLD AUTO: 27.5 % (ref 34.8–46.1)
HGB BLD-MCNC: 8.5 G/DL (ref 11.5–15.4)
IMM GRANULOCYTES # BLD AUTO: 0.05 THOUSAND/UL (ref 0–0.2)
IMM GRANULOCYTES NFR BLD AUTO: 0 % (ref 0–2)
LYMPHOCYTES # BLD AUTO: 0.76 THOUSANDS/ÂΜL (ref 0.6–4.47)
LYMPHOCYTES NFR BLD AUTO: 6 % (ref 14–44)
MCH RBC QN AUTO: 24.5 PG (ref 26.8–34.3)
MCHC RBC AUTO-ENTMCNC: 30.9 G/DL (ref 31.4–37.4)
MCV RBC AUTO: 79 FL (ref 82–98)
MONOCYTES # BLD AUTO: 0.62 THOUSAND/ÂΜL (ref 0.17–1.22)
MONOCYTES NFR BLD AUTO: 5 % (ref 4–12)
NEUTROPHILS # BLD AUTO: 11.76 THOUSANDS/ÂΜL (ref 1.85–7.62)
NEUTS SEG NFR BLD AUTO: 89 % (ref 43–75)
NRBC BLD AUTO-RTO: 0 /100 WBCS
PLATELET # BLD AUTO: 437 THOUSANDS/UL (ref 149–390)
PMV BLD AUTO: 7.8 FL (ref 8.9–12.7)
POTASSIUM SERPL-SCNC: 3.4 MMOL/L (ref 3.5–5.3)
RBC # BLD AUTO: 3.47 MILLION/UL (ref 3.81–5.12)
SODIUM SERPL-SCNC: 131 MMOL/L (ref 135–147)
WBC # BLD AUTO: 13.25 THOUSAND/UL (ref 4.31–10.16)

## 2023-05-29 PROCEDURE — 80048 BASIC METABOLIC PNL TOTAL CA: CPT | Performed by: NURSE PRACTITIONER

## 2023-05-29 PROCEDURE — 85025 COMPLETE CBC W/AUTO DIFF WBC: CPT | Performed by: NURSE PRACTITIONER

## 2023-05-29 PROCEDURE — 71045 X-RAY EXAM CHEST 1 VIEW: CPT

## 2023-05-29 PROCEDURE — 99233 SBSQ HOSP IP/OBS HIGH 50: CPT

## 2023-05-29 PROCEDURE — C9113 INJ PANTOPRAZOLE SODIUM, VIA: HCPCS | Performed by: INTERNAL MEDICINE

## 2023-05-29 RX ORDER — OXYCODONE HYDROCHLORIDE 5 MG/1
5 TABLET ORAL 3 TIMES DAILY
Status: DISCONTINUED | OUTPATIENT
Start: 2023-05-29 | End: 2023-05-30

## 2023-05-29 RX ORDER — SENNOSIDES 8.6 MG
1 TABLET ORAL
Status: DISCONTINUED | OUTPATIENT
Start: 2023-05-29 | End: 2023-06-05 | Stop reason: HOSPADM

## 2023-05-29 RX ORDER — HYDROMORPHONE HCL/PF 1 MG/ML
1 SYRINGE (ML) INJECTION EVERY 4 HOURS PRN
Status: DISCONTINUED | OUTPATIENT
Start: 2023-05-29 | End: 2023-05-29

## 2023-05-29 RX ORDER — HYDROMORPHONE HCL/PF 1 MG/ML
1 SYRINGE (ML) INJECTION ONCE
Status: COMPLETED | OUTPATIENT
Start: 2023-05-29 | End: 2023-05-29

## 2023-05-29 RX ORDER — DOCUSATE SODIUM 100 MG/1
100 CAPSULE, LIQUID FILLED ORAL 2 TIMES DAILY
Status: DISCONTINUED | OUTPATIENT
Start: 2023-05-29 | End: 2023-06-05 | Stop reason: HOSPADM

## 2023-05-29 RX ORDER — HYDROMORPHONE HCL/PF 1 MG/ML
0.5 SYRINGE (ML) INJECTION EVERY 4 HOURS PRN
Status: DISCONTINUED | OUTPATIENT
Start: 2023-05-29 | End: 2023-05-31

## 2023-05-29 RX ADMIN — HEPARIN SODIUM 5000 UNITS: 5000 INJECTION INTRAVENOUS; SUBCUTANEOUS at 21:44

## 2023-05-29 RX ADMIN — ONDANSETRON 4 MG: 2 INJECTION INTRAMUSCULAR; INTRAVENOUS at 00:37

## 2023-05-29 RX ADMIN — BUPROPION HYDROCHLORIDE TABLETS 150 MG: 100 TABLET, FILM COATED ORAL at 17:58

## 2023-05-29 RX ADMIN — HYDROMORPHONE HYDROCHLORIDE 0.5 MG: 1 INJECTION, SOLUTION INTRAMUSCULAR; INTRAVENOUS; SUBCUTANEOUS at 18:20

## 2023-05-29 RX ADMIN — DOCUSATE SODIUM 100 MG: 100 CAPSULE, LIQUID FILLED ORAL at 18:55

## 2023-05-29 RX ADMIN — ACETAMINOPHEN 650 MG: 325 TABLET ORAL at 08:08

## 2023-05-29 RX ADMIN — FERROUS SULFATE TAB 325 MG (65 MG ELEMENTAL FE) 325 MG: 325 (65 FE) TAB at 08:07

## 2023-05-29 RX ADMIN — ONDANSETRON 4 MG: 2 INJECTION INTRAMUSCULAR; INTRAVENOUS at 08:11

## 2023-05-29 RX ADMIN — HYDROMORPHONE HYDROCHLORIDE 4 MG: 2 TABLET ORAL at 02:07

## 2023-05-29 RX ADMIN — LOSARTAN POTASSIUM 100 MG: 50 TABLET, FILM COATED ORAL at 10:23

## 2023-05-29 RX ADMIN — GUAIFENESIN AND DEXTROMETHORPHAN 10 ML: 100; 10 SYRUP ORAL at 10:49

## 2023-05-29 RX ADMIN — BUPROPION HYDROCHLORIDE TABLETS 150 MG: 100 TABLET, FILM COATED ORAL at 10:23

## 2023-05-29 RX ADMIN — HYDROMORPHONE HYDROCHLORIDE 4 MG: 2 TABLET ORAL at 18:55

## 2023-05-29 RX ADMIN — OXYCODONE HYDROCHLORIDE 5 MG: 5 TABLET ORAL at 21:44

## 2023-05-29 RX ADMIN — HYDROXYZINE HYDROCHLORIDE 25 MG: 25 TABLET ORAL at 16:48

## 2023-05-29 RX ADMIN — ONDANSETRON 4 MG: 2 INJECTION INTRAMUSCULAR; INTRAVENOUS at 16:55

## 2023-05-29 RX ADMIN — PRAVASTATIN SODIUM 80 MG: 40 TABLET ORAL at 16:47

## 2023-05-29 RX ADMIN — HEPARIN SODIUM 5000 UNITS: 5000 INJECTION INTRAVENOUS; SUBCUTANEOUS at 14:23

## 2023-05-29 RX ADMIN — HEPARIN SODIUM 5000 UNITS: 5000 INJECTION INTRAVENOUS; SUBCUTANEOUS at 06:13

## 2023-05-29 RX ADMIN — OXYCODONE HYDROCHLORIDE 5 MG: 5 TABLET ORAL at 16:47

## 2023-05-29 RX ADMIN — HYDROXYZINE HYDROCHLORIDE 25 MG: 25 TABLET ORAL at 00:26

## 2023-05-29 RX ADMIN — PANTOPRAZOLE SODIUM 40 MG: 40 INJECTION, POWDER, FOR SOLUTION INTRAVENOUS at 10:43

## 2023-05-29 RX ADMIN — HYDROMORPHONE HYDROCHLORIDE 4 MG: 2 TABLET ORAL at 14:22

## 2023-05-29 RX ADMIN — HYDROMORPHONE HYDROCHLORIDE 1 MG: 1 INJECTION, SOLUTION INTRAMUSCULAR; INTRAVENOUS; SUBCUTANEOUS at 03:13

## 2023-05-29 RX ADMIN — HYDROMORPHONE HYDROCHLORIDE 4 MG: 2 TABLET ORAL at 08:07

## 2023-05-29 RX ADMIN — Medication 400 MCG: at 10:23

## 2023-05-29 RX ADMIN — ACETAMINOPHEN 650 MG: 325 TABLET ORAL at 17:58

## 2023-05-29 RX ADMIN — OXYCODONE HYDROCHLORIDE 5 MG: 5 TABLET ORAL at 12:00

## 2023-05-29 RX ADMIN — CYANOCOBALAMIN TAB 500 MCG 500 MCG: 500 TAB at 10:23

## 2023-05-29 RX ADMIN — SENNOSIDES 8.6 MG: 8.6 TABLET, FILM COATED ORAL at 21:44

## 2023-05-29 RX ADMIN — POTASSIUM CHLORIDE 20 MEQ: 1500 TABLET, EXTENDED RELEASE ORAL at 10:23

## 2023-05-29 NOTE — ASSESSMENT & PLAN NOTE
· POA with serum magnesium 1 5  · Likely secondary to vomiting and diarrhea  · Repleted with 4 g IV magnesium on 5/21  · Resolved, repeat Mg on 5/22 3 0

## 2023-05-29 NOTE — ASSESSMENT & PLAN NOTE
· Presented for right upper lower quadrant abdominal pain with nausea vomiting and diarrhea   · Leukocytosis with WBC 21 9  · CT chest abdomen pelvis  · Heterogeneously enhancing right suprahilar lesion with abrupt cut off of the right mainstem bronchus suspicious for malignancy    There is secondary complete atelectasis of the right lung with heterogeneous density  Small to moderate right pleural effusion is noted  Few subcentimeter left lung nodules are noted  Evidence of mediastinal and right supraclavicular adenopathy  · Heterogeneous, rim-enhancing lesion at the proximal pancreatic body, possible metastatic focus  Pathologically enlarged celiac axis lymph nodes  Focal annular lesion involving the ascending colon, suspicious for metastatic focus versus primary malignancy  (this ascending colon lesion was not found on subsequent colonoscopy)  · GI consulted, appreciate recommendations  Patient had EGD/colonoscopy on 5/22  · 5/22 EGD: Mild patchy erythematous mucosa in the antrum  Cold forceps biopsy performed  Normal duodenum, random biopsy was performed to rule out celiac disease  · 5/22 colonoscopy: Polyp removed from sigmoid colon  Scattered diverticula of mild severity in the sigmoid colon  · Pulmonology consulted, appreciate recommendations  Recommendation is biopsy of large right supraclavicular node  Performed by IR, final results: Metastatic carcinoma, compatible with adenocarcinoma of lung origin  Pulmonology has signed off  · Oncology consulted, appreciate recommendations  · 5/21 MRI brain: No acute infarction, edema, or mass effect  Few punctate hyperintense T2/FLAIR foci are noted within the periventricular and subcortical white matter which are nonspecific and can be seen with vasculitis, migrainous angiopathy, Lyme's disease or microangiopathic changes  · Palliative care consulted, appreciate recommendations  · Symptom control  On 5/28 oxycodone changed to hydromophone 4 mg Q4  Patient states her pain is not controlled and is worse at night  Reviewed palliative care recommendations, will initiate oxy IR 5 mg 3 times a day scheduled and continue as needed Dilaudid  · Continue ondansetron and scopolamine  · Patient still to decide whether she wants to seek care here while living at the [facility] or return to [place] for care in [place]    She says she wants to try radiation first, but is now open to possible chemo  · Case management following

## 2023-05-29 NOTE — PROGRESS NOTES
Zachary U  66   Progress Note  Name: Lisa Wilburn  MRN: [id number]  Unit/Bed#: -01 I Date of Admission: 5/21/2023   Date of Service: 5/29/2023 I Hospital Day: 8    Assessment/Plan   * Metastatic adenocarcinoma [facility])  Assessment & Plan  · Presented for right upper lower quadrant abdominal pain with nausea vomiting and diarrhea   · Leukocytosis with WBC 21 9  · CT chest abdomen pelvis  · Heterogeneously enhancing right suprahilar lesion with abrupt cut off of the right mainstem bronchus suspicious for malignancy    There is secondary complete atelectasis of the right lung with heterogeneous density  Small to moderate right pleural effusion is noted  Few subcentimeter left lung nodules are noted  Evidence of mediastinal and right supraclavicular adenopathy  · Heterogeneous, rim-enhancing lesion at the proximal pancreatic body, possible metastatic focus  Pathologically enlarged celiac axis lymph nodes  Focal annular lesion involving the ascending colon, suspicious for metastatic focus versus primary malignancy  (this ascending colon lesion was not found on subsequent colonoscopy)  · GI consulted, appreciate recommendations  Patient had EGD/colonoscopy on 5/22  · 5/22 EGD: Mild patchy erythematous mucosa in the antrum  Cold forceps biopsy performed  Normal duodenum, random biopsy was performed to rule out celiac disease  · 5/22 colonoscopy: Polyp removed from sigmoid colon  Scattered diverticula of mild severity in the sigmoid colon  · Pulmonology consulted, appreciate recommendations  Recommendation is biopsy of large right supraclavicular node  Performed by IR, final results: Metastatic carcinoma, compatible with adenocarcinoma of lung origin  Pulmonology has signed off  · Oncology consulted, appreciate recommendations  · 5/21 MRI brain: No acute infarction, edema, or mass effect   Few punctate hyperintense T2/FLAIR foci are noted within the periventricular and subcortical white matter which are nonspecific and can be seen with vasculitis, migrainous angiopathy, Lyme's disease or microangiopathic changes  · Palliative care consulted, appreciate recommendations  · Symptom control  On 5/28 oxycodone changed to hydromophone 4 mg Q4  Patient states her pain is not controlled and is worse at night  Reviewed palliative care recommendations, will initiate oxy IR 5 mg 3 times a day scheduled and continue as needed Dilaudid  · Continue ondansetron and scopolamine  · Patient still to decide whether she wants to seek care here while living at the [facility] or return to [place] for care in [place]    She says she wants to try radiation first, but is now open to possible chemo  · Case management following    Abdominal pain  Assessment & Plan  · Patient presented to ED with complaints of right upper and lower quadrant abdominal pain with nausea, vomiting and diarrhea  · Patient reports continued pain in right upper lower quadrant of abdomen worse at night, states current pain medication regimen not controlling her pain  · Reviewed notes from palliative care, will initiate oxycodone 5 mg 3 times a day scheduled, and continue as needed Dilaudid as recommended by palliative care  · CT scan chest abdomen pelvis see above  · For assessment/treatment plan see abnormal CT scan above    Hypomagnesemia  Assessment & Plan  · POA with serum magnesium 1 5  · Likely secondary to vomiting and diarrhea  · Repleted with 4 g IV magnesium on 5/21  · Resolved, repeat Mg on 5/22 3 0    Diarrhea  Assessment & Plan  · Present on admission, now resolved     Nausea & vomiting  Assessment & Plan  · Presented with nausea and vomiting and right upper and lower quadrant pain  · No nausea vomiting currently   · CT imaging see above  · Continue Zofran and scopolamine  · Regular diet as tolerated      Iron deficiency anemia  Assessment & Plan  · Hemoglobin currently stabilized   · Had trended downward to 6 7 on 5/27 which time patient received 1 unit of PRBCs   · Iron panel results reviewed  · S/p EGD and colonoscopy on 5/22  Reviewed findings  · Currently without signs of bleeding  · Plan to transfuse for hemoglobin below 7  · Continue to monitor closely for bleeding  · CBC in a m  Anxiety  Assessment & Plan  · Currently without anxiety  · Continue as needed Atarax  · Continue Wellbutrin  · Supportive care             VTE Pharmacologic Prophylaxis:   Pharmacologic: Heparin  Mechanical VTE Prophylaxis in Place: Yes    Patient Centered Rounds: I have performed bedside rounds with nursing staff today  Discussions with Specialists or Other Care Team Provider: Nursing, case management    Education and Discussions with Family / Patient: Treatment plan discussed with patient and significant other at bedside  Both understand the plan as has been explained and are agreeable to plan as stated  All questions answered her satisfaction  Did clarify that patient wishes to remain a full code at this time and is going to pursue treatment with hematology/oncology on discharge  Time Spent for Care: 45 minutes  More than 50% of total time spent on counseling and coordination of care as described above  Current Length of Stay: 8 day(s)    Current Patient Status: Inpatient   Certification Statement: The patient will continue to require additional inpatient hospital stay due to Pain control currently requiring as needed IV Dilaudid, case management working on discharge plan    Discharge Plan: Pending hospital course  Patient reported that her abdominal pain was still uncontrolled this morning  Reviewed palliative care recommendations, will add straight ordered OxyContin 3 times a day as recommended by palliative care  We will continue to monitor effectiveness of pain regimen  Case management following  Code Status: Level 1 - Full Code      Subjective:   Patient pleasant, verbalizing needs    Significant other at bedside  Objective:     Vitals:   Temp (24hrs), Av 8 °F (37 1 °C), Min:98 °F (36 7 °C), Max:99 7 °F (37 6 °C)    Temp:  [98 °F (36 7 °C)-99 7 °F (37 6 °C)] 99 1 °F (37 3 °C)  HR:  [103-111] 108  Resp:  [17-20] 17  BP: (125-162)/(71-86) 152/82  SpO2:  [92 %-97 %] 96 %  Body mass index is 22 13 kg/m²  Input and Output Summary (last 24 hours): Intake/Output Summary (Last 24 hours) at 2023 1229  Last data filed at 2023 0900  Gross per 24 hour   Intake 0 ml   Output --   Net 0 ml       Physical Exam:     Physical Exam  Vitals and nursing note reviewed  Constitutional:       General: She is not in acute distress  Appearance: She is not ill-appearing  HENT:      Head: Normocephalic and atraumatic  Nose: Nose normal       Mouth/Throat:      Mouth: Mucous membranes are moist    Cardiovascular:      Rate and Rhythm: Normal rate and regular rhythm  Pulses: Normal pulses  Heart sounds: Normal heart sounds  Pulmonary:      Effort: Pulmonary effort is normal       Breath sounds: Normal breath sounds  Abdominal:      General: Bowel sounds are normal  There is no distension  Palpations: Abdomen is soft  Tenderness: There is no abdominal tenderness  There is no guarding  Musculoskeletal:         General: Normal range of motion  Skin:     General: Skin is warm and dry  Capillary Refill: Capillary refill takes less than 2 seconds  Neurological:      General: No focal deficit present  Mental Status: She is alert and oriented to person, place, and time  Mental status is at baseline  Psychiatric:         Mood and Affect: Mood normal          Behavior: Behavior normal          Thought Content:  Thought content normal          Judgment: Judgment normal          Additional Data:     Labs:    Results from last 7 days   Lab Units 23  0500   EOS PCT % 0   HEMATOCRIT % 27 5*   HEMOGLOBIN g/dL 8 5*   LYMPHS PCT % 6*   MONOS PCT % 5   NEUTROS PCT % 89* PLATELETS Thousands/uL 437*   WBC Thousand/uL 13 25*     Results from last 7 days   Lab Units 05/29/23  0500   BUN mg/dL 8   CALCIUM mg/dL 8 8   CHLORIDE mmol/L 95*   CO2 mmol/L 27   CREATININE mg/dL 0 50*   POTASSIUM mmol/L 3 4*           * I Have Reviewed All Lab Data Listed Above  * Additional Pertinent Lab Tests Reviewed:  [facility] 66 Admission Reviewed    Imaging:    Imaging Reports Reviewed Today Include: CT chest abdomen pelvis, MRI brain  Imaging Personally Reviewed by Myself Includes: CT chest abdomen pelvis    Recent Cultures (last 7 days):           Last 24 Hours Medication List:   Current Facility-Administered Medications   Medication Dose Route Frequency Provider Last Rate   • acetaminophen  650 mg Oral Q6H PRN Bryan Jensen MD     • buPROPion  150 mg Oral BID Bryan Jensen MD     • butalbital-acetaminophen-caffeine  1 tablet Oral Q4H PRN Bryan Jensen MD     • cyanocobalamin  500 mcg Oral Daily Bryan Jensen MD     • dextromethorphan-guaiFENesin  10 mL Oral Q4H PRN Ramos Yo PA-C     • ferrous sulfate  325 mg Oral Daily With Breakfast BRITTNY Mccann     • folic acid  585 mcg Oral Daily Bryan Jensen MD     • heparin (porcine)  5,000 Units Subcutaneous [facility] BRITTNY Mccann     • HYDROmorphone  0 5 mg Intravenous Q4H PRN BRITTNY Sheppard     • HYDROmorphone  4 mg Oral Q4H PRN BRITTNY Mccann     • hydrOXYzine HCL  25 mg Oral TID PRN BRITTNY Mccann     • losartan  100 mg Oral Daily Bryan Jensen MD     • naloxone  0 04 mg Intravenous Q1MIN PRN BRITTNY Mccann     • ondansetron  4 mg Intravenous Q6H PRN Bryan Jensen MD     • oxyCODONE  5 mg Oral TID BRITTNY Sheppard     • pantoprazole  40 mg Intravenous Q24H [address] Bryan Jensen MD     • potassium chloride  20 mEq Oral Daily Ramos Yo PA-C     • pravastatin  80 mg Oral Daily With Brennen Serrano MD     • scopolamine  1 patch Transdermal Q72H BRITTNY Mccann Today, Patient Was Seen By: BRITTNY Paige    ** Please Note: Dictation voice to text software may have been used in the creation of this document   **

## 2023-05-29 NOTE — PLAN OF CARE
Problem: Potential for Falls  Goal: Patient will remain free of falls  Description: INTERVENTIONS:  - Educate patient/family on patient safety including physical limitations  - Instruct patient to call for assistance with activity   - Consult OT/PT to assist with strengthening/mobility   - Keep Call bell within reach  - Keep bed low and locked with side rails adjusted as appropriate  - Keep care items and personal belongings within reach  - Initiate and maintain comfort rounds  - Make Fall Risk Sign visible to staff  - Offer Toileting in advance of need  - Initiate/Maintain bed alarm  - Obtain necessary fall risk management equipment  - Apply yellow socks and bracelet for high fall risk patients  - Consider moving patient to room near nurses station  Outcome: Progressing     Problem: PAIN - ADULT  Goal: Verbalizes/displays adequate comfort level or baseline comfort level  Description: Interventions:  - Encourage patient to monitor pain and request assistance  - Assess pain using appropriate pain scale  - Administer analgesics based on type and severity of pain and evaluate response  - Implement non-pharmacological measures as appropriate and evaluate response  - Consider cultural and social influences on pain and pain management  - Notify physician/advanced practitioner if interventions unsuccessful or patient reports new pain  Outcome: Progressing     Problem: INFECTION - ADULT  Goal: Absence or prevention of progression during hospitalization  Description: INTERVENTIONS:  - Assess and monitor for signs and symptoms of infection  - Monitor lab/diagnostic results  - Monitor all insertion sites, i e  indwelling lines, tubes, and drains  - Monitor endotracheal if appropriate and nasal secretions for changes in amount and color  - [place] appropriate cooling/warming therapies per order  - Administer medications as ordered  - Instruct and encourage patient and family to use good hand hygiene technique  - Identify and instruct in appropriate isolation precautions for identified infection/condition  Outcome: Progressing  Goal: Absence of fever/infection during neutropenic period  Description: INTERVENTIONS:  - Monitor WBC    Outcome: Progressing     Problem: SAFETY ADULT  Goal: Patient will remain free of falls  Description: INTERVENTIONS:  - Educate patient/family on patient safety including physical limitations  - Instruct patient to call for assistance with activity   - Consult OT/PT to assist with strengthening/mobility   - Keep Call bell within reach  - Keep bed low and locked with side rails adjusted as appropriate  - Keep care items and personal belongings within reach  - Initiate and maintain comfort rounds  - Make Fall Risk Sign visible to staff  - Offer Toileting in advance of need  - Initiate/Maintain bed alarm  - Obtain necessary fall risk management equipment  - Apply yellow socks and bracelet for high fall risk patients  - Consider moving patient to room near nurses station  Outcome: Progressing  Goal: Maintain or return to baseline ADL function  Description: INTERVENTIONS:  - Educate patient/family on patient safety including physical limitations  - Instruct patient to call for assistance with activity   - Consult OT/PT to assist with strengthening/mobility   - Keep Call bell within reach  - Keep bed low and locked with side rails adjusted as appropriate  - Keep care items and personal belongings within reach  - Initiate and maintain comfort rounds  - Make Fall Risk Sign visible to staff  - Offer Toileting in advance of need  - Initiate/Maintain bed alarm  - Obtain necessary fall risk management equipment:   - Apply yellow socks and bracelet for high fall risk patients  - Consider moving patient to room near nurses station  Outcome: Progressing     Problem: DISCHARGE PLANNING  Goal: Discharge to home or other facility with appropriate resources  Description: INTERVENTIONS:  - Identify barriers to discharge w/patient and caregiver  - Arrange for needed discharge resources and transportation as appropriate  - Identify discharge learning needs (meds, wound care, etc )  - Refer to Case Management Department for coordinating discharge planning if the patient needs post-hospital services based on physician/advanced practitioner order or complex needs related to functional status, cognitive ability, or social support system  Outcome: Progressing     Problem: Knowledge Deficit  Goal: Patient/family/caregiver demonstrates understanding of disease process, treatment plan, medications, and discharge instructions  Description: Complete learning assessment and assess knowledge base    Interventions:  - Provide teaching at level of understanding  - Provide teaching via preferred learning methods  Outcome: Progressing     Problem: GASTROINTESTINAL - ADULT  Goal: Minimal or absence of nausea and/or vomiting  Description: INTERVENTIONS:  - Administer IV fluids if ordered to ensure adequate hydration  - Maintain NPO status until nausea and vomiting are resolved  - Nasogastric tube if ordered  - Administer ordered antiemetic medications as needed  - Provide nonpharmacologic comfort measures as appropriate  - Advance diet as tolerated, if ordered  - Consider nutrition services referral to assist patient with adequate nutrition and appropriate food choices  Outcome: Progressing  Goal: Maintains or returns to baseline bowel function  Description: INTERVENTIONS:  - Assess bowel function  - Encourage oral fluids to ensure adequate hydration  - Administer IV fluids if ordered to ensure adequate hydration  - Administer ordered medications as needed  - Encourage mobilization and activity  - Consider nutritional services referral to assist patient with adequate nutrition and appropriate food choices  Outcome: Progressing  Goal: Maintains adequate nutritional intake  Description: INTERVENTIONS:  - Monitor percentage of each meal consumed  - Identify factors contributing to decreased intake, treat as appropriate  - Assist with meals as needed  - Monitor I&O, weight, and lab values if indicated  - Obtain nutrition services referral as needed  Outcome: Progressing  Goal: Oral mucous membranes remain intact  Description: INTERVENTIONS  - Assess oral mucosa and hygiene practices  - Implement preventative oral hygiene regimen  - Implement oral medicated treatments as ordered  - Initiate Nutrition services referral as needed  Outcome: Progressing     Problem: GENITOURINARY - ADULT  Goal: Maintains or returns to baseline urinary function  Description: INTERVENTIONS:  - Assess urinary function  - Encourage oral fluids to ensure adequate hydration if ordered  - Administer IV fluids as ordered to ensure adequate hydration  - Administer ordered medications as needed  - Offer frequent toileting  - Follow urinary retention protocol if ordered  Outcome: Progressing     Problem: METABOLIC, FLUID AND ELECTROLYTES - ADULT  Goal: Electrolytes maintained within normal limits  Description: INTERVENTIONS:  - Monitor labs and assess patient for signs and symptoms of electrolyte imbalances  - Administer electrolyte replacement as ordered  - Monitor response to electrolyte replacements, including repeat lab results as appropriate  - Instruct patient on fluid and nutrition as appropriate  Outcome: Progressing  Goal: Fluid balance maintained  Description: INTERVENTIONS:  - Monitor labs   - Monitor I/O and WT  - Instruct patient on fluid and nutrition as appropriate  - Assess for signs & symptoms of volume excess or deficit  Outcome: Progressing  Goal: Glucose maintained within target range  Description: INTERVENTIONS:  - Monitor Blood Glucose as ordered  - Assess for signs and symptoms of hyperglycemia and hypoglycemia  - Administer ordered medications to maintain glucose within target range  - Assess nutritional intake and initiate nutrition service referral as needed  Outcome: Progressing     Problem: Nutrition/Hydration-ADULT  Goal: Nutrient/Hydration intake appropriate for improving, restoring or maintaining nutritional needs  Description: Monitor and assess patient's nutrition/hydration status for malnutrition  Collaborate with interdisciplinary team and initiate plan and interventions as ordered  Monitor patient's weight and dietary intake as ordered or per policy  Utilize nutrition screening tool and intervene as necessary  Determine patient's food preferences and provide high-protein, high-caloric foods as appropriate       INTERVENTIONS:  - Monitor oral intake, urinary output, labs, and treatment plans  - Assess nutrition and hydration status and recommend course of action  - Evaluate amount of meals eaten  - Assist patient with eating if necessary   - Allow adequate time for meals  - Recommend/ encourage appropriate diets, oral nutritional supplements, and vitamin/mineral supplements  - Order, calculate, and assess calorie counts as needed  - Recommend, monitor, and adjust tube feedings and TPN/PPN based on assessed needs  - Assess need for intravenous fluids  - Provide specific nutrition/hydration education as appropriate  - Include patient/family/caregiver in decisions related to nutrition  Outcome: Progressing     Problem: MOBILITY - ADULT  Goal: Maintain or return to baseline ADL function  Description: INTERVENTIONS:  - Educate patient/family on patient safety including physical limitations  - Instruct patient to call for assistance with activity   - Consult OT/PT to assist with strengthening/mobility   - Keep Call bell within reach  - Keep bed low and locked with side rails adjusted as appropriate  - Keep care items and personal belongings within reach  - Initiate and maintain comfort rounds  - Make Fall Risk Sign visible to staff  - Offer Toileting in advance of need  - Initiate/Maintain bed alarm  - Obtain necessary fall risk management equipment:   - Apply yellow socks and bracelet for high fall risk patients  - Consider moving patient to room near nurses station  Outcome: Progressing  Goal: Maintains/Returns to pre admission functional level  Description: INTERVENTIONS:  - Perform BMAT or MOVE assessment daily    - Set and communicate daily mobility goal to care team and patient/family/caregiver  - Collaborate with rehabilitation services on mobility goals if consulted  - Perform Range of Motion 3 times a day  - Reposition patient every 2 hours    - Dangle patient 3 times a day  - Stand patient 3 times a day  - Ambulate patient 3 times a day  - Out of bed to chair 3 times a day   - Out of bed for meals 3 times a day  - Out of bed for toileting  - Record patient progress and toleration of activity level   Outcome: Progressing     Problem: Prexisting or High Potential for Compromised Skin Integrity  Goal: Skin integrity is maintained or improved  Description: INTERVENTIONS:  - Identify patients at risk for skin breakdown  - Assess and monitor skin integrity  - Assess and monitor nutrition and hydration status  - Monitor labs   - Assess for incontinence   - Turn and reposition patient  - Assist with mobility/ambulation  - Relieve pressure over bony prominences  - Avoid friction and shearing  - Provide appropriate hygiene as needed including keeping skin clean and dry  - Evaluate need for skin moisturizer/barrier cream  - Collaborate with interdisciplinary team   - Patient/family teaching  - Consider wound care consult   Outcome: Progressing

## 2023-05-29 NOTE — ASSESSMENT & PLAN NOTE
· Presented with nausea and vomiting and right upper and lower quadrant pain  · No nausea vomiting currently   · CT imaging see above  · Continue Zofran and scopolamine  · Regular diet as tolerated

## 2023-05-29 NOTE — ASSESSMENT & PLAN NOTE
· Hemoglobin currently stabilized   · Had trended downward to 6 7 on 5/27 which time patient received 1 unit of PRBCs   · Iron panel results reviewed  · S/p EGD and colonoscopy on 5/22  Reviewed findings  · Currently without signs of bleeding  · Plan to transfuse for hemoglobin below 7  · Continue to monitor closely for bleeding  · CBC in a m

## 2023-05-29 NOTE — ASSESSMENT & PLAN NOTE
· Patient presented to ED with complaints of right upper and lower quadrant abdominal pain with nausea, vomiting and diarrhea  · Patient reports continued pain in right upper lower quadrant of abdomen worse at night, states current pain medication regimen not controlling her pain  · Reviewed notes from palliative care, will initiate oxycodone 5 mg 3 times a day scheduled, and continue as needed Dilaudid as recommended by palliative care  · CT scan chest abdomen pelvis see above  · For assessment/treatment plan see abnormal CT scan above

## 2023-05-30 ENCOUNTER — APPOINTMENT (INPATIENT)
Dept: NON INVASIVE DIAGNOSTICS | Facility: HOSPITAL | Age: 63
DRG: 988 | End: 2023-05-30
Payer: COMMERCIAL

## 2023-05-30 ENCOUNTER — APPOINTMENT (INPATIENT)
Dept: RADIOLOGY | Facility: HOSPITAL | Age: 63
DRG: 988 | End: 2023-05-30
Payer: COMMERCIAL

## 2023-05-30 LAB
ANION GAP SERPL CALCULATED.3IONS-SCNC: 9 MMOL/L (ref 4–13)
BASOPHILS # BLD AUTO: 0.03 THOUSANDS/ÂΜL (ref 0–0.1)
BASOPHILS NFR BLD AUTO: 0 % (ref 0–1)
BUN SERPL-MCNC: 8 MG/DL (ref 5–25)
CALCIUM SERPL-MCNC: 8.7 MG/DL (ref 8.4–10.2)
CHLORIDE SERPL-SCNC: 98 MMOL/L (ref 96–108)
CO2 SERPL-SCNC: 28 MMOL/L (ref 21–32)
CREAT SERPL-MCNC: 0.53 MG/DL (ref 0.6–1.3)
EOSINOPHIL # BLD AUTO: 0.36 THOUSAND/ÂΜL (ref 0–0.61)
EOSINOPHIL NFR BLD AUTO: 3 % (ref 0–6)
ERYTHROCYTE [DISTWIDTH] IN BLOOD BY AUTOMATED COUNT: 16.3 % (ref 11.6–15.1)
GFR SERPL CREATININE-BSD FRML MDRD: 102 ML/MIN/1.73SQ M
GLUCOSE SERPL-MCNC: 137 MG/DL (ref 65–140)
HCT VFR BLD AUTO: 28.5 % (ref 34.8–46.1)
HGB BLD-MCNC: 8.6 G/DL (ref 11.5–15.4)
IMM GRANULOCYTES # BLD AUTO: 0.03 THOUSAND/UL (ref 0–0.2)
IMM GRANULOCYTES NFR BLD AUTO: 0 % (ref 0–2)
LYMPHOCYTES # BLD AUTO: 1.63 THOUSANDS/ÂΜL (ref 0.6–4.47)
LYMPHOCYTES NFR BLD AUTO: 15 % (ref 14–44)
MAGNESIUM SERPL-MCNC: 1.5 MG/DL (ref 1.9–2.7)
MCH RBC QN AUTO: 24.2 PG (ref 26.8–34.3)
MCHC RBC AUTO-ENTMCNC: 30.2 G/DL (ref 31.4–37.4)
MCV RBC AUTO: 80 FL (ref 82–98)
MONOCYTES # BLD AUTO: 1.23 THOUSAND/ÂΜL (ref 0.17–1.22)
MONOCYTES NFR BLD AUTO: 11 % (ref 4–12)
NEUTROPHILS # BLD AUTO: 7.83 THOUSANDS/ÂΜL (ref 1.85–7.62)
NEUTS SEG NFR BLD AUTO: 71 % (ref 43–75)
NRBC BLD AUTO-RTO: 0 /100 WBCS
PLATELET # BLD AUTO: 507 THOUSANDS/UL (ref 149–390)
PMV BLD AUTO: 8 FL (ref 8.9–12.7)
POTASSIUM SERPL-SCNC: 3.4 MMOL/L (ref 3.5–5.3)
RBC # BLD AUTO: 3.56 MILLION/UL (ref 3.81–5.12)
SODIUM SERPL-SCNC: 135 MMOL/L (ref 135–147)
WBC # BLD AUTO: 11.11 THOUSAND/UL (ref 4.31–10.16)

## 2023-05-30 PROCEDURE — 74018 RADEX ABDOMEN 1 VIEW: CPT

## 2023-05-30 PROCEDURE — 99233 SBSQ HOSP IP/OBS HIGH 50: CPT

## 2023-05-30 PROCEDURE — 80048 BASIC METABOLIC PNL TOTAL CA: CPT

## 2023-05-30 PROCEDURE — 99232 SBSQ HOSP IP/OBS MODERATE 35: CPT | Performed by: PHYSICIAN ASSISTANT

## 2023-05-30 PROCEDURE — C9113 INJ PANTOPRAZOLE SODIUM, VIA: HCPCS | Performed by: INTERNAL MEDICINE

## 2023-05-30 PROCEDURE — 83735 ASSAY OF MAGNESIUM: CPT

## 2023-05-30 PROCEDURE — 85025 COMPLETE CBC W/AUTO DIFF WBC: CPT

## 2023-05-30 RX ORDER — MAGNESIUM SULFATE HEPTAHYDRATE 40 MG/ML
4 INJECTION, SOLUTION INTRAVENOUS ONCE
Status: COMPLETED | OUTPATIENT
Start: 2023-05-30 | End: 2023-05-30

## 2023-05-30 RX ORDER — POTASSIUM CHLORIDE 20 MEQ/1
20 TABLET, EXTENDED RELEASE ORAL ONCE
Status: COMPLETED | OUTPATIENT
Start: 2023-05-30 | End: 2023-05-30

## 2023-05-30 RX ORDER — POLYETHYLENE GLYCOL 3350 17 G/17G
17 POWDER, FOR SOLUTION ORAL DAILY
Status: DISCONTINUED | OUTPATIENT
Start: 2023-05-30 | End: 2023-06-05 | Stop reason: HOSPADM

## 2023-05-30 RX ORDER — HYDROMORPHONE HYDROCHLORIDE 2 MG/1
4 TABLET ORAL 4 TIMES DAILY
Status: DISCONTINUED | OUTPATIENT
Start: 2023-05-30 | End: 2023-05-31

## 2023-05-30 RX ORDER — DICYCLOMINE HYDROCHLORIDE 10 MG/1
10 CAPSULE ORAL
Status: DISCONTINUED | OUTPATIENT
Start: 2023-05-30 | End: 2023-06-05 | Stop reason: HOSPADM

## 2023-05-30 RX ORDER — HYDROMORPHONE HYDROCHLORIDE 2 MG/1
2 TABLET ORAL EVERY 4 HOURS PRN
Status: DISCONTINUED | OUTPATIENT
Start: 2023-05-30 | End: 2023-05-31

## 2023-05-30 RX ADMIN — FERROUS SULFATE TAB 325 MG (65 MG ELEMENTAL FE) 325 MG: 325 (65 FE) TAB at 08:54

## 2023-05-30 RX ADMIN — POTASSIUM CHLORIDE 20 MEQ: 1500 TABLET, EXTENDED RELEASE ORAL at 08:47

## 2023-05-30 RX ADMIN — BUPROPION HYDROCHLORIDE TABLETS 150 MG: 100 TABLET, FILM COATED ORAL at 17:54

## 2023-05-30 RX ADMIN — MAGNESIUM SULFATE HEPTAHYDRATE 4 G: 40 INJECTION, SOLUTION INTRAVENOUS at 08:55

## 2023-05-30 RX ADMIN — HYDROMORPHONE HYDROCHLORIDE 2 MG: 2 TABLET ORAL at 14:46

## 2023-05-30 RX ADMIN — HYDROMORPHONE HYDROCHLORIDE 0.5 MG: 1 INJECTION, SOLUTION INTRAMUSCULAR; INTRAVENOUS; SUBCUTANEOUS at 16:52

## 2023-05-30 RX ADMIN — HYDROMORPHONE HYDROCHLORIDE 0.5 MG: 1 INJECTION, SOLUTION INTRAMUSCULAR; INTRAVENOUS; SUBCUTANEOUS at 05:14

## 2023-05-30 RX ADMIN — PRAVASTATIN SODIUM 80 MG: 40 TABLET ORAL at 16:52

## 2023-05-30 RX ADMIN — POLYETHYLENE GLYCOL 3350 17 G: 17 POWDER, FOR SOLUTION ORAL at 11:49

## 2023-05-30 RX ADMIN — SCOPALAMINE 1 PATCH: 1 PATCH, EXTENDED RELEASE TRANSDERMAL at 14:43

## 2023-05-30 RX ADMIN — PANTOPRAZOLE SODIUM 40 MG: 40 INJECTION, POWDER, FOR SOLUTION INTRAVENOUS at 08:50

## 2023-05-30 RX ADMIN — Medication 400 MCG: at 08:48

## 2023-05-30 RX ADMIN — DOCUSATE SODIUM 100 MG: 100 CAPSULE, LIQUID FILLED ORAL at 08:49

## 2023-05-30 RX ADMIN — DICYCLOMINE HYDROCHLORIDE 10 MG: 10 CAPSULE ORAL at 16:52

## 2023-05-30 RX ADMIN — HEPARIN SODIUM 5000 UNITS: 5000 INJECTION INTRAVENOUS; SUBCUTANEOUS at 21:22

## 2023-05-30 RX ADMIN — OXYCODONE HYDROCHLORIDE 5 MG: 5 TABLET ORAL at 08:48

## 2023-05-30 RX ADMIN — LOSARTAN POTASSIUM 100 MG: 50 TABLET, FILM COATED ORAL at 08:49

## 2023-05-30 RX ADMIN — HYDROMORPHONE HYDROCHLORIDE 4 MG: 2 TABLET ORAL at 03:52

## 2023-05-30 RX ADMIN — CYANOCOBALAMIN TAB 500 MCG 500 MCG: 500 TAB at 08:47

## 2023-05-30 RX ADMIN — HYDROMORPHONE HYDROCHLORIDE 4 MG: 2 TABLET ORAL at 21:23

## 2023-05-30 RX ADMIN — HYDROMORPHONE HYDROCHLORIDE 4 MG: 2 TABLET ORAL at 11:48

## 2023-05-30 RX ADMIN — HEPARIN SODIUM 5000 UNITS: 5000 INJECTION INTRAVENOUS; SUBCUTANEOUS at 14:40

## 2023-05-30 RX ADMIN — HYDROXYZINE HYDROCHLORIDE 25 MG: 25 TABLET ORAL at 17:58

## 2023-05-30 RX ADMIN — ONDANSETRON 4 MG: 2 INJECTION INTRAMUSCULAR; INTRAVENOUS at 03:57

## 2023-05-30 RX ADMIN — POTASSIUM CHLORIDE 20 MEQ: 1500 TABLET, EXTENDED RELEASE ORAL at 08:54

## 2023-05-30 RX ADMIN — HEPARIN SODIUM 5000 UNITS: 5000 INJECTION INTRAVENOUS; SUBCUTANEOUS at 05:14

## 2023-05-30 RX ADMIN — DICYCLOMINE HYDROCHLORIDE 10 MG: 10 CAPSULE ORAL at 21:22

## 2023-05-30 RX ADMIN — HYDROMORPHONE HYDROCHLORIDE 4 MG: 2 TABLET ORAL at 17:56

## 2023-05-30 RX ADMIN — DICYCLOMINE HYDROCHLORIDE 10 MG: 10 CAPSULE ORAL at 11:48

## 2023-05-30 RX ADMIN — DOCUSATE SODIUM 100 MG: 100 CAPSULE, LIQUID FILLED ORAL at 17:54

## 2023-05-30 RX ADMIN — SENNOSIDES 8.6 MG: 8.6 TABLET, FILM COATED ORAL at 21:22

## 2023-05-30 RX ADMIN — HYDROMORPHONE HYDROCHLORIDE 0.5 MG: 1 INJECTION, SOLUTION INTRAMUSCULAR; INTRAVENOUS; SUBCUTANEOUS at 10:39

## 2023-05-30 RX ADMIN — BUPROPION HYDROCHLORIDE TABLETS 150 MG: 100 TABLET, FILM COATED ORAL at 08:48

## 2023-05-30 RX ADMIN — HYDROMORPHONE HYDROCHLORIDE 4 MG: 2 TABLET ORAL at 09:49

## 2023-05-30 NOTE — PLAN OF CARE
Problem: Potential for Falls  Goal: Patient will remain free of falls  Description: INTERVENTIONS:  - Educate patient/family on patient safety including physical limitations  - Instruct patient to call for assistance with activity   - Consult OT/PT to assist with strengthening/mobility   - Keep Call bell within reach  - Keep bed low and locked with side rails adjusted as appropriate  - Keep care items and personal belongings within reach  - Initiate and maintain comfort rounds  - Make Fall Risk Sign visible to staff  - Offer Toileting in advance of need  - Initiate/Maintain bed alarm  - Obtain necessary fall risk management equipment  - Apply yellow socks and bracelet for high fall risk patients  - Consider moving patient to room near nurses station  Outcome: Progressing     Problem: PAIN - ADULT  Goal: Verbalizes/displays adequate comfort level or baseline comfort level  Description: Interventions:  - Encourage patient to monitor pain and request assistance  - Assess pain using appropriate pain scale  - Administer analgesics based on type and severity of pain and evaluate response  - Implement non-pharmacological measures as appropriate and evaluate response  - Consider cultural and social influences on pain and pain management  - Notify physician/advanced practitioner if interventions unsuccessful or patient reports new pain  Outcome: Progressing     Problem: INFECTION - ADULT  Goal: Absence or prevention of progression during hospitalization  Description: INTERVENTIONS:  - Assess and monitor for signs and symptoms of infection  - Monitor lab/diagnostic results  - Monitor all insertion sites, i e  indwelling lines, tubes, and drains  - Monitor endotracheal if appropriate and nasal secretions for changes in amount and color  - [place] appropriate cooling/warming therapies per order  - Administer medications as ordered  - Instruct and encourage patient and family to use good hand hygiene technique  - Identify and instruct in appropriate isolation precautions for identified infection/condition  Outcome: Progressing  Goal: Absence of fever/infection during neutropenic period  Description: INTERVENTIONS:  - Monitor WBC    Outcome: Progressing     Problem: SAFETY ADULT  Goal: Patient will remain free of falls  Description: INTERVENTIONS:  - Educate patient/family on patient safety including physical limitations  - Instruct patient to call for assistance with activity   - Consult OT/PT to assist with strengthening/mobility   - Keep Call bell within reach  - Keep bed low and locked with side rails adjusted as appropriate  - Keep care items and personal belongings within reach  - Initiate and maintain comfort rounds  - Make Fall Risk Sign visible to staff  - Offer Toileting in advance of need  - Initiate/Maintain bed alarm  - Obtain necessary fall risk management equipment  - Apply yellow socks and bracelet for high fall risk patients  - Consider moving patient to room near nurses station  Outcome: Progressing  Goal: Maintain or return to baseline ADL function  Description: INTERVENTIONS:  - Educate patient/family on patient safety including physical limitations  - Instruct patient to call for assistance with activity   - Consult OT/PT to assist with strengthening/mobility   - Keep Call bell within reach  - Keep bed low and locked with side rails adjusted as appropriate  - Keep care items and personal belongings within reach  - Initiate and maintain comfort rounds  - Make Fall Risk Sign visible to staff  - Offer Toileting in advance of need  - Initiate/Maintain bed alarm  - Obtain necessary fall risk management equipment:   - Apply yellow socks and bracelet for high fall risk patients  - Consider moving patient to room near nurses station  Outcome: Progressing     Problem: DISCHARGE PLANNING  Goal: Discharge to home or other facility with appropriate resources  Description: INTERVENTIONS:  - Identify barriers to discharge w/patient and caregiver  - Arrange for needed discharge resources and transportation as appropriate  - Identify discharge learning needs (meds, wound care, etc )  - Refer to Case Management Department for coordinating discharge planning if the patient needs post-hospital services based on physician/advanced practitioner order or complex needs related to functional status, cognitive ability, or social support system  Outcome: Progressing     Problem: Knowledge Deficit  Goal: Patient/family/caregiver demonstrates understanding of disease process, treatment plan, medications, and discharge instructions  Description: Complete learning assessment and assess knowledge base    Interventions:  - Provide teaching at level of understanding  - Provide teaching via preferred learning methods  Outcome: Progressing     Problem: GASTROINTESTINAL - ADULT  Goal: Minimal or absence of nausea and/or vomiting  Description: INTERVENTIONS:  - Administer IV fluids if ordered to ensure adequate hydration  - Maintain NPO status until nausea and vomiting are resolved  - Nasogastric tube if ordered  - Administer ordered antiemetic medications as needed  - Provide nonpharmacologic comfort measures as appropriate  - Advance diet as tolerated, if ordered  - Consider nutrition services referral to assist patient with adequate nutrition and appropriate food choices  Outcome: Progressing  Goal: Maintains or returns to baseline bowel function  Description: INTERVENTIONS:  - Assess bowel function  - Encourage oral fluids to ensure adequate hydration  - Administer IV fluids if ordered to ensure adequate hydration  - Administer ordered medications as needed  - Encourage mobilization and activity  - Consider nutritional services referral to assist patient with adequate nutrition and appropriate food choices  Outcome: Progressing  Goal: Maintains adequate nutritional intake  Description: INTERVENTIONS:  - Monitor percentage of each meal consumed  - Identify factors contributing to decreased intake, treat as appropriate  - Assist with meals as needed  - Monitor I&O, weight, and lab values if indicated  - Obtain nutrition services referral as needed  Outcome: Progressing  Goal: Oral mucous membranes remain intact  Description: INTERVENTIONS  - Assess oral mucosa and hygiene practices  - Implement preventative oral hygiene regimen  - Implement oral medicated treatments as ordered  - Initiate Nutrition services referral as needed  Outcome: Progressing     Problem: GENITOURINARY - ADULT  Goal: Maintains or returns to baseline urinary function  Description: INTERVENTIONS:  - Assess urinary function  - Encourage oral fluids to ensure adequate hydration if ordered  - Administer IV fluids as ordered to ensure adequate hydration  - Administer ordered medications as needed  - Offer frequent toileting  - Follow urinary retention protocol if ordered  Outcome: Progressing     Problem: METABOLIC, FLUID AND ELECTROLYTES - ADULT  Goal: Electrolytes maintained within normal limits  Description: INTERVENTIONS:  - Monitor labs and assess patient for signs and symptoms of electrolyte imbalances  - Administer electrolyte replacement as ordered  - Monitor response to electrolyte replacements, including repeat lab results as appropriate  - Instruct patient on fluid and nutrition as appropriate  Outcome: Progressing  Goal: Fluid balance maintained  Description: INTERVENTIONS:  - Monitor labs   - Monitor I/O and WT  - Instruct patient on fluid and nutrition as appropriate  - Assess for signs & symptoms of volume excess or deficit  Outcome: Progressing  Goal: Glucose maintained within target range  Description: INTERVENTIONS:  - Monitor Blood Glucose as ordered  - Assess for signs and symptoms of hyperglycemia and hypoglycemia  - Administer ordered medications to maintain glucose within target range  - Assess nutritional intake and initiate nutrition service referral as needed  Outcome: Progressing     Problem: Nutrition/Hydration-ADULT  Goal: Nutrient/Hydration intake appropriate for improving, restoring or maintaining nutritional needs  Description: Monitor and assess patient's nutrition/hydration status for malnutrition  Collaborate with interdisciplinary team and initiate plan and interventions as ordered  Monitor patient's weight and dietary intake as ordered or per policy  Utilize nutrition screening tool and intervene as necessary  Determine patient's food preferences and provide high-protein, high-caloric foods as appropriate       INTERVENTIONS:  - Monitor oral intake, urinary output, labs, and treatment plans  - Assess nutrition and hydration status and recommend course of action  - Evaluate amount of meals eaten  - Assist patient with eating if necessary   - Allow adequate time for meals  - Recommend/ encourage appropriate diets, oral nutritional supplements, and vitamin/mineral supplements  - Order, calculate, and assess calorie counts as needed  - Recommend, monitor, and adjust tube feedings and TPN/PPN based on assessed needs  - Assess need for intravenous fluids  - Provide specific nutrition/hydration education as appropriate  - Include patient/family/caregiver in decisions related to nutrition  Outcome: Progressing     Problem: MOBILITY - ADULT  Goal: Maintain or return to baseline ADL function  Description: INTERVENTIONS:  - Educate patient/family on patient safety including physical limitations  - Instruct patient to call for assistance with activity   - Consult OT/PT to assist with strengthening/mobility   - Keep Call bell within reach  - Keep bed low and locked with side rails adjusted as appropriate  - Keep care items and personal belongings within reach  - Initiate and maintain comfort rounds  - Make Fall Risk Sign visible to staff  - Offer Toileting in advance of need  - Initiate/Maintain bed alarm  - Obtain necessary fall risk management equipment:   - Apply yellow socks and bracelet for high fall risk patients  - Consider moving patient to room near nurses station  Outcome: Progressing  Goal: Maintains/Returns to pre admission functional level  Description: INTERVENTIONS:  - Perform BMAT or MOVE assessment daily    - Set and communicate daily mobility goal to care team and patient/family/caregiver  - Collaborate with rehabilitation services on mobility goals if consulted  - Perform Range of Motion  times a day  - Reposition patient every  hours    - Dangle patient  times a day  - Stand patient  times a day  - Ambulate patient  times a day  - Out of bed to chair  times a day   - Out of bed for meals times a day  - Out of bed for toileting  - Record patient progress and toleration of activity level   Outcome: Progressing     Problem: Prexisting or High Potential for Compromised Skin Integrity  Goal: Skin integrity is maintained or improved  Description: INTERVENTIONS:  - Identify patients at risk for skin breakdown  - Assess and monitor skin integrity  - Assess and monitor nutrition and hydration status  - Monitor labs   - Assess for incontinence   - Turn and reposition patient  - Assist with mobility/ambulation  - Relieve pressure over bony prominences  - Avoid friction and shearing  - Provide appropriate hygiene as needed including keeping skin clean and dry  - Evaluate need for skin moisturizer/barrier cream  - Collaborate with interdisciplinary team   - Patient/family teaching  - Consider wound care consult   Outcome: Progressing

## 2023-05-30 NOTE — PROGRESS NOTES
"Progress Note - [address]  58 y o   female  /-01   MRN: [id number]  Encounter: [id number]     Assessment  Metastatic adenocarcinoma  Abdominal pain  Nausea and vomiting  Constipation  Palliative care by specialist  Goals of care      Plan  1  Symptom management  · Abdominal pain - continues to have constant, aching, \"cramping\" pain in RUQ  Better with rest on right side, worse with food  No relief with oxy, Dilaudid helps with the pain  ? Discontinue Oxycodone 5mg PO TID SCHED  ? Start Dilaudid 4mg PO QID SCHED  ? Start Dilaudid 2mg PO q4h PRN mod-severe pain  ? Continue Dilaudid 0 5mg IV q4hrs PRN breakthrough pain  · Bowel regimen - Also complains of gas/bloating  Last BM was 6 days ago  · Consider increasing Senna to 2 tabs PO QHS  · Continue Miralax QD  Consider adding Bisacodyl 10mg RS QD If no BM in 48h  · If no BM in 72 hours, consider rectal exam to r/o impaction  Consider fleet enema or saline enema    2  Goals  Level 1 code status  Full code  Disease focused care  No limits placed  • No changes to goals of care today, remains diseased focused  • Patient unsure of whether they will be going home to [facility] but would like to consult with Bill Holman  • They are deciding between seeking care around [facility] and living at home vs  Staying here at [facility]" and following up with oncology here  • Pt/family would like to discuss options with oncologist regarding options for treatments, prognosis, etc  prior to making decisions on where to go  • She does not want chemo if offerred but accepting of radiation  • Pt would like to continued palliative care outpatient  Once d/c plan is figured out palliative will continue to prescribe pain medications  PT looking to establish with a PCP as well  3   Social support  • Patient is supported by her wife Bienvenido Amezcua, her daughter, mother, 2 brothers and 1 sister, and uncle    • Supportive listening " provided  • Normalized experience of patient/family  • Provided anxiety containment  • Provided anticipatory guidance     4  Follow up  • Palliative Care will continue to follow for symptom management and goals of care discussions will be ongoing  • Please reach out to on-call provider via [employer] if questions or concerns arise  Interval History  Chart reviewed before visit  She does not feel well this morning  Reports constant pain with little relief and is counting th eminutes until her next Dilaudid dose  No improvement with oxycodone but Dilaudid seems to help  She has waxing and waning RUQ pain that improves when lying on her right side, worsens with movement, after eating  She reports gas and bloating after meals  Last BM was 6 days ago    Current pain location(s): RUQ  Pain Scale:   9  PRN Use of Pain Medications: DIL PO x4, DIL IV x 2  24 hour Total OME for 50/29-05/30 is 68  Review of Systems  All other systems negative       Medications    Current Facility-Administered Medications:   •  acetaminophen (TYLENOL) tablet 650 mg, 650 mg, Oral, Q6H PRN, Markus Menezes MD, 650 mg at 05/29/23 1758  •  buPROPion [facility]) tablet 150 mg, 150 mg, Oral, BID, Markus Menezes MD, 150 mg at 05/30/23 0848  •  butalbital-acetaminophen-caffeine (FIORICET,ESGIC) -40 mg per tablet 1 tablet, 1 tablet, Oral, Q4H PRN, Markus Menezes MD, 1 tablet at 05/22/23 1141  •  cyanocobalamin (VITAMIN B-12) tablet 500 mcg, 500 mcg, Oral, Daily, Markus Menezes MD, 500 mcg at 05/30/23 0847  •  dextromethorphan-guaiFENesin (ROBITUSSIN DM) oral syrup 10 mL, 10 mL, Oral, Q4H PRN, Radha Rush PA-C, 10 mL at 05/29/23 1049  •  dicyclomine (BENTYL) capsule 10 mg, 10 mg, Oral, 4x Daily (AC & HS), BRITTNY Sheppard  •  docusate sodium (COLACE) capsule 100 mg, 100 mg, Oral, BID, BRITTNY Sheppard, 100 mg at 05/30/23 1028  •  ferrous sulfate tablet 325 mg, 325 mg, Oral, Daily With Breakfast, Stefani Burch "CRNP, 325 mg at 19/37/71 0512  •  folic acid (FOLVITE) tablet 400 mcg, 400 mcg, Oral, Daily, Abel Max MD, 400 mcg at 05/30/23 0848  •  heparin (porcine) subcutaneous injection 5,000 Units, 5,000 Units, Subcutaneous, Q8H [facility], BRITTNY Montiel, 5,000 Units at 05/30/23 8247  •  HYDROmorphone (DILAUDID) injection 0 5 mg, 0 5 mg, Intravenous, Q4H PRN, BRITTNY Sheppard, 0 5 mg at 05/30/23 1039  •  HYDROmorphone (DILAUDID) tablet 2 mg, 2 mg, Oral, Q4H PRN, Jarek Form, PA-C  •  HYDROmorphone (DILAUDID) tablet 4 mg, 4 mg, Oral, 4x Daily, Jarek Form, PA-C  •  hydrOXYzine HCL (ATARAX) tablet 25 mg, 25 mg, Oral, TID PRN, BRITTNY Montiel, 25 mg at 05/29/23 1648  •  losartan (COZAAR) tablet 100 mg, 100 mg, Oral, Daily, Abel Max MD, 100 mg at 05/30/23 0849  •  magnesium sulfate 4 g/100 mL IVPB (premix) 4 g, 4 g, Intravenous, Once, BRITTNY Sheppard, Last Rate: 25 mL/hr at 05/30/23 0855, 4 g at 05/30/23 0855  •  naloxone (NARCAN) 0 04 mg/mL syringe 0 04 mg, 0 04 mg, Intravenous, Q1MIN PRN, BRITTNY Montiel  •  ondansetron [facility]) injection 4 mg, 4 mg, Intravenous, Q6H PRN, Abel Max MD, 4 mg at 05/30/23 0357  •  pantoprazole (PROTONIX) injection 40 mg, 40 mg, Intravenous, Q24H [facility], Abel Max MD, 40 mg at 05/30/23 0850  •  polyethylene glycol (MIRALAX) packet 17 g, 17 g, Oral, Daily, BRITTNY Sheppard  •  potassium chloride (K-DUR,KLOR-CON) CR tablet 20 mEq, 20 mEq, Oral, Daily, Milton Carlson PA-C, 20 mEq at 05/30/23 5265  •  pravastatin (PRAVACHOL) tablet 80 mg, 80 mg, Oral, Daily With Edilia Medrano MD, 80 mg at 05/29/23 1647  •  scopolamine (TRANSDERM-SCOP) 1 mg/3 days TD 72 hr patch 1 patch, 1 patch, Transdermal, Q72H, BRITTNY Montiel, 1 patch at 05/27/23 1451  •  senna (SENOKOT) tablet 8 6 mg, 1 tablet, Oral, HS, Rubina BRITTNY Kirk, 8 6 mg at 05/29/23 214    Objective  /82   Pulse 105   Temp 98 2 °F (36 8 °C)   Resp 19   Ht 5' 2\" (1 575 " m)   Wt 54 9 kg (121 lb)   SpO2 94%   BMI 22 13 kg/m²   Physical Exam  Vitals and nursing note reviewed  Constitutional:       Appearance: She is ill-appearing  HENT:      Head: Normocephalic and atraumatic  Right Ear: External ear normal       Left Ear: External ear normal       Nose: Nose normal       Mouth/Throat:      Pharynx: Oropharynx is clear  Eyes:      Extraocular Movements: Extraocular movements intact  Cardiovascular:      Rate and Rhythm: Normal rate  Pulmonary:      Effort: Pulmonary effort is normal    Abdominal:      Tenderness: There is abdominal tenderness  Skin:     Findings: No rash  Neurological:      Mental Status: She is alert and oriented to person, place, and time  Psychiatric:         Mood and Affect: Mood normal          Behavior: Behavior normal          Thought Content: Thought content normal          Judgment: Judgment normal          Lab Results:   I have personally reviewed pertinent labs  , CBC:   Lab Results   Component Value Date    HCT 28 5 (L) 05/30/2023    HGB 8 6 (L) 05/30/2023    MCH 24 2 (L) 05/30/2023    MCHC 30 2 (L) 05/30/2023    MCV 80 (L) 05/30/2023    MPV 8 0 (L) 05/30/2023    NRBC 0 05/30/2023     (H) 05/30/2023    RBC 3 56 (L) 05/30/2023    RDW 16 3 (H) 05/30/2023    WBC 11 11 (H) 05/30/2023   , CMP:   Lab Results   Component Value Date    BUN 8 05/30/2023    CALCIUM 8 7 05/30/2023    CL 98 05/30/2023    CO2 28 05/30/2023    CREATININE 0 53 (L) 05/30/2023    EGFR 102 05/30/2023    K 3 4 (L) 05/30/2023    SODIUM 135 05/30/2023     Imaging Studies: I have personally reviewed pertinent reports  EKG, Pathology, and Other Studies: I have personally reviewed pertinent reports  Counseling / Coordination of Care  Total floor / unit time spent today 35 minutes  Greater than 50% of total time was spent with the patient and / or family counseling and / or coordinating of care   A description of the counseling / coordination of care: Chart "reviewed, reviewed medical updates, determined goals of care, discussed palliative care and symptom management, discussed comfort care and hospice, provided anticipatory guidance, determined social/family support, provided psychosocial support  Reviewed with SLIM, CM  Conception Favre, PA-C  Palliative and Supportive Care  Clinic/Answering Service: [phone]  You can find me on [employer]! Portions of this document may have been created using dictation software and as such some \"sound alike\" terms may have been generated by the system  Do not hesitate to contact me with any questions or clarifications    "

## 2023-05-30 NOTE — ASSESSMENT & PLAN NOTE
· POA with serum magnesium 1 5  · Likely secondary to vomiting and diarrhea  · S/P IV repletion  · Continue oral magnesium oxide  · Monitor magnesium as an outpatient

## 2023-05-30 NOTE — PROGRESS NOTES
Medical Oncology/Hematology Progress Note  Walter Lizarraga, female, 58 y o , 1960,  /-01, [id number]     Assessment and Plan  1  Adenocarcinoma of the lung: With mets to lymph nodes 2 subcentimeter left lung nodules noted  She will need PET CT scan as an outpatient to complete staging work-up  We will also need to send tissue for NexGen sequencing  Patient was told that we will need to complete the staging work-up before making decision regarding treatment options  She is not entirely sure if she is going to be treated locally as she is staying temporarily at her [facility] versus getting treated closer to home in [place] or [place]  Palliative care has been following and helping manage pain  She was told that we certainly can take care of her if she decides to get treated locally  Was provided with our contact information  2   Pancreatic lesion: Noted on CT imaging  Was evaluated by GI  Upper endoscopy did not show any significant findings  She does seem to be symptomatic with right upper abdominal pain  CA 19-9 and CEA tumor marker are elevated  She will need outpatient PET CT scan and endoscopic ultrasound to evaluate lesion and obtain tissue biopsy  Unusual place for metastasis  Primary GI/malignancy cannot be ruled out  3   Elevated CA 27-29: Again await outpatient PET CT scan  We will also need mammogram outpatient to evaluate for primary breast malignancy  Patient never had mammogram     4   Left upper arm edema: Patient states this is due to her recent IV infiltration  For completeness sake check venous duplex of the bilateral extremity to rule out acute DVT  5   Microcytic anemia: She was noted to be iron deficient on her most recent iron panel which is likely the primary reason- iron saturation 5%  Was started on oral supplements  Was noted to have borderline low vitamin N22 and folic acid already started on oral supplements    She also seems to have thrombocytosis which is also likely related to iron deficiency versus inflammation  Could consider giving her few doses of IV Venofer while she is inpatient  HPI: See initial heme/onc e-Consult 5/22/23 regarding HPI  Subjective:  Patient states that she is feeling a bit better today but continues to have significant right abdominal pain  Is hoping she can be discharged soon once her pain is better controlled to continue work-up for her malignancy  She states that she still has not decided if she is going to be treated locally versus closer to her home in [place]  She is reporting some nausea but believes her postnasal drip may be contributing  Reports cough  She has significant edema to her left hand and upper extremity which she states is due to recent infiltrating IV  Review of Systems   Constitutional: Positive for activity change and appetite change  HENT: Positive for postnasal drip  Respiratory: Positive for cough  Gastrointestinal: Positive for abdominal pain and nausea  Neurological: Positive for weakness and headaches (chronic d/t to migraines)  All other systems reviewed and are negative  Objective:   Most recent laboratory studies from this morning 5/30/2023 showed mild leukocytosis which is likely reactive in nature WBC 11 11, stable microcytic anemia H&H 8 6/28 5, MCV 80 platelet count elevated 507  Creatinine 0 50,   She had upper and lower endoscopy 5 days ago 5/25/2023  Upper endoscopy showed mild patchy erythematous mucosa in the antrum otherwise normal   1 pedunculated polyp measuring 10 to 19 mm in the sigmoid colon was removed, scattered diverticula noted in sigmoid colon  Pathology from endoscopy is still pending  She had IR biopsy of lymph node on the right side of her neck 5/22/2023 which was reported 5/25 as metastatic adenocarcinoma consistent with lung primary  She had MRI of the brain with and without contrast 5/23/2023   IMPRESSION:  1  No acute infarction, edema, or mass effect    2  Few punctate hyperintense T2/FLAIR foci are noted within the periventricular and subcortical white matter which are nonspecific and can be seen with vasculitis, migrainous angiopathy, Lyme's disease or microangiopathic changes      Component      Latest Ref Rng & Units 5/23/2023   CA 19-9      0 - 35 U/mL 151 (H)     Component      Latest Ref Rng & Units 5/23/2023   CARCINOEMBRYONIC ANTIGEN      0 0 - 3 0 ng/mL 7 1 (H)     Component      Latest Ref Rng & Units 5/23/2023   CA 27-29      0 0 - 38 6 U/mL 120 4 (H)     Component      Latest Ref Rng & Units 5/23/2023   AFP-TUMOR MARKER      0 00 - 9 00 ng/mL 1 07     Medication Administration - last 24 hours from 05/29/2023 1718 to 05/30/2023 1718       Date/Time Order Dose Route Action Action by     05/29/2023 1758 EDT acetaminophen (TYLENOL) tablet 650 mg 650 mg Oral Given Margarita Harkins RN     05/30/2023 5342 EDT ondansetron (ZOFRAN) injection 4 mg 4 mg Intravenous Given Molly Schaeffer RN     05/30/2023 0850 EDT pantoprazole (PROTONIX) injection 40 mg 40 mg Intravenous Given Jason Givens RN     05/30/2023 1652 EDT pravastatin (PRAVACHOL) tablet 80 mg 80 mg Oral Given Jason Givens RN     05/30/2023 7662 EDT losartan (COZAAR) tablet 100 mg 100 mg Oral Given Jason Givens RN     09/84/6425 1352 EDT folic acid (FOLVITE) tablet 400 mcg 400 mcg Oral Given Jason Givens RN     05/30/2023 0537 EDT cyanocobalamin (VITAMIN B-12) tablet 500 mcg 500 mcg Oral Given Jason Givens RN     05/30/2023 0848 EDT buPROPion [facility] [place] [facility]) tablet 150 mg 150 mg Oral Given Jason Givens RN     05/29/2023 1758 EDT buPROPion [facility] [place] [facility]) tablet 150 mg 150 mg Oral Given Margraita Harkins RN     05/30/2023 1440 EDT heparin (porcine) subcutaneous injection 5,000 Units 5,000 Units Subcutaneous Given Jason Givens RN     05/30/2023 0336 EDT heparin (porcine) subcutaneous injection 5,000 Units 5,000 Units Subcutaneous Given Avis Daryl Ruvalcaba, RN     05/29/2023 2144 EDT heparin (porcine) subcutaneous injection 5,000 Units 5,000 Units Subcutaneous Given Carlos Duran, AHSAN     05/30/2023 3963 EDT ferrous sulfate tablet 325 mg 325 mg Oral Given Vaughn Zelaya, AHSAN     05/30/2023 4329 EDT potassium chloride (K-DUR,KLOR-CON) CR tablet 20 mEq 20 mEq Oral Given Vaughn Zelaya, AHSAN     05/30/2023 1443 EDT scopolamine (TRANSDERM-SCOP) 1 mg/3 days TD 72 hr patch 1 patch 1 patch Transdermal Medication Applied Vaughn Zelaya, RN     05/30/2023 1440 EDT scopolamine (TRANSDERM-SCOP) 1 mg/3 days TD 72 hr patch 1 patch 1 patch Transdermal Patch Removed Vaughn Zelaya, RN     05/30/2023 6646 EDT HYDROmorphone (DILAUDID) tablet 4 mg 4 mg Oral Given Vaughn Zelaya RN     05/30/2023 8715 EDT HYDROmorphone (DILAUDID) tablet 4 mg 4 mg Oral Given Carlos Duran, AHSAN     05/29/2023 1855 EDT HYDROmorphone (DILAUDID) tablet 4 mg 4 mg Oral Given Sg Maldonado RN     05/30/2023 7176 EDT oxyCODONE (ROXICODONE) IR tablet 5 mg 5 mg Oral Given Vaughn Zelaya, AHSAN     05/29/2023 2144 EDT oxyCODONE (ROXICODONE) IR tablet 5 mg 5 mg Oral Given Carlos Duran RN     05/30/2023 1652 EDT HYDROmorphone (DILAUDID) injection 0 5 mg 0 5 mg Intravenous Given Vaughn Zelaya RN     05/30/2023 1039 EDT HYDROmorphone (DILAUDID) injection 0 5 mg 0 5 mg Intravenous Given Vaughn Zelaya RN     05/30/2023 1393 EDT HYDROmorphone (DILAUDID) injection 0 5 mg 0 5 mg Intravenous Given Carlos Duran, AHSAN     05/29/2023 1820 EDT HYDROmorphone (DILAUDID) injection 0 5 mg 0 5 mg Intravenous Given Sg Maldonado RN     05/30/2023 9634 EDT docusate sodium (COLACE) capsule 100 mg 100 mg Oral Given Vaughn Zelaya, AHSAN     05/29/2023 1855 EDT docusate sodium (COLACE) capsule 100 mg 100 mg Oral Given Sg Maldonado RN     05/29/2023 2144 EDT senna (SENOKOT) tablet 8 6 mg 8 6 mg Oral Given Carlos Duran, AHSAN     05/30/2023 9378 EDT magnesium sulfate 4 g/100 mL IVPB (premix) 4 g 4 g Intravenous New [address] [place] [address] "Renzo Christianson RN     05/30/2023 9700 EDT potassium chloride (K-DUR,KLOR-CON) CR tablet 20 mEq 20 mEq Oral Given Jennifer Mcdowell RN     05/30/2023 1149 EDT polyethylene glycol (MIRALAX) packet 17 g 17 g Oral Given Jennifer Mcdowell RN     05/30/2023 1652 EDT dicyclomine (BENTYL) capsule 10 mg 10 mg Oral Given Jennifer Mcdowell RN     05/30/2023 1148 EDT dicyclomine (BENTYL) capsule 10 mg 10 mg Oral Given Jennifer Mcdowell RN     05/30/2023 1148 EDT HYDROmorphone (DILAUDID) tablet 4 mg 4 mg Oral Given Jennifer Mcdowell RN     05/30/2023 1446 EDT HYDROmorphone (DILAUDID) tablet 2 mg 2 mg Oral Given Jennifer Mcdowell RN          /73   Pulse 102   Temp 98 3 °F (36 8 °C)   Resp 18   Ht 5' 2\" (1 575 m)   Wt 54 9 kg (121 lb)   SpO2 92%   BMI 22 13 kg/m²       Physical Exam  Vitals reviewed  Constitutional:       General: She is not in acute distress  Appearance: She is well-developed  She is ill-appearing  She is not diaphoretic  HENT:      Head: Normocephalic and atraumatic  Mouth/Throat:      Mouth: Mucous membranes are moist       Pharynx: Oropharynx is clear  Eyes:      General: No scleral icterus  Conjunctiva/sclera: Conjunctivae normal    Neck:      Thyroid: No thyromegaly  Cardiovascular:      Rate and Rhythm: Normal rate and regular rhythm  Heart sounds: Normal heart sounds  No murmur heard  Pulmonary:      Effort: Pulmonary effort is normal  No respiratory distress  Breath sounds: Examination of the right-upper field reveals decreased breath sounds  Examination of the right-middle field reveals decreased breath sounds  Examination of the right-lower field reveals decreased breath sounds  Decreased breath sounds and wheezing present  Abdominal:      General: There is no distension  Palpations: Abdomen is soft  There is no hepatomegaly or splenomegaly  Tenderness: There is abdominal tenderness in the right upper quadrant     Musculoskeletal:         General: Swelling (LUE/left hand " +1-2 pitting) present  Normal range of motion  Cervical back: Normal range of motion and neck supple  Lymphadenopathy:      Cervical: No cervical adenopathy  Upper Body:      Right upper body: No axillary adenopathy  Left upper body: No axillary adenopathy  Skin:     General: Skin is warm and dry  Coloration: Skin is pale  Findings: No erythema or rash  Neurological:      General: No focal deficit present  Mental Status: She is alert and oriented to person, place, and time  Psychiatric:         Mood and Affect: Mood normal  Affect is blunt  Behavior: Behavior normal  Behavior is cooperative  Thought Content:  Thought content normal          Judgment: Judgment normal          Recent Results (from the past 48 hour(s))   Basic metabolic panel    Collection Time: 05/29/23  5:00 AM   Result Value Ref Range    Sodium 131 (L) 135 - 147 mmol/L    Potassium 3 4 (L) 3 5 - 5 3 mmol/L    Chloride 95 (L) 96 - 108 mmol/L    CO2 27 21 - 32 mmol/L    ANION GAP 9 4 - 13 mmol/L    BUN 8 5 - 25 mg/dL    Creatinine 0 50 (L) 0 60 - 1 30 mg/dL    Glucose 169 (H) 65 - 140 mg/dL    Calcium 8 8 8 4 - 10 2 mg/dL    eGFR 104 ml/min/1 73sq m   CBC and differential    Collection Time: 05/29/23  5:00 AM   Result Value Ref Range    WBC 13 25 (H) 4 31 - 10 16 Thousand/uL    RBC 3 47 (L) 3 81 - 5 12 Million/uL    Hemoglobin 8 5 (L) 11 5 - 15 4 g/dL    Hematocrit 27 5 (L) 34 8 - 46 1 %    MCV 79 (L) 82 - 98 fL    MCH 24 5 (L) 26 8 - 34 3 pg    MCHC 30 9 (L) 31 4 - 37 4 g/dL    RDW 15 9 (H) 11 6 - 15 1 %    MPV 7 8 (L) 8 9 - 12 7 fL    Platelets 934 (H) 325 - 390 Thousands/uL    nRBC 0 /100 WBCs    Neutrophils Relative 89 (H) 43 - 75 %    Immat GRANS % 0 0 - 2 %    Lymphocytes Relative 6 (L) 14 - 44 %    Monocytes Relative 5 4 - 12 %    Eosinophils Relative 0 0 - 6 %    Basophils Relative 0 0 - 1 %    Neutrophils Absolute 11 76 (H) 1 85 - 7 62 Thousands/µL    Immature Grans Absolute 0 05 0 00 - 0 20 Thousand/uL    Lymphocytes Absolute 0 76 0 60 - 4 47 Thousands/µL    Monocytes Absolute 0 62 0 17 - 1 22 Thousand/µL    Eosinophils Absolute 0 02 0 00 - 0 61 Thousand/µL    Basophils Absolute 0 04 0 00 - 0 10 Thousands/µL   CBC and differential    Collection Time: 05/30/23  5:09 AM   Result Value Ref Range    WBC 11 11 (H) 4 31 - 10 16 Thousand/uL    RBC 3 56 (L) 3 81 - 5 12 Million/uL    Hemoglobin 8 6 (L) 11 5 - 15 4 g/dL    Hematocrit 28 5 (L) 34 8 - 46 1 %    MCV 80 (L) 82 - 98 fL    MCH 24 2 (L) 26 8 - 34 3 pg    MCHC 30 2 (L) 31 4 - 37 4 g/dL    RDW 16 3 (H) 11 6 - 15 1 %    MPV 8 0 (L) 8 9 - 12 7 fL    Platelets 853 (H) 251 - 390 Thousands/uL    nRBC 0 /100 WBCs    Neutrophils Relative 71 43 - 75 %    Immat GRANS % 0 0 - 2 %    Lymphocytes Relative 15 14 - 44 %    Monocytes Relative 11 4 - 12 %    Eosinophils Relative 3 0 - 6 %    Basophils Relative 0 0 - 1 %    Neutrophils Absolute 7 83 (H) 1 85 - 7 62 Thousands/µL    Immature Grans Absolute 0 03 0 00 - 0 20 Thousand/uL    Lymphocytes Absolute 1 63 0 60 - 4 47 Thousands/µL    Monocytes Absolute 1 23 (H) 0 17 - 1 22 Thousand/µL    Eosinophils Absolute 0 36 0 00 - 0 61 Thousand/µL    Basophils Absolute 0 03 0 00 - 0 10 Thousands/µL   Basic metabolic panel    Collection Time: 05/30/23  5:09 AM   Result Value Ref Range    Sodium 135 135 - 147 mmol/L    Potassium 3 4 (L) 3 5 - 5 3 mmol/L    Chloride 98 96 - 108 mmol/L    CO2 28 21 - 32 mmol/L    ANION GAP 9 4 - 13 mmol/L    BUN 8 5 - 25 mg/dL    Creatinine 0 53 (L) 0 60 - 1 30 mg/dL    Glucose 137 65 - 140 mg/dL    Calcium 8 7 8 4 - 10 2 mg/dL    eGFR 102 ml/min/1 73sq m   Magnesium    Collection Time: 05/30/23  5:09 AM   Result Value Ref Range    Magnesium 1 5 (L) 1 9 - 2 7 mg/dL       XR abdomen 1 view kub    Result Date: 5/30/2023  Narrative: ABDOMEN INDICATION:   abdominal pain, constipation   COMPARISON:  None VIEWS:  AP supine Images: 2 FINDINGS: Large amount of fecal debris in the colon There is a nonobstructive bowel gas pattern  No discernible free air on this supine study  Upright or left lateral decubitus imaging is more sensitive to detect subtle free air in the appropriate setting  No pathologic calcifications or soft tissue masses  Visualized lung bases are clear  Visualized osseous structures are unremarkable for the patient's age  Impression: Large amount of fecal debris in the colon predominantly on the right side, correlate with constipation No bowel obstruction Workstation performed: [id number]     XR chest portable    Result Date: 5/30/2023  Narrative: CHEST INDICATION:   cough  COMPARISON: CT from May 21, 2023 EXAM PERFORMED/VIEWS:  XR CHEST PORTABLE FINDINGS: Cardiomediastinal silhouette appears unremarkable  There is complete opacification of the right hemithorax but there is shift of the trachea to the right side  Left lung appear unremarkable Osseous structures appear within normal limits for patient age  Impression: Opaque right hemithorax, unchanged from the previous study of May 21, 2023 due to combination of consolidation and effusion  No new consolidation left lung Workstation performed: [id number]     Colonoscopy    Result Date: 5/25/2023  Narrative: Table formatting from the original result was not included  Kelsea Hair [facility] [id number] [phone] DATE OF SERVICE: 5/25/23 PHYSICIAN(S): Attending: Carolann Baxter MD Fellow: No Staff Documented INDICATION: Anemia, Abnormal CT scan POST-OP DIAGNOSIS: See the impression below  HISTORY: Prior colonoscopy: No prior colonoscopy  BOWEL PREPARATION: Golytely/Colyte/Trilyte PREPROCEDURE: Informed consent was obtained for the procedure, including sedation  Risks including but not limited to bleeding, infection, perforation, adverse drug reaction and aspiration were explained in detail  Also explained about less than 100% sensitivity with the exam and other alternatives   The patient was placed in the left lateral decubitus position  Procedure: Colonoscopy DETAILS OF PROCEDURE: Patient was taken to the procedure room where a time out was performed to confirm correct patient and correct procedure  The patient underwent monitored anesthesia care, which was administered by an anesthesia professional  The patient's blood pressure, heart rate, level of consciousness, oxygen, respirations, ECG and ETCO2 were monitored throughout the procedure  A digital rectal exam was performed  The scope was introduced through the anus and advanced to the cecum  Retroflexion was performed in the rectum  The quality of bowel preparation was evaluated using the [facility] Bowel Preparation Scale with scores of: right colon = 2, transverse colon = 2, left colon = 2  The total BBPS score was 6  Bowel prep was adequate  The patient experienced no blood loss  The procedure was not difficult  The patient tolerated the procedure well  There were no apparent adverse events   ANESTHESIA INFORMATION: ASA: III Anesthesia Type: IV Sedation with Anesthesia MEDICATIONS: No administrations occurring from 1232 to 1348 on 05/25/23 FINDINGS: One pedunculated polyp measuring 10-19 mm in the sigmoid colon; completely removed en bloc by hot snare and retrieved specimen The ileocecal valve, cecum, ascending colon, hepatic flexure, transverse colon, splenic flexure and descending colon appeared normal  Scattered diverticula of mild severity in the sigmoid colon EVENTS: Procedure Events Event Event Time ENDO SCOPE OUT TIME 5/25/2023  1:18 PM ENDO CECUM REACHED 5/25/2023  1:31 PM ENDO SCOPE OUT TIME 5/25/2023  1:47 PM SPECIMENS: ID Type Source Tests Collected by Time Destination 1 : DUODENUM R/O CELIAC Tissue Duodenum TISSUE EXAM Rick Saldivar MD 5/25/2023  1:16 PM  2 : STOMACH - GASTRITIS R/O H PYLORI Tissue Stomach TISSUE EXAM Rick Saldivar MD 5/25/2023  1:25 PM  3 : polyp Tissue Large Intestine, Sigmoid Colon TISSUE EXAM Rick Saldivar MD 5/25/2023  1:42 PM  EQUIPMENT: Colonoscope -     Impression: One pedunculated polyp measuring 10-19 mm in the sigmoid colon; removed by hot snare The ileocecal valve, cecum, ascending colon, hepatic flexure, transverse colon, splenic flexure and descending colon appeared normal  Scattered diverticulosis of mild severity in the sigmoid colon No luminal stricture or mass seen in the ascending colon  The whole colon was examined carefully  I did second look in the entire colon  RECOMMENDATION:  No further screening colonoscopies necessary  Overall health   Await pathology results   Lakshmi Chapin MD     EGD    Result Date: 5/25/2023  Narrative: Table formatting from the original result was not included  [address] [facility] [id number] [phone] DATE OF SERVICE: 5/25/23 PHYSICIAN(S): Attending: Lakshmi Chapin MD Fellow: No Staff Documented INDICATION: Anemia POST-OP DIAGNOSIS: See the impression below  PREPROCEDURE: Informed consent was obtained for the procedure, including sedation  Risks of perforation, hemorrhage, adverse drug reaction and aspiration were discussed  The patient was placed in the left lateral decubitus position  Patient was explained about the risks and benefits of the procedure  Risks including but not limited to bleeding, infection, and perforation were explained in detail  Also explained about less than 100% sensitivity with the exam and other alternatives  PROCEDURE: EGD DETAILS OF PROCEDURE: Patient was taken to the procedure room where a time out was performed to confirm correct patient and correct procedure  The patient underwent monitored anesthesia care, which was administered by an anesthesia professional  The patient's blood pressure, heart rate, level of consciousness, respirations, oxygen, ETCO2 and ECG were monitored throughout the procedure  The scope was advanced to the second part of the duodenum   Retroflexion was performed in the fundus  The patient experienced no blood loss  The procedure was not difficult  The patient tolerated the procedure well  There were no apparent adverse events  ANESTHESIA INFORMATION: ASA: III Anesthesia Type: IV Sedation with Anesthesia MEDICATIONS: No administrations occurring from 1232 to 1321 on 05/25/23 FINDINGS: The upper third of the esophagus, middle third of the esophagus and lower third of the esophagus appeared normal  Mild, patchy erythematous mucosa in the antrum; performed cold forceps biopsy to rule out H  pylori The duodenal bulb and 2nd part of the duodenum appeared normal  Performed random biopsy using biopsy forceps to rule out celiac disease  SPECIMENS: ID Type Source Tests Collected by Time Destination 1 : DUODENUM R/O CELIAC Tissue Duodenum TISSUE EXAM Alesia Andrea MD 5/25/2023  1:16 PM  2 : STOMACH - GASTRITIS R/O H PYLORI Tissue Stomach TISSUE EXAM Alesia Andrea MD 5/25/2023  1:25 PM  3 : polyp Tissue Large Intestine, Sigmoid Colon TISSUE EXAM Alesia Andrea MD 5/25/2023  1:42 PM      Impression: The upper third of the esophagus, middle third of the esophagus and lower third of the esophagus appeared normal  Mild, patchy erythematous mucosa in the antrum; performed cold forceps biopsy The duodenal bulb and 2nd part of the duodenum appeared normal  Performed random biopsy to rule out celiac disease  RECOMMENDATION:  Await pathology results  Colonoscopy today    Alesia Andrea MD     IR biopsy neck    Result Date: 5/24/2023  Narrative: Examination: Ultrasound-guided biopsy of the base of the right neck HISTORY: Lesion of colon which may represent primary malignancy  Lesion of lung which may represent a second primary malignant TECHNIQUE: The patient was brought to Radiology  Informed consent was obtained  The right neck was prepped and draped in usual sterile fashion  Timeout was performed  Lidocaine was administered as local anesthesia   Using ultrasound guidance a 17-gauge needle was "advanced to the targeted lesion  An 18-gauge needle was placed coaxially, and core biopsy was performed  The tract was not closed with D stat  FINDINGS: Several hypoechoic nodes in the jugular chain     Impression: Technically successful ultrasound-guided biopsy This is then of the clinically relevant portion of this report  Subsequent information is for compliance, documentation, and coding purposes  In the process of informed consent, information was communicated, verbally, to the patient regarding the procedure  The patient was informed of; the name of the procedure, nature of the procedure, nature of the condition, risks, benefits, alternatives, and potential complications, as well as the consequences of not having the procedure  All the patient's questions were answered  Informed consent was signed  Chlorhexidine and alcohol was used for cleansing and sterile preparation of the skin  This was allowed to dry prior to the application of sterile draping  Timeout was performed, with all team members present, and in agreement  Confirmation of patient, procedure, laterally, allergies, and all needed equipment was performed  PROCEDURE: Ultrasound-guided biopsy of the above-named organ or abnormality Preprocedure diagnosis: Please see \"history \", above Postprocedure diagnosis: Same Antibiotics: None Estimated blood loss: less than 5 mL Specimen: Core biopsy submitted in formalin Anesthesia: Local Workstation performed: [id number]     MRI brain w wo contrast    Result Date: 5/23/2023  Narrative: MRI BRAIN WITH AND WITHOUT CONTRAST INDICATION: migraines  COMPARISON:  None  TECHNIQUE: Multiplanar, multisequence imaging of the brain was performed before and after gadolinium administration  IV Contrast:  7 mL of Gadobutrol injection (SINGLE-DOSE) IMAGE QUALITY:   Diagnostic  FINDINGS: BRAIN PARENCHYMA:  There is no discrete mass, mass effect or midline shift  There is no intracranial hemorrhage    Normal posterior " fossa  Diffusion imaging is unremarkable  Few punctate hyperintense T2/FLAIR foci are noted within the periventricular and subcortical white matter which are nonspecific and can be seen with vasculitis, migrainous angiopathy, Lyme's disease or microangiopathic changes  Postcontrast imaging of the brain demonstrates no abnormal enhancement  VENTRICLES:  Normal for the patient's age  SELLA AND PITUITARY GLAND:  Normal  ORBITS:  Normal  PARANASAL SINUSES:  Normal  VASCULATURE:  Evaluation of the major intracranial vasculature demonstrates appropriate flow voids  CALVARIUM AND SKULL BASE:  Normal  EXTRACRANIAL SOFT TISSUES:  Normal      Impression: 1  No acute infarction, edema, or mass effect  2  Few punctate hyperintense T2/FLAIR foci are noted within the periventricular and subcortical white matter which are nonspecific and can be seen with vasculitis, migrainous angiopathy, Lyme's disease or microangiopathic changes  Workstation performed: [id number]     CT chest abdomen pelvis w contrast    Result Date: 5/21/2023  Narrative: CT CHEST, ABDOMEN AND PELVIS WITH IV CONTRAST INDICATION:   R sided upper abdominal pain with n/v/d  COMPARISON: No relevant prior studies available for comparison at the time of dictation  TECHNIQUE: CT examination of the chest, abdomen and pelvis was performed  Multiplanar 2D reformatted images were created from the source data  This examination, like all CT scans performed in the [facility], was performed utilizing techniques to minimize radiation dose exposure, including the use of iterative reconstruction and automated exposure control  Radiation dose length product (DLP) for this visit:  626 63 mGy-cm IV Contrast:  100 mL of iohexol (OMNIPAQUE) Enteric Contrast: Enteric contrast was administered  FINDINGS: CHEST LUNGS/PLEURA: Complete consolidation of the right lung with underlying heterogeneous density   Underlying heterogeneous enhancing right suprahilar lesion measuring 5 5 x 5 5 x 6 7 cm (AP x TRV x CC) suspicious for malignancy  There is abrupt cut off of the right mainstem bronchus just distal to the bifurcation  Small to moderate right pleural effusion is noted  A 0 4 cm nodule is noted within the posterior left upper lobe (series 3: 72)  A 0 3 cm posterior left upper lobe lung nodule is noted (series 3: 77)  A 0 3 cm left lower lobe lung nodule is noted (series 3: 102)  HEART/GREAT VESSELS: Heart is unremarkable for patient's age  No thoracic aortic aneurysm  MEDIASTINUM AND CHRISTINA: A pathologically enlarged pretracheal/right paratracheal fernanda mass is noted measuring 1 7 x 3 0 x 3 2 cm  A 2 0 x 3 0 x 2 2 cm subcarinal paraesophageal rim lymph node is noted  CHEST WALL AND LOWER NECK: A 2 4 x 2 7 x 3 0 cm right supraclavicular lymph node is noted  Symmetric fibroglandular bilateral breast densities are noted   ABDOMEN LIVER/BILIARY TREE:  Unremarkable  GALLBLADDER:  No calcified gallstones  No pericholecystic inflammatory change  SPLEEN:  Unremarkable  PANCREAS: A heterogeneous, rim-enhancing lesion is noted at the proximal body measuring 1 8 x 2 2 x 3 1 cm  The pancreatic duct is not dilated  Adjacent 1 3 cm short axis lymph node is noted anterior to the celiac artery bifurcation ADRENAL GLANDS:  Unremarkable  KIDNEYS/URETERS:  Unremarkable  No hydronephrosis  STOMACH AND BOWEL: A 4 7 cm long annular lesion is noted along the ascending colon (series 2: 166) (series 604: 81) suspicious for malignancy  Minimal pericolonic infiltrative changes are noted  APPENDIX:  No findings to suggest appendicitis  ABDOMINOPELVIC CAVITY:  No ascites  No pneumoperitoneum  Pathologically enlarged celiac axis lymph nodes are noted  VESSELS:  Unremarkable for patient's age  PELVIS REPRODUCTIVE ORGANS:  Unremarkable for patient's age  URINARY BLADDER:  Unremarkable  ABDOMINAL WALL/INGUINAL REGIONS:  Unremarkable  OSSEOUS STRUCTURES:  No acute fracture or destructive osseous lesion  Spinal degenerative changes are noted  Impression: 1  Heterogeneously enhancing right suprahilar lesion with abrupt cut off of the right mainstem bronchus suspicious for malignancy    There is secondary complete atelectasis of the right lung with heterogeneous density  Small to moderate right pleural effusion is noted  Few subcentimeter left lung nodules are noted  Evidence of mediastinal and right supraclavicular adenopathy  2  Heterogeneous, rim-enhancing lesion at the proximal pancreatic body, possible metastatic focus  Pathologically enlarged celiac axis lymph nodes  3  Focal annular lesion involving the ascending colon, suspicious for metastatic focus versus primary malignancy  I personally discussed this study with [address] [place] [address] III on 5/21/2023 1:56 PM  Workstation performed: [id number]       I have personally reviewed labs, imaging studies, and pertinent reports  This note has been generated by voice recognition software system  Therefore, there may be spelling, grammar, and or syntax errors  Please contact if questions arise

## 2023-05-30 NOTE — PROGRESS NOTES
[facility] 45  Progress Note  Name: Keri Montoya  MRN: [id number]  Unit/Bed#: -01 I Date of Admission: 5/21/2023   Date of Service: 5/30/2023 I Hospital Day: 9    Assessment/Plan   * Metastatic adenocarcinoma [facility])  Assessment & Plan  · Presented for right upper lower quadrant abdominal pain with nausea vomiting and diarrhea   · Leukocytosis with WBC 21 9  · CT chest abdomen pelvis  · Heterogeneously enhancing right suprahilar lesion with abrupt cut off of the right mainstem bronchus suspicious for malignancy    There is secondary complete atelectasis of the right lung with heterogeneous density  Small to moderate right pleural effusion is noted  Few subcentimeter left lung nodules are noted  Evidence of mediastinal and right supraclavicular adenopathy  · Heterogeneous, rim-enhancing lesion at the proximal pancreatic body, possible metastatic focus  Pathologically enlarged celiac axis lymph nodes  Focal annular lesion involving the ascending colon, suspicious for metastatic focus versus primary malignancy  (this ascending colon lesion was not found on subsequent colonoscopy)  · GI consulted, appreciate recommendations  Patient had EGD/colonoscopy on 5/22  · 5/22 EGD: Mild patchy erythematous mucosa in the antrum  Cold forceps biopsy performed  Normal duodenum, random biopsy was performed to rule out celiac disease  · 5/22 colonoscopy: Polyp removed from sigmoid colon  Scattered diverticula of mild severity in the sigmoid colon  · Pulmonology consulted, appreciate recommendations  Recommendation is biopsy of large right supraclavicular node  Performed by IR, final results: Metastatic carcinoma, compatible with adenocarcinoma of lung origin  Pulmonology has signed off  · Oncology consulted, appreciate recommendations  · 5/21 MRI brain: No acute infarction, edema, or mass effect   Few punctate hyperintense T2/FLAIR foci are noted within the periventricular and subcortical "white matter which are nonspecific and can be seen with vasculitis, migrainous angiopathy, Lyme's disease or microangiopathic changes  · Palliative care consulted, appreciate recommendations  · Patient started on Oxy IR 3 times daily yesterday as recommended by palliative care  Patient reports oxy IR not effective in controlling pain  · Oxy IR discontinued, patient started on around-the-clock Dilaudid   · continue ondansetron and scopolamine  · Patient still to decide whether she wants to seek care here while living at the [facility] or return to [place] for care in [place]  · Hematology/oncology is going to see patient later today  · Significant other is in process of obtaining PCP for patient  · Case management following    Abdominal pain  Assessment & Plan  · Patient presented to ED with complaints of right upper and lower quadrant abdominal pain with nausea, vomiting and diarrhea  She reported pain was right upper quadrant and worse at night  · Palliative care has been following, appreciate recommendations  · Oxycodone 5 mg 3 times a day scheduled was initiated yesterday, patient reports oxycodone not helping her pain  · Palliative care saw patient today, discontinued OxyContin and started patient on scheduled Dilaudid with as needed Dilaudid goal of patient not requiring IV Dilaudid for breakthrough pain  · CT scan chest abdomen pelvis see above  · For assessment/treatment plan see abnormal CT scan above  · X-ray abdomen today  · Added Bentyl for \"spasmodic pain with eating \"per patient  · Continue bowel regimen    Hypomagnesemia  Assessment & Plan  · POA with serum magnesium 1 5  · Likely secondary to vomiting and diarrhea  · Repleted with 4 g IV magnesium on 5/21  · Resolved, repeat Mg on 5/22 3 0  · Repeat magnesium today 1 5, again repleted with 4 g IV magnesium  · Repeat magnesium level in a m      Diarrhea  Assessment & Plan  · Present on admission, now resolved     Nausea & " vomiting  Assessment & Plan  · Presented with nausea and vomiting and right upper and lower quadrant pain  · No nausea vomiting currently   · CT imaging see above  · Continue Zofran and scopolamine  · Regular diet as tolerated      Iron deficiency anemia  Assessment & Plan  · Hemoglobin currently stabilized   · Had trended downward to 6 7 on 5/27 which time patient received 1 unit of PRBCs   · Iron panel results reviewed  · S/p EGD and colonoscopy on 5/22  Reviewed findings  · Currently without signs of bleeding  · Plan to transfuse for hemoglobin below 7  · Continue to monitor closely for bleeding  · CBC in a m  Anxiety  Assessment & Plan  · Currently without anxiety  · Continue as needed Atarax  · Continue Wellbutrin           VTE Pharmacologic Prophylaxis:   Pharmacologic: Heparin  Mechanical VTE Prophylaxis in Place: Yes    Patient Centered Rounds: I have performed bedside rounds with nursing staff today  Discussions with Specialists or Other Care Team Provider: Nursing, case management    Education and Discussions with Family / Patient: Treatment plan discussed with patient and significant other at bedside  She is still trying to decide if she wants to return to [place] or stay locally for treatment  Patient's wife is in process of finding her a primary care physician  Patient and family state they will make decisions after patient evaluated by hematology/oncology today and recommendations were made  Time Spent for Care: 45 minutes  More than 50% of total time spent on counseling and coordination of care as described above  Current Length of Stay: 9 day(s)    Current Patient Status: Inpatient   Certification Statement: The patient will continue to require additional inpatient hospital stay due to Uncontrolled pain, hematology/oncology consult    Discharge Plan: Pending hospital course  Patient reports OxyContin that was ordered 3 times a day around-the-clock is not helping her pain  Palliative care saw patient today, discontinued OxyContin and started patient on Dilaudid around-the-clock plus as needed Dilaudid  Hematology/oncology will see patient later today  Patient undecided at this point if she is going to return to [place] where she resides to start treatment or stay locally  Patient's wife at bedside is in process of finding her a primary care physician  Code Status: Level 1 - Full Code      Subjective:   Patient reports uncontrolled pain overnight  States pain is worse when eating  Objective:     Vitals:   Temp (24hrs), Av 1 °F (36 7 °C), Min:98 °F (36 7 °C), Max:98 2 °F (36 8 °C)    Temp:  [98 °F (36 7 °C)-98 2 °F (36 8 °C)] 98 2 °F (36 8 °C)  HR:  [] 105  Resp:  [19] 19  BP: (133-162)/(75-82) 162/82  SpO2:  [94 %] 94 %  Body mass index is 22 13 kg/m²  Input and Output Summary (last 24 hours): Intake/Output Summary (Last 24 hours) at 2023 1222  Last data filed at 2023 1138  Gross per 24 hour   Intake 360 ml   Output 1025 ml   Net -665 ml       Physical Exam:     Physical Exam  Vitals and nursing note reviewed  Constitutional:       General: She is not in acute distress  Appearance: She is not ill-appearing  HENT:      Head: Normocephalic and atraumatic  Nose: Nose normal       Mouth/Throat:      Mouth: Mucous membranes are moist    Cardiovascular:      Rate and Rhythm: Normal rate and regular rhythm  Pulses: Normal pulses  Heart sounds: Normal heart sounds  Pulmonary:      Effort: Pulmonary effort is normal  No respiratory distress  Breath sounds: Normal breath sounds  No wheezing or rales  Abdominal:      General: Bowel sounds are normal  There is no distension  Palpations: Abdomen is soft  Tenderness: There is no abdominal tenderness  There is no guarding  Musculoskeletal:         General: Normal range of motion  Right lower leg: No edema  Left lower leg: No edema     Skin:     General: Skin is warm and dry  Capillary Refill: Capillary refill takes less than 2 seconds  Neurological:      General: No focal deficit present  Mental Status: She is alert and oriented to person, place, and time  Mental status is at baseline  Psychiatric:         Mood and Affect: Mood normal          Behavior: Behavior normal          Thought Content: Thought content normal          Judgment: Judgment normal          Additional Data:     Labs:    Results from last 7 days   Lab Units 05/30/23  0509   EOS PCT % 3   HEMATOCRIT % 28 5*   HEMOGLOBIN g/dL 8 6*   LYMPHS PCT % 15   MONOS PCT % 11   NEUTROS PCT % 71   PLATELETS Thousands/uL 507*   WBC Thousand/uL 11 11*     Results from last 7 days   Lab Units 05/30/23  0509   BUN mg/dL 8   CALCIUM mg/dL 8 7   CHLORIDE mmol/L 98   CO2 mmol/L 28   CREATININE mg/dL 0 53*   POTASSIUM mmol/L 3 4*           * I Have Reviewed All Lab Data Listed Above  * Additional Pertinent Lab Tests Reviewed:  [facility]    Imaging:    Imaging Reports Reviewed Today Include: CT chest abdomen pelvis, MRI brain  Imaging Personally Reviewed by Myself Includes: CT chest abdomen pelvis    Recent Cultures (last 7 days):           Last 24 Hours Medication List:   Current Facility-Administered Medications   Medication Dose Route Frequency Provider Last Rate   • acetaminophen  650 mg Oral Q6H PRN Daryl Disla MD     • buPROPion  150 mg Oral BID Daryl Disla MD     • butalbital-acetaminophen-caffeine  1 tablet Oral Q4H PRN Daryl Disla MD     • cyanocobalamin  500 mcg Oral Daily Daryl Disla MD     • dextromethorphan-guaiFENesin  10 mL Oral Q4H PRN Jovon Parker PA-C     • dicyclomine  10 mg Oral 4x Daily (AC & HS) BRITTNY Sheppard     • docusate sodium  100 mg Oral BID BRITTNY Sehppard     • ferrous sulfate  325 mg Oral Daily With Breakfast BRITTNY Flores     • folic acid  363 mcg Oral Daily Daryl Disla MD     • heparin (porcine)  5,000 Units Subcutaneous [facility] BRITTNY Mccann     • HYDROmorphone  0 5 mg Intravenous Q4H PRN BRITTNY Sheppard     • HYDROmorphone  2 mg Oral Q4H PRN Damion Llamas PA-C     • HYDROmorphone  4 mg Oral 4x Daily Damion Llamas PA-C     • hydrOXYzine HCL  25 mg Oral TID PRN BRITTNY Mccann     • losartan  100 mg Oral Daily Bryan Jensen MD     • magnesium sulfate  4 g Intravenous Once BRITTNY Sheppard 4 g (05/30/23 0855)   • naloxone  0 04 mg Intravenous Q1MIN PRN BRITTNY Mccann     • ondansetron  4 mg Intravenous Q6H PRN Bryan Jensen MD     • pantoprazole  40 mg Intravenous Q24H [address] Bryan Jensen MD     • polyethylene glycol  17 g Oral Daily BRITTNY Sheppard     • potassium chloride  20 mEq Oral Daily Ramos Yo PA-C     • pravastatin  80 mg Oral Daily With Brennen Serrano MD     • scopolamine  1 patch Transdermal Q72H BRITTNY Mccann     • senna  1 tablet Oral HS BRITTNY Sheppard          Today, Patient Was Seen By: BRITTNY Dumont    ** Please Note: Dictation voice to text software may have been used in the creation of this document   **

## 2023-05-30 NOTE — ASSESSMENT & PLAN NOTE
· Presented for right upper lower quadrant abdominal pain with nausea, vomiting, and diarrhea   · CT chest abdomen pelvis 5/21/2023:  · Heterogeneously enhancing right suprahilar lesion with abrupt cut off of the right mainstem bronchus suspicious for malignancy    There is secondary complete atelectasis of the right lung with heterogeneous density  Small to moderate right pleural effusion is noted  Few subcentimeter left lung nodules are noted  Evidence of mediastinal and right supraclavicular adenopathy  · Heterogeneous, rim-enhancing lesion at the proximal pancreatic body, possible metastatic focus  Pathologically enlarged celiac axis lymph nodes  Focal annular lesion involving the ascending colon, suspicious for metastatic focus versus primary malignancy  (this ascending colon lesion was not found on subsequent colonoscopy)  · 5/21/2023 MRI brain: No acute infarction, edema, or mass effect  Few punctate hyperintense T2/FLAIR foci are noted within the periventricular and subcortical white matter which are nonspecific and can be seen with vasculitis, migrainous angiopathy, Lyme's disease or microangiopathic changes  · GI consulted, appreciate recommendations  · EGD 5/22/2023: Mild patchy erythematous mucosa in the antrum  Cold forceps biopsy performed  Normal duodenum, random biopsy was performed to rule out celiac disease  · Colonoscopy 5/22/2023: Polyp removed from sigmoid colon  Scattered diverticula of mild severity in the sigmoid colon  · Pulmonology consulted with recommendation to biopsy large right supraclavicular node  Performed by IR with final results of metastatic carcinoma, compatible with adenocarcinoma of lung origin    Pulmonology has signed off  · Hematology/oncology consulted, appreciate recommendations  · Started on Oxy IR 3 times daily on 5/29, switched to OxyContin on 5/30, with further adjustment of medicications by palliative care  · Continue ondansetron and scopolamine  · Pain control was reported to be adequate on 6/2, however, had increased abdominal cramping due to constipation so did not discharge  · Palliative care recommendations appreciated, patient to be discharged today to follow-up closer to her home

## 2023-05-30 NOTE — ASSESSMENT & PLAN NOTE
· Hemoglobin is currently stabilized, had trended down to 6 7 on 5/27/2023 and received 1 unit of PRBCs   · Iron panel results reviewed  · S/p EGD and colonoscopy on 5/22/2023    Findings as above  · Currently without signs of bleeding  · Recommend transfusing for hemoglobin below 7  · Continue to monitor closely for bleeding  · Monitor CBC as an outpatient

## 2023-05-30 NOTE — PLAN OF CARE
Problem: Potential for Falls  Goal: Patient will remain free of falls  Description: INTERVENTIONS:  - Educate patient/family on patient safety including physical limitations  - Instruct patient to call for assistance with activity   - Consult OT/PT to assist with strengthening/mobility   - Keep Call bell within reach  - Keep bed low and locked with side rails adjusted as appropriate  - Keep care items and personal belongings within reach  - Initiate and maintain comfort rounds  - Make Fall Risk Sign visible to staff  - Offer Toileting in advance of need  - Initiate/Maintain bed alarm  - Obtain necessary fall risk management equipment  - Apply yellow socks and bracelet for high fall risk patients  - Consider moving patient to room near nurses station  Outcome: Progressing     Problem: PAIN - ADULT  Goal: Verbalizes/displays adequate comfort level or baseline comfort level  Description: Interventions:  - Encourage patient to monitor pain and request assistance  - Assess pain using appropriate pain scale  - Administer analgesics based on type and severity of pain and evaluate response  - Implement non-pharmacological measures as appropriate and evaluate response  - Consider cultural and social influences on pain and pain management  - Notify physician/advanced practitioner if interventions unsuccessful or patient reports new pain  Outcome: Progressing     Problem: INFECTION - ADULT  Goal: Absence or prevention of progression during hospitalization  Description: INTERVENTIONS:  - Assess and monitor for signs and symptoms of infection  - Monitor lab/diagnostic results  - Monitor all insertion sites, i e  indwelling lines, tubes, and drains  - Monitor endotracheal if appropriate and nasal secretions for changes in amount and color  - [place] appropriate cooling/warming therapies per order  - Administer medications as ordered  - Instruct and encourage patient and family to use good hand hygiene technique  - Identify and instruct in appropriate isolation precautions for identified infection/condition  Outcome: Progressing     Problem: SAFETY ADULT  Goal: Patient will remain free of falls  Description: INTERVENTIONS:  - Educate patient/family on patient safety including physical limitations  - Instruct patient to call for assistance with activity   - Consult OT/PT to assist with strengthening/mobility   - Keep Call bell within reach  - Keep bed low and locked with side rails adjusted as appropriate  - Keep care items and personal belongings within reach  - Initiate and maintain comfort rounds  - Make Fall Risk Sign visible to staff  - Offer Toileting in advance of need  - Initiate/Maintain bed alarm  - Obtain necessary fall risk management equipment  - Apply yellow socks and bracelet for high fall risk patients  - Consider moving patient to room near nurses station  Outcome: Progressing  Goal: Maintain or return to baseline ADL function  Description: INTERVENTIONS:  - Educate patient/family on patient safety including physical limitations  - Instruct patient to call for assistance with activity   - Consult OT/PT to assist with strengthening/mobility   - Keep Call bell within reach  - Keep bed low and locked with side rails adjusted as appropriate  - Keep care items and personal belongings within reach  - Initiate and maintain comfort rounds  - Make Fall Risk Sign visible to staff  - Offer Toileting in advance of need  - Initiate/Maintain bed alarm  - Obtain necessary fall risk management equipment:   - Apply yellow socks and bracelet for high fall risk patients  - Consider moving patient to room near nurses station  Outcome: Progressing     Problem: DISCHARGE PLANNING  Goal: Discharge to home or other facility with appropriate resources  Description: INTERVENTIONS:  - Identify barriers to discharge w/patient and caregiver  - Arrange for needed discharge resources and transportation as appropriate  - Identify discharge learning needs (meds, wound care, etc )  - Refer to Case Management Department for coordinating discharge planning if the patient needs post-hospital services based on physician/advanced practitioner order or complex needs related to functional status, cognitive ability, or social support system  Outcome: Progressing     Problem: Knowledge Deficit  Goal: Patient/family/caregiver demonstrates understanding of disease process, treatment plan, medications, and discharge instructions  Description: Complete learning assessment and assess knowledge base    Interventions:  - Provide teaching at level of understanding  - Provide teaching via preferred learning methods  Outcome: Progressing     Problem: GASTROINTESTINAL - ADULT  Goal: Minimal or absence of nausea and/or vomiting  Description: INTERVENTIONS:  - Administer IV fluids if ordered to ensure adequate hydration  - Maintain NPO status until nausea and vomiting are resolved  - Nasogastric tube if ordered  - Administer ordered antiemetic medications as needed  - Provide nonpharmacologic comfort measures as appropriate  - Advance diet as tolerated, if ordered  - Consider nutrition services referral to assist patient with adequate nutrition and appropriate food choices  Outcome: Progressing  Goal: Maintains or returns to baseline bowel function  Description: INTERVENTIONS:  - Assess bowel function  - Encourage oral fluids to ensure adequate hydration  - Administer IV fluids if ordered to ensure adequate hydration  - Administer ordered medications as needed  - Encourage mobilization and activity  - Consider nutritional services referral to assist patient with adequate nutrition and appropriate food choices  Outcome: Progressing  Goal: Maintains adequate nutritional intake  Description: INTERVENTIONS:  - Monitor percentage of each meal consumed  - Identify factors contributing to decreased intake, treat as appropriate  - Assist with meals as needed  - Monitor I&O, weight, and lab values if indicated  - Obtain nutrition services referral as needed  Outcome: Progressing  Goal: Oral mucous membranes remain intact  Description: INTERVENTIONS  - Assess oral mucosa and hygiene practices  - Implement preventative oral hygiene regimen  - Implement oral medicated treatments as ordered  - Initiate Nutrition services referral as needed  Outcome: Progressing     Problem: GENITOURINARY - ADULT  Goal: Maintains or returns to baseline urinary function  Description: INTERVENTIONS:  - Assess urinary function  - Encourage oral fluids to ensure adequate hydration if ordered  - Administer IV fluids as ordered to ensure adequate hydration  - Administer ordered medications as needed  - Offer frequent toileting  - Follow urinary retention protocol if ordered  Outcome: Progressing     Problem: METABOLIC, FLUID AND ELECTROLYTES - ADULT  Goal: Electrolytes maintained within normal limits  Description: INTERVENTIONS:  - Monitor labs and assess patient for signs and symptoms of electrolyte imbalances  - Administer electrolyte replacement as ordered  - Monitor response to electrolyte replacements, including repeat lab results as appropriate  - Instruct patient on fluid and nutrition as appropriate  Outcome: Progressing  Goal: Fluid balance maintained  Description: INTERVENTIONS:  - Monitor labs   - Monitor I/O and WT  - Instruct patient on fluid and nutrition as appropriate  - Assess for signs & symptoms of volume excess or deficit  Outcome: Progressing  Goal: Glucose maintained within target range  Description: INTERVENTIONS:  - Monitor Blood Glucose as ordered  - Assess for signs and symptoms of hyperglycemia and hypoglycemia  - Administer ordered medications to maintain glucose within target range  - Assess nutritional intake and initiate nutrition service referral as needed  Outcome: Progressing     Problem: Nutrition/Hydration-ADULT  Goal: Nutrient/Hydration intake appropriate for improving, restoring or maintaining nutritional needs  Description: Monitor and assess patient's nutrition/hydration status for malnutrition  Collaborate with interdisciplinary team and initiate plan and interventions as ordered  Monitor patient's weight and dietary intake as ordered or per policy  Utilize nutrition screening tool and intervene as necessary  Determine patient's food preferences and provide high-protein, high-caloric foods as appropriate       INTERVENTIONS:  - Monitor oral intake, urinary output, labs, and treatment plans  - Assess nutrition and hydration status and recommend course of action  - Evaluate amount of meals eaten  - Assist patient with eating if necessary   - Allow adequate time for meals  - Recommend/ encourage appropriate diets, oral nutritional supplements, and vitamin/mineral supplements  - Order, calculate, and assess calorie counts as needed  - Recommend, monitor, and adjust tube feedings and TPN/PPN based on assessed needs  - Assess need for intravenous fluids  - Provide specific nutrition/hydration education as appropriate  - Include patient/family/caregiver in decisions related to nutrition  Outcome: Progressing     Problem: MOBILITY - ADULT  Goal: Maintain or return to baseline ADL function  Description: INTERVENTIONS:  - Educate patient/family on patient safety including physical limitations  - Instruct patient to call for assistance with activity   - Consult OT/PT to assist with strengthening/mobility   - Keep Call bell within reach  - Keep bed low and locked with side rails adjusted as appropriate  - Keep care items and personal belongings within reach  - Initiate and maintain comfort rounds  - Make Fall Risk Sign visible to staff  - Offer Toileting in advance of need  - Initiate/Maintain bed alarm  - Obtain necessary fall risk management equipment:   - Apply yellow socks and bracelet for high fall risk patients  - Consider moving patient to room near nurses station  Outcome: Progressing  Goal: Maintains/Returns to pre admission functional level  Description: INTERVENTIONS:  - Perform BMAT or MOVE assessment daily    - Set and communicate daily mobility goal to care team and patient/family/caregiver     - Collaborate with rehabilitation services on mobility goals if consulted  - Out of bed for toileting  - Record patient progress and toleration of activity level   Outcome: Progressing     Problem: Prexisting or High Potential for Compromised Skin Integrity  Goal: Skin integrity is maintained or improved  Description: INTERVENTIONS:  - Identify patients at risk for skin breakdown  - Assess and monitor skin integrity  - Assess and monitor nutrition and hydration status  - Monitor labs   - Assess for incontinence   - Turn and reposition patient  - Assist with mobility/ambulation  - Relieve pressure over bony prominences  - Avoid friction and shearing  - Provide appropriate hygiene as needed including keeping skin clean and dry  - Evaluate need for skin moisturizer/barrier cream  - Collaborate with interdisciplinary team   - Patient/family teaching  - Consider wound care consult   Outcome: Progressing

## 2023-05-31 ENCOUNTER — APPOINTMENT (INPATIENT)
Dept: NON INVASIVE DIAGNOSTICS | Facility: HOSPITAL | Age: 63
DRG: 988 | End: 2023-05-31
Payer: COMMERCIAL

## 2023-05-31 LAB
ABO GROUP BLD: NORMAL
ANION GAP SERPL CALCULATED.3IONS-SCNC: 8 MMOL/L (ref 4–13)
BASOPHILS # BLD AUTO: 0.03 THOUSANDS/ÂΜL (ref 0–0.1)
BASOPHILS NFR BLD AUTO: 0 % (ref 0–1)
BLD GP AB SCN SERPL QL: NEGATIVE
BUN SERPL-MCNC: 7 MG/DL (ref 5–25)
CALCIUM SERPL-MCNC: 9.1 MG/DL (ref 8.4–10.2)
CHLORIDE SERPL-SCNC: 97 MMOL/L (ref 96–108)
CO2 SERPL-SCNC: 27 MMOL/L (ref 21–32)
CREAT SERPL-MCNC: 0.54 MG/DL (ref 0.6–1.3)
EOSINOPHIL # BLD AUTO: 0.06 THOUSAND/ÂΜL (ref 0–0.61)
EOSINOPHIL NFR BLD AUTO: 1 % (ref 0–6)
ERYTHROCYTE [DISTWIDTH] IN BLOOD BY AUTOMATED COUNT: 16.3 % (ref 11.6–15.1)
GFR SERPL CREATININE-BSD FRML MDRD: 101 ML/MIN/1.73SQ M
GLUCOSE SERPL-MCNC: 150 MG/DL (ref 65–140)
HCT VFR BLD AUTO: 28 % (ref 34.8–46.1)
HGB BLD-MCNC: 8.5 G/DL (ref 11.5–15.4)
IMM GRANULOCYTES # BLD AUTO: 0.04 THOUSAND/UL (ref 0–0.2)
IMM GRANULOCYTES NFR BLD AUTO: 0 % (ref 0–2)
LYMPHOCYTES # BLD AUTO: 1.14 THOUSANDS/ÂΜL (ref 0.6–4.47)
LYMPHOCYTES NFR BLD AUTO: 9 % (ref 14–44)
MAGNESIUM SERPL-MCNC: 1.8 MG/DL (ref 1.9–2.7)
MCH RBC QN AUTO: 24.4 PG (ref 26.8–34.3)
MCHC RBC AUTO-ENTMCNC: 30.4 G/DL (ref 31.4–37.4)
MCV RBC AUTO: 80 FL (ref 82–98)
MONOCYTES # BLD AUTO: 1.26 THOUSAND/ÂΜL (ref 0.17–1.22)
MONOCYTES NFR BLD AUTO: 10 % (ref 4–12)
NEUTROPHILS # BLD AUTO: 9.98 THOUSANDS/ÂΜL (ref 1.85–7.62)
NEUTS SEG NFR BLD AUTO: 80 % (ref 43–75)
NRBC BLD AUTO-RTO: 0 /100 WBCS
PLATELET # BLD AUTO: 503 THOUSANDS/UL (ref 149–390)
PMV BLD AUTO: 8.1 FL (ref 8.9–12.7)
POTASSIUM SERPL-SCNC: 4.1 MMOL/L (ref 3.5–5.3)
RBC # BLD AUTO: 3.49 MILLION/UL (ref 3.81–5.12)
RH BLD: POSITIVE
SODIUM SERPL-SCNC: 132 MMOL/L (ref 135–147)
SPECIMEN EXPIRATION DATE: NORMAL
WBC # BLD AUTO: 12.51 THOUSAND/UL (ref 4.31–10.16)

## 2023-05-31 PROCEDURE — 86901 BLOOD TYPING SEROLOGIC RH(D): CPT | Performed by: PHYSICIAN ASSISTANT

## 2023-05-31 PROCEDURE — 80048 BASIC METABOLIC PNL TOTAL CA: CPT

## 2023-05-31 PROCEDURE — 83735 ASSAY OF MAGNESIUM: CPT

## 2023-05-31 PROCEDURE — 86900 BLOOD TYPING SEROLOGIC ABO: CPT | Performed by: PHYSICIAN ASSISTANT

## 2023-05-31 PROCEDURE — C9113 INJ PANTOPRAZOLE SODIUM, VIA: HCPCS | Performed by: INTERNAL MEDICINE

## 2023-05-31 PROCEDURE — 93971 EXTREMITY STUDY: CPT | Performed by: SURGERY

## 2023-05-31 PROCEDURE — 85025 COMPLETE CBC W/AUTO DIFF WBC: CPT

## 2023-05-31 PROCEDURE — 86850 RBC ANTIBODY SCREEN: CPT | Performed by: PHYSICIAN ASSISTANT

## 2023-05-31 PROCEDURE — 93971 EXTREMITY STUDY: CPT

## 2023-05-31 PROCEDURE — 99233 SBSQ HOSP IP/OBS HIGH 50: CPT

## 2023-05-31 RX ORDER — HYDROMORPHONE HCL/PF 1 MG/ML
1 SYRINGE (ML) INJECTION EVERY 4 HOURS PRN
Status: DISCONTINUED | OUTPATIENT
Start: 2023-05-31 | End: 2023-06-01

## 2023-05-31 RX ORDER — BISACODYL 10 MG
10 SUPPOSITORY, RECTAL RECTAL ONCE
Status: COMPLETED | OUTPATIENT
Start: 2023-05-31 | End: 2023-05-31

## 2023-05-31 RX ORDER — HYDROMORPHONE HCL/PF 1 MG/ML
0.5 SYRINGE (ML) INJECTION ONCE
Status: COMPLETED | OUTPATIENT
Start: 2023-05-31 | End: 2023-05-31

## 2023-05-31 RX ORDER — MAGNESIUM SULFATE HEPTAHYDRATE 40 MG/ML
2 INJECTION, SOLUTION INTRAVENOUS ONCE
Status: COMPLETED | OUTPATIENT
Start: 2023-05-31 | End: 2023-05-31

## 2023-05-31 RX ORDER — HYDROMORPHONE HYDROCHLORIDE 2 MG/1
4 TABLET ORAL EVERY 4 HOURS PRN
Status: DISCONTINUED | OUTPATIENT
Start: 2023-05-31 | End: 2023-06-05 | Stop reason: HOSPADM

## 2023-05-31 RX ORDER — ONDANSETRON 4 MG/1
8 TABLET, ORALLY DISINTEGRATING ORAL
Status: DISCONTINUED | OUTPATIENT
Start: 2023-05-31 | End: 2023-06-05 | Stop reason: HOSPADM

## 2023-05-31 RX ORDER — METOCLOPRAMIDE HYDROCHLORIDE 5 MG/ML
10 INJECTION INTRAMUSCULAR; INTRAVENOUS EVERY 6 HOURS PRN
Status: DISCONTINUED | OUTPATIENT
Start: 2023-05-31 | End: 2023-06-05 | Stop reason: HOSPADM

## 2023-05-31 RX ORDER — HYDROMORPHONE HYDROCHLORIDE 2 MG/1
4 TABLET ORAL EVERY 4 HOURS
Status: DISCONTINUED | OUTPATIENT
Start: 2023-05-31 | End: 2023-06-01

## 2023-05-31 RX ADMIN — HYDROMORPHONE HYDROCHLORIDE 4 MG: 2 TABLET ORAL at 20:13

## 2023-05-31 RX ADMIN — HYDROMORPHONE HYDROCHLORIDE 1 MG: 1 INJECTION, SOLUTION INTRAMUSCULAR; INTRAVENOUS; SUBCUTANEOUS at 14:14

## 2023-05-31 RX ADMIN — FERROUS SULFATE TAB 325 MG (65 MG ELEMENTAL FE) 325 MG: 325 (65 FE) TAB at 08:03

## 2023-05-31 RX ADMIN — PANTOPRAZOLE SODIUM 40 MG: 40 INJECTION, POWDER, FOR SOLUTION INTRAVENOUS at 08:01

## 2023-05-31 RX ADMIN — POTASSIUM CHLORIDE 20 MEQ: 1500 TABLET, EXTENDED RELEASE ORAL at 08:02

## 2023-05-31 RX ADMIN — BUPROPION HYDROCHLORIDE TABLETS 150 MG: 100 TABLET, FILM COATED ORAL at 08:01

## 2023-05-31 RX ADMIN — HEPARIN SODIUM 5000 UNITS: 5000 INJECTION INTRAVENOUS; SUBCUTANEOUS at 14:14

## 2023-05-31 RX ADMIN — HEPARIN SODIUM 5000 UNITS: 5000 INJECTION INTRAVENOUS; SUBCUTANEOUS at 22:44

## 2023-05-31 RX ADMIN — HEPARIN SODIUM 5000 UNITS: 5000 INJECTION INTRAVENOUS; SUBCUTANEOUS at 05:03

## 2023-05-31 RX ADMIN — METOCLOPRAMIDE 10 MG: 5 INJECTION, SOLUTION INTRAMUSCULAR; INTRAVENOUS at 22:44

## 2023-05-31 RX ADMIN — BUPROPION HYDROCHLORIDE TABLETS 150 MG: 100 TABLET, FILM COATED ORAL at 18:26

## 2023-05-31 RX ADMIN — HYDROMORPHONE HYDROCHLORIDE 1 MG: 1 INJECTION, SOLUTION INTRAMUSCULAR; INTRAVENOUS; SUBCUTANEOUS at 22:44

## 2023-05-31 RX ADMIN — MAGNESIUM SULFATE HEPTAHYDRATE 2 G: 40 INJECTION, SOLUTION INTRAVENOUS at 08:01

## 2023-05-31 RX ADMIN — HYDROMORPHONE HYDROCHLORIDE 4 MG: 2 TABLET ORAL at 12:19

## 2023-05-31 RX ADMIN — ONDANSETRON 8 MG: 4 TABLET, ORALLY DISINTEGRATING ORAL at 16:33

## 2023-05-31 RX ADMIN — ONDANSETRON 8 MG: 4 TABLET, ORALLY DISINTEGRATING ORAL at 12:19

## 2023-05-31 RX ADMIN — BISACODYL 10 MG: 10 SUPPOSITORY RECTAL at 10:37

## 2023-05-31 RX ADMIN — HYDROMORPHONE HYDROCHLORIDE 4 MG: 2 TABLET ORAL at 16:33

## 2023-05-31 RX ADMIN — DICYCLOMINE HYDROCHLORIDE 10 MG: 10 CAPSULE ORAL at 22:44

## 2023-05-31 RX ADMIN — LOSARTAN POTASSIUM 100 MG: 50 TABLET, FILM COATED ORAL at 08:03

## 2023-05-31 RX ADMIN — HYDROMORPHONE HYDROCHLORIDE 0.5 MG: 1 INJECTION, SOLUTION INTRAMUSCULAR; INTRAVENOUS; SUBCUTANEOUS at 00:05

## 2023-05-31 RX ADMIN — HYDROMORPHONE HYDROCHLORIDE 0.5 MG: 1 INJECTION, SOLUTION INTRAMUSCULAR; INTRAVENOUS; SUBCUTANEOUS at 10:37

## 2023-05-31 RX ADMIN — HYDROMORPHONE HYDROCHLORIDE 4 MG: 2 TABLET ORAL at 08:02

## 2023-05-31 RX ADMIN — ONDANSETRON 4 MG: 2 INJECTION INTRAMUSCULAR; INTRAVENOUS at 09:22

## 2023-05-31 RX ADMIN — DICYCLOMINE HYDROCHLORIDE 10 MG: 10 CAPSULE ORAL at 16:34

## 2023-05-31 RX ADMIN — DICYCLOMINE HYDROCHLORIDE 10 MG: 10 CAPSULE ORAL at 10:37

## 2023-05-31 RX ADMIN — PRAVASTATIN SODIUM 80 MG: 40 TABLET ORAL at 16:33

## 2023-05-31 RX ADMIN — HYDROMORPHONE HYDROCHLORIDE 0.5 MG: 1 INJECTION, SOLUTION INTRAMUSCULAR; INTRAVENOUS; SUBCUTANEOUS at 04:58

## 2023-05-31 RX ADMIN — SENNOSIDES 8.6 MG: 8.6 TABLET, FILM COATED ORAL at 22:44

## 2023-05-31 RX ADMIN — CYANOCOBALAMIN TAB 500 MCG 500 MCG: 500 TAB at 08:02

## 2023-05-31 RX ADMIN — DICYCLOMINE HYDROCHLORIDE 10 MG: 10 CAPSULE ORAL at 08:02

## 2023-05-31 RX ADMIN — POLYETHYLENE GLYCOL 3350 17 G: 17 POWDER, FOR SOLUTION ORAL at 08:19

## 2023-05-31 RX ADMIN — ONDANSETRON 4 MG: 2 INJECTION INTRAMUSCULAR; INTRAVENOUS at 00:05

## 2023-05-31 RX ADMIN — HYDROMORPHONE HYDROCHLORIDE 0.5 MG: 1 INJECTION, SOLUTION INTRAMUSCULAR; INTRAVENOUS; SUBCUTANEOUS at 09:22

## 2023-05-31 RX ADMIN — HYDROMORPHONE HYDROCHLORIDE 1 MG: 1 INJECTION, SOLUTION INTRAMUSCULAR; INTRAVENOUS; SUBCUTANEOUS at 18:26

## 2023-05-31 RX ADMIN — HYDROMORPHONE HYDROCHLORIDE 2 MG: 2 TABLET ORAL at 01:34

## 2023-05-31 RX ADMIN — DOCUSATE SODIUM 100 MG: 100 CAPSULE, LIQUID FILLED ORAL at 18:26

## 2023-05-31 RX ADMIN — Medication 400 MCG: at 08:02

## 2023-05-31 RX ADMIN — DOCUSATE SODIUM 100 MG: 100 CAPSULE, LIQUID FILLED ORAL at 08:03

## 2023-05-31 NOTE — PLAN OF CARE
Problem: Potential for Falls  Goal: Patient will remain free of falls  Description: INTERVENTIONS:  - Educate patient/family on patient safety including physical limitations  - Instruct patient to call for assistance with activity   - Consult OT/PT to assist with strengthening/mobility   - Keep Call bell within reach  - Keep bed low and locked with side rails adjusted as appropriate  - Keep care items and personal belongings within reach  - Initiate and maintain comfort rounds  - Make Fall Risk Sign visible to staff  - Offer Toileting in advance of need  - Initiate/Maintain bed alarm  - Obtain necessary fall risk management equipment  - Apply yellow socks and bracelet for high fall risk patients  - Consider moving patient to room near nurses station  Outcome: Progressing     Problem: PAIN - ADULT  Goal: Verbalizes/displays adequate comfort level or baseline comfort level  Description: Interventions:  - Encourage patient to monitor pain and request assistance  - Assess pain using appropriate pain scale  - Administer analgesics based on type and severity of pain and evaluate response  - Implement non-pharmacological measures as appropriate and evaluate response  - Consider cultural and social influences on pain and pain management  - Notify physician/advanced practitioner if interventions unsuccessful or patient reports new pain  Outcome: Progressing     Problem: SAFETY ADULT  Goal: Patient will remain free of falls  Description: INTERVENTIONS:  - Educate patient/family on patient safety including physical limitations  - Instruct patient to call for assistance with activity   - Consult OT/PT to assist with strengthening/mobility   - Keep Call bell within reach  - Keep bed low and locked with side rails adjusted as appropriate  - Keep care items and personal belongings within reach  - Initiate and maintain comfort rounds  - Make Fall Risk Sign visible to staff  - Offer Toileting in advance of need  - Initiate/Maintain bed alarm  - Obtain necessary fall risk management equipment  - Apply yellow socks and bracelet for high fall risk patients  - Consider moving patient to room near nurses station  Outcome: Progressing

## 2023-05-31 NOTE — PROGRESS NOTES
[facility] 45  Progress Note  Name: Shereen Aranda  MRN: [id number]  Unit/Bed#: -01 I Date of Admission: 5/21/2023   Date of Service: 5/31/2023 I Hospital Day: 10    Assessment/Plan   * Metastatic adenocarcinoma [facility])  Assessment & Plan  · Presented for right upper lower quadrant abdominal pain with nausea vomiting and diarrhea   · Leukocytosis with WBC 21 9  · CT chest abdomen pelvis  · Heterogeneously enhancing right suprahilar lesion with abrupt cut off of the right mainstem bronchus suspicious for malignancy    There is secondary complete atelectasis of the right lung with heterogeneous density  Small to moderate right pleural effusion is noted  Few subcentimeter left lung nodules are noted  Evidence of mediastinal and right supraclavicular adenopathy  · Heterogeneous, rim-enhancing lesion at the proximal pancreatic body, possible metastatic focus  Pathologically enlarged celiac axis lymph nodes  Focal annular lesion involving the ascending colon, suspicious for metastatic focus versus primary malignancy  (this ascending colon lesion was not found on subsequent colonoscopy)  · GI consulted, appreciate recommendations  Patient had EGD/colonoscopy on 5/22  · 5/22 EGD: Mild patchy erythematous mucosa in the antrum  Cold forceps biopsy performed  Normal duodenum, random biopsy was performed to rule out celiac disease  · 5/22 colonoscopy: Polyp removed from sigmoid colon  Scattered diverticula of mild severity in the sigmoid colon  · Pulmonology consulted, appreciate recommendations  Recommendation is biopsy of large right supraclavicular node  Performed by IR, final results: Metastatic carcinoma, compatible with adenocarcinoma of lung origin  Pulmonology has signed off  · Oncology consulted, appreciate recommendations  · 5/21 MRI brain: No acute infarction, edema, or mass effect   Few punctate hyperintense T2/FLAIR foci are noted within the periventricular and subcortical white matter which are nonspecific and can be seen with vasculitis, migrainous angiopathy, Lyme's disease or microangiopathic changes  · Palliative care consulted, appreciate recommendations  · Patient started on Oxy IR 3 times daily on 5/29   · On 5/30 OxyContin discontinued as patient reported not helping her pain  · Oxy IR discontinued, patient started on around-the-clock Dilaudid on 5/30  · Patient reports pain still uncontrolled this a m , discussed with palliative care, for changes to pain regimen see abdominal pain plan  · continue ondansetron and scopolamine  · Hematology/oncology consult, appreciate recommendations  · Significant other is in process of obtaining PCP for patient  · Patient is still undecided whether she wants to stay here living in the [place] or return to [place] for care or in [place]  · Case management following    Abdominal pain  Assessment & Plan  · Patient presented to ED with complaints of right upper and lower quadrant abdominal pain with nausea, vomiting and diarrhea    She reported pain was right upper quadrant and worse at night  · Palliative care has been following, appreciate recommendations  · Palliative care saw patient 5/30, discontinued OxyContin and started patient on scheduled Dilaudid with as needed Dilaudid goal of patient not requiring IV Dilaudid for breakthrough pain  · Patient reports no improvement in pain overnight  · Reached out to palliative care, patient's Dilaudid was increased to every 4 hours round-the-clock with oral Dilaudid every 4 hours as needed and IV Dilaudid increased to 1 mg for breakthrough pain  · We will continue to monitor effectiveness of pain regimen  · CT scan chest abdomen pelvis see above  · For assessment/treatment plan see abnormal CT scan above  · X-ray abdomen 5/30 chest constipation, no obstruction  · Continue bowel regimen  · Added Dulcolax to regimen    Hypomagnesemia  Assessment & Plan  · POA with serum magnesium 1  5  · Likely secondary to vomiting and diarrhea  · Repleted with 4 g IV magnesium on 5/21  · Resolved, repeat Mg on 5/22 3 0  · Magnesium on 5/30 1 5, again repleted with 4 g IV magnesium  · Magnesium level 5/31 1 8 , repleted with 2 g IV magnesium   · Repeat magnesium level in a m  Diarrhea  Assessment & Plan  · Present on admission, now resolved     Nausea & vomiting  Assessment & Plan  · Presented with nausea and vomiting and right upper and lower quadrant pain  · No nausea vomiting currently   · CT imaging see above  · Continue Zofran and scopolamine  · Regular diet as tolerated      Iron deficiency anemia  Assessment & Plan  · Hemoglobin currently stabilized   · Had trended downward to 6 7 on 5/27 which time patient received 1 unit of PRBCs   · Iron panel results reviewed  · S/p EGD and colonoscopy on 5/22  Reviewed findings  · Currently without signs of bleeding  · Plan to transfuse for hemoglobin below 7  · Continue to monitor closely for bleeding  · Continue vitamin B12, ferrous sulfate, folic acid  · CBC in a m  Anxiety  Assessment & Plan  · Currently without anxiety  · Continue as needed Atarax  · Continue Wellbutrin           VTE Pharmacologic Prophylaxis:   Pharmacologic: Heparin  Mechanical VTE Prophylaxis in Place: Yes    Patient Centered Rounds: I have performed bedside rounds with nursing staff today  Discussions with Specialists or Other Care Team Provider: Nursing, case management    Education and Discussions with Family / Patient: Treatment plan discussed with patient  She is still trying to decide if she wants to return to [place] or stay locally for treatment  Patient's wife is in process of finding her a primary care care physician  Time Spent for Care: 45 minutes  More than 50% of total time spent on counseling and coordination of care as described above      Current Length of Stay: 10 day(s)    Current Patient Status: Inpatient   Certification Statement: The patient will continue to require additional inpatient hospital stay due to Uncontrolled pain requiring IV pain medication for breakthrough    Discharge Plan: Pending hospital course  Patient had pain medication regimen increased yesterday per palliative care, reported no improvement this a m  Reached out to palliative care, pain regimen again adjusted, will continue to monitor for effectiveness  Patient is still undecided if she will go to her home in [place] or will stay locally on discharge  She still wants to have treatment for her cancer at this time and wants to remain a full code  Code Status: Level 1 - Full Code      Subjective:   Patient reports uncontrolled pain overnight  States that the Dilaudid pill does not hold her until the next 2 dose and the IV Dilaudid is effective but does not work for long time  Objective:     Vitals:   Temp (24hrs), Av 4 °F (36 9 °C), Min:98 3 °F (36 8 °C), Max:98 5 °F (36 9 °C)    Temp:  [98 3 °F (36 8 °C)-98 5 °F (36 9 °C)] 98 5 °F (36 9 °C)  HR:  [102-103] 103  Resp:  [17-18] 17  BP: (133-178)/(73-94) 178/94  SpO2:  [91 %-92 %] 91 %  Body mass index is 22 13 kg/m²  Input and Output Summary (last 24 hours): Intake/Output Summary (Last 24 hours) at 2023 1252  Last data filed at 2023 0802  Gross per 24 hour   Intake 570 ml   Output 600 ml   Net -30 ml       Physical Exam:     Physical Exam  Vitals and nursing note reviewed  Constitutional:       General: She is not in acute distress  Appearance: She is not ill-appearing  HENT:      Head: Normocephalic and atraumatic  Nose: Nose normal       Mouth/Throat:      Mouth: Mucous membranes are dry  Cardiovascular:      Rate and Rhythm: Normal rate and regular rhythm  Pulses: Normal pulses  Heart sounds: Normal heart sounds  Pulmonary:      Effort: Pulmonary effort is normal  No respiratory distress  Breath sounds: Normal breath sounds  No wheezing or rales     Abdominal: General: Bowel sounds are normal  There is no distension  Palpations: Abdomen is soft  Tenderness: There is no abdominal tenderness  There is no guarding  Musculoskeletal:         General: Normal range of motion  Right lower leg: No edema  Left lower leg: No edema  Skin:     General: Skin is warm and dry  Capillary Refill: Capillary refill takes less than 2 seconds  Neurological:      General: No focal deficit present  Mental Status: She is alert and oriented to person, place, and time  Mental status is at baseline  Psychiatric:         Mood and Affect: Mood normal          Behavior: Behavior normal          Thought Content: Thought content normal          Judgment: Judgment normal          Additional Data:     Labs:    Results from last 7 days   Lab Units 05/31/23  0506   EOS PCT % 1   HEMATOCRIT % 28 0*   HEMOGLOBIN g/dL 8 5*   LYMPHS PCT % 9*   MONOS PCT % 10   NEUTROS PCT % 80*   PLATELETS Thousands/uL 503*   WBC Thousand/uL 12 51*     Results from last 7 days   Lab Units 05/31/23  0617   BUN mg/dL 7   CALCIUM mg/dL 9 1   CHLORIDE mmol/L 97   CO2 mmol/L 27   CREATININE mg/dL 0 54*   POTASSIUM mmol/L 4 1           * I Have Reviewed All Lab Data Listed Above  * Additional Pertinent Lab Tests Reviewed:  [facility]    Imaging:    Imaging Reports Reviewed Today Include: CT chest abdomen pelvis, MRI brain, KUB  Imaging Personally Reviewed by Myself Includes: CT chest abdomen pelvis, KUB    Recent Cultures (last 7 days):           Last 24 Hours Medication List:   Current Facility-Administered Medications   Medication Dose Route Frequency Provider Last Rate   • acetaminophen  650 mg Oral Q6H PRN Ratna Neely MD     • buPROPion  150 mg Oral BID Ratna Neely MD     • butalbital-acetaminophen-caffeine  1 tablet Oral Q4H PRN Ratna Neely MD     • cyanocobalamin  500 mcg Oral Daily Ratna Neely MD     • dextromethorphan-guaiFENesin  10 mL Oral Q4H PRN Jovon Parker PA-C     • dicyclomine  10 mg Oral 4x Daily (AC & HS) BRITTNY Sheppard     • docusate sodium  100 mg Oral BID BRITTNY Sheppard     • ferrous sulfate  325 mg Oral Daily With Breakfast BRITTNY Flores     • folic acid  485 mcg Oral Daily Daryl Disla MD     • heparin (porcine)  5,000 Units Subcutaneous [facility] BRITTNY Flores     • HYDROmorphone  1 mg Intravenous Q4H PRN Myke Rapp PA-C     • HYDROmorphone  4 mg Oral Q4H PRN Myke Rapp PA-C     • HYDROmorphone  4 mg Oral Q4H Sigmund Goldmann, MD     • hydrOXYzine HCL  25 mg Oral TID PRN BRITTNY Flores     • losartan  100 mg Oral Daily Daryl Disla MD     • naloxone  0 04 mg Intravenous Q1MIN PRN BRITTNY Flores     • ondansetron  8 mg Oral TID [facility] Sigmund Goldmann, MD     • pantoprazole  40 mg Intravenous Q24H [facility] Daryl Disla MD     • polyethylene glycol  17 g Oral Daily BRITTNY Sheppard     • potassium chloride  20 mEq Oral Daily Jovon Parker PA-C     • pravastatin  80 mg Oral Daily With Chloe Huddleston MD     • scopolamine  1 patch Transdermal Q72H BRITTNY Flores     • senna  1 tablet Oral HS BRITTNY Sheppard          Today, Patient Was Seen By: BRITTNY Whelan    ** Please Note: Dictation voice to text software may have been used in the creation of this document   **

## 2023-05-31 NOTE — TREATMENT PLAN
5/31/2023 10:26 AM - REport collected from hospital teams that pt is still endorsing suboptimal pain control, with notable 'wearing off' of hydromorphone 4mg PO doses prior to next scheduled dose  In review of patient's opioid tolerances with her evolving cancer pain, we find:      - 2mg hydromorphone PO PRN likely insufficient to help with pain control    - 0 5mg hydromorphone IV PRN likely insufficient to provide durable relief    - long-acting medication is likely indicated, but the effective dose of this cannot be safely estimated from current levels of opioid which have NOT provided good relief to patient  For today, will trial increased scheduled and PRN short-acting opioid control with hydromorphone, and use the data over 24hrs to calculate OMEs tomorrow  With better control of pain, total OMEs over 12-24 hrs will more effectively help us calculate home dosing of long-acting meds  Goal of preventing both overdose AND underdose with repeat ED visit for pain control will be easier to achieve with this strategy  Will also D/C PRN IV ondansetron, and schedule ODT ondansetron  This will also help prepare pt for D/C to home on an enteral/PO regimen  Discussed with Mr Francisco Javier Sibley, who will round in-person tomorrow on this patient  Leilani Mcnair MD  Palliative and Supportive Care  Clinic/Answering Service: [phone]  You can find me on [email or web address]!

## 2023-06-01 LAB
ANION GAP SERPL CALCULATED.3IONS-SCNC: 8 MMOL/L (ref 4–13)
BASOPHILS # BLD AUTO: 0.04 THOUSANDS/ÂΜL (ref 0–0.1)
BASOPHILS NFR BLD AUTO: 0 % (ref 0–1)
BUN SERPL-MCNC: 8 MG/DL (ref 5–25)
CALCIUM SERPL-MCNC: 9.1 MG/DL (ref 8.4–10.2)
CHLORIDE SERPL-SCNC: 97 MMOL/L (ref 96–108)
CO2 SERPL-SCNC: 27 MMOL/L (ref 21–32)
CREAT SERPL-MCNC: 0.6 MG/DL (ref 0.6–1.3)
EOSINOPHIL # BLD AUTO: 0.24 THOUSAND/ÂΜL (ref 0–0.61)
EOSINOPHIL NFR BLD AUTO: 2 % (ref 0–6)
ERYTHROCYTE [DISTWIDTH] IN BLOOD BY AUTOMATED COUNT: 16.4 % (ref 11.6–15.1)
GFR SERPL CREATININE-BSD FRML MDRD: 98 ML/MIN/1.73SQ M
GLUCOSE SERPL-MCNC: 120 MG/DL (ref 65–140)
HCT VFR BLD AUTO: 29.2 % (ref 34.8–46.1)
HGB BLD-MCNC: 8.8 G/DL (ref 11.5–15.4)
IMM GRANULOCYTES # BLD AUTO: 0.04 THOUSAND/UL (ref 0–0.2)
IMM GRANULOCYTES NFR BLD AUTO: 0 % (ref 0–2)
LYMPHOCYTES # BLD AUTO: 2.04 THOUSANDS/ÂΜL (ref 0.6–4.47)
LYMPHOCYTES NFR BLD AUTO: 18 % (ref 14–44)
MAGNESIUM SERPL-MCNC: 1.6 MG/DL (ref 1.9–2.7)
MCH RBC QN AUTO: 24.2 PG (ref 26.8–34.3)
MCHC RBC AUTO-ENTMCNC: 30.1 G/DL (ref 31.4–37.4)
MCV RBC AUTO: 80 FL (ref 82–98)
MONOCYTES # BLD AUTO: 1.71 THOUSAND/ÂΜL (ref 0.17–1.22)
MONOCYTES NFR BLD AUTO: 15 % (ref 4–12)
NEUTROPHILS # BLD AUTO: 7.07 THOUSANDS/ÂΜL (ref 1.85–7.62)
NEUTS SEG NFR BLD AUTO: 65 % (ref 43–75)
NRBC BLD AUTO-RTO: 0 /100 WBCS
PLATELET # BLD AUTO: 532 THOUSANDS/UL (ref 149–390)
PMV BLD AUTO: 8.2 FL (ref 8.9–12.7)
POTASSIUM SERPL-SCNC: 3.7 MMOL/L (ref 3.5–5.3)
RBC # BLD AUTO: 3.64 MILLION/UL (ref 3.81–5.12)
SODIUM SERPL-SCNC: 132 MMOL/L (ref 135–147)
WBC # BLD AUTO: 11.14 THOUSAND/UL (ref 4.31–10.16)

## 2023-06-01 PROCEDURE — 80048 BASIC METABOLIC PNL TOTAL CA: CPT

## 2023-06-01 PROCEDURE — C9113 INJ PANTOPRAZOLE SODIUM, VIA: HCPCS | Performed by: INTERNAL MEDICINE

## 2023-06-01 PROCEDURE — 99233 SBSQ HOSP IP/OBS HIGH 50: CPT

## 2023-06-01 PROCEDURE — 83735 ASSAY OF MAGNESIUM: CPT

## 2023-06-01 PROCEDURE — 85025 COMPLETE CBC W/AUTO DIFF WBC: CPT

## 2023-06-01 PROCEDURE — 99233 SBSQ HOSP IP/OBS HIGH 50: CPT | Performed by: PHYSICIAN ASSISTANT

## 2023-06-01 RX ORDER — LIDOCAINE 50 MG/G
1 PATCH TOPICAL DAILY
Status: DISCONTINUED | OUTPATIENT
Start: 2023-06-01 | End: 2023-06-05 | Stop reason: HOSPADM

## 2023-06-01 RX ORDER — ENEMA 19; 7 G/133ML; G/133ML
1 ENEMA RECTAL ONCE
Status: COMPLETED | OUTPATIENT
Start: 2023-06-01 | End: 2023-06-01

## 2023-06-01 RX ORDER — MAGNESIUM SULFATE HEPTAHYDRATE 40 MG/ML
4 INJECTION, SOLUTION INTRAVENOUS ONCE
Status: COMPLETED | OUTPATIENT
Start: 2023-06-01 | End: 2023-06-01

## 2023-06-01 RX ORDER — GUAIFENESIN 600 MG/1
600 TABLET, EXTENDED RELEASE ORAL EVERY 12 HOURS SCHEDULED
Status: DISCONTINUED | OUTPATIENT
Start: 2023-06-01 | End: 2023-06-05 | Stop reason: HOSPADM

## 2023-06-01 RX ADMIN — FERROUS SULFATE TAB 325 MG (65 MG ELEMENTAL FE) 325 MG: 325 (65 FE) TAB at 07:39

## 2023-06-01 RX ADMIN — OXYCODONE HYDROCHLORIDE 30 MG: 20 TABLET, FILM COATED, EXTENDED RELEASE ORAL at 20:35

## 2023-06-01 RX ADMIN — PANTOPRAZOLE SODIUM 40 MG: 40 INJECTION, POWDER, FOR SOLUTION INTRAVENOUS at 08:30

## 2023-06-01 RX ADMIN — CYANOCOBALAMIN TAB 500 MCG 500 MCG: 500 TAB at 08:29

## 2023-06-01 RX ADMIN — PRAVASTATIN SODIUM 80 MG: 40 TABLET ORAL at 15:47

## 2023-06-01 RX ADMIN — POTASSIUM CHLORIDE 20 MEQ: 1500 TABLET, EXTENDED RELEASE ORAL at 08:30

## 2023-06-01 RX ADMIN — BUPROPION HYDROCHLORIDE TABLETS 150 MG: 100 TABLET, FILM COATED ORAL at 08:29

## 2023-06-01 RX ADMIN — HYDROMORPHONE HYDROCHLORIDE 4 MG: 2 TABLET ORAL at 04:26

## 2023-06-01 RX ADMIN — POLYETHYLENE GLYCOL 3350 17 G: 17 POWDER, FOR SOLUTION ORAL at 08:30

## 2023-06-01 RX ADMIN — GUAIFENESIN 600 MG: 600 TABLET, EXTENDED RELEASE ORAL at 11:35

## 2023-06-01 RX ADMIN — DICYCLOMINE HYDROCHLORIDE 10 MG: 10 CAPSULE ORAL at 21:53

## 2023-06-01 RX ADMIN — SODIUM PHOSPHATE, DIBASIC AND SODIUM PHOSPHATE, MONOBASIC 1 ENEMA: 7; 19 ENEMA RECTAL at 19:30

## 2023-06-01 RX ADMIN — HYDROMORPHONE HYDROCHLORIDE 4 MG: 2 TABLET ORAL at 21:53

## 2023-06-01 RX ADMIN — LIDOCAINE 1 PATCH: 50 PATCH CUTANEOUS at 11:37

## 2023-06-01 RX ADMIN — HYDROMORPHONE HYDROCHLORIDE 4 MG: 2 TABLET ORAL at 17:52

## 2023-06-01 RX ADMIN — OXYCODONE HYDROCHLORIDE 30 MG: 20 TABLET, FILM COATED, EXTENDED RELEASE ORAL at 12:32

## 2023-06-01 RX ADMIN — LOSARTAN POTASSIUM 100 MG: 50 TABLET, FILM COATED ORAL at 08:29

## 2023-06-01 RX ADMIN — GUAIFENESIN 600 MG: 600 TABLET, EXTENDED RELEASE ORAL at 20:35

## 2023-06-01 RX ADMIN — BUPROPION HYDROCHLORIDE TABLETS 150 MG: 100 TABLET, FILM COATED ORAL at 17:52

## 2023-06-01 RX ADMIN — DICYCLOMINE HYDROCHLORIDE 10 MG: 10 CAPSULE ORAL at 11:35

## 2023-06-01 RX ADMIN — HYDROMORPHONE HYDROCHLORIDE 4 MG: 2 TABLET ORAL at 08:29

## 2023-06-01 RX ADMIN — ONDANSETRON 8 MG: 4 TABLET, ORALLY DISINTEGRATING ORAL at 15:47

## 2023-06-01 RX ADMIN — HEPARIN SODIUM 5000 UNITS: 5000 INJECTION INTRAVENOUS; SUBCUTANEOUS at 05:21

## 2023-06-01 RX ADMIN — HEPARIN SODIUM 5000 UNITS: 5000 INJECTION INTRAVENOUS; SUBCUTANEOUS at 21:53

## 2023-06-01 RX ADMIN — MAGNESIUM SULFATE HEPTAHYDRATE 4 G: 40 INJECTION, SOLUTION INTRAVENOUS at 12:34

## 2023-06-01 RX ADMIN — DOCUSATE SODIUM 100 MG: 100 CAPSULE, LIQUID FILLED ORAL at 08:29

## 2023-06-01 RX ADMIN — ONDANSETRON 8 MG: 4 TABLET, ORALLY DISINTEGRATING ORAL at 07:39

## 2023-06-01 RX ADMIN — LIDOCAINE 1 PATCH: 50 PATCH CUTANEOUS at 11:38

## 2023-06-01 RX ADMIN — DICYCLOMINE HYDROCHLORIDE 10 MG: 10 CAPSULE ORAL at 15:47

## 2023-06-01 RX ADMIN — SENNOSIDES 8.6 MG: 8.6 TABLET, FILM COATED ORAL at 21:53

## 2023-06-01 RX ADMIN — HYDROMORPHONE HYDROCHLORIDE 1 MG: 1 INJECTION, SOLUTION INTRAMUSCULAR; INTRAVENOUS; SUBCUTANEOUS at 05:20

## 2023-06-01 RX ADMIN — Medication 400 MCG: at 08:29

## 2023-06-01 RX ADMIN — HYDROMORPHONE HYDROCHLORIDE 4 MG: 2 TABLET ORAL at 00:22

## 2023-06-01 RX ADMIN — DOCUSATE SODIUM 100 MG: 100 CAPSULE, LIQUID FILLED ORAL at 17:52

## 2023-06-01 RX ADMIN — ONDANSETRON 8 MG: 4 TABLET, ORALLY DISINTEGRATING ORAL at 11:35

## 2023-06-01 RX ADMIN — HEPARIN SODIUM 5000 UNITS: 5000 INJECTION INTRAVENOUS; SUBCUTANEOUS at 15:47

## 2023-06-01 RX ADMIN — DICYCLOMINE HYDROCHLORIDE 10 MG: 10 CAPSULE ORAL at 07:39

## 2023-06-01 NOTE — PROGRESS NOTES
[facility] 45  Progress Note  Name: Andreea Padilla  MRN: [id number]  Unit/Bed#: -01 I Date of Admission: 5/21/2023   Date of Service: 6/1/2023 I Hospital Day: 11    Assessment/Plan   * Metastatic adenocarcinoma [facility])  Assessment & Plan  · Presented for right upper lower quadrant abdominal pain with nausea vomiting and diarrhea   · Leukocytosis with WBC 21 9  · CT chest abdomen pelvis  · Heterogeneously enhancing right suprahilar lesion with abrupt cut off of the right mainstem bronchus suspicious for malignancy    There is secondary complete atelectasis of the right lung with heterogeneous density  Small to moderate right pleural effusion is noted  Few subcentimeter left lung nodules are noted  Evidence of mediastinal and right supraclavicular adenopathy  · Heterogeneous, rim-enhancing lesion at the proximal pancreatic body, possible metastatic focus  Pathologically enlarged celiac axis lymph nodes  Focal annular lesion involving the ascending colon, suspicious for metastatic focus versus primary malignancy  (this ascending colon lesion was not found on subsequent colonoscopy)  · GI consulted, appreciate recommendations  Patient had EGD/colonoscopy on 5/22  · 5/22 EGD: Mild patchy erythematous mucosa in the antrum  Cold forceps biopsy performed  Normal duodenum, random biopsy was performed to rule out celiac disease  · 5/22 colonoscopy: Polyp removed from sigmoid colon  Scattered diverticula of mild severity in the sigmoid colon  · Pulmonology consulted, appreciate recommendations  Recommendation is biopsy of large right supraclavicular node  Performed by IR, final results: Metastatic carcinoma, compatible with adenocarcinoma of lung origin  Pulmonology has signed off  · Oncology consulted, appreciate recommendations  · 5/21 MRI brain: No acute infarction, edema, or mass effect   Few punctate hyperintense T2/FLAIR foci are noted within the periventricular and subcortical white matter which are nonspecific and can be seen with vasculitis, migrainous angiopathy, Lyme's disease or microangiopathic changes  · Palliative care consulted, appreciate recommendations  · Patient started on Oxy IR 3 times daily on 5/29   · On 5/30 OxyContin discontinued as patient reported not helping her pain  · Oxy IR discontinued, patient started on around-the-clock Dilaudid on 5/30  · Pain uncontrolled on 5/31, palliative care adjusted medications, pain improved this a m   · 6/1 evaluated by palliative care, will further adjust pain medications and address pain medications for discharge  · continue ondansetron and scopolamine  · Hematology/oncology consult, appreciate recommendations  · Patient has decided that she wants to return to [place] for treatment, tentative discharge tomorrow  · Case management following and aware tentative discharge tomorrow    Abdominal pain  Assessment & Plan  · Patient presented to ED with complaints of right upper and lower quadrant abdominal pain with nausea, vomiting and diarrhea    She reported pain was right upper quadrant and worse at night  · Palliative care has been following, appreciate recommendations  · Pain improved overnight with the scheduled oral Dilaudid, palliative care aware, making further medication adjustments today   · Will  continue to monitor effectiveness of pain regimen  · CT scan chest abdomen pelvis see above  · For assessment/treatment plan see abnormal CT scan above  · X-ray abdomen 5/30 chest constipation, no obstruction  · Continue bowel regimen  · Added Dulcolax to regimen on 5/31, still no BM  · Will order fleets enema today    Hypomagnesemia  Assessment & Plan  · POA with serum magnesium 1 5  · Likely secondary to vomiting and diarrhea  · Repleted with 4 g IV magnesium on 5/21  · Resolved, repeat Mg on 5/22 3 0  · Magnesium on 5/30 1 5, again repleted with 4 g IV magnesium  · Magnesium level 5/31 1 8 , repleted with 2 g IV magnesium · Magnesium on 6/1 1 6, repleted with 4 g IV magnesium, repeat magnesium level in a m  Diarrhea  Assessment & Plan  · Present on admission, now resolved     Nausea & vomiting  Assessment & Plan  · Presented with nausea and vomiting and right upper and lower quadrant pain  · No nausea vomiting currently   · CT imaging see above  · Continue Zofran and scopolamine  · Regular diet as tolerated      Iron deficiency anemia  Assessment & Plan  · Hemoglobin currently stabilized   · Had trended downward to 6 7 on 5/27 which time patient received 1 unit of PRBCs   · Iron panel results reviewed  · S/p EGD and colonoscopy on 5/22  Reviewed findings  · Currently without signs of bleeding  · Plan to transfuse for hemoglobin below 7  · Continue to monitor closely for bleeding  · Continue vitamin B12, ferrous sulfate, folic acid  · CBC in a m  Anxiety  Assessment & Plan  · Currently without anxiety  · Continue as needed Atarax  · Continue Wellbutrin           VTE Pharmacologic Prophylaxis:   Pharmacologic: Heparin  Mechanical VTE Prophylaxis in Place: Yes    Patient Centered Rounds: I have performed bedside rounds with nursing staff today  Discussions with Specialists or Other Care Team Provider: Palliative care, nursing, case management    Education and Discussions with Family / Patient: Treatment plan discussed with patient and significant other at bedside  Patient has decided that she wants to return to [facility] treatment  Palliative care saw patient today, further adjusting pain medications and will order pain medications for discharge which will tentatively occur tomorrow  Time Spent for Care: 45 minutes  More than 50% of total time spent on counseling and coordination of care as described above      Current Length of Stay: 11 day(s)    Current Patient Status: Inpatient   Certification Statement: The patient will continue to require additional inpatient hospital stay due to Monitoring her pain regimen as adjusted by palliative care today  Tentative discharge back to [place] in a m  Discharge Plan: Discharge back to [place] in a   where she will follow-up for further treatment  Monitor effectiveness of pain medication that was adjusted by palliative care today overnight  Code Status: Level 1 - Full Code      Subjective:   Patient reported that her pain was improved since the medication adjustments yesterday  Still reports is constipated, will address  Objective:     Vitals:   Temp (24hrs), Av 7 °F (37 6 °C), Min:99 7 °F (37 6 °C), Max:99 7 °F (37 6 °C)    Temp:  [99 7 °F (37 6 °C)] 99 7 °F (37 6 °C)  HR:  [104-107] 107  Resp:  [14-18] 18  BP: (146-150)/(83-85) 146/83  SpO2:  [92 %-97 %] 92 %  Body mass index is 22 13 kg/m²  Input and Output Summary (last 24 hours): Intake/Output Summary (Last 24 hours) at 2023 1311  Last data filed at 2023 1300  Gross per 24 hour   Intake 240 ml   Output 775 ml   Net -535 ml       Physical Exam:     Physical Exam  Vitals and nursing note reviewed  Constitutional:       General: She is not in acute distress  Appearance: She is not ill-appearing  HENT:      Head: Normocephalic and atraumatic  Nose: Nose normal       Mouth/Throat:      Mouth: Mucous membranes are moist    Cardiovascular:      Rate and Rhythm: Normal rate and regular rhythm  Pulses: Normal pulses  Heart sounds: Normal heart sounds  Pulmonary:      Effort: Pulmonary effort is normal  No respiratory distress  Breath sounds: Normal breath sounds  No wheezing or rales  Abdominal:      General: Bowel sounds are normal  There is no distension  Palpations: Abdomen is soft  Tenderness: There is no abdominal tenderness  There is no guarding  Musculoskeletal:         General: Normal range of motion  Right lower leg: No edema  Left lower leg: No edema  Skin:     General: Skin is warm and dry        Capillary Refill: Capillary refill takes less than 2 seconds  Neurological:      General: No focal deficit present  Mental Status: She is alert and oriented to person, place, and time  Mental status is at baseline  Psychiatric:         Mood and Affect: Mood normal          Behavior: Behavior normal          Thought Content: Thought content normal          Judgment: Judgment normal          Additional Data:     Labs:    Results from last 7 days   Lab Units 06/01/23  0422   EOS PCT % 2   HEMATOCRIT % 29 2*   HEMOGLOBIN g/dL 8 8*   LYMPHS PCT % 18   MONOS PCT % 15*   NEUTROS PCT % 65   PLATELETS Thousands/uL 532*   WBC Thousand/uL 11 14*     Results from last 7 days   Lab Units 06/01/23  0422   BUN mg/dL 8   CALCIUM mg/dL 9 1   CHLORIDE mmol/L 97   CO2 mmol/L 27   CREATININE mg/dL 0 60   POTASSIUM mmol/L 3 7           * I Have Reviewed All Lab Data Listed Above  * Additional Pertinent Lab Tests Reviewed:  [facility] 66 Admission Reviewed    Imaging:    Imaging Reports Reviewed Today Include: CT chest abdomen pelvis, MRI brain, KUB  Imaging Personally Reviewed by Myself Includes: CT chest abdomen pelvis, KUB    Recent Cultures (last 7 days):           Last 24 Hours Medication List:   Current Facility-Administered Medications   Medication Dose Route Frequency Provider Last Rate   • acetaminophen  650 mg Oral Q6H PRN Geno Weldon MD     • buPROPion  150 mg Oral BID Geno Weldon MD     • butalbital-acetaminophen-caffeine  1 tablet Oral Q4H PRN Geno Weldon MD     • cyanocobalamin  500 mcg Oral Daily Geno Weldon MD     • dicyclomine  10 mg Oral 4x Daily (AC & HS) BRITTNY Sheppard     • docusate sodium  100 mg Oral BID BRITTNY Sheppard     • ferrous sulfate  325 mg Oral Daily With Breakfast BRITTNY Boyce     • folic acid  584 mcg Oral Daily Geno Weldon MD     • guaiFENesin  600 mg Oral Q12H [facility] BRITTNY Sheppard     • heparin (porcine)  5,000 Units Subcutaneous [facility] BRITTNY Boyce     • HYDROmorphone  4 mg Oral Q4H PRN Lizbeth Garcia PA-C     • hydrOXYzine HCL  25 mg Oral TID PRN BRITTNY Valenzuela     • lidocaine  1 patch Topical Daily BRITTNY Sheppard     • lidocaine  1 patch Topical Daily Lizbeth Garcia PA-C     • losartan  100 mg Oral Daily Kathleen Hankins MD     • magnesium sulfate  4 g Intravenous Once BRITTNY Sheppard     • metoclopramide  10 mg Intravenous Q6H PRN Molly Lopez PA-C     • naloxone  0 04 mg Intravenous Q1MIN PRN BRITTNY Valenzuela     • ondansetron  8 mg Oral TID [facility] Batool Beltran MD     • oxyCODONE  30 mg Oral Q12H [facility] Nakul Ratliff PA-C     • pantoprazole  40 mg Intravenous Q24H [facility] Kathleen Hankins MD     • polyethylene glycol  17 g Oral Daily BRITTNY Sheppard     • potassium chloride  20 mEq Oral Daily Mario Polanco PA-C     • pravastatin  80 mg Oral Daily With Tiffany Bowens MD     • scopolamine  1 patch Transdermal Q72H BRITTNY Valenzuela     • senna  1 tablet Oral HS BRITTNY Sheppard     • sodium phosphate-biphosphate  1 enema Rectal Once BRITTNY Sheppard          Today, Patient Was Seen By: BRITTNY Lujan    ** Please Note: Dictation voice to text software may have been used in the creation of this document   **

## 2023-06-01 NOTE — UTILIZATION REVIEW
Continued Stay Review    Date:5/30- 6/1                          Current Patient Class: INpatient   Current Level of Care: MEd/surg    HPI:62 y o  female initially admitted on 5/21     Assessment/Plan: Pain improved  As per PALLIATIVE: start oxycontin, continue dilaudid po  NO bm today  Give fleets enema  Diarrhea resolved  Continue zofran, scopolamine  hgb stable  CMag 1 6   GIve 4 g IV and recheck  Planning to return to [facility] for cancer care  5/31 UPdate:Still undecided where she want to proceed with treatment  Pain improved, but still uncontrolled     Continue bowel regimen  Mag 1 8  Repleted with 2g IV  Repeat mag level  HGbs stable  5/30 UPdate:Mag 1 5 today  Give 5 g IV and recheck  No n/v currently  Hgb stabilized  Currently without signs of bleeding  5/27, hgb was 6 7 for which she was given 1 unit PRBCs  Continue to monitor  OncologY: Adenocarcinoma of the lung: With mets to lymph nodes 2 subcentimeter left lung nodules noted  She has cough  Nausea, post nasal drip  Will need eventual PET scan  IR biopsy of lymph node on the right side of her neck 5/22/2023 which was reported 5/25 as metastatic adenocarcinoma consistent with lung primary  • 5/22 EGD: Mild patchy erythematous mucosa in the antrum  Cold forceps biopsy performed  Normal duodenum, random biopsy was performed to rule out celiac disease  • 5/22 colonoscopy: Polyp removed from sigmoid colon  Scattered diverticula of mild severity in the sigmoid colon    Palliative consult: DC oxycodone, start dilaudid around the clock  RUQ pain waxing, and waning  Reports gas and bloating after meals  LAst BM was 6 days prior     Vital Signs:   Time Temp Pulse Resp BP MAP (mmHg) SpO2 O2 Device Patient Position - Orthostatic VS   06/01/23 15:06:36 98 5 °F (36 9 °C) 101 18 132/73 93 91 % -- --   05/31/23 22:19:08 99 7 °F (37 6 °C) 107 Abnormal  18 146/83 104 92 % -- Lying   05/31/23 15:17:36 -- 104 14 150/85 107 97 % -- -- 05/31/23 07:05:44 98 5 °F (36 9 °C) 103 17 178/94 Abnormal  122 91 % -- --   05/30/23 22:45:33 98 3 °F (36 8 °C) 103 18 144/86 105 92 % -- --   05/30/23 2031 -- -- -- -- -- -- None (Room air) --   05/30/23 15:14:55 98 3 °F (36 8 °C) 102 18 133/73 93 92 % -- --   05/30/23 07:13:55 98 2 °F (36 8 °C) 105 19 162/82 109 94 % --          Pertinent Labs/Diagnostic Results:   5/30 Xray abdomen KUB: Large amount of fecal debris in the colon predominantly on the right side, correlate with constipation   No bowel obstruction   5/30 CXR: Opaque right hemithorax, unchanged from the previous study of May 21, 2023 due to combination of consolidation and effusion     No new consolidation left lung       Results from last 7 days   Lab Units 06/01/23  0422 05/31/23  0506 05/30/23  0509 05/29/23  0500 05/28/23  0520   HEMATOCRIT % 29 2* 28 0* 28 5* 27 5* 26 2*   HEMOGLOBIN g/dL 8 8* 8 5* 8 6* 8 5* 8 2*   NEUTROS ABS Thousands/µL 7 07 9 98* 7 83* 11 76* 8 96*   PLATELETS Thousands/uL 532* 503* 507* 437* 429*   WBC Thousand/uL 11 14* 12 51* 11 11* 13 25* 12 04*         Results from last 7 days   Lab Units 06/01/23  0422 05/31/23  0617 05/30/23  0509 05/29/23  0500 05/28/23  0520   ANION GAP mmol/L 8 8 9 9 8   BUN mg/dL 8 7 8 8 7   CALCIUM mg/dL 9 1 9 1 8 7 8 8 8 6   CHLORIDE mmol/L 97 97 98 95* 97   CO2 mmol/L 27 27 28 27 29   CREATININE mg/dL 0 60 0 54* 0 53* 0 50* 0 51*   EGFR ml/min/1 73sq m 98 101 102 104 103   POTASSIUM mmol/L 3 7 4 1 3 4* 3 4* 3 4*   MAGNESIUM mg/dL 1 6* 1 8* 1 5*  --   --    SODIUM mmol/L 132* 132* 135 131* 134*             Results from last 7 days   Lab Units 06/01/23  0422 05/31/23  0617 05/30/23  0509 05/29/23  0500 05/28/23  0520 05/27/23  0540   GLUCOSE RANDOM mg/dL 120 150* 137 169* 131 121       Results from last 7 days   Lab Units 05/28/23  0617 05/28/23  0530   CROSSMATCH  Compatible Compatible  Compatible   UNITABO  A A  A   UNIT DISPENSE STATUS  Return to Inv Return to Inv  Presumed Trans   UNIT NUMBER  [id number]  [id number]   UNIT PRODUCT CODE  [id number]  [id number]   UNIT PRODUCT VOL ml 300 300  300   UNITRH  POS POS  POS     Medications:   Scheduled Medications:  buPROPion, 150 mg, Oral, BID  cyanocobalamin, 500 mcg, Oral, Daily  dicyclomine, 10 mg, Oral, 4x Daily (AC & HS)  docusate sodium, 100 mg, Oral, BID  ferrous sulfate, 325 mg, Oral, Daily With Breakfast  folic acid, 207 mcg, Oral, Daily  guaiFENesin, 600 mg, Oral, Q12H MAGALI  heparin (porcine), 5,000 Units, Subcutaneous, Q8H MAGALI  lidocaine, 1 patch, Topical, Daily  lidocaine, 1 patch, Topical, Daily  losartan, 100 mg, Oral, Daily  magnesium sulfate, 4 g, Intravenous, Once  ondansetron, 8 mg, Oral, TID AC  oxyCODONE, 30 mg, Oral, Q12H MAGALI  pantoprazole, 40 mg, Intravenous, Q24H MAGALI  polyethylene glycol, 17 g, Oral, Daily  potassium chloride, 20 mEq, Oral, Daily  pravastatin, 80 mg, Oral, Daily With Dinner  scopolamine, 1 patch, Transdermal, Q72H  senna, 1 tablet, Oral, HS  sodium phosphate-biphosphate, 1 enema, Rectal, Once      Continuous IV Infusions:     PRN Meds:  acetaminophen, 650 mg, Oral, Q6H PRN  butalbital-acetaminophen-caffeine, 1 tablet, Oral, Q4H PRN  HYDROmorphone, 4 mg, Oral, Q4H PRN x 3 5/30, x 3 5/31, x 3 6/1  hydrOXYzine HCL, 25 mg, Oral, TID PRN  metoclopramide, 10 mg, Intravenous, Q6H PRN  naloxone, 0 04 mg, Intravenous, Q1MIN PRN        Discharge Plan: [facility]    Network Utilization Review Department  ATTENTION: Please call with any questions or concerns to [phone] and carefully listen to the prompts so that you are directed to the right person  All voicemails are confidential   Elpidio Reich all requests for admission clinical reviews, approved or denied determinations and any other requests to dedicated fax number below belonging to the campus where the patient is receiving treatment   List of dedicated fax numbers for the Facilities:  FACILITY NAME UR FAX NUMBER   ADMISSION DENIALS (Administrative/Medical Necessity) [phone]   [address] (Maternity/NICU/Pediatrics) Jerri Witt [address] Gracie Lam  [phone]   [address] [address] Medical [place] [place] [address] [facility] [address] [facility] [place] [facility] [address] [facility] [phone]

## 2023-06-01 NOTE — CASE MANAGEMENT
Case Management Discharge Planning Note    Patient name Yamilet Patel Luite Spike 87 219/-01 MRN [id number]  : 1960 Date 2023       Current Admission Date: 2023  Current Admission Diagnosis:Metastatic adenocarcinoma [facility])   Patient Active Problem List    Diagnosis Date Noted   • Anxiety 2023   • Leukocytosis    • Anemia    • Iron deficiency anemia 2023   • Abdominal pain 2023   • Nausea & vomiting 2023   • Diarrhea 2023   • Hypomagnesemia 2023   • Metastatic adenocarcinoma ([facility] [address] ) 2023      LOS (days): 11  Geometric Mean LOS (GMLOS) (days):   Days to GMLOS:     OBJECTIVE:  Risk of Unplanned Readmission Score: 14 94     Current admission status: Inpatient   [facility] Pharm[facility]   [facility] [address] [address]  Phone: [phone] Fax: [phone]    Primary Care Provider: No primary care provider on file  Primary Insurance: [employer]  Secondary Insurance:     DISCHARGE DETAILS:  Pt has been started on a new pain medication regime through Palliative care  AS per Palliative Care the pt wants to return to [place] and plans to follow with oncolgy in [place]

## 2023-06-01 NOTE — PROGRESS NOTES
Progress Note - [address]  58 y o   female  /-01   MRN: [id number]  Encounter: [id number]     Assessment  Metastatic adenocarcinoma  Abdominal pain  Nausea and vomiting  Constipation  Palliative care consult  Goals of care by specialist    Plan  1  Symptom management  • Abdominal pain - Symptoms current controlled over the past 12 hours  Patient feels she is able to get up and move around  Discussed starting a long-acting medication for pain and to discontinue use of IV pain medications in anticipation of discharge  o Start OxyCONTIN 30mg BID, first dose now and then scheduled  o Continue Dilaudid 4mg PO q4hrs PRN mod-severe pain  o Discontinue Dilaudid 4mg PO q4h SCHED  o Discontinue Dilaudid 1mg IV q4h PRN  • Bowel regimen - continues without BM x 1week, discussed with and will defer to [facility] for constipation tx  2    Goals  • Level 1 code status  Full code  Disease focused care  No limits placed  • Plan is to go home to [place] and establish care at [facility]  • Patient is working on applying for new insurance as her runs out at the end of the month, will be applying for [place] medicaid but has to first be back home or in hospital in [place] to apply  • Will determine efficacy of new pain regimen tomorrow and send rx to [facility] in [place] in anticipation of discharge and transport to [facility]   Social support  • Patient is supported by her spouse  · Supportive listening provided  · Normalized experience of patient/family  · Provided anxiety containment  · Provided anticipatory guidance    4  Follow up  • Palliative Care will continue to follow for symptom management and goals of care discussions will be ongoing  • Please reach out to on-call provider via [facility] if questions or concerns arise  Interval History  Chart reviewed before visit    Patient pain better controlled with current regimen  Still needs DIL IV after about 2-3hrs form last PO dose  She is still constipated with no BM yet  Does not feel overly sedated by medications    Current pain location(s): abdomen  Pain Scale:   9  PRN Use of Pain Medications: DIL IV x 5  24 hour Total OME for 05/31/23-06/01/23: 196    Review of Systems  All other systems negative      Medications    Current Facility-Administered Medications:   •  acetaminophen (TYLENOL) tablet 650 mg, 650 mg, Oral, Q6H PRN, Abel Max MD, 650 mg at 05/29/23 1758  •  buPROPion [facility]) tablet 150 mg, 150 mg, Oral, BID, Abel Max MD, 150 mg at 06/01/23 0829  •  butalbital-acetaminophen-caffeine (FIORICET,ESGIC) -40 mg per tablet 1 tablet, 1 tablet, Oral, Q4H PRN, Abel Max MD, 1 tablet at 05/22/23 1141  •  cyanocobalamin (VITAMIN B-12) tablet 500 mcg, 500 mcg, Oral, Daily, Abel Max MD, 500 mcg at 06/01/23 6953  •  dicyclomine (BENTYL) capsule 10 mg, 10 mg, Oral, 4x Daily (AC & HS), BRITTNY Sheppard, 10 mg at 06/01/23 0739  •  docusate sodium (COLACE) capsule 100 mg, 100 mg, Oral, BID, BRITTNY Sheppard, 100 mg at 06/01/23 7633  •  ferrous sulfate tablet 325 mg, 325 mg, Oral, Daily With Breakfast, BRITTNY Montiel, 325 mg at 70/55/72 8989  •  folic acid (FOLVITE) tablet 400 mcg, 400 mcg, Oral, Daily, Abel Max MD, 400 mcg at 06/01/23 0829  •  guaiFENesin (MUCINEX) 12 hr tablet 600 mg, 600 mg, Oral, Q12H [address], BRITTNY Sheppard  •  heparin (porcine) subcutaneous injection 5,000 Units, 5,000 Units, Subcutaneous, Q8H [address], BRITTNY Montiel, 5,000 Units at 06/01/23 7679  •  HYDROmorphone (DILAUDID) injection 1 mg, 1 mg, Intravenous, Q4H PRN, Jarek Form, PA-C, 1 mg at 06/01/23 9232  •  HYDROmorphone (DILAUDID) tablet 4 mg, 4 mg, Oral, Q4H PRN, Jarek Form, PA-C  •  HYDROmorphone (DILAUDID) tablet 4 mg, 4 mg, Oral, Q4H, Praful Cormier MD, 4 mg at 06/01/23 4010  •  hydrOXYzine HCL (ATARAX) tablet 25 mg, 25 mg, Oral, TID PRN, BRITTNY Montiel, "25 mg at 05/30/23 1758  •  lidocaine (LIDODERM) 5 % patch 1 patch, 1 patch, Topical, Daily, BRITTNY Sheppard  •  losartan (COZAAR) tablet 100 mg, 100 mg, Oral, Daily, Nithya Baugh MD, 100 mg at 06/01/23 0829  •  magnesium sulfate 4 g/100 mL IVPB (premix) 4 g, 4 g, Intravenous, Once, BRITTNY Sheppard  •  metoclopramide (REGLAN) injection 10 mg, 10 mg, Intravenous, Q6H PRN, Ashley Bah PA-C, 10 mg at 05/31/23 2244  •  naloxone (NARCAN) 0 04 mg/mL syringe 0 04 mg, 0 04 mg, Intravenous, Q1MIN PRN, BRITTNY Silva  •  ondansetron (ZOFRAN-ODT) dispersible tablet 8 mg, 8 mg, Oral, TID AC, Josephine Harrell MD, 8 mg at 06/01/23 0739  •  pantoprazole (PROTONIX) injection 40 mg, 40 mg, Intravenous, Q24H [address], Nithya Baugh MD, 40 mg at 06/01/23 0830  •  polyethylene glycol (MIRALAX) packet 17 g, 17 g, Oral, Daily, BRITTNY Sheppard, 17 g at 06/01/23 0830  •  potassium chloride (K-DUR,KLOR-CON) CR tablet 20 mEq, 20 mEq, Oral, Daily, Felicity Quiroga PA-C, 20 mEq at 06/01/23 0830  •  pravastatin (PRAVACHOL) tablet 80 mg, 80 mg, Oral, Daily With Vinny Burton MD, 80 mg at 05/31/23 1633  •  scopolamine (TRANSDERM-SCOP) 1 mg/3 days TD 72 hr patch 1 patch, 1 patch, Transdermal, Q72H, BRITTNY Silva, 1 patch at 05/30/23 1443  •  senna (SENOKOT) tablet 8 6 mg, 1 tablet, Oral, HS, BRITTNY Sheppard, 8 6 mg at 05/31/23 2244    Objective  /83 (BP Location: Left arm)   Pulse (!) 107   Temp 99 7 °F (37 6 °C) (Oral)   Resp 18   Ht 5' 2\" (1 575 m)   Wt 54 9 kg (121 lb)   SpO2 92%   BMI 22 13 kg/m²   Physical Exam  Vitals and nursing note reviewed  Constitutional:       General: She is not in acute distress  Appearance: She is ill-appearing  HENT:      Head: Normocephalic and atraumatic  Right Ear: External ear normal       Nose: Nose normal       Mouth/Throat:      Pharynx: Oropharynx is clear     Eyes:      Conjunctiva/sclera: Conjunctivae normal  " Cardiovascular:      Rate and Rhythm: Normal rate  Pulmonary:      Effort: Pulmonary effort is normal    Abdominal:      General: There is distension  Tenderness: There is abdominal tenderness  Musculoskeletal:         General: Normal range of motion  Skin:     Coloration: Skin is not pale  Findings: No rash  Neurological:      Mental Status: She is alert and oriented to person, place, and time  Psychiatric:         Behavior: Behavior normal          Thought Content: Thought content normal          Judgment: Judgment normal        Lab Results:   I have personally reviewed pertinent labs  , CBC:   Lab Results   Component Value Date    HCT 29 2 (L) 06/01/2023    HGB 8 8 (L) 06/01/2023    MCH 24 2 (L) 06/01/2023    MCHC 30 1 (L) 06/01/2023    MCV 80 (L) 06/01/2023    MPV 8 2 (L) 06/01/2023    NRBC 0 06/01/2023     (H) 06/01/2023    RBC 3 64 (L) 06/01/2023    RDW 16 4 (H) 06/01/2023    WBC 11 14 (H) 06/01/2023   , CMP:   Lab Results   Component Value Date    BUN 8 06/01/2023    CALCIUM 9 1 06/01/2023    CL 97 06/01/2023    CO2 27 06/01/2023    CREATININE 0 60 06/01/2023    EGFR 98 06/01/2023    K 3 7 06/01/2023    SODIUM 132 (L) 06/01/2023     Imaging Studies: I have personally reviewed pertinent reports  EKG, Pathology, and Other Studies: I have personally reviewed pertinent reports  Counseling / Coordination of Care  Total floor / unit time spent today 45 minutes  Greater than 50% of total time was spent with the patient and / or family counseling and / or coordinating of care  A description of the counseling / coordination of care: Chart reviewed, discussed palliative care and symptom management, reviewed medications and symptom control, opioid adjustment, provided anticipatory guidance, determined social/family support, provided psychosocial support  Reviewed with Josephine Fitzgerald PA-C  Palliative and Supportive Care  Clinic/Answering Service: [phone]  You can "find me on [email or web address]! Portions of this document may have been created using dictation software and as such some \"sound alike\" terms may have been generated by the system  Do not hesitate to contact me with any questions or clarifications    "

## 2023-06-01 NOTE — PLAN OF CARE
Problem: Potential for Falls  Goal: Patient will remain free of falls  Description: INTERVENTIONS:  - Educate patient/family on patient safety including physical limitations  - Instruct patient to call for assistance with activity   - Consult OT/PT to assist with strengthening/mobility   - Keep Call bell within reach  - Keep bed low and locked with side rails adjusted as appropriate  - Keep care items and personal belongings within reach  - Initiate and maintain comfort rounds  - Make Fall Risk Sign visible to staff  - Offer Toileting in advance of need  - Initiate/Maintain bed alarm  - Obtain necessary fall risk management equipment  - Apply yellow socks and bracelet for high fall risk patients  - Consider moving patient to room near nurses station  Outcome: Progressing     Problem: PAIN - ADULT  Goal: Verbalizes/displays adequate comfort level or baseline comfort level  Description: Interventions:  - Encourage patient to monitor pain and request assistance  - Assess pain using appropriate pain scale  - Administer analgesics based on type and severity of pain and evaluate response  - Implement non-pharmacological measures as appropriate and evaluate response  - Consider cultural and social influences on pain and pain management  - Notify physician/advanced practitioner if interventions unsuccessful or patient reports new pain  Outcome: Progressing     Problem: INFECTION - ADULT  Goal: Absence or prevention of progression during hospitalization  Description: INTERVENTIONS:  - Assess and monitor for signs and symptoms of infection  - Monitor lab/diagnostic results  - Monitor all insertion sites, i e  indwelling lines, tubes, and drains  - Monitor endotracheal if appropriate and nasal secretions for changes in amount and color  - [place] appropriate cooling/warming therapies per order  - Administer medications as ordered  - Instruct and encourage patient and family to use good hand hygiene technique  - Identify and instruct in appropriate isolation precautions for identified infection/condition  Outcome: Progressing     Problem: SAFETY ADULT  Goal: Patient will remain free of falls  Description: INTERVENTIONS:  - Educate patient/family on patient safety including physical limitations  - Instruct patient to call for assistance with activity   - Consult OT/PT to assist with strengthening/mobility   - Keep Call bell within reach  - Keep bed low and locked with side rails adjusted as appropriate  - Keep care items and personal belongings within reach  - Initiate and maintain comfort rounds  - Make Fall Risk Sign visible to staff  - Offer Toileting in advance of need  - Initiate/Maintain bed alarm  - Obtain necessary fall risk management equipment  - Apply yellow socks and bracelet for high fall risk patients  - Consider moving patient to room near nurses station  Outcome: Progressing  Goal: Maintain or return to baseline ADL function  Description: INTERVENTIONS:  - Educate patient/family on patient safety including physical limitations  - Instruct patient to call for assistance with activity   - Consult OT/PT to assist with strengthening/mobility   - Keep Call bell within reach  - Keep bed low and locked with side rails adjusted as appropriate  - Keep care items and personal belongings within reach  - Initiate and maintain comfort rounds  - Make Fall Risk Sign visible to staff  - Offer Toileting in advance of need  - Initiate/Maintain bed alarm  - Obtain necessary fall risk management equipment:   - Apply yellow socks and bracelet for high fall risk patients  - Consider moving patient to room near nurses station  Outcome: Progressing     Problem: DISCHARGE PLANNING  Goal: Discharge to home or other facility with appropriate resources  Description: INTERVENTIONS:  - Identify barriers to discharge w/patient and caregiver  - Arrange for needed discharge resources and transportation as appropriate  - Identify discharge learning needs (meds, wound care, etc )  - Refer to Case Management Department for coordinating discharge planning if the patient needs post-hospital services based on physician/advanced practitioner order or complex needs related to functional status, cognitive ability, or social support system  Outcome: Progressing     Problem: Knowledge Deficit  Goal: Patient/family/caregiver demonstrates understanding of disease process, treatment plan, medications, and discharge instructions  Description: Complete learning assessment and assess knowledge base    Interventions:  - Provide teaching at level of understanding  - Provide teaching via preferred learning methods  Outcome: Progressing     Problem: GASTROINTESTINAL - ADULT  Goal: Minimal or absence of nausea and/or vomiting  Description: INTERVENTIONS:  - Administer IV fluids if ordered to ensure adequate hydration  - Maintain NPO status until nausea and vomiting are resolved  - Nasogastric tube if ordered  - Administer ordered antiemetic medications as needed  - Provide nonpharmacologic comfort measures as appropriate  - Advance diet as tolerated, if ordered  - Consider nutrition services referral to assist patient with adequate nutrition and appropriate food choices  Outcome: Progressing  Goal: Maintains or returns to baseline bowel function  Description: INTERVENTIONS:  - Assess bowel function  - Encourage oral fluids to ensure adequate hydration  - Administer IV fluids if ordered to ensure adequate hydration  - Administer ordered medications as needed  - Encourage mobilization and activity  - Consider nutritional services referral to assist patient with adequate nutrition and appropriate food choices  Outcome: Progressing  Goal: Maintains adequate nutritional intake  Description: INTERVENTIONS:  - Monitor percentage of each meal consumed  - Identify factors contributing to decreased intake, treat as appropriate  - Assist with meals as needed  - Monitor I&O, weight, and lab values if indicated  - Obtain nutrition services referral as needed  Outcome: Progressing  Goal: Oral mucous membranes remain intact  Description: INTERVENTIONS  - Assess oral mucosa and hygiene practices  - Implement preventative oral hygiene regimen  - Implement oral medicated treatments as ordered  - Initiate Nutrition services referral as needed  Outcome: Progressing     Problem: GENITOURINARY - ADULT  Goal: Maintains or returns to baseline urinary function  Description: INTERVENTIONS:  - Assess urinary function  - Encourage oral fluids to ensure adequate hydration if ordered  - Administer IV fluids as ordered to ensure adequate hydration  - Administer ordered medications as needed  - Offer frequent toileting  - Follow urinary retention protocol if ordered  Outcome: Progressing     Problem: METABOLIC, FLUID AND ELECTROLYTES - ADULT  Goal: Electrolytes maintained within normal limits  Description: INTERVENTIONS:  - Monitor labs and assess patient for signs and symptoms of electrolyte imbalances  - Administer electrolyte replacement as ordered  - Monitor response to electrolyte replacements, including repeat lab results as appropriate  - Instruct patient on fluid and nutrition as appropriate  Outcome: Progressing  Goal: Fluid balance maintained  Description: INTERVENTIONS:  - Monitor labs   - Monitor I/O and WT  - Instruct patient on fluid and nutrition as appropriate  - Assess for signs & symptoms of volume excess or deficit  Outcome: Progressing  Goal: Glucose maintained within target range  Description: INTERVENTIONS:  - Monitor Blood Glucose as ordered  - Assess for signs and symptoms of hyperglycemia and hypoglycemia  - Administer ordered medications to maintain glucose within target range  - Assess nutritional intake and initiate nutrition service referral as needed  Outcome: Progressing     Problem: Nutrition/Hydration-ADULT  Goal: Nutrient/Hydration intake appropriate for improving, restoring or maintaining nutritional needs  Description: Monitor and assess patient's nutrition/hydration status for malnutrition  Collaborate with interdisciplinary team and initiate plan and interventions as ordered  Monitor patient's weight and dietary intake as ordered or per policy  Utilize nutrition screening tool and intervene as necessary  Determine patient's food preferences and provide high-protein, high-caloric foods as appropriate       INTERVENTIONS:  - Monitor oral intake, urinary output, labs, and treatment plans  - Assess nutrition and hydration status and recommend course of action  - Evaluate amount of meals eaten  - Assist patient with eating if necessary   - Allow adequate time for meals  - Recommend/ encourage appropriate diets, oral nutritional supplements, and vitamin/mineral supplements  - Order, calculate, and assess calorie counts as needed  - Recommend, monitor, and adjust tube feedings and TPN/PPN based on assessed needs  - Assess need for intravenous fluids  - Provide specific nutrition/hydration education as appropriate  - Include patient/family/caregiver in decisions related to nutrition  Outcome: Progressing     Problem: MOBILITY - ADULT  Goal: Maintain or return to baseline ADL function  Description: INTERVENTIONS:  - Educate patient/family on patient safety including physical limitations  - Instruct patient to call for assistance with activity   - Consult OT/PT to assist with strengthening/mobility   - Keep Call bell within reach  - Keep bed low and locked with side rails adjusted as appropriate  - Keep care items and personal belongings within reach  - Initiate and maintain comfort rounds  - Make Fall Risk Sign visible to staff  - Offer Toileting in advance of need  - Initiate/Maintain bed alarm  - Obtain necessary fall risk management equipment:   - Apply yellow socks and bracelet for high fall risk patients  - Consider moving patient to room near nurses station  Outcome: Progressing  Goal: Maintains/Returns to pre admission functional level  Description: INTERVENTIONS:  - Perform BMAT or MOVE assessment daily    - Set and communicate daily mobility goal to care team and patient/family/caregiver  - Collaborate with rehabilitation services on mobility goals if consulted  - Perform Range of Motion  times a day  - Reposition patient every  hours    - Dangle patient  times a day  - Stand patient  times a day  - Ambulate patient  times a day  - Out of bed to chair  times a day   - Out of bed for meals  times a day  - Out of bed for toileting  - Record patient progress and toleration of activity level   Outcome: Progressing     Problem: Prexisting or High Potential for Compromised Skin Integrity  Goal: Skin integrity is maintained or improved  Description: INTERVENTIONS:  - Identify patients at risk for skin breakdown  - Assess and monitor skin integrity  - Assess and monitor nutrition and hydration status  - Monitor labs   - Assess for incontinence   - Turn and reposition patient  - Assist with mobility/ambulation  - Relieve pressure over bony prominences  - Avoid friction and shearing  - Provide appropriate hygiene as needed including keeping skin clean and dry  - Evaluate need for skin moisturizer/barrier cream  - Collaborate with interdisciplinary team   - Patient/family teaching  - Consider wound care consult   Outcome: Progressing

## 2023-06-02 LAB
ANION GAP SERPL CALCULATED.3IONS-SCNC: 9 MMOL/L (ref 4–13)
BASOPHILS # BLD AUTO: 0.04 THOUSANDS/ÂΜL (ref 0–0.1)
BASOPHILS NFR BLD AUTO: 0 % (ref 0–1)
BUN SERPL-MCNC: 6 MG/DL (ref 5–25)
CALCIUM SERPL-MCNC: 9.1 MG/DL (ref 8.4–10.2)
CHLORIDE SERPL-SCNC: 99 MMOL/L (ref 96–108)
CO2 SERPL-SCNC: 26 MMOL/L (ref 21–32)
CREAT SERPL-MCNC: 0.62 MG/DL (ref 0.6–1.3)
EOSINOPHIL # BLD AUTO: 0.36 THOUSAND/ÂΜL (ref 0–0.61)
EOSINOPHIL NFR BLD AUTO: 3 % (ref 0–6)
ERYTHROCYTE [DISTWIDTH] IN BLOOD BY AUTOMATED COUNT: 16.6 % (ref 11.6–15.1)
GFR SERPL CREATININE-BSD FRML MDRD: 96 ML/MIN/1.73SQ M
GLUCOSE SERPL-MCNC: 107 MG/DL (ref 65–140)
HCT VFR BLD AUTO: 29.5 % (ref 34.8–46.1)
HGB BLD-MCNC: 8.8 G/DL (ref 11.5–15.4)
IMM GRANULOCYTES # BLD AUTO: 0.04 THOUSAND/UL (ref 0–0.2)
IMM GRANULOCYTES NFR BLD AUTO: 0 % (ref 0–2)
LYMPHOCYTES # BLD AUTO: 2.36 THOUSANDS/ÂΜL (ref 0.6–4.47)
LYMPHOCYTES NFR BLD AUTO: 23 % (ref 14–44)
MAGNESIUM SERPL-MCNC: 1.7 MG/DL (ref 1.9–2.7)
MCH RBC QN AUTO: 23.8 PG (ref 26.8–34.3)
MCHC RBC AUTO-ENTMCNC: 29.8 G/DL (ref 31.4–37.4)
MCV RBC AUTO: 80 FL (ref 82–98)
MONOCYTES # BLD AUTO: 1.53 THOUSAND/ÂΜL (ref 0.17–1.22)
MONOCYTES NFR BLD AUTO: 15 % (ref 4–12)
NEUTROPHILS # BLD AUTO: 6.18 THOUSANDS/ÂΜL (ref 1.85–7.62)
NEUTS SEG NFR BLD AUTO: 59 % (ref 43–75)
NRBC BLD AUTO-RTO: 0 /100 WBCS
PLATELET # BLD AUTO: 493 THOUSANDS/UL (ref 149–390)
PMV BLD AUTO: 8.1 FL (ref 8.9–12.7)
POTASSIUM SERPL-SCNC: 3.8 MMOL/L (ref 3.5–5.3)
RBC # BLD AUTO: 3.69 MILLION/UL (ref 3.81–5.12)
SODIUM SERPL-SCNC: 134 MMOL/L (ref 135–147)
WBC # BLD AUTO: 10.51 THOUSAND/UL (ref 4.31–10.16)

## 2023-06-02 PROCEDURE — 80048 BASIC METABOLIC PNL TOTAL CA: CPT

## 2023-06-02 PROCEDURE — 85025 COMPLETE CBC W/AUTO DIFF WBC: CPT

## 2023-06-02 PROCEDURE — C9113 INJ PANTOPRAZOLE SODIUM, VIA: HCPCS | Performed by: INTERNAL MEDICINE

## 2023-06-02 PROCEDURE — 83735 ASSAY OF MAGNESIUM: CPT

## 2023-06-02 PROCEDURE — 99233 SBSQ HOSP IP/OBS HIGH 50: CPT

## 2023-06-02 RX ORDER — LACTULOSE 20 G/30ML
30 SOLUTION ORAL 4 TIMES DAILY
Status: DISCONTINUED | OUTPATIENT
Start: 2023-06-02 | End: 2023-06-05 | Stop reason: HOSPADM

## 2023-06-02 RX ORDER — HYDROMORPHONE HYDROCHLORIDE 4 MG/1
4 TABLET ORAL EVERY 4 HOURS PRN
Qty: 60 TABLET | Refills: 0 | Status: SHIPPED | OUTPATIENT
Start: 2023-06-02 | End: 2023-06-12

## 2023-06-02 RX ORDER — NALOXONE HYDROCHLORIDE 4 MG/.1ML
SPRAY NASAL
Qty: 1 EACH | Refills: 0 | Status: SHIPPED | OUTPATIENT
Start: 2023-06-02 | End: 2024-06-01

## 2023-06-02 RX ORDER — LACTULOSE 20 G/30ML
30 SOLUTION ORAL ONCE
Status: DISCONTINUED | OUTPATIENT
Start: 2023-06-02 | End: 2023-06-02

## 2023-06-02 RX ORDER — LANOLIN ALCOHOL/MO/W.PET/CERES
400 CREAM (GRAM) TOPICAL 2 TIMES DAILY
Status: DISCONTINUED | OUTPATIENT
Start: 2023-06-02 | End: 2023-06-05 | Stop reason: HOSPADM

## 2023-06-02 RX ORDER — OXYCODONE HYDROCHLORIDE 30 MG/1
30 TABLET, FILM COATED, EXTENDED RELEASE ORAL EVERY 12 HOURS SCHEDULED
Qty: 20 TABLET | Refills: 0 | Status: SHIPPED | OUTPATIENT
Start: 2023-06-02 | End: 2023-06-12

## 2023-06-02 RX ORDER — MAGNESIUM SULFATE HEPTAHYDRATE 40 MG/ML
2 INJECTION, SOLUTION INTRAVENOUS ONCE
Status: COMPLETED | OUTPATIENT
Start: 2023-06-02 | End: 2023-06-02

## 2023-06-02 RX ADMIN — LIDOCAINE 1 PATCH: 50 PATCH CUTANEOUS at 12:39

## 2023-06-02 RX ADMIN — HYDROMORPHONE HYDROCHLORIDE 4 MG: 2 TABLET ORAL at 17:03

## 2023-06-02 RX ADMIN — FERROUS SULFATE TAB 325 MG (65 MG ELEMENTAL FE) 325 MG: 325 (65 FE) TAB at 09:33

## 2023-06-02 RX ADMIN — LIDOCAINE 1 PATCH: 50 PATCH CUTANEOUS at 09:36

## 2023-06-02 RX ADMIN — PANTOPRAZOLE SODIUM 40 MG: 40 INJECTION, POWDER, FOR SOLUTION INTRAVENOUS at 09:37

## 2023-06-02 RX ADMIN — OXYCODONE HYDROCHLORIDE 30 MG: 20 TABLET, FILM COATED, EXTENDED RELEASE ORAL at 08:24

## 2023-06-02 RX ADMIN — LACTULOSE 30 G: 20 SOLUTION ORAL at 15:05

## 2023-06-02 RX ADMIN — BUPROPION HYDROCHLORIDE TABLETS 150 MG: 100 TABLET, FILM COATED ORAL at 17:02

## 2023-06-02 RX ADMIN — DOCUSATE SODIUM 100 MG: 100 CAPSULE, LIQUID FILLED ORAL at 17:02

## 2023-06-02 RX ADMIN — GUAIFENESIN 600 MG: 600 TABLET, EXTENDED RELEASE ORAL at 09:33

## 2023-06-02 RX ADMIN — POTASSIUM CHLORIDE 20 MEQ: 1500 TABLET, EXTENDED RELEASE ORAL at 09:33

## 2023-06-02 RX ADMIN — PRAVASTATIN SODIUM 80 MG: 40 TABLET ORAL at 17:02

## 2023-06-02 RX ADMIN — HEPARIN SODIUM 5000 UNITS: 5000 INJECTION INTRAVENOUS; SUBCUTANEOUS at 05:45

## 2023-06-02 RX ADMIN — LOSARTAN POTASSIUM 100 MG: 50 TABLET, FILM COATED ORAL at 09:33

## 2023-06-02 RX ADMIN — BUPROPION HYDROCHLORIDE TABLETS 150 MG: 100 TABLET, FILM COATED ORAL at 09:33

## 2023-06-02 RX ADMIN — HYDROXYZINE HYDROCHLORIDE 25 MG: 25 TABLET ORAL at 08:24

## 2023-06-02 RX ADMIN — Medication 400 MCG: at 09:34

## 2023-06-02 RX ADMIN — HYDROMORPHONE HYDROCHLORIDE 4 MG: 2 TABLET ORAL at 05:45

## 2023-06-02 RX ADMIN — SENNOSIDES 8.6 MG: 8.6 TABLET, FILM COATED ORAL at 21:33

## 2023-06-02 RX ADMIN — ONDANSETRON 8 MG: 4 TABLET, ORALLY DISINTEGRATING ORAL at 06:06

## 2023-06-02 RX ADMIN — METOCLOPRAMIDE 10 MG: 5 INJECTION, SOLUTION INTRAMUSCULAR; INTRAVENOUS at 18:34

## 2023-06-02 RX ADMIN — GUAIFENESIN 600 MG: 600 TABLET, EXTENDED RELEASE ORAL at 21:33

## 2023-06-02 RX ADMIN — HEPARIN SODIUM 5000 UNITS: 5000 INJECTION INTRAVENOUS; SUBCUTANEOUS at 21:33

## 2023-06-02 RX ADMIN — DICYCLOMINE HYDROCHLORIDE 10 MG: 10 CAPSULE ORAL at 17:02

## 2023-06-02 RX ADMIN — OXYCODONE HYDROCHLORIDE 30 MG: 20 TABLET, FILM COATED, EXTENDED RELEASE ORAL at 21:33

## 2023-06-02 RX ADMIN — SCOPALAMINE 1 PATCH: 1 PATCH, EXTENDED RELEASE TRANSDERMAL at 15:06

## 2023-06-02 RX ADMIN — CYANOCOBALAMIN TAB 500 MCG 500 MCG: 500 TAB at 09:34

## 2023-06-02 RX ADMIN — DICYCLOMINE HYDROCHLORIDE 10 MG: 10 CAPSULE ORAL at 06:06

## 2023-06-02 RX ADMIN — DICYCLOMINE HYDROCHLORIDE 10 MG: 10 CAPSULE ORAL at 12:39

## 2023-06-02 RX ADMIN — Medication 400 MG: at 17:02

## 2023-06-02 RX ADMIN — LACTULOSE 30 G: 20 SOLUTION ORAL at 17:58

## 2023-06-02 RX ADMIN — MAGNESIUM SULFATE HEPTAHYDRATE 2 G: 40 INJECTION, SOLUTION INTRAVENOUS at 08:25

## 2023-06-02 RX ADMIN — DOCUSATE SODIUM 100 MG: 100 CAPSULE, LIQUID FILLED ORAL at 09:33

## 2023-06-02 RX ADMIN — HEPARIN SODIUM 5000 UNITS: 5000 INJECTION INTRAVENOUS; SUBCUTANEOUS at 15:05

## 2023-06-02 RX ADMIN — POLYETHYLENE GLYCOL 3350 17 G: 17 POWDER, FOR SOLUTION ORAL at 09:36

## 2023-06-02 RX ADMIN — ONDANSETRON 8 MG: 4 TABLET, ORALLY DISINTEGRATING ORAL at 12:39

## 2023-06-02 RX ADMIN — DICYCLOMINE HYDROCHLORIDE 10 MG: 10 CAPSULE ORAL at 21:33

## 2023-06-02 RX ADMIN — ONDANSETRON 8 MG: 4 TABLET, ORALLY DISINTEGRATING ORAL at 17:02

## 2023-06-02 NOTE — PLAN OF CARE
Problem: Potential for Falls  Goal: Patient will remain free of falls  Description: INTERVENTIONS:  - Educate patient/family on patient safety including physical limitations  - Instruct patient to call for assistance with activity   - Consult OT/PT to assist with strengthening/mobility   - Keep Call bell within reach  - Keep bed low and locked with side rails adjusted as appropriate  - Keep care items and personal belongings within reach  - Initiate and maintain comfort rounds  - Make Fall Risk Sign visible to staff  - Offer Toileting in advance of need  - Initiate/Maintain bed alarm  - Obtain necessary fall risk management equipment  - Apply yellow socks and bracelet for high fall risk patients  - Consider moving patient to room near nurses station  Outcome: Progressing     Problem: PAIN - ADULT  Goal: Verbalizes/displays adequate comfort level or baseline comfort level  Description: Interventions:  - Encourage patient to monitor pain and request assistance  - Assess pain using appropriate pain scale  - Administer analgesics based on type and severity of pain and evaluate response  - Implement non-pharmacological measures as appropriate and evaluate response  - Consider cultural and social influences on pain and pain management  - Notify physician/advanced practitioner if interventions unsuccessful or patient reports new pain  Outcome: Progressing     Problem: INFECTION - ADULT  Goal: Absence or prevention of progression during hospitalization  Description: INTERVENTIONS:  - Assess and monitor for signs and symptoms of infection  - Monitor lab/diagnostic results  - Monitor all insertion sites, i e  indwelling lines, tubes, and drains  - Monitor endotracheal if appropriate and nasal secretions for changes in amount and color  - [place] appropriate cooling/warming therapies per order  - Administer medications as ordered  - Instruct and encourage patient and family to use good hand hygiene technique  - Identify and instruct in appropriate isolation precautions for identified infection/condition  Outcome: Progressing     Problem: SAFETY ADULT  Goal: Patient will remain free of falls  Description: INTERVENTIONS:  - Educate patient/family on patient safety including physical limitations  - Instruct patient to call for assistance with activity   - Consult OT/PT to assist with strengthening/mobility   - Keep Call bell within reach  - Keep bed low and locked with side rails adjusted as appropriate  - Keep care items and personal belongings within reach  - Initiate and maintain comfort rounds  - Make Fall Risk Sign visible to staff  - Offer Toileting in advance of need  - Initiate/Maintain bed alarm  - Obtain necessary fall risk management equipment  - Apply yellow socks and bracelet for high fall risk patients  - Consider moving patient to room near nurses station  Outcome: Progressing  Goal: Maintain or return to baseline ADL function  Description: INTERVENTIONS:  - Educate patient/family on patient safety including physical limitations  - Instruct patient to call for assistance with activity   - Consult OT/PT to assist with strengthening/mobility   - Keep Call bell within reach  - Keep bed low and locked with side rails adjusted as appropriate  - Keep care items and personal belongings within reach  - Initiate and maintain comfort rounds  - Make Fall Risk Sign visible to staff  - Offer Toileting in advance of need  - Initiate/Maintain bed alarm  - Obtain necessary fall risk management equipment:   - Apply yellow socks and bracelet for high fall risk patients  - Consider moving patient to room near nurses station  Outcome: Progressing     Problem: DISCHARGE PLANNING  Goal: Discharge to home or other facility with appropriate resources  Description: INTERVENTIONS:  - Identify barriers to discharge w/patient and caregiver  - Arrange for needed discharge resources and transportation as appropriate  - Identify discharge learning needs (meds, wound care, etc )  - Refer to Case Management Department for coordinating discharge planning if the patient needs post-hospital services based on physician/advanced practitioner order or complex needs related to functional status, cognitive ability, or social support system  Outcome: Progressing     Problem: Knowledge Deficit  Goal: Patient/family/caregiver demonstrates understanding of disease process, treatment plan, medications, and discharge instructions  Description: Complete learning assessment and assess knowledge base    Interventions:  - Provide teaching at level of understanding  - Provide teaching via preferred learning methods  Outcome: Progressing     Problem: GASTROINTESTINAL - ADULT  Goal: Minimal or absence of nausea and/or vomiting  Description: INTERVENTIONS:  - Administer IV fluids if ordered to ensure adequate hydration  - Maintain NPO status until nausea and vomiting are resolved  - Nasogastric tube if ordered  - Administer ordered antiemetic medications as needed  - Provide nonpharmacologic comfort measures as appropriate  - Advance diet as tolerated, if ordered  - Consider nutrition services referral to assist patient with adequate nutrition and appropriate food choices  Outcome: Progressing  Goal: Maintains or returns to baseline bowel function  Description: INTERVENTIONS:  - Assess bowel function  - Encourage oral fluids to ensure adequate hydration  - Administer IV fluids if ordered to ensure adequate hydration  - Administer ordered medications as needed  - Encourage mobilization and activity  - Consider nutritional services referral to assist patient with adequate nutrition and appropriate food choices  Outcome: Progressing  Goal: Maintains adequate nutritional intake  Description: INTERVENTIONS:  - Monitor percentage of each meal consumed  - Identify factors contributing to decreased intake, treat as appropriate  - Assist with meals as needed  - Monitor I&O, weight, and lab values if indicated  - Obtain nutrition services referral as needed  Outcome: Progressing  Goal: Oral mucous membranes remain intact  Description: INTERVENTIONS  - Assess oral mucosa and hygiene practices  - Implement preventative oral hygiene regimen  - Implement oral medicated treatments as ordered  - Initiate Nutrition services referral as needed  Outcome: Progressing     Problem: GENITOURINARY - ADULT  Goal: Maintains or returns to baseline urinary function  Description: INTERVENTIONS:  - Assess urinary function  - Encourage oral fluids to ensure adequate hydration if ordered  - Administer IV fluids as ordered to ensure adequate hydration  - Administer ordered medications as needed  - Offer frequent toileting  - Follow urinary retention protocol if ordered  Outcome: Progressing     Problem: METABOLIC, FLUID AND ELECTROLYTES - ADULT  Goal: Electrolytes maintained within normal limits  Description: INTERVENTIONS:  - Monitor labs and assess patient for signs and symptoms of electrolyte imbalances  - Administer electrolyte replacement as ordered  - Monitor response to electrolyte replacements, including repeat lab results as appropriate  - Instruct patient on fluid and nutrition as appropriate  Outcome: Progressing  Goal: Fluid balance maintained  Description: INTERVENTIONS:  - Monitor labs   - Monitor I/O and WT  - Instruct patient on fluid and nutrition as appropriate  - Assess for signs & symptoms of volume excess or deficit  Outcome: Progressing  Goal: Glucose maintained within target range  Description: INTERVENTIONS:  - Monitor Blood Glucose as ordered  - Assess for signs and symptoms of hyperglycemia and hypoglycemia  - Administer ordered medications to maintain glucose within target range  - Assess nutritional intake and initiate nutrition service referral as needed  Outcome: Progressing     Problem: Nutrition/Hydration-ADULT  Goal: Nutrient/Hydration intake appropriate for improving, restoring or maintaining nutritional needs  Description: Monitor and assess patient's nutrition/hydration status for malnutrition  Collaborate with interdisciplinary team and initiate plan and interventions as ordered  Monitor patient's weight and dietary intake as ordered or per policy  Utilize nutrition screening tool and intervene as necessary  Determine patient's food preferences and provide high-protein, high-caloric foods as appropriate       INTERVENTIONS:  - Monitor oral intake, urinary output, labs, and treatment plans  - Assess nutrition and hydration status and recommend course of action  - Evaluate amount of meals eaten  - Assist patient with eating if necessary   - Allow adequate time for meals  - Recommend/ encourage appropriate diets, oral nutritional supplements, and vitamin/mineral supplements  - Order, calculate, and assess calorie counts as needed  - Recommend, monitor, and adjust tube feedings and TPN/PPN based on assessed needs  - Assess need for intravenous fluids  - Provide specific nutrition/hydration education as appropriate  - Include patient/family/caregiver in decisions related to nutrition  Outcome: Progressing     Problem: MOBILITY - ADULT  Goal: Maintain or return to baseline ADL function  Description: INTERVENTIONS:  - Educate patient/family on patient safety including physical limitations  - Instruct patient to call for assistance with activity   - Consult OT/PT to assist with strengthening/mobility   - Keep Call bell within reach  - Keep bed low and locked with side rails adjusted as appropriate  - Keep care items and personal belongings within reach  - Initiate and maintain comfort rounds  - Make Fall Risk Sign visible to staff  - Offer Toileting in advance of need  - Initiate/Maintain bed alarm  - Obtain necessary fall risk management equipment:   - Apply yellow socks and bracelet for high fall risk patients  - Consider moving patient to room near nurses station  Outcome: Progressing  Goal: Maintains/Returns to pre admission functional level  Description: INTERVENTIONS:  - Perform BMAT or MOVE assessment daily    - Set and communicate daily mobility goal to care team and patient/family/caregiver  - Collaborate with rehabilitation services on mobility goals if consulted  - Perform Range of Motion *** times a day  - Reposition patient every *** hours    - Dangle patient *** times a day  - Stand patient *** times a day  - Ambulate patient *** times a day  - Out of bed to chair *** times a day   - Out of bed for meals *** times a day  - Out of bed for toileting  - Record patient progress and toleration of activity level   Outcome: Progressing     Problem: Prexisting or High Potential for Compromised Skin Integrity  Goal: Skin integrity is maintained or improved  Description: INTERVENTIONS:  - Identify patients at risk for skin breakdown  - Assess and monitor skin integrity  - Assess and monitor nutrition and hydration status  - Monitor labs   - Assess for incontinence   - Turn and reposition patient  - Assist with mobility/ambulation  - Relieve pressure over bony prominences  - Avoid friction and shearing  - Provide appropriate hygiene as needed including keeping skin clean and dry  - Evaluate need for skin moisturizer/barrier cream  - Collaborate with interdisciplinary team   - Patient/family teaching  - Consider wound care consult   Outcome: Progressing

## 2023-06-02 NOTE — PROGRESS NOTES
[facility] 45  Progress Note  Name: Abhay Amador  MRN: [id number]  Unit/Bed#: -01 I Date of Admission: 5/21/2023   Date of Service: 6/2/2023 I Hospital Day: 12    Assessment/Plan   * Metastatic adenocarcinoma [facility])  Assessment & Plan  · Presented for right upper lower quadrant abdominal pain with nausea vomiting and diarrhea   · Leukocytosis with WBC 21 9  · CT chest abdomen pelvis  · Heterogeneously enhancing right suprahilar lesion with abrupt cut off of the right mainstem bronchus suspicious for malignancy    There is secondary complete atelectasis of the right lung with heterogeneous density  Small to moderate right pleural effusion is noted  Few subcentimeter left lung nodules are noted  Evidence of mediastinal and right supraclavicular adenopathy  · Heterogeneous, rim-enhancing lesion at the proximal pancreatic body, possible metastatic focus  Pathologically enlarged celiac axis lymph nodes  Focal annular lesion involving the ascending colon, suspicious for metastatic focus versus primary malignancy  (this ascending colon lesion was not found on subsequent colonoscopy)  · GI consulted, appreciate recommendations  Patient had EGD/colonoscopy on 5/22  · 5/22 EGD: Mild patchy erythematous mucosa in the antrum  Cold forceps biopsy performed  Normal duodenum, random biopsy was performed to rule out celiac disease  · 5/22 colonoscopy: Polyp removed from sigmoid colon  Scattered diverticula of mild severity in the sigmoid colon  · Pulmonology consulted, appreciate recommendations  Recommendation is biopsy of large right supraclavicular node  Performed by IR, final results: Metastatic carcinoma, compatible with adenocarcinoma of lung origin  Pulmonology has signed off  · Oncology consulted, appreciate recommendations  · 5/21 MRI brain: No acute infarction, edema, or mass effect   Few punctate hyperintense T2/FLAIR foci are noted within the periventricular and subcortical white matter which are nonspecific and can be seen with vasculitis, migrainous angiopathy, Lyme's disease or microangiopathic changes  · Palliative care consulted, appreciate recommendations  · Patient started on Oxy IR 3 times daily on 5/29   · On 5/30 OxyContin discontinued as patient reported not helping her pain  · Oxy IR discontinued, patient started on around-the-clock Dilaudid on 5/30  · Pain uncontrolled on 5/31, palliative care adjusted medications, pain improved this a m   · 6/1 evaluated by palliative care, will further adjust pain medications and address pain medications for discharge  · continue ondansetron and scopolamine  · Hematology/oncology consult, appreciate recommendations  · Pain control improved patient's on current regimen, feels abdominal pain now is cramping due to constipation   · Added lactulose to bowel regimen every 4 hours until has BM   · Plan is for discharge to home to [place] in a m  for further treatment  Palliative care has ordered pain medicine for discharge  · Case management following and aware tentative discharge tomorrow    Abdominal pain  Assessment & Plan  · Patient presented to ED with complaints of right upper and lower quadrant abdominal pain with nausea, vomiting and diarrhea    She reported pain was right upper quadrant and worse at night  · Palliative care has been following, appreciate recommendations  · Medications changed to long-acting OxyContin yesterday, patient reported better relief with this regimen   · Was having abdominal cramping this morning, feels related to constipation has not had a BM in multiple days despite bowel regimen   · She had fleets enema last evening with no result  · CT scan chest abdomen pelvis see above  · For assessment/treatment plan see abnormal CT scan above  · X-ray abdomen 5/30:  constipation, no obstruction  · Continue bowel regimen  · Will add  lactulose today, monitor effectiveness      Hypomagnesemia  Assessment & Plan  · POA with serum magnesium 1 5  · Likely secondary to vomiting and diarrhea  · Repleted with 4 g IV magnesium on 5/21  · Resolved, repeat Mg on 5/22 3 0  · Magnesium on 5/30 1 5, again repleted with 4 g IV magnesium  · Magnesium level 5/31 1 8 , repleted with 2 g IV magnesium   · Magnesium on 6/1 1 6, repleted with 4 g IV magnesium  · Repeat magnesium level 6/2 1 7, repleted IV magnesium, will add oral magnesium to her regimen  · Magnesium level in a m  Diarrhea  Assessment & Plan  · Present on admission, now resolved     Nausea & vomiting  Assessment & Plan  · Presented with nausea and vomiting and right upper and lower quadrant pain  · No nausea vomiting currently   · CT imaging see above  · Continue Zofran and scopolamine  · Regular diet as tolerated      Iron deficiency anemia  Assessment & Plan  · Hemoglobin currently stabilized   · Had trended downward to 6 7 on 5/27 which time patient received 1 unit of PRBCs   · Iron panel results reviewed  · S/p EGD and colonoscopy on 5/22  Reviewed findings  · Currently without signs of bleeding  · Plan to transfuse for hemoglobin below 7  · Continue to monitor closely for bleeding  · Continue vitamin B12, ferrous sulfate, folic acid  · CBC in a m  Anxiety  Assessment & Plan  · Currently without anxiety  · Continue as needed Atarax  · Continue Wellbutrin           VTE Pharmacologic Prophylaxis:   Pharmacologic: Heparin  Mechanical VTE Prophylaxis in Place: Yes    Patient Centered Rounds: I have performed bedside rounds with nursing staff today  Discussions with Specialists or Other Care Team Provider: Palliative care, nursing, case management    Education and Discussions with Family / Patient: Treatment plan discussed with patient and significant other at bedside  Patient has decided that she wants to return to [place] for treatment  Palliative care saw patient yesterday and further adjusted pain medication, including adding long acting OxyContin  Patient states pain much better controlled overnight, feels only abdominal cramping at this time due to constipation  Palliative care has ordered pain medication for discharge  Added lactulose to bowel regimen until BM, hopeful discharge back home to [place] in a m  for further treatment by doctors in [place]  Time Spent for Care: 45 minutes  More than 50% of total time spent on counseling and coordination of care as described above  Current Length of Stay: 12 day(s)    Current Patient Status: Inpatient   Certification Statement: The patient will continue to require additional inpatient hospital stay due to Patient still having abdominal pain, although improved feels due to constipation  Patient given aggressive bowel regimen today, hopefully will be effective and able to discharge patient home to [place] in a m  Discharge Plan: Discharge back to [place] in a m  pending effectiveness of bowel regimen and pain control, where she will follow-up for further treatment  Code Status: Level 1 - Full Code      Subjective:   Patient reported that her pain was improved since the medication adjustments yesterday  She states that she is having abdominal cramping which she contributes to constipation  Will address  Objective:     Vitals:   Temp (24hrs), Av 5 °F (36 9 °C), Min:98 4 °F (36 9 °C), Max:98 5 °F (36 9 °C)    Temp:  [98 4 °F (36 9 °C)-98 5 °F (36 9 °C)] 98 4 °F (36 9 °C)  HR:  [101-110] 106  Resp:  [18-22] 22  BP: (125-165)/(73-91) 165/91  SpO2:  [91 %-92 %] 91 %  Body mass index is 22 13 kg/m²  Input and Output Summary (last 24 hours): Intake/Output Summary (Last 24 hours) at 2023 1345  Last data filed at 2023 9979  Gross per 24 hour   Intake --   Output 1310 ml   Net -1310 ml       Physical Exam:     Physical Exam  Vitals and nursing note reviewed  Constitutional:       General: She is not in acute distress  Appearance: She is not ill-appearing     HENT: Head: Normocephalic and atraumatic  Nose: Nose normal       Mouth/Throat:      Mouth: Mucous membranes are moist    Cardiovascular:      Rate and Rhythm: Normal rate and regular rhythm  Pulses: Normal pulses  Heart sounds: Normal heart sounds  Pulmonary:      Effort: Pulmonary effort is normal  No respiratory distress  Breath sounds: Normal breath sounds  No wheezing or rales  Abdominal:      General: Bowel sounds are normal  There is no distension  Palpations: Abdomen is soft  Tenderness: There is no abdominal tenderness  There is no guarding  Musculoskeletal:         General: Normal range of motion  Right lower leg: No edema  Left lower leg: No edema  Skin:     General: Skin is warm and dry  Capillary Refill: Capillary refill takes less than 2 seconds  Neurological:      General: No focal deficit present  Mental Status: She is alert and oriented to person, place, and time  Mental status is at baseline  Psychiatric:         Mood and Affect: Mood normal          Behavior: Behavior normal          Thought Content: Thought content normal          Judgment: Judgment normal          Additional Data:     Labs:    Results from last 7 days   Lab Units 06/02/23  0505   EOS PCT % 3   HEMATOCRIT % 29 5*   HEMOGLOBIN g/dL 8 8*   LYMPHS PCT % 23   MONOS PCT % 15*   NEUTROS PCT % 59   PLATELETS Thousands/uL 493*   WBC Thousand/uL 10 51*     Results from last 7 days   Lab Units 06/02/23  0505   BUN mg/dL 6   CALCIUM mg/dL 9 1   CHLORIDE mmol/L 99   CO2 mmol/L 26   CREATININE mg/dL 0 62   POTASSIUM mmol/L 3 8           * I Have Reviewed All Lab Data Listed Above  * Additional Pertinent Lab Tests Reviewed:  Ara 66 Admission Reviewed    Imaging:    Imaging Reports Reviewed Today Include: CT chest abdomen pelvis, MRI brain, KUB  Imaging Personally Reviewed by Myself Includes: CT chest abdomen pelvis, KUB    Recent Cultures (last 7 days): Last 24 Hours Medication List:   Current Facility-Administered Medications   Medication Dose Route Frequency Provider Last Rate   • acetaminophen  650 mg Oral Q6H PRN Finn Cutler MD     • buPROPion  150 mg Oral BID Finn Cutler MD     • butalbital-acetaminophen-caffeine  1 tablet Oral Q4H PRN Finn Cutler MD     • cyanocobalamin  500 mcg Oral Daily Finn Cutler MD     • dicyclomine  10 mg Oral 4x Daily (AC & HS) BRITTNY Sheppard     • docusate sodium  100 mg Oral BID BRITTNY Sheppard     • ferrous sulfate  325 mg Oral Daily With Breakfast BRITTNY Benitez     • folic acid  526 mcg Oral Daily Finn Cutler MD     • guaiFENesin  600 mg Oral Q12H [address] BRITTNY Sheppard     • heparin (porcine)  5,000 Units Subcutaneous [facility] BRITTNY Benitez     • HYDROmorphone  4 mg Oral Q4H PRN Gadiel Goncalves PA-C     • hydrOXYzine HCL  25 mg Oral TID PRN BRITTNY Benitez     • lactulose  30 g Oral 4x Daily BRITTNY Sheppard     • lidocaine  1 patch Topical Daily BRITTNY Sheppard     • lidocaine  1 patch Topical Daily Gadiel Goncalves PA-C     • losartan  100 mg Oral Daily Finn Cutler MD     • magnesium Oxide  400 mg Oral BID BRITTNY Sheppard     • metoclopramide  10 mg Intravenous Q6H PRN Franco Ritter PA-C     • naloxone  0 04 mg Intravenous Q1MIN PRN BRITTNY Benitez     • ondansetron  8 mg Oral TID [facility] Chip Yadav MD     • oxyCODONE  30 mg Oral Q12H [address] Nakul Ratliff PA-C     • pantoprazole  40 mg Intravenous Q24H [address] Finn Cutler MD     • polyethylene glycol  17 g Oral Daily BRITTNY Sheppard     • potassium chloride  20 mEq Oral Daily Servando Holley PA-C     • pravastatin  80 mg Oral Daily With Dinner Finn Cutler MD     • scopolamine  1 patch Transdermal Q72H BRITTNY Benitez     • senna  1 tablet Oral HS BRITTNY Sheppard          Today, Patient Was Seen By: BRITTNY Garcia    ** Please Note: Dictation voice to text software may have been used in the creation of this document   **

## 2023-06-03 LAB
ANION GAP SERPL CALCULATED.3IONS-SCNC: 11 MMOL/L (ref 4–13)
BASOPHILS # BLD AUTO: 0.05 THOUSANDS/ÂΜL (ref 0–0.1)
BASOPHILS NFR BLD AUTO: 0 % (ref 0–1)
BUN SERPL-MCNC: 6 MG/DL (ref 5–25)
CALCIUM SERPL-MCNC: 9.7 MG/DL (ref 8.4–10.2)
CHLORIDE SERPL-SCNC: 99 MMOL/L (ref 96–108)
CO2 SERPL-SCNC: 24 MMOL/L (ref 21–32)
CREAT SERPL-MCNC: 0.68 MG/DL (ref 0.6–1.3)
EOSINOPHIL # BLD AUTO: 0.1 THOUSAND/ÂΜL (ref 0–0.61)
EOSINOPHIL NFR BLD AUTO: 1 % (ref 0–6)
ERYTHROCYTE [DISTWIDTH] IN BLOOD BY AUTOMATED COUNT: 16.6 % (ref 11.6–15.1)
GFR SERPL CREATININE-BSD FRML MDRD: 94 ML/MIN/1.73SQ M
GLUCOSE SERPL-MCNC: 135 MG/DL (ref 65–140)
HCT VFR BLD AUTO: 29 % (ref 34.8–46.1)
HGB BLD-MCNC: 8.6 G/DL (ref 11.5–15.4)
IMM GRANULOCYTES # BLD AUTO: 0.07 THOUSAND/UL (ref 0–0.2)
IMM GRANULOCYTES NFR BLD AUTO: 1 % (ref 0–2)
LYMPHOCYTES # BLD AUTO: 1.86 THOUSANDS/ÂΜL (ref 0.6–4.47)
LYMPHOCYTES NFR BLD AUTO: 15 % (ref 14–44)
MAGNESIUM SERPL-MCNC: 1.6 MG/DL (ref 1.9–2.7)
MCH RBC QN AUTO: 23.9 PG (ref 26.8–34.3)
MCHC RBC AUTO-ENTMCNC: 29.7 G/DL (ref 31.4–37.4)
MCV RBC AUTO: 81 FL (ref 82–98)
MONOCYTES # BLD AUTO: 1.55 THOUSAND/ÂΜL (ref 0.17–1.22)
MONOCYTES NFR BLD AUTO: 12 % (ref 4–12)
NEUTROPHILS # BLD AUTO: 8.92 THOUSANDS/ÂΜL (ref 1.85–7.62)
NEUTS SEG NFR BLD AUTO: 71 % (ref 43–75)
NRBC BLD AUTO-RTO: 0 /100 WBCS
PLATELET # BLD AUTO: 601 THOUSANDS/UL (ref 149–390)
PMV BLD AUTO: 8.5 FL (ref 8.9–12.7)
POTASSIUM SERPL-SCNC: 3.8 MMOL/L (ref 3.5–5.3)
RBC # BLD AUTO: 3.6 MILLION/UL (ref 3.81–5.12)
SODIUM SERPL-SCNC: 134 MMOL/L (ref 135–147)
WBC # BLD AUTO: 12.55 THOUSAND/UL (ref 4.31–10.16)

## 2023-06-03 PROCEDURE — 80048 BASIC METABOLIC PNL TOTAL CA: CPT

## 2023-06-03 PROCEDURE — 85025 COMPLETE CBC W/AUTO DIFF WBC: CPT

## 2023-06-03 PROCEDURE — C9113 INJ PANTOPRAZOLE SODIUM, VIA: HCPCS | Performed by: INTERNAL MEDICINE

## 2023-06-03 PROCEDURE — 99233 SBSQ HOSP IP/OBS HIGH 50: CPT

## 2023-06-03 PROCEDURE — 83735 ASSAY OF MAGNESIUM: CPT

## 2023-06-03 RX ORDER — MAGNESIUM SULFATE HEPTAHYDRATE 40 MG/ML
4 INJECTION, SOLUTION INTRAVENOUS ONCE
Status: COMPLETED | OUTPATIENT
Start: 2023-06-03 | End: 2023-06-04

## 2023-06-03 RX ADMIN — DICLOFENAC SODIUM TOPICAL GEL, 1%, 2 G: 10 GEL TOPICAL at 21:32

## 2023-06-03 RX ADMIN — LIDOCAINE 1 PATCH: 50 PATCH CUTANEOUS at 08:44

## 2023-06-03 RX ADMIN — DICYCLOMINE HYDROCHLORIDE 10 MG: 10 CAPSULE ORAL at 12:13

## 2023-06-03 RX ADMIN — HYDROMORPHONE HYDROCHLORIDE 4 MG: 2 TABLET ORAL at 03:30

## 2023-06-03 RX ADMIN — ONDANSETRON 8 MG: 4 TABLET, ORALLY DISINTEGRATING ORAL at 17:19

## 2023-06-03 RX ADMIN — HEPARIN SODIUM 5000 UNITS: 5000 INJECTION INTRAVENOUS; SUBCUTANEOUS at 13:21

## 2023-06-03 RX ADMIN — POTASSIUM CHLORIDE 20 MEQ: 1500 TABLET, EXTENDED RELEASE ORAL at 08:40

## 2023-06-03 RX ADMIN — Medication 400 MG: at 17:19

## 2023-06-03 RX ADMIN — DICYCLOMINE HYDROCHLORIDE 10 MG: 10 CAPSULE ORAL at 17:19

## 2023-06-03 RX ADMIN — PRAVASTATIN SODIUM 80 MG: 40 TABLET ORAL at 17:19

## 2023-06-03 RX ADMIN — BUPROPION HYDROCHLORIDE TABLETS 150 MG: 100 TABLET, FILM COATED ORAL at 17:19

## 2023-06-03 RX ADMIN — BUPROPION HYDROCHLORIDE TABLETS 150 MG: 100 TABLET, FILM COATED ORAL at 08:41

## 2023-06-03 RX ADMIN — HEPARIN SODIUM 5000 UNITS: 5000 INJECTION INTRAVENOUS; SUBCUTANEOUS at 21:32

## 2023-06-03 RX ADMIN — GUAIFENESIN 600 MG: 600 TABLET, EXTENDED RELEASE ORAL at 08:41

## 2023-06-03 RX ADMIN — ONDANSETRON 8 MG: 4 TABLET, ORALLY DISINTEGRATING ORAL at 12:13

## 2023-06-03 RX ADMIN — LOSARTAN POTASSIUM 100 MG: 50 TABLET, FILM COATED ORAL at 08:40

## 2023-06-03 RX ADMIN — Medication 400 MCG: at 08:41

## 2023-06-03 RX ADMIN — HYDROMORPHONE HYDROCHLORIDE 4 MG: 2 TABLET ORAL at 19:00

## 2023-06-03 RX ADMIN — CYANOCOBALAMIN TAB 500 MCG 500 MCG: 500 TAB at 08:40

## 2023-06-03 RX ADMIN — HEPARIN SODIUM 5000 UNITS: 5000 INJECTION INTRAVENOUS; SUBCUTANEOUS at 06:03

## 2023-06-03 RX ADMIN — LACTULOSE 30 G: 20 SOLUTION ORAL at 12:13

## 2023-06-03 RX ADMIN — FERROUS SULFATE TAB 325 MG (65 MG ELEMENTAL FE) 325 MG: 325 (65 FE) TAB at 08:50

## 2023-06-03 RX ADMIN — DOCUSATE SODIUM 100 MG: 100 CAPSULE, LIQUID FILLED ORAL at 17:19

## 2023-06-03 RX ADMIN — ONDANSETRON 8 MG: 4 TABLET, ORALLY DISINTEGRATING ORAL at 06:07

## 2023-06-03 RX ADMIN — HYDROMORPHONE HYDROCHLORIDE 4 MG: 2 TABLET ORAL at 09:31

## 2023-06-03 RX ADMIN — LACTULOSE 30 G: 20 SOLUTION ORAL at 08:42

## 2023-06-03 RX ADMIN — DICYCLOMINE HYDROCHLORIDE 10 MG: 10 CAPSULE ORAL at 21:32

## 2023-06-03 RX ADMIN — OXYCODONE HYDROCHLORIDE 30 MG: 20 TABLET, FILM COATED, EXTENDED RELEASE ORAL at 21:32

## 2023-06-03 RX ADMIN — Medication 400 MG: at 08:41

## 2023-06-03 RX ADMIN — MAGNESIUM SULFATE HEPTAHYDRATE 4 G: 40 INJECTION, SOLUTION INTRAVENOUS at 08:51

## 2023-06-03 RX ADMIN — DICYCLOMINE HYDROCHLORIDE 10 MG: 10 CAPSULE ORAL at 06:07

## 2023-06-03 RX ADMIN — OXYCODONE HYDROCHLORIDE 30 MG: 20 TABLET, FILM COATED, EXTENDED RELEASE ORAL at 08:41

## 2023-06-03 RX ADMIN — POLYETHYLENE GLYCOL 3350 17 G: 17 POWDER, FOR SOLUTION ORAL at 08:42

## 2023-06-03 RX ADMIN — GUAIFENESIN 600 MG: 600 TABLET, EXTENDED RELEASE ORAL at 21:32

## 2023-06-03 RX ADMIN — PANTOPRAZOLE SODIUM 40 MG: 40 INJECTION, POWDER, FOR SOLUTION INTRAVENOUS at 08:50

## 2023-06-03 RX ADMIN — DOCUSATE SODIUM 100 MG: 100 CAPSULE, LIQUID FILLED ORAL at 08:41

## 2023-06-03 NOTE — PLAN OF CARE
Problem: Potential for Falls  Goal: Patient will remain free of falls  Description: INTERVENTIONS:  - Educate patient/family on patient safety including physical limitations  - Instruct patient to call for assistance with activity   - Consult OT/PT to assist with strengthening/mobility   - Keep Call bell within reach  - Keep bed low and locked with side rails adjusted as appropriate  - Keep care items and personal belongings within reach  - Initiate and maintain comfort rounds  - Make Fall Risk Sign visible to staff  - Offer Toileting in advance of need  - Initiate/Maintain bed alarm  - Obtain necessary fall risk management equipment  - Apply yellow socks and bracelet for high fall risk patients  - Consider moving patient to room near nurses station  Outcome: Progressing     Problem: PAIN - ADULT  Goal: Verbalizes/displays adequate comfort level or baseline comfort level  Description: Interventions:  - Encourage patient to monitor pain and request assistance  - Assess pain using appropriate pain scale  - Administer analgesics based on type and severity of pain and evaluate response  - Implement non-pharmacological measures as appropriate and evaluate response  - Consider cultural and social influences on pain and pain management  - Notify physician/advanced practitioner if interventions unsuccessful or patient reports new pain  Outcome: Progressing     Problem: INFECTION - ADULT  Goal: Absence or prevention of progression during hospitalization  Description: INTERVENTIONS:  - Assess and monitor for signs and symptoms of infection  - Monitor lab/diagnostic results  - Monitor all insertion sites, i e  indwelling lines, tubes, and drains  - Monitor endotracheal if appropriate and nasal secretions for changes in amount and color  - [place] appropriate cooling/warming therapies per order  - Administer medications as ordered  - Instruct and encourage patient and family to use good hand hygiene technique  - Identify and instruct in appropriate isolation precautions for identified infection/condition  Outcome: Progressing     Problem: SAFETY ADULT  Goal: Patient will remain free of falls  Description: INTERVENTIONS:  - Educate patient/family on patient safety including physical limitations  - Instruct patient to call for assistance with activity   - Consult OT/PT to assist with strengthening/mobility   - Keep Call bell within reach  - Keep bed low and locked with side rails adjusted as appropriate  - Keep care items and personal belongings within reach  - Initiate and maintain comfort rounds  - Make Fall Risk Sign visible to staff  - Offer Toileting in advance of need  - Initiate/Maintain bed alarm  - Obtain necessary fall risk management equipment  - Apply yellow socks and bracelet for high fall risk patients  - Consider moving patient to room near nurses station  Outcome: Progressing  Goal: Maintain or return to baseline ADL function  Description: INTERVENTIONS:  - Educate patient/family on patient safety including physical limitations  - Instruct patient to call for assistance with activity   - Consult OT/PT to assist with strengthening/mobility   - Keep Call bell within reach  - Keep bed low and locked with side rails adjusted as appropriate  - Keep care items and personal belongings within reach  - Initiate and maintain comfort rounds  - Make Fall Risk Sign visible to staff  - Offer Toileting in advance of need  - Initiate/Maintain bed alarm  - Obtain necessary fall risk management equipment:   - Apply yellow socks and bracelet for high fall risk patients  - Consider moving patient to room near nurses station  Outcome: Progressing     Problem: DISCHARGE PLANNING  Goal: Discharge to home or other facility with appropriate resources  Description: INTERVENTIONS:  - Identify barriers to discharge w/patient and caregiver  - Arrange for needed discharge resources and transportation as appropriate  - Identify discharge learning needs (meds, wound care, etc )  - Refer to Case Management Department for coordinating discharge planning if the patient needs post-hospital services based on physician/advanced practitioner order or complex needs related to functional status, cognitive ability, or social support system  Outcome: Progressing     Problem: Knowledge Deficit  Goal: Patient/family/caregiver demonstrates understanding of disease process, treatment plan, medications, and discharge instructions  Description: Complete learning assessment and assess knowledge base    Interventions:  - Provide teaching at level of understanding  - Provide teaching via preferred learning methods  Outcome: Progressing     Problem: GASTROINTESTINAL - ADULT  Goal: Minimal or absence of nausea and/or vomiting  Description: INTERVENTIONS:  - Administer IV fluids if ordered to ensure adequate hydration  - Maintain NPO status until nausea and vomiting are resolved  - Nasogastric tube if ordered  - Administer ordered antiemetic medications as needed  - Provide nonpharmacologic comfort measures as appropriate  - Advance diet as tolerated, if ordered  - Consider nutrition services referral to assist patient with adequate nutrition and appropriate food choices  Outcome: Progressing  Goal: Maintains or returns to baseline bowel function  Description: INTERVENTIONS:  - Assess bowel function  - Encourage oral fluids to ensure adequate hydration  - Administer IV fluids if ordered to ensure adequate hydration  - Administer ordered medications as needed  - Encourage mobilization and activity  - Consider nutritional services referral to assist patient with adequate nutrition and appropriate food choices  Outcome: Progressing  Goal: Maintains adequate nutritional intake  Description: INTERVENTIONS:  - Monitor percentage of each meal consumed  - Identify factors contributing to decreased intake, treat as appropriate  - Assist with meals as needed  - Monitor I&O, weight, and lab values if indicated  - Obtain nutrition services referral as needed  Outcome: Progressing  Goal: Oral mucous membranes remain intact  Description: INTERVENTIONS  - Assess oral mucosa and hygiene practices  - Implement preventative oral hygiene regimen  - Implement oral medicated treatments as ordered  - Initiate Nutrition services referral as needed  Outcome: Progressing     Problem: GENITOURINARY - ADULT  Goal: Maintains or returns to baseline urinary function  Description: INTERVENTIONS:  - Assess urinary function  - Encourage oral fluids to ensure adequate hydration if ordered  - Administer IV fluids as ordered to ensure adequate hydration  - Administer ordered medications as needed  - Offer frequent toileting  - Follow urinary retention protocol if ordered  Outcome: Progressing     Problem: METABOLIC, FLUID AND ELECTROLYTES - ADULT  Goal: Electrolytes maintained within normal limits  Description: INTERVENTIONS:  - Monitor labs and assess patient for signs and symptoms of electrolyte imbalances  - Administer electrolyte replacement as ordered  - Monitor response to electrolyte replacements, including repeat lab results as appropriate  - Instruct patient on fluid and nutrition as appropriate  Outcome: Progressing  Goal: Fluid balance maintained  Description: INTERVENTIONS:  - Monitor labs   - Monitor I/O and WT  - Instruct patient on fluid and nutrition as appropriate  - Assess for signs & symptoms of volume excess or deficit  Outcome: Progressing  Goal: Glucose maintained within target range  Description: INTERVENTIONS:  - Monitor Blood Glucose as ordered  - Assess for signs and symptoms of hyperglycemia and hypoglycemia  - Administer ordered medications to maintain glucose within target range  - Assess nutritional intake and initiate nutrition service referral as needed  Outcome: Progressing     Problem: Nutrition/Hydration-ADULT  Goal: Nutrient/Hydration intake appropriate for improving, restoring or maintaining nutritional needs  Description: Monitor and assess patient's nutrition/hydration status for malnutrition  Collaborate with interdisciplinary team and initiate plan and interventions as ordered  Monitor patient's weight and dietary intake as ordered or per policy  Utilize nutrition screening tool and intervene as necessary  Determine patient's food preferences and provide high-protein, high-caloric foods as appropriate       INTERVENTIONS:  - Monitor oral intake, urinary output, labs, and treatment plans  - Assess nutrition and hydration status and recommend course of action  - Evaluate amount of meals eaten  - Assist patient with eating if necessary   - Allow adequate time for meals  - Recommend/ encourage appropriate diets, oral nutritional supplements, and vitamin/mineral supplements  - Order, calculate, and assess calorie counts as needed  - Recommend, monitor, and adjust tube feedings and TPN/PPN based on assessed needs  - Assess need for intravenous fluids  - Provide specific nutrition/hydration education as appropriate  - Include patient/family/caregiver in decisions related to nutrition  Outcome: Progressing

## 2023-06-03 NOTE — PROGRESS NOTES
[address] [address]  Progress Note  Name: La Nena Dougherty  MRN: [id number]  Unit/Bed#: -01 I Date of Admission: 5/21/2023   Date of Service: 6/3/2023 I Hospital Day: 13    Assessment/Plan   * Metastatic adenocarcinoma [facility])  Assessment & Plan  · Presented for right upper lower quadrant abdominal pain with nausea vomiting and diarrhea   · Leukocytosis with WBC 21 9  · CT chest abdomen pelvis  · Heterogeneously enhancing right suprahilar lesion with abrupt cut off of the right mainstem bronchus suspicious for malignancy    There is secondary complete atelectasis of the right lung with heterogeneous density  Small to moderate right pleural effusion is noted  Few subcentimeter left lung nodules are noted  Evidence of mediastinal and right supraclavicular adenopathy  · Heterogeneous, rim-enhancing lesion at the proximal pancreatic body, possible metastatic focus  Pathologically enlarged celiac axis lymph nodes  Focal annular lesion involving the ascending colon, suspicious for metastatic focus versus primary malignancy  (this ascending colon lesion was not found on subsequent colonoscopy)  · GI consulted, appreciate recommendations  Patient had EGD/colonoscopy on 5/22  · 5/22 EGD: Mild patchy erythematous mucosa in the antrum  Cold forceps biopsy performed  Normal duodenum, random biopsy was performed to rule out celiac disease  · 5/22 colonoscopy: Polyp removed from sigmoid colon  Scattered diverticula of mild severity in the sigmoid colon  · Pulmonology consulted, appreciate recommendations  Recommendation is biopsy of large right supraclavicular node  Performed by IR, final results: Metastatic carcinoma, compatible with adenocarcinoma of lung origin  Pulmonology has signed off  · Oncology consulted, appreciate recommendations  · 5/21 MRI brain: No acute infarction, edema, or mass effect   Few punctate hyperintense T2/FLAIR foci are noted within the periventricular and subcortical white matter which are nonspecific and can be seen with vasculitis, migrainous angiopathy, Lyme's disease or microangiopathic changes  · Hematology/oncology consulted, appreciate recommendations  · Palliative care consulted, appreciate recommendations  · Patient started on Oxy IR 3 times daily on 5/29   · On 5/30 OxyContin discontinued as patient reported not helping her pain  · Oxy IR discontinued, patient started on around-the-clock Dilaudid on 5/30  · Pain uncontrolled on 5/31, palliative care adjusted medications, pain improved this a m   · 6/1 evaluated by palliative care, will further adjust pain medications and address pain medications for discharge  · Patient pain control was adequate for patient on 6/2, however, had increased abdominal cramping due to constipation so did not discharge  · 6/20 patient reports she again had increased pain overnight requiring as needed medications in addition to her scheduled long-acting OxyContin  · continue ondansetron and scopolamine  · Pain control improved patient's on current regimen, feels abdominal pain now is cramping due to constipation   · Added lactulose to bowel regimen as of 6/2, with effective results    · Plan is for discharge to home to [place] in a m  for further treatment  Palliative care has ordered pain medicine for discharge  · Case management following and aware tentative discharge tomorrow pending improvement in pain as she needs to return to [place] on discharge    Abdominal pain  Assessment & Plan  · Patient presented to ED with complaints of right upper and lower quadrant abdominal pain with nausea, vomiting and diarrhea  She reported pain was right upper quadrant and worse at night  · 5/21 CT chest abdomen pelvis: Heterogeneously enhancing right suprahilar lesion with abrupt cut off of the right mainstem bronchus suspicious for malignancy    There is secondary complete atelectasis of the right lung with heterogeneous density   Small to moderate right pleural effusion is noted  Few subcentimeter left lung nodules are noted  Evidence of mediastinal and right supraclavicular adenopathy  Heterogeneous, rim-enhancing lesion at the proximal pancreatic body, possible metastatic focus  Pathologically enlarged celiac axis lymph nodes  Focal annular lesion involving the ascending colon, suspicious for metastatic focus versus primary malignancy  · 5/30 obstructive series: Constipation, no obstruction  · GI following, appreciate recommendations  Patient in EGD colonoscopy  See results under metastatic adenocarcinoma plan  · Hematology/oncology consulted, appreciate recommendations  · Palliative care has been following, appreciate recommendations  · Medications changed to long-acting OxyContin 6/1, patient initially reported better relief with this regimen, however today states she is still having significant abdominal pain requiring breakthrough medication  · Abdominal cramping from yesterday improved, given given patient finally had bowel movement   · Continue bowel regimen  · Continue to monitor effectiveness of pain regimen        Hypomagnesemia  Assessment & Plan  · POA with serum magnesium 1 5  · Likely secondary to vomiting and diarrhea  · Have been monitoring levels and repleting potassium as indicated  · Added oral magnesium to regimen 6/2  · Repeat magnesium level in a m  Diarrhea  Assessment & Plan  · Present on admission, now resolved     Nausea & vomiting  Assessment & Plan  · Presented with nausea and vomiting and right upper and lower quadrant pain  · No nausea vomiting currently   · CT imaging see above  · Continue Zofran and scopolamine  · Regular diet as tolerated      Iron deficiency anemia  Assessment & Plan  · Hemoglobin currently stabilized   · Had trended downward to 6 7 on 5/27 which time patient received 1 unit of PRBCs   · Iron panel results reviewed  · S/p EGD and colonoscopy on 5/22  Reviewed findings    · Currently without signs of bleeding  · Plan to transfuse for hemoglobin below 7  · Continue to monitor closely for bleeding  · Continue vitamin B12, ferrous sulfate, folic acid  · CBC in a m  Anxiety  Assessment & Plan  · Currently without anxiety  · Continue as needed Atarax  · Continue Wellbutrin           VTE Pharmacologic Prophylaxis:   Pharmacologic: Heparin  Mechanical VTE Prophylaxis in Place: Yes    Patient Centered Rounds: I have performed bedside rounds with nursing staff today  Discussions with Specialists or Other Care Team Provider: Palliative care, nursing, case management    Education and Discussions with Family / Patient: Treatment plan discussed with patient and significant other at bedside  Patient has decided that she wants to return to [facility] for treatment  Palliative care has been working on pain regimen for patient on discharge  She reported pain was much improved yesterday, however, was having increased abdominal cramping due to constipation  She was initiated on lactulose in addition to her bowel regimen which was effective in helping her move her bowels  She reported increased pain again overnight, states she required as needed medication for pain and is concerned that she will not be able to get home to [facility] due to her uncontrolled pain today  We will continue to monitor overnight  Tentative discharge in the a m  Time Spent for Care: 45 minutes  More than 50% of total time spent on counseling and coordination of care as described above  Current Length of Stay: 13 day(s)    Current Patient Status: Inpatient   Certification Statement: The patient will continue to require additional inpatient hospital stay due to Increased abdominal pain overnight, requiring as needed's  Patient states unable to sit in car long enough to get back to [facility] due to pain      Discharge Plan: Tentative discharge home to [facility] in a m  pending effectiveness of pain regimen     code Status: Level 1 - Full Code      Subjective:   Patient reported she finally moved her bowels, abdominal cramping has improved  She states that she had increased pain overnight again requiring as needed medication  She is concerned that she would not be able to get home to [place] today due to the long ride in the car due to pain  Objective:     Vitals:   Temp (24hrs), Av 8 °F (37 1 °C), Min:98 2 °F (36 8 °C), Max:99 5 °F (37 5 °C)    Temp:  [98 2 °F (36 8 °C)-99 5 °F (37 5 °C)] 98 8 °F (37 1 °C)  HR:  [] 86  Resp:  [18] 18  BP: (119-148)/(67-73) 148/73  SpO2:  [93 %-94 %] 93 %  Body mass index is 22 13 kg/m²  Input and Output Summary (last 24 hours):     No intake or output data in the 24 hours ending 23 1253    Physical Exam:     Physical Exam  Vitals and nursing note reviewed  Constitutional:       General: She is not in acute distress  Appearance: She is not ill-appearing  HENT:      Head: Normocephalic and atraumatic  Nose: Nose normal       Mouth/Throat:      Mouth: Mucous membranes are moist    Cardiovascular:      Rate and Rhythm: Normal rate and regular rhythm  Pulses: Normal pulses  Heart sounds: Normal heart sounds  Pulmonary:      Effort: Pulmonary effort is normal  No respiratory distress  Breath sounds: Normal breath sounds  No wheezing or rales  Abdominal:      General: Bowel sounds are normal  There is no distension  Palpations: Abdomen is soft  Tenderness: There is no abdominal tenderness  There is no guarding  Musculoskeletal:         General: Normal range of motion  Right lower leg: No edema  Left lower leg: No edema  Skin:     General: Skin is warm and dry  Capillary Refill: Capillary refill takes less than 2 seconds  Neurological:      General: No focal deficit present  Mental Status: She is alert and oriented to person, place, and time  Mental status is at baseline     Psychiatric:         Mood and Affect: Mood normal  Behavior: Behavior normal          Thought Content: Thought content normal          Judgment: Judgment normal          Additional Data:     Labs:    Results from last 7 days   Lab Units 06/03/23  0427   EOS PCT % 1   HEMATOCRIT % 29 0*   HEMOGLOBIN g/dL 8 6*   LYMPHS PCT % 15   MONOS PCT % 12   NEUTROS PCT % 71   PLATELETS Thousands/uL 601*   WBC Thousand/uL 12 55*     Results from last 7 days   Lab Units 06/03/23  0427   BUN mg/dL 6   CALCIUM mg/dL 9 7   CHLORIDE mmol/L 99   CO2 mmol/L 24   CREATININE mg/dL 0 68   POTASSIUM mmol/L 3 8           * I Have Reviewed All Lab Data Listed Above  * Additional Pertinent Lab Tests Reviewed:  [address] 66 Admission Reviewed    Imaging:    Imaging Reports Reviewed Today Include: CT chest abdomen pelvis, MRI brain, KUB  Imaging Personally Reviewed by Myself Includes: CT chest abdomen pelvis, KUB    Recent Cultures (last 7 days):           Last 24 Hours Medication List:   Current Facility-Administered Medications   Medication Dose Route Frequency Provider Last Rate   • acetaminophen  650 mg Oral Q6H PRN Femi Rolle MD     • buPROPion  150 mg Oral BID Femi Rolle MD     • butalbital-acetaminophen-caffeine  1 tablet Oral Q4H PRN Femi Rolle MD     • cyanocobalamin  500 mcg Oral Daily Femi Rolle MD     • dicyclomine  10 mg Oral 4x Daily (AC & HS) BRITTNY Sheppadr     • docusate sodium  100 mg Oral BID BRITTNY Sheppard     • ferrous sulfate  325 mg Oral Daily With Breakfast BRITTNY Cross     • folic acid  473 mcg Oral Daily Femi Rolle MD     • guaiFENesin  600 mg Oral Q12H [address] BRITTNY Sheppard     • heparin (porcine)  5,000 Units Subcutaneous [facility] BRITTNY Cross     • HYDROmorphone  4 mg Oral Q4H PRN Minerva Ashby PA-C     • hydrOXYzine HCL  25 mg Oral TID PRN BRITTNY Cross     • lactulose  30 g Oral 4x Daily BRITTNY Sheppard     • lidocaine  1 patch Topical Daily BRITTNY Sheppard     • lidocaine  1 patch Topical Daily [employer]     • losartan  100 mg Oral Daily Lorrayne Duane, MD     • magnesium Oxide  400 mg Oral BID BRITTNY Sheppard     • metoclopramide  10 mg Intravenous Q6H PRN Madisyn Em PA-C     • naloxone  0 04 mg Intravenous Q1MIN PRN BRITTNY Edgar     • ondansetron  8 mg Oral TID [facility] Zheng Spear MD     • oxyCODONE  30 mg Oral Q12H [address] Nakul Ratliff PA-C     • pantoprazole  40 mg Intravenous Q24H [address] Lorrayne Duane, MD     • polyethylene glycol  17 g Oral Daily BRITTNY Sheppard     • potassium chloride  20 mEq Oral Daily Kirsten Rothman PA-C     • pravastatin  80 mg Oral Daily With Mikhail Jerome MD     • scopolamine  1 patch Transdermal Q72H BRITTNY Edgar     • senna  1 tablet Oral HS BRITTNY Sheppard          Today, Patient Was Seen By: BRITTNY Velasco    ** Please Note: Dictation voice to text software may have been used in the creation of this document   **

## 2023-06-03 NOTE — PLAN OF CARE
Problem: INFECTION - ADULT  Goal: Absence or prevention of progression during hospitalization  Description: INTERVENTIONS:  - Assess and monitor for signs and symptoms of infection  - Monitor lab/diagnostic results  - Monitor all insertion sites, i e  indwelling lines, tubes, and drains  - Monitor endotracheal if appropriate and nasal secretions for changes in amount and color  - [facility] appropriate cooling/warming therapies per order  - Administer medications as ordered  - Instruct and encourage patient and family to use good hand hygiene technique  - Identify and instruct in appropriate isolation precautions for identified infection/condition  6/3/2023 0038 by Paty Liu RN  Outcome: Progressing  6/3/2023 0038 by Paty Liu RN  Outcome: Progressing

## 2023-06-04 LAB
ANION GAP SERPL CALCULATED.3IONS-SCNC: 10 MMOL/L (ref 4–13)
BASOPHILS # BLD AUTO: 0.07 THOUSANDS/ÂΜL (ref 0–0.1)
BASOPHILS NFR BLD AUTO: 1 % (ref 0–1)
BUN SERPL-MCNC: 5 MG/DL (ref 5–25)
CALCIUM SERPL-MCNC: 9.9 MG/DL (ref 8.4–10.2)
CHLORIDE SERPL-SCNC: 98 MMOL/L (ref 96–108)
CO2 SERPL-SCNC: 26 MMOL/L (ref 21–32)
CREAT SERPL-MCNC: 0.65 MG/DL (ref 0.6–1.3)
EOSINOPHIL # BLD AUTO: 0.16 THOUSAND/ÂΜL (ref 0–0.61)
EOSINOPHIL NFR BLD AUTO: 1 % (ref 0–6)
ERYTHROCYTE [DISTWIDTH] IN BLOOD BY AUTOMATED COUNT: 16.6 % (ref 11.6–15.1)
GFR SERPL CREATININE-BSD FRML MDRD: 95 ML/MIN/1.73SQ M
GLUCOSE SERPL-MCNC: 141 MG/DL (ref 65–140)
HCT VFR BLD AUTO: 30.7 % (ref 34.8–46.1)
HGB BLD-MCNC: 9.2 G/DL (ref 11.5–15.4)
IMM GRANULOCYTES # BLD AUTO: 0.07 THOUSAND/UL (ref 0–0.2)
IMM GRANULOCYTES NFR BLD AUTO: 1 % (ref 0–2)
LYMPHOCYTES # BLD AUTO: 1.95 THOUSANDS/ÂΜL (ref 0.6–4.47)
LYMPHOCYTES NFR BLD AUTO: 15 % (ref 14–44)
MAGNESIUM SERPL-MCNC: 1.6 MG/DL (ref 1.9–2.7)
MCH RBC QN AUTO: 24.1 PG (ref 26.8–34.3)
MCHC RBC AUTO-ENTMCNC: 30 G/DL (ref 31.4–37.4)
MCV RBC AUTO: 80 FL (ref 82–98)
MONOCYTES # BLD AUTO: 1.66 THOUSAND/ÂΜL (ref 0.17–1.22)
MONOCYTES NFR BLD AUTO: 12 % (ref 4–12)
NEUTROPHILS # BLD AUTO: 9.54 THOUSANDS/ÂΜL (ref 1.85–7.62)
NEUTS SEG NFR BLD AUTO: 70 % (ref 43–75)
NRBC BLD AUTO-RTO: 0 /100 WBCS
PLATELET # BLD AUTO: 616 THOUSANDS/UL (ref 149–390)
PMV BLD AUTO: 8.2 FL (ref 8.9–12.7)
POTASSIUM SERPL-SCNC: 3.6 MMOL/L (ref 3.5–5.3)
RBC # BLD AUTO: 3.82 MILLION/UL (ref 3.81–5.12)
SODIUM SERPL-SCNC: 134 MMOL/L (ref 135–147)
WBC # BLD AUTO: 13.45 THOUSAND/UL (ref 4.31–10.16)

## 2023-06-04 PROCEDURE — 99233 SBSQ HOSP IP/OBS HIGH 50: CPT

## 2023-06-04 PROCEDURE — 85025 COMPLETE CBC W/AUTO DIFF WBC: CPT

## 2023-06-04 PROCEDURE — 80048 BASIC METABOLIC PNL TOTAL CA: CPT

## 2023-06-04 PROCEDURE — 83735 ASSAY OF MAGNESIUM: CPT

## 2023-06-04 PROCEDURE — C9113 INJ PANTOPRAZOLE SODIUM, VIA: HCPCS | Performed by: INTERNAL MEDICINE

## 2023-06-04 RX ORDER — MAGNESIUM SULFATE HEPTAHYDRATE 40 MG/ML
4 INJECTION, SOLUTION INTRAVENOUS ONCE
Status: COMPLETED | OUTPATIENT
Start: 2023-06-04 | End: 2023-06-04

## 2023-06-04 RX ADMIN — PRAVASTATIN SODIUM 80 MG: 40 TABLET ORAL at 16:45

## 2023-06-04 RX ADMIN — MAGNESIUM SULFATE HEPTAHYDRATE 4 G: 40 INJECTION, SOLUTION INTRAVENOUS at 08:45

## 2023-06-04 RX ADMIN — LACTULOSE 30 G: 20 SOLUTION ORAL at 08:51

## 2023-06-04 RX ADMIN — Medication 400 MG: at 17:10

## 2023-06-04 RX ADMIN — HEPARIN SODIUM 5000 UNITS: 5000 INJECTION INTRAVENOUS; SUBCUTANEOUS at 21:04

## 2023-06-04 RX ADMIN — HEPARIN SODIUM 5000 UNITS: 5000 INJECTION INTRAVENOUS; SUBCUTANEOUS at 06:18

## 2023-06-04 RX ADMIN — GUAIFENESIN 600 MG: 600 TABLET, EXTENDED RELEASE ORAL at 21:04

## 2023-06-04 RX ADMIN — FERROUS SULFATE TAB 325 MG (65 MG ELEMENTAL FE) 325 MG: 325 (65 FE) TAB at 08:43

## 2023-06-04 RX ADMIN — DICLOFENAC SODIUM TOPICAL GEL, 1%, 2 G: 10 GEL TOPICAL at 12:16

## 2023-06-04 RX ADMIN — HYDROMORPHONE HYDROCHLORIDE 4 MG: 2 TABLET ORAL at 02:14

## 2023-06-04 RX ADMIN — PANTOPRAZOLE SODIUM 40 MG: 40 INJECTION, POWDER, FOR SOLUTION INTRAVENOUS at 08:44

## 2023-06-04 RX ADMIN — ONDANSETRON 8 MG: 4 TABLET, ORALLY DISINTEGRATING ORAL at 11:37

## 2023-06-04 RX ADMIN — HYDROMORPHONE HYDROCHLORIDE 4 MG: 2 TABLET ORAL at 14:19

## 2023-06-04 RX ADMIN — OXYCODONE HYDROCHLORIDE 30 MG: 20 TABLET, FILM COATED, EXTENDED RELEASE ORAL at 08:42

## 2023-06-04 RX ADMIN — Medication 400 MCG: at 08:43

## 2023-06-04 RX ADMIN — OXYCODONE HYDROCHLORIDE 30 MG: 20 TABLET, FILM COATED, EXTENDED RELEASE ORAL at 20:58

## 2023-06-04 RX ADMIN — HYDROMORPHONE HYDROCHLORIDE 4 MG: 2 TABLET ORAL at 06:17

## 2023-06-04 RX ADMIN — DICYCLOMINE HYDROCHLORIDE 10 MG: 10 CAPSULE ORAL at 16:45

## 2023-06-04 RX ADMIN — DOCUSATE SODIUM 100 MG: 100 CAPSULE, LIQUID FILLED ORAL at 17:10

## 2023-06-04 RX ADMIN — POTASSIUM CHLORIDE 20 MEQ: 1500 TABLET, EXTENDED RELEASE ORAL at 08:44

## 2023-06-04 RX ADMIN — DICYCLOMINE HYDROCHLORIDE 10 MG: 10 CAPSULE ORAL at 06:17

## 2023-06-04 RX ADMIN — METOCLOPRAMIDE 10 MG: 5 INJECTION, SOLUTION INTRAMUSCULAR; INTRAVENOUS at 08:51

## 2023-06-04 RX ADMIN — CYANOCOBALAMIN TAB 500 MCG 500 MCG: 500 TAB at 08:43

## 2023-06-04 RX ADMIN — DOCUSATE SODIUM 100 MG: 100 CAPSULE, LIQUID FILLED ORAL at 08:42

## 2023-06-04 RX ADMIN — HEPARIN SODIUM 5000 UNITS: 5000 INJECTION INTRAVENOUS; SUBCUTANEOUS at 14:19

## 2023-06-04 RX ADMIN — ONDANSETRON 8 MG: 4 TABLET, ORALLY DISINTEGRATING ORAL at 06:17

## 2023-06-04 RX ADMIN — HYDROMORPHONE HYDROCHLORIDE 4 MG: 2 TABLET ORAL at 19:06

## 2023-06-04 RX ADMIN — BUPROPION HYDROCHLORIDE TABLETS 150 MG: 100 TABLET, FILM COATED ORAL at 17:10

## 2023-06-04 RX ADMIN — GUAIFENESIN 600 MG: 600 TABLET, EXTENDED RELEASE ORAL at 08:44

## 2023-06-04 RX ADMIN — DICYCLOMINE HYDROCHLORIDE 10 MG: 10 CAPSULE ORAL at 21:00

## 2023-06-04 RX ADMIN — Medication 400 MG: at 08:44

## 2023-06-04 RX ADMIN — DICYCLOMINE HYDROCHLORIDE 10 MG: 10 CAPSULE ORAL at 11:37

## 2023-06-04 RX ADMIN — LIDOCAINE 1 PATCH: 50 PATCH CUTANEOUS at 08:51

## 2023-06-04 RX ADMIN — BUPROPION HYDROCHLORIDE TABLETS 150 MG: 100 TABLET, FILM COATED ORAL at 08:43

## 2023-06-04 RX ADMIN — LOSARTAN POTASSIUM 100 MG: 50 TABLET, FILM COATED ORAL at 08:44

## 2023-06-04 RX ADMIN — ONDANSETRON 8 MG: 4 TABLET, ORALLY DISINTEGRATING ORAL at 16:45

## 2023-06-04 RX ADMIN — LACTULOSE 30 G: 20 SOLUTION ORAL at 21:05

## 2023-06-04 NOTE — PROGRESS NOTES
[facility] 45  Progress Note  Name: Alissa Cedeño  MRN: [id number]  Unit/Bed#: -01 I Date of Admission: 5/21/2023   Date of Service: 6/4/2023 I Hospital Day: 14    Assessment/Plan   * Metastatic adenocarcinoma [facility])  Assessment & Plan  · Presented for right upper lower quadrant abdominal pain with nausea vomiting and diarrhea   · Leukocytosis with WBC 21 9  · CT chest abdomen pelvis  · Heterogeneously enhancing right suprahilar lesion with abrupt cut off of the right mainstem bronchus suspicious for malignancy    There is secondary complete atelectasis of the right lung with heterogeneous density  Small to moderate right pleural effusion is noted  Few subcentimeter left lung nodules are noted  Evidence of mediastinal and right supraclavicular adenopathy  · Heterogeneous, rim-enhancing lesion at the proximal pancreatic body, possible metastatic focus  Pathologically enlarged celiac axis lymph nodes  Focal annular lesion involving the ascending colon, suspicious for metastatic focus versus primary malignancy  (this ascending colon lesion was not found on subsequent colonoscopy)  · GI consulted, appreciate recommendations  Patient had EGD/colonoscopy on 5/22  · 5/22 EGD: Mild patchy erythematous mucosa in the antrum  Cold forceps biopsy performed  Normal duodenum, random biopsy was performed to rule out celiac disease  · 5/22 colonoscopy: Polyp removed from sigmoid colon  Scattered diverticula of mild severity in the sigmoid colon  · Pulmonology consulted, appreciate recommendations  Recommendation is biopsy of large right supraclavicular node  Performed by IR, final results: Metastatic carcinoma, compatible with adenocarcinoma of lung origin  Pulmonology has signed off  · Oncology consulted, appreciate recommendations  · 5/21 MRI brain: No acute infarction, edema, or mass effect   Few punctate hyperintense T2/FLAIR foci are noted within the periventricular and subcortical white matter which are nonspecific and can be seen with vasculitis, migrainous angiopathy, Lyme's disease or microangiopathic changes  · Hematology/oncology consulted, appreciate recommendations  · Palliative care consulted, appreciate recommendations  · Patient started on Oxy IR 3 times daily on 5/29   · On 5/30 OxyContin discontinued as patient reported not helping her pain  · Oxy IR discontinued, patient started on around-the-clock Dilaudid on 5/30  · Pain uncontrolled on 5/31, palliative care adjusted medications, pain improved this a m   · 6/1 evaluated by palliative care, will further adjust pain medications and address pain medications for discharge  · Patient pain control was adequate for patient on 6/2, however, had increased abdominal cramping due to constipation so did not discharge  · 6/20 patient reports she again had increased pain overnight requiring as needed medications in addition to her scheduled long-acting OxyContin  · continue ondansetron and scopolamine  · Pain is currently controlled with current regimen  · Patient's wife called [facility] where to care has called the pain medicine  They informed her that the OxyContin does not in stock and the Dilaudid requires insurance authorization which has not gone through yet  Case management following we will make aware  · Hopeful discharge home in a   to [place] where patient will follow-up with cancer center    Abdominal pain  Assessment & Plan  · Patient presented to ED with complaints of right upper and lower quadrant abdominal pain with nausea, vomiting and diarrhea  She reported pain was right upper quadrant and worse at night  · 5/21 CT chest abdomen pelvis: Heterogeneously enhancing right suprahilar lesion with abrupt cut off of the right mainstem bronchus suspicious for malignancy    There is secondary complete atelectasis of the right lung with heterogeneous density  Small to moderate right pleural effusion is noted   Few subcentimeter left lung nodules are noted  Evidence of mediastinal and right supraclavicular adenopathy  Heterogeneous, rim-enhancing lesion at the proximal pancreatic body, possible metastatic focus  Pathologically enlarged celiac axis lymph nodes  Focal annular lesion involving the ascending colon, suspicious for metastatic focus versus primary malignancy  · 5/30 obstructive series: Constipation, no obstruction  · GI following, appreciate recommendations  Patient in EGD colonoscopy  See results under metastatic adenocarcinoma plan  · Hematology/oncology consulted, appreciate recommendations  · Palliative care has been following, appreciate recommendations  · Patient's pain much improved on regimen by palliative care  Was planning on discharge today, however, medications were called to [facility] JUDAH ESTRADA and they do not have the OxyContin long-acting and states nakul the as needed Dilaudid needs insurance authorization which is pending  Case management following, will need to follow-up with insurance company in a m  Hypomagnesemia  Assessment & Plan  · POA with serum magnesium 1 5  · Likely secondary to vomiting and diarrhea  · Have been monitoring levels and repleting potassium as indicated  · Magnesium today 1 6, given 4 g IV magnesium sulfate  · Continue oral magnesium oxide  · Magnesium level in a m  Diarrhea  Assessment & Plan  · Present on admission, now resolved     Nausea & vomiting  Assessment & Plan  · Presented with nausea and vomiting and right upper and lower quadrant pain  · Now resolved   · Continue Zofran and scopolamine  · Regular diet as tolerated      Iron deficiency anemia  Assessment & Plan  · Hemoglobin currently stabilized   · Had trended downward to 6 7 on 5/27 which time patient received 1 unit of PRBCs   · Iron panel results reviewed  · S/p EGD and colonoscopy on 5/22  Reviewed findings    · Currently without signs of bleeding  · Plan to transfuse for hemoglobin below 7  · Continue to monitor "closely for bleeding  · Continue vitamin B12, ferrous sulfate, folic acid  · CBC in a m  Anxiety  Assessment & Plan  · Currently without anxiety  · Continue Xanax, Wellbutrin           VTE Pharmacologic Prophylaxis:   Pharmacologic: Heparin  Mechanical VTE Prophylaxis in Place: Yes    Patient Centered Rounds: I have performed bedside rounds with nursing staff today  Discussions with Specialists or Other Care Team Provider: Palliative care, nursing, case management    Education and Discussions with Family / Patient: Treatment plan discussed with patient and wife at bedside  Patient still wants treatment for her cancer and wants to be a full code yet  Patient was going to be discharged today with follow-up in [place] in a m  at the cancer center, however, pain medications ordered by palliative called to local pharmacy who state they do not have the OxyContin in stock and the Dilaudid needs insurance authorization which is pending  Patient states she cannot go without the pain medication so will be staying today  Time Spent for Care: 45 minutes  More than 50% of total time spent on counseling and coordination of care as described above  Current Length of Stay: 14 day(s)    Current Patient Status: Inpatient   Certification Statement: The patient will continue to require additional inpatient hospital stay due to Pain medication regimen ordered by palliative care and call to local pharmacy  OxyContin medication was out of stock and as needed Dilaudid that was ordered was \"pending insurance authorization\"  Unable to discharge patient home today as pain is uncontrolled without these medications  Patient still required as needed Dilaudid overnight  Discharge Plan: Tentative discharge home to [place] in a m   Patient's pain control was adequate today, however, when pharmacy that was supposed to give the patient her medication regimen ordered by palliative care was contacted, they reported that the " OxyContin was out of stock and the Dilaudid that was ordered as needed was pending insurance authorization  Patient still wants treatment for her cancer  Her plan is to follow-up in the cancer center in [place] where she resides  Case management following  Patient can be discharged when able to secure pain medication for discharge which should occur in a m  (Authorization pending)  code Status: Level 1 - Full Code      Subjective:   Patient reports better pain control today  No longer constipated  Objective:     Vitals:   Temp (24hrs), Av 2 °F (36 8 °C), Min:97 8 °F (36 6 °C), Max:98 5 °F (36 9 °C)    Temp:  [97 8 °F (36 6 °C)-98 5 °F (36 9 °C)] 98 4 °F (36 9 °C)  HR:  [] 99  Resp:  [18-20] 20  BP: (115-146)/(62-72) 146/72  SpO2:  [92 %-95 %] 95 %  Body mass index is 22 13 kg/m²  Input and Output Summary (last 24 hours): Intake/Output Summary (Last 24 hours) at 2023 1318  Last data filed at 6/3/2023 2047  Gross per 24 hour   Intake 100 ml   Output --   Net 100 ml       Physical Exam:     Physical Exam  Vitals and nursing note reviewed  Constitutional:       General: She is not in acute distress  Appearance: She is not ill-appearing  HENT:      Head: Normocephalic and atraumatic  Nose: Nose normal       Mouth/Throat:      Mouth: Mucous membranes are moist    Cardiovascular:      Rate and Rhythm: Normal rate and regular rhythm  Pulses: Normal pulses  Heart sounds: Normal heart sounds  Pulmonary:      Effort: Pulmonary effort is normal  No respiratory distress  Breath sounds: Normal breath sounds  No wheezing or rales  Abdominal:      General: Bowel sounds are normal  There is no distension  Palpations: Abdomen is soft  Tenderness: There is no abdominal tenderness  There is no guarding  Musculoskeletal:         General: Normal range of motion  Right lower leg: No edema  Left lower leg: No edema     Skin:     General: Skin is warm and dry  Capillary Refill: Capillary refill takes less than 2 seconds  Neurological:      General: No focal deficit present  Mental Status: She is alert and oriented to person, place, and time  Mental status is at baseline  Psychiatric:         Mood and Affect: Mood normal          Behavior: Behavior normal          Thought Content: Thought content normal          Judgment: Judgment normal          Additional Data:     Labs:    Results from last 7 days   Lab Units 06/04/23  0527   EOS PCT % 1   HEMATOCRIT % 30 7*   HEMOGLOBIN g/dL 9 2*   LYMPHS PCT % 15   MONOS PCT % 12   NEUTROS PCT % 70   PLATELETS Thousands/uL 616*   WBC Thousand/uL 13 45*     Results from last 7 days   Lab Units 06/04/23  0527   BUN mg/dL 5   CALCIUM mg/dL 9 9   CHLORIDE mmol/L 98   CO2 mmol/L 26   CREATININE mg/dL 0 65   POTASSIUM mmol/L 3 6           * I Have Reviewed All Lab Data Listed Above  * Additional Pertinent Lab Tests Reviewed:  Ara 66 Admission Reviewed    Imaging:    Imaging Reports Reviewed Today Include: CT chest abdomen pelvis, MRI brain, KUB  Imaging Personally Reviewed by Myself Includes: CT chest abdomen pelvis, KUB    Recent Cultures (last 7 days):           Last 24 Hours Medication List:   Current Facility-Administered Medications   Medication Dose Route Frequency Provider Last Rate   • acetaminophen  650 mg Oral Q6H PRN Rick Saldivar MD     • buPROPion  150 mg Oral BID Rick Saldivar MD     • butalbital-acetaminophen-caffeine  1 tablet Oral Q4H PRN Rick Saldivar MD     • cyanocobalamin  500 mcg Oral Daily Rick Saldivar MD     • Diclofenac Sodium  2 g Topical 4x Daily PRN Tyler Reid PA-C     • dicyclomine  10 mg Oral 4x Daily (AC & HS) BRITTNY Sheppard     • docusate sodium  100 mg Oral BID BRITTNY Sheppard     • ferrous sulfate  325 mg Oral Daily With Breakfast BRITTNY Mcqueen     • folic acid  777 mcg Oral Daily Rick Saldivar MD     • guaiFENesin 600 mg Oral Q12H [address] BRITTNY Sheppard     • heparin (porcine)  5,000 Units Subcutaneous [facility] Danielle Every, CRNP     • HYDROmorphone  4 mg Oral Q4H PRN Tavon Lombardo PA-C     • hydrOXYzine HCL  25 mg Oral TID PRN Danielle Every, CRNP     • lactulose  30 g Oral 4x Daily BRITTNY Sheppard     • lidocaine  1 patch Topical Daily BRITTNY Sheppard     • lidocaine  1 patch Topical Daily Tavon Lombardo PA-C     • losartan  100 mg Oral Daily Carolann Baxter MD     • magnesium Oxide  400 mg Oral BID BRITTNY Sheppard     • metoclopramide  10 mg Intravenous Q6H PRN Carla Cruz PA-C     • naloxone  0 04 mg Intravenous Q1MIN PRN Danielle Every, CRNP     • ondansetron  8 mg Oral TID [facility] Ubaldo Cutler MD     • oxyCODONE  30 mg Oral Q12H [address] Nakul Ratliff PA-C     • pantoprazole  40 mg Intravenous Q24H [address] Carolann Baxter MD     • polyethylene glycol  17 g Oral Daily BRITTNY Sheppard     • potassium chloride  20 mEq Oral Daily Janusz Pedro PA-C     • pravastatin  80 mg Oral Daily With Dinner Carolann Baxter MD     • scopolamine  1 patch Transdermal Q72H Danielle Every, CRNP     • senna  1 tablet Oral HS BRITTNY Sheppard          Today, Patient Was Seen By: BRITTNY Read    ** Please Note: Dictation voice to text software may have been used in the creation of this document   **

## 2023-06-04 NOTE — PLAN OF CARE
Problem: Potential for Falls  Goal: Patient will remain free of falls  Description: INTERVENTIONS:  - Educate patient/family on patient safety including physical limitations  - Instruct patient to call for assistance with activity   - Consult OT/PT to assist with strengthening/mobility   - Keep Call bell within reach  - Keep bed low and locked with side rails adjusted as appropriate  - Keep care items and personal belongings within reach  - Initiate and maintain comfort rounds  - Make Fall Risk Sign visible to staff  - Offer Toileting in advance of need  - Initiate/Maintain bed alarm  - Obtain necessary fall risk management equipment  - Apply yellow socks and bracelet for high fall risk patients  - Consider moving patient to room near nurses station  6/4/2023 0928 by Kina Monreal RN  Outcome: Progressing  6/4/2023 0928 by Kina Monreal RN  Outcome: Progressing     Problem: PAIN - ADULT  Goal: Verbalizes/displays adequate comfort level or baseline comfort level  Description: Interventions:  - Encourage patient to monitor pain and request assistance  - Assess pain using appropriate pain scale  - Administer analgesics based on type and severity of pain and evaluate response  - Implement non-pharmacological measures as appropriate and evaluate response  - Consider cultural and social influences on pain and pain management  - Notify physician/advanced practitioner if interventions unsuccessful or patient reports new pain  6/4/2023 0928 by Kina Monreal RN  Outcome: Progressing  6/4/2023 0928 by Kina Monreal RN  Outcome: Progressing     Problem: INFECTION - ADULT  Goal: Absence or prevention of progression during hospitalization  Description: INTERVENTIONS:  - Assess and monitor for signs and symptoms of infection  - Monitor lab/diagnostic results  - Monitor all insertion sites, i e  indwelling lines, tubes, and drains  - Monitor endotracheal if appropriate and nasal secretions for changes in amount and color  - [place] appropriate cooling/warming therapies per order  - Administer medications as ordered  - Instruct and encourage patient and family to use good hand hygiene technique  - Identify and instruct in appropriate isolation precautions for identified infection/condition  6/4/2023 0928 by Julieta Adkins RN  Outcome: Progressing  6/4/2023 0928 by Julieta Adkins RN  Outcome: Progressing     Problem: SAFETY ADULT  Goal: Patient will remain free of falls  Description: INTERVENTIONS:  - Educate patient/family on patient safety including physical limitations  - Instruct patient to call for assistance with activity   - Consult OT/PT to assist with strengthening/mobility   - Keep Call bell within reach  - Keep bed low and locked with side rails adjusted as appropriate  - Keep care items and personal belongings within reach  - Initiate and maintain comfort rounds  - Make Fall Risk Sign visible to staff  - Offer Toileting in advance of need  - Initiate/Maintain bed alarm  - Obtain necessary fall risk management equipment  - Apply yellow socks and bracelet for high fall risk patients  - Consider moving patient to room near nurses station  6/4/2023 0928 by Julieta Adkins RN  Outcome: Progressing  6/4/2023 0928 by Julieta Adkins RN  Outcome: Progressing  Goal: Maintain or return to baseline ADL function  Description: INTERVENTIONS:  - Educate patient/family on patient safety including physical limitations  - Instruct patient to call for assistance with activity   - Consult OT/PT to assist with strengthening/mobility   - Keep Call bell within reach  - Keep bed low and locked with side rails adjusted as appropriate  - Keep care items and personal belongings within reach  - Initiate and maintain comfort rounds  - Make Fall Risk Sign visible to staff  - Offer Toileting in advance of need  - Initiate/Maintain bed alarm  - Obtain necessary fall risk management equipment:   - Apply yellow socks and bracelet for high fall risk patients  - Consider moving patient to room near nurses station  6/4/2023 0928 by Eduard Gonzalez RN  Outcome: Progressing  6/4/2023 0928 by Eduard Gonzalez RN  Outcome: Progressing     Problem: DISCHARGE PLANNING  Goal: Discharge to home or other facility with appropriate resources  Description: INTERVENTIONS:  - Identify barriers to discharge w/patient and caregiver  - Arrange for needed discharge resources and transportation as appropriate  - Identify discharge learning needs (meds, wound care, etc )  - Refer to Case Management Department for coordinating discharge planning if the patient needs post-hospital services based on physician/advanced practitioner order or complex needs related to functional status, cognitive ability, or social support system  6/4/2023 0928 by Eduard Gonzalez RN  Outcome: Progressing  6/4/2023 0928 by Eduard Gonzalez RN  Outcome: Progressing     Problem: Knowledge Deficit  Goal: Patient/family/caregiver demonstrates understanding of disease process, treatment plan, medications, and discharge instructions  Description: Complete learning assessment and assess knowledge base    Interventions:  - Provide teaching at level of understanding  - Provide teaching via preferred learning methods  6/4/2023 0928 by Eduard Gonzalez RN  Outcome: Progressing  6/4/2023 0928 by Eduard Gonzalez RN  Outcome: Progressing     Problem: GASTROINTESTINAL - ADULT  Goal: Minimal or absence of nausea and/or vomiting  Description: INTERVENTIONS:  - Administer IV fluids if ordered to ensure adequate hydration  - Maintain NPO status until nausea and vomiting are resolved  - Nasogastric tube if ordered  - Administer ordered antiemetic medications as needed  - Provide nonpharmacologic comfort measures as appropriate  - Advance diet as tolerated, if ordered  - Consider nutrition services referral to assist patient with adequate nutrition and appropriate food choices  6/4/2023 0928 by Satnam Kwan AHSAN Diaz  Outcome: Progressing  6/4/2023 0928 by Jana Omalley RN  Outcome: Progressing  Goal: Maintains or returns to baseline bowel function  Description: INTERVENTIONS:  - Assess bowel function  - Encourage oral fluids to ensure adequate hydration  - Administer IV fluids if ordered to ensure adequate hydration  - Administer ordered medications as needed  - Encourage mobilization and activity  - Consider nutritional services referral to assist patient with adequate nutrition and appropriate food choices  6/4/2023 0928 by Jana Omalley RN  Outcome: Progressing  6/4/2023 0928 by Jana Omalley RN  Outcome: Progressing  Goal: Maintains adequate nutritional intake  Description: INTERVENTIONS:  - Monitor percentage of each meal consumed  - Identify factors contributing to decreased intake, treat as appropriate  - Assist with meals as needed  - Monitor I&O, weight, and lab values if indicated  - Obtain nutrition services referral as needed  6/4/2023 0928 by Jana Omalley RN  Outcome: Progressing  6/4/2023 0928 by Jana Omalley RN  Outcome: Progressing  Goal: Oral mucous membranes remain intact  Description: INTERVENTIONS  - Assess oral mucosa and hygiene practices  - Implement preventative oral hygiene regimen  - Implement oral medicated treatments as ordered  - Initiate Nutrition services referral as needed  6/4/2023 0928 by Jana Omalley RN  Outcome: Progressing  6/4/2023 0928 by Jana Omalley RN  Outcome: Progressing     Problem: GENITOURINARY - ADULT  Goal: Maintains or returns to baseline urinary function  Description: INTERVENTIONS:  - Assess urinary function  - Encourage oral fluids to ensure adequate hydration if ordered  - Administer IV fluids as ordered to ensure adequate hydration  - Administer ordered medications as needed  - Offer frequent toileting  - Follow urinary retention protocol if ordered  6/4/2023 0928 by Jana Omalley RN  Outcome: Progressing  6/4/2023 0928 by Carol Nino RN  Outcome: Progressing     Problem: METABOLIC, FLUID AND ELECTROLYTES - ADULT  Goal: Electrolytes maintained within normal limits  Description: INTERVENTIONS:  - Monitor labs and assess patient for signs and symptoms of electrolyte imbalances  - Administer electrolyte replacement as ordered  - Monitor response to electrolyte replacements, including repeat lab results as appropriate  - Instruct patient on fluid and nutrition as appropriate  6/4/2023 0928 by Carol Nino RN  Outcome: Progressing  6/4/2023 0928 by Carol Nino RN  Outcome: Progressing  Goal: Fluid balance maintained  Description: INTERVENTIONS:  - Monitor labs   - Monitor I/O and WT  - Instruct patient on fluid and nutrition as appropriate  - Assess for signs & symptoms of volume excess or deficit  6/4/2023 0928 by Carol Nino RN  Outcome: Progressing  6/4/2023 0928 by Carol Nino RN  Outcome: Progressing  Goal: Glucose maintained within target range  Description: INTERVENTIONS:  - Monitor Blood Glucose as ordered  - Assess for signs and symptoms of hyperglycemia and hypoglycemia  - Administer ordered medications to maintain glucose within target range  - Assess nutritional intake and initiate nutrition service referral as needed  6/4/2023 0928 by Carol Nino RN  Outcome: Progressing  6/4/2023 0928 by Carol Nino RN  Outcome: Progressing     Problem: Nutrition/Hydration-ADULT  Goal: Nutrient/Hydration intake appropriate for improving, restoring or maintaining nutritional needs  Description: Monitor and assess patient's nutrition/hydration status for malnutrition  Collaborate with interdisciplinary team and initiate plan and interventions as ordered  Monitor patient's weight and dietary intake as ordered or per policy  Utilize nutrition screening tool and intervene as necessary  Determine patient's food preferences and provide high-protein, high-caloric foods as appropriate       INTERVENTIONS:  - Monitor oral intake, urinary output, labs, and treatment plans  - Assess nutrition and hydration status and recommend course of action  - Evaluate amount of meals eaten  - Assist patient with eating if necessary   - Allow adequate time for meals  - Recommend/ encourage appropriate diets, oral nutritional supplements, and vitamin/mineral supplements  - Order, calculate, and assess calorie counts as needed  - Recommend, monitor, and adjust tube feedings and TPN/PPN based on assessed needs  - Assess need for intravenous fluids  - Provide specific nutrition/hydration education as appropriate  - Include patient/family/caregiver in decisions related to nutrition  6/4/2023 0928 by Jana Omalley RN  Outcome: Progressing  6/4/2023 0928 by Jana Omalley RN  Outcome: Progressing     Problem: MOBILITY - ADULT  Goal: Maintain or return to baseline ADL function  Description: INTERVENTIONS:  - Educate patient/family on patient safety including physical limitations  - Instruct patient to call for assistance with activity   - Consult OT/PT to assist with strengthening/mobility   - Keep Call bell within reach  - Keep bed low and locked with side rails adjusted as appropriate  - Keep care items and personal belongings within reach  - Initiate and maintain comfort rounds  - Make Fall Risk Sign visible to staff  - Offer Toileting in advance of need  - Initiate/Maintain bed alarm  - Obtain necessary fall risk management equipment:   - Apply yellow socks and bracelet for high fall risk patients  - Consider moving patient to room near nurses station  6/4/2023 0928 by Jana Omlaley RN  Outcome: Progressing  6/4/2023 0928 by Jana Omalley RN  Outcome: Progressing  Goal: Maintains/Returns to pre admission functional level  Description: INTERVENTIONS:  - Perform BMAT or MOVE assessment daily    - Set and communicate daily mobility goal to care team and patient/family/caregiver     - Collaborate with rehabilitation services on mobility goals if consulted  - Perform Range of Motion 3 times a day  - Reposition patient every 2 hours    - Dangle patient 3 times a day  - Stand patient 3 times a day  - Ambulate patient 3 times a day  - Out of bed to chair 3 times a day   - Out of bed for meals 3 times a day  - Out of bed for toileting  - Record patient progress and toleration of activity level   6/4/2023 0928 by Shira Lam RN  Outcome: Progressing  6/4/2023 0928 by Shira Lam RN  Outcome: Progressing     Problem: Prexisting or High Potential for Compromised Skin Integrity  Goal: Skin integrity is maintained or improved  Description: INTERVENTIONS:  - Identify patients at risk for skin breakdown  - Assess and monitor skin integrity  - Assess and monitor nutrition and hydration status  - Monitor labs   - Assess for incontinence   - Turn and reposition patient  - Assist with mobility/ambulation  - Relieve pressure over bony prominences  - Avoid friction and shearing  - Provide appropriate hygiene as needed including keeping skin clean and dry  - Evaluate need for skin moisturizer/barrier cream  - Collaborate with interdisciplinary team   - Patient/family teaching  - Consider wound care consult   6/4/2023 0928 by Shira Lam RN  Outcome: Progressing  6/4/2023 0928 by Shira Lam RN  Outcome: Progressing

## 2023-06-04 NOTE — PLAN OF CARE
Problem: Potential for Falls  Goal: Patient will remain free of falls  Description: INTERVENTIONS:  - Educate patient/family on patient safety including physical limitations  - Instruct patient to call for assistance with activity   - Consult OT/PT to assist with strengthening/mobility   - Keep Call bell within reach  - Keep bed low and locked with side rails adjusted as appropriate  - Keep care items and personal belongings within reach  - Initiate and maintain comfort rounds  - Make Fall Risk Sign visible to staff  - Offer Toileting in advance of need  - Initiate/Maintain bed alarm  - Obtain necessary fall risk management equipment  - Apply yellow socks and bracelet for high fall risk patients  - Consider moving patient to room near nurses station  Outcome: Progressing

## 2023-06-05 VITALS
TEMPERATURE: 98 F | WEIGHT: 121 LBS | HEIGHT: 62 IN | BODY MASS INDEX: 22.26 KG/M2 | RESPIRATION RATE: 20 BRPM | DIASTOLIC BLOOD PRESSURE: 73 MMHG | SYSTOLIC BLOOD PRESSURE: 153 MMHG | HEART RATE: 90 BPM | OXYGEN SATURATION: 93 %

## 2023-06-05 LAB
ANION GAP SERPL CALCULATED.3IONS-SCNC: 8 MMOL/L (ref 4–13)
BASOPHILS # BLD AUTO: 0.06 THOUSANDS/ÂΜL (ref 0–0.1)
BASOPHILS NFR BLD AUTO: 1 % (ref 0–1)
BUN SERPL-MCNC: 6 MG/DL (ref 5–25)
CALCIUM SERPL-MCNC: 9.9 MG/DL (ref 8.4–10.2)
CHLORIDE SERPL-SCNC: 98 MMOL/L (ref 96–108)
CO2 SERPL-SCNC: 27 MMOL/L (ref 21–32)
CREAT SERPL-MCNC: 0.71 MG/DL (ref 0.6–1.3)
EOSINOPHIL # BLD AUTO: 0.42 THOUSAND/ÂΜL (ref 0–0.61)
EOSINOPHIL NFR BLD AUTO: 3 % (ref 0–6)
ERYTHROCYTE [DISTWIDTH] IN BLOOD BY AUTOMATED COUNT: 16.6 % (ref 11.6–15.1)
GFR SERPL CREATININE-BSD FRML MDRD: 91 ML/MIN/1.73SQ M
GLUCOSE SERPL-MCNC: 142 MG/DL (ref 65–140)
HCT VFR BLD AUTO: 31.4 % (ref 34.8–46.1)
HGB BLD-MCNC: 9.3 G/DL (ref 11.5–15.4)
IMM GRANULOCYTES # BLD AUTO: 0.06 THOUSAND/UL (ref 0–0.2)
IMM GRANULOCYTES NFR BLD AUTO: 1 % (ref 0–2)
LYMPHOCYTES # BLD AUTO: 2.24 THOUSANDS/ÂΜL (ref 0.6–4.47)
LYMPHOCYTES NFR BLD AUTO: 17 % (ref 14–44)
MAGNESIUM SERPL-MCNC: 1.7 MG/DL (ref 1.9–2.7)
MCH RBC QN AUTO: 23.8 PG (ref 26.8–34.3)
MCHC RBC AUTO-ENTMCNC: 29.6 G/DL (ref 31.4–37.4)
MCV RBC AUTO: 81 FL (ref 82–98)
MONOCYTES # BLD AUTO: 1.62 THOUSAND/ÂΜL (ref 0.17–1.22)
MONOCYTES NFR BLD AUTO: 12 % (ref 4–12)
NEUTROPHILS # BLD AUTO: 8.7 THOUSANDS/ÂΜL (ref 1.85–7.62)
NEUTS SEG NFR BLD AUTO: 66 % (ref 43–75)
NRBC BLD AUTO-RTO: 0 /100 WBCS
PLATELET # BLD AUTO: 635 THOUSANDS/UL (ref 149–390)
PMV BLD AUTO: 8.2 FL (ref 8.9–12.7)
POTASSIUM SERPL-SCNC: 3.8 MMOL/L (ref 3.5–5.3)
RBC # BLD AUTO: 3.9 MILLION/UL (ref 3.81–5.12)
SODIUM SERPL-SCNC: 133 MMOL/L (ref 135–147)
WBC # BLD AUTO: 13.1 THOUSAND/UL (ref 4.31–10.16)

## 2023-06-05 PROCEDURE — 99239 HOSP IP/OBS DSCHRG MGMT >30: CPT | Performed by: NURSE PRACTITIONER

## 2023-06-05 PROCEDURE — 99233 SBSQ HOSP IP/OBS HIGH 50: CPT | Performed by: INTERNAL MEDICINE

## 2023-06-05 PROCEDURE — C9113 INJ PANTOPRAZOLE SODIUM, VIA: HCPCS | Performed by: INTERNAL MEDICINE

## 2023-06-05 PROCEDURE — 83735 ASSAY OF MAGNESIUM: CPT

## 2023-06-05 PROCEDURE — 80048 BASIC METABOLIC PNL TOTAL CA: CPT

## 2023-06-05 PROCEDURE — 85025 COMPLETE CBC W/AUTO DIFF WBC: CPT

## 2023-06-05 PROCEDURE — 88305 TISSUE EXAM BY PATHOLOGIST: CPT | Performed by: STUDENT IN AN ORGANIZED HEALTH CARE EDUCATION/TRAINING PROGRAM

## 2023-06-05 RX ORDER — SCOLOPAMINE TRANSDERMAL SYSTEM 1 MG/1
1 PATCH, EXTENDED RELEASE TRANSDERMAL
Qty: 10 PATCH | Refills: 0 | Status: SHIPPED | OUTPATIENT
Start: 2023-06-05 | End: 2023-07-05

## 2023-06-05 RX ORDER — LANOLIN ALCOHOL/MO/W.PET/CERES
400 CREAM (GRAM) TOPICAL 2 TIMES DAILY
Qty: 60 TABLET | Refills: 0 | Status: SHIPPED | OUTPATIENT
Start: 2023-06-05 | End: 2023-07-05

## 2023-06-05 RX ORDER — ONDANSETRON 8 MG/1
8 TABLET, ORALLY DISINTEGRATING ORAL
Qty: 20 TABLET | Refills: 0 | Status: SHIPPED | OUTPATIENT
Start: 2023-06-05

## 2023-06-05 RX ORDER — GUAIFENESIN 600 MG/1
600 TABLET, EXTENDED RELEASE ORAL EVERY 12 HOURS SCHEDULED
Qty: 60 TABLET | Refills: 0 | Status: SHIPPED | OUTPATIENT
Start: 2023-06-05 | End: 2023-07-05

## 2023-06-05 RX ORDER — DICYCLOMINE HYDROCHLORIDE 10 MG/1
10 CAPSULE ORAL
Qty: 120 CAPSULE | Refills: 0 | Status: SHIPPED | OUTPATIENT
Start: 2023-06-05 | End: 2023-07-05

## 2023-06-05 RX ORDER — SENNOSIDES 8.6 MG
8.6 TABLET ORAL
Qty: 30 TABLET | Refills: 0 | Status: SHIPPED | OUTPATIENT
Start: 2023-06-05 | End: 2023-07-05

## 2023-06-05 RX ORDER — DOCUSATE SODIUM 100 MG/1
100 CAPSULE, LIQUID FILLED ORAL 2 TIMES DAILY
Qty: 60 CAPSULE | Refills: 0 | Status: SHIPPED | OUTPATIENT
Start: 2023-06-05 | End: 2023-07-05

## 2023-06-05 RX ORDER — LOSARTAN POTASSIUM 100 MG/1
100 TABLET ORAL DAILY
Qty: 30 TABLET | Refills: 0 | Status: SHIPPED | OUTPATIENT
Start: 2023-06-06 | End: 2023-07-06

## 2023-06-05 RX ORDER — HYDROMORPHONE HCL/PF 1 MG/ML
1 SYRINGE (ML) INJECTION ONCE AS NEEDED
Status: COMPLETED | OUTPATIENT
Start: 2023-06-05 | End: 2023-06-05

## 2023-06-05 RX ORDER — FERROUS SULFATE 325(65) MG
325 TABLET ORAL
Qty: 30 TABLET | Refills: 0 | Status: SHIPPED | OUTPATIENT
Start: 2023-06-06 | End: 2023-07-06

## 2023-06-05 RX ORDER — HYDROMORPHONE HCL/PF 1 MG/ML
1 SYRINGE (ML) INJECTION ONCE
Status: COMPLETED | OUTPATIENT
Start: 2023-06-05 | End: 2023-06-05

## 2023-06-05 RX ORDER — LANOLIN ALCOHOL/MO/W.PET/CERES
400 CREAM (GRAM) TOPICAL DAILY
Qty: 30 TABLET | Refills: 0 | Status: SHIPPED | OUTPATIENT
Start: 2023-06-06 | End: 2023-07-06

## 2023-06-05 RX ORDER — LACTULOSE 20 G/30ML
30 SOLUTION ORAL 4 TIMES DAILY
Qty: 5400 ML | Refills: 0 | Status: SHIPPED | OUTPATIENT
Start: 2023-06-05 | End: 2023-07-05

## 2023-06-05 RX ADMIN — HEPARIN SODIUM 5000 UNITS: 5000 INJECTION INTRAVENOUS; SUBCUTANEOUS at 05:57

## 2023-06-05 RX ADMIN — DICYCLOMINE HYDROCHLORIDE 10 MG: 10 CAPSULE ORAL at 06:00

## 2023-06-05 RX ADMIN — BUPROPION HYDROCHLORIDE TABLETS 150 MG: 100 TABLET, FILM COATED ORAL at 09:35

## 2023-06-05 RX ADMIN — LIDOCAINE 1 PATCH: 50 PATCH CUTANEOUS at 09:53

## 2023-06-05 RX ADMIN — DICLOFENAC SODIUM TOPICAL GEL, 1%, 2 G: 10 GEL TOPICAL at 06:33

## 2023-06-05 RX ADMIN — GUAIFENESIN 600 MG: 600 TABLET, EXTENDED RELEASE ORAL at 09:35

## 2023-06-05 RX ADMIN — Medication 400 MCG: at 09:35

## 2023-06-05 RX ADMIN — DICYCLOMINE HYDROCHLORIDE 10 MG: 10 CAPSULE ORAL at 11:57

## 2023-06-05 RX ADMIN — HYDROMORPHONE HYDROCHLORIDE 1 MG: 1 INJECTION, SOLUTION INTRAMUSCULAR; INTRAVENOUS; SUBCUTANEOUS at 11:57

## 2023-06-05 RX ADMIN — DOCUSATE SODIUM 100 MG: 100 CAPSULE, LIQUID FILLED ORAL at 09:36

## 2023-06-05 RX ADMIN — HYDROMORPHONE HYDROCHLORIDE 4 MG: 2 TABLET ORAL at 04:52

## 2023-06-05 RX ADMIN — ONDANSETRON 8 MG: 4 TABLET, ORALLY DISINTEGRATING ORAL at 11:57

## 2023-06-05 RX ADMIN — METOCLOPRAMIDE 10 MG: 5 INJECTION, SOLUTION INTRAMUSCULAR; INTRAVENOUS at 09:58

## 2023-06-05 RX ADMIN — ONDANSETRON 8 MG: 4 TABLET, ORALLY DISINTEGRATING ORAL at 06:00

## 2023-06-05 RX ADMIN — Medication 400 MG: at 09:35

## 2023-06-05 RX ADMIN — HYDROMORPHONE HYDROCHLORIDE 4 MG: 2 TABLET ORAL at 11:58

## 2023-06-05 RX ADMIN — PANTOPRAZOLE SODIUM 40 MG: 40 INJECTION, POWDER, FOR SOLUTION INTRAVENOUS at 09:35

## 2023-06-05 RX ADMIN — HYDROMORPHONE HYDROCHLORIDE 1 MG: 1 INJECTION, SOLUTION INTRAMUSCULAR; INTRAVENOUS; SUBCUTANEOUS at 10:05

## 2023-06-05 RX ADMIN — OXYCODONE HYDROCHLORIDE 30 MG: 20 TABLET, FILM COATED, EXTENDED RELEASE ORAL at 09:35

## 2023-06-05 RX ADMIN — CYANOCOBALAMIN TAB 500 MCG 500 MCG: 500 TAB at 09:36

## 2023-06-05 RX ADMIN — FERROUS SULFATE TAB 325 MG (65 MG ELEMENTAL FE) 325 MG: 325 (65 FE) TAB at 09:58

## 2023-06-05 RX ADMIN — POTASSIUM CHLORIDE 20 MEQ: 1500 TABLET, EXTENDED RELEASE ORAL at 09:36

## 2023-06-05 RX ADMIN — LOSARTAN POTASSIUM 100 MG: 50 TABLET, FILM COATED ORAL at 09:36

## 2023-06-05 NOTE — CASE MANAGEMENT
Case Management Discharge Planning Note    Patient name Yamilet Oliva  Formerly [facility] [address] [id number]/-[id number] MRN [id number]  : 1960 Date 2023       Current Admission Date: 2023  Current Admission Diagnosis:Metastatic adenocarcinoma [facility])   Patient Active Problem List    Diagnosis Date Noted   • Anxiety 2023   • Leukocytosis    • Anemia    • Iron deficiency anemia 2023   • Abdominal pain 2023   • Nausea & vomiting 2023   • Diarrhea 2023   • Hypomagnesemia 2023   • Metastatic adenocarcinoma ([facility] [address] ) 2023      LOS (days): 15  Geometric Mean LOS (GMLOS) (days):   Days to GMLOS:     OBJECTIVE:  Risk of Unplanned Readmission Score: 16 7     Current admission status: Inpatient   Preferred Pharmacy:   [address], [address] [id number]  Phone: [phone] Fax: [phone]    Primary Care Provider: No primary care provider on file  Primary Insurance: [employer]  Secondary Insurance:     DISCHARGE DETAILS:  As per Palliative MD the pt is medically stable to be DC today  MD will provide scripts for pain and will f/u with a hospital near them in [place]  Wife will transport

## 2023-06-05 NOTE — DISCHARGE SUMMARY
[address]  Discharge- Haze  1960, 58 y o  female MRN: [id number]  Unit/Bed#: -01 Encounter: [id number]  Primary Care Provider: No primary care provider on file  Date and time admitted to hospital: 5/21/2023 12:03 PM    * Metastatic adenocarcinoma [facility])  Assessment & Plan  · Presented for right upper lower quadrant abdominal pain with nausea, vomiting, and diarrhea   · CT chest abdomen pelvis 5/21/2023:  · Heterogeneously enhancing right suprahilar lesion with abrupt cut off of the right mainstem bronchus suspicious for malignancy    There is secondary complete atelectasis of the right lung with heterogeneous density  Small to moderate right pleural effusion is noted  Few subcentimeter left lung nodules are noted  Evidence of mediastinal and right supraclavicular adenopathy  · Heterogeneous, rim-enhancing lesion at the proximal pancreatic body, possible metastatic focus  Pathologically enlarged celiac axis lymph nodes  Focal annular lesion involving the ascending colon, suspicious for metastatic focus versus primary malignancy  (this ascending colon lesion was not found on subsequent colonoscopy)  · 5/21/2023 MRI brain: No acute infarction, edema, or mass effect  Few punctate hyperintense T2/FLAIR foci are noted within the periventricular and subcortical white matter which are nonspecific and can be seen with vasculitis, migrainous angiopathy, Lyme's disease or microangiopathic changes  · GI consulted, appreciate recommendations  · EGD 5/22/2023: Mild patchy erythematous mucosa in the antrum  Cold forceps biopsy performed  Normal duodenum, random biopsy was performed to rule out celiac disease  · Colonoscopy 5/22/2023: Polyp removed from sigmoid colon  Scattered diverticula of mild severity in the sigmoid colon  · Pulmonology consulted with recommendation to biopsy large right supraclavicular node    Performed by IR with final results of metastatic carcinoma, compatible with adenocarcinoma of lung origin  Pulmonology has signed off  · Hematology/oncology consulted, appreciate recommendations  · Started on Oxy IR 3 times daily on 5/29, switched to OxyContin on 5/30, with further adjustment of medicications by palliative care  · Continue ondansetron and scopolamine  · Pain control was reported to be adequate on 6/2, however, had increased abdominal cramping due to constipation so did not discharge  · Palliative care recommendations appreciated, patient to be discharged today to follow-up closer to her home    Iron deficiency anemia  Assessment & Plan  · Hemoglobin is currently stabilized, had trended down to 6 7 on 5/27/2023 and received 1 unit of PRBCs   · Iron panel results reviewed  · S/p EGD and colonoscopy on 5/22/2023  Findings as above  · Currently without signs of bleeding  · Recommend transfusing for hemoglobin below 7  · Continue to monitor closely for bleeding  · Monitor CBC as an outpatient    Hypomagnesemia  Assessment & Plan  · POA with serum magnesium 1 5  · Likely secondary to vomiting and diarrhea  · S/P IV repletion  · Continue oral magnesium oxide  · Monitor magnesium as an outpatient    Anxiety  Assessment & Plan  · Currently controlled  · Continue Xanax, Wellbutrin    Medical Problems     Resolved Problems  Date Reviewed: 6/5/2023   None       Discharging Physician / Practitioner: BRITTNY Zambrano  PCP: No primary care provider on file  Admission Date:   Admission Orders (From admission, onward)     Ordered        05/21/23 1434  [facility]  Once                      Discharge Date: 06/05/23    Consultations During Hospital Stay:  · Gastroenterology, pulmonology, and interventional radiology, oncology, palliative care    Procedures Performed:   · EGD 5/25/2023: Esophagus appeared normal   Mild patchy erythematous mucosa in antrum  Abdomen appeared normal  · Colonoscopy 5/25/2023: Sigmoid polyp moved by hot snare    Ileocecal valve, cecum, ascending colon, hepatic flexure, transverse colon, splenic flexure, and descending colon appeared normal   Scattered diverticula of mild severity in the sigmoid colon    Significant Findings / Test Results:     · CT chest abdomen pelvis 5/21/2023: Heterogenously enhancing right suprahilar lesion with abrupt cut off of the right mainstem bronchus suspicious for malignancy  There is secondary complete atelectasis of the right lung with heterogenous density  Evidence of mediastinal and right supraclavicular adenopathy  Heterogenous, rim-enhancing lesion at the proximal pancreatic body, possible metastatic focus  Pathologically enlarged celiac axis lymph nodes  Focal annular lesion involving the ascending colon, suspicious for metastatic focus versus primary malignancy  (This ascending colon lesion was not evident on colonoscopy performed 5/25/2023)  · MRI brain 5/23/2023: No acute infarction, edema, or mass effect  · IR biopsy right neck lymph node 5/24/2023: Metastatic carcinoma compatible with adenoma of lung origin    Complications:  None apparent    Reason for Admission: Abdominal pain    Hospital Course:   Wolf Clifford is a 58 y o  female patient who originally presented to the hospital on 5/21/2023 due to right-sided abdominal pain with nausea vomiting and diarrhea  Very unfortunately, CT of the chest abdomen and pelvis performed in the ER showed evidence suspicious for lung, pancreas, and colon malignancy  She was admitted to medicine  She was seen by both pulmonology and oncology  IR was consulted for lymph biopsy which revealed evidence for metastatic carcinoma compatible with adenoma of lung origin  She was seen by GI and EGD and colonoscopy, with fortunately, no significant findings  She was seen by palliative for symptom control  She developed anemia and received 1 unit of PRBCs    After consideration and discussion with her wife, patient decided to return to [place] to follow-up "with oncology near her home  This is a brief discharge summary  Please see full patient chart for additional details  Condition at Discharge: stable    Discharge Day Visit / Exam:   Subjective: No new complaints offered  Feels the same  Vitals: Blood Pressure: 153/73 (06/05/23 0704)  Pulse: 90 (06/05/23 0724)  Temperature: 98 °F (36 7 °C) (06/05/23 0704)  Temp Source: Oral (05/31/23 2219)  Respirations: 20 (06/05/23 0704)  Height: 5' 2\" (157 5 cm) (05/25/23 1221)  Weight - Scale: 54 9 kg (121 lb) (05/25/23 1221)  SpO2: 93 % (06/05/23 0704)  Exam:   Physical Exam  Vitals and nursing note reviewed  HENT:      Head: Normocephalic and atraumatic  Mouth/Throat:      Mouth: Mucous membranes are moist       Pharynx: Oropharynx is clear  Eyes:      Pupils: Pupils are equal, round, and reactive to light  Cardiovascular:      Rate and Rhythm: Normal rate and regular rhythm  Pulses: Normal pulses  Pulmonary:      Effort: Pulmonary effort is normal  No respiratory distress  Breath sounds: Normal breath sounds  Abdominal:      General: Bowel sounds are normal       Palpations: Abdomen is soft  Tenderness: There is no abdominal tenderness  Musculoskeletal:      Cervical back: Neck supple  Right lower leg: No edema  Left lower leg: No edema  Skin:     General: Skin is warm and dry  Capillary Refill: Capillary refill takes less than 2 seconds  Neurological:      General: No focal deficit present  Mental Status: She is alert and oriented to person, place, and time  Discussion with Family: Patient declined call to   Discharge instructions/Information to patient and family:   See after visit summary for information provided to patient and family  Provisions for Follow-Up Care:  See after visit summary for information related to follow-up care and any pertinent home health orders         Disposition:   Home    Planned Readmission: No   " Discharge Statement:  I spent 50 minutes discharging the patient  This time was spent on the day of discharge  I had direct contact with the patient on the day of discharge  Greater than 50% of the total time was spent examining patient, answering all patient questions, arranging and discussing plan of care with patient as well as directly providing post-discharge instructions  Additional time then spent on discharge activities  Discharge Medications:  See after visit summary for reconciled discharge medications provided to patient and/or family        **Please Note: This note may have been constructed using a voice recognition system**

## 2023-06-05 NOTE — PROGRESS NOTES
Progress note - Palliative and Supportive Care   Sada Stewart 58 y o  female [id number]    Patient Active Problem List   Diagnosis   • Abdominal pain   • Nausea & vomiting   • Diarrhea   • Hypomagnesemia   • Metastatic adenocarcinoma (HCC)   • Iron deficiency anemia   • Leukocytosis   • Anemia   • Anxiety     Active issues specifically addressed today include:   Metastatic adenocarcinoma  Abdominal pain  Nausea and vomiting  Constipation  Palliative care consult  Goals of care by specialist    Plan:  1  Symptom management -    -  Holding bowel meds at this time given significant BMs over the weekend   -  Oxycontin 30mg change to oxycontin 30mg q8h  - IV HM one time dose 1mg  Will order one time dose to be given just before discharge as well     -     2  Goals - Patient and wife urgently wish to return to [place]  Given her BMs and acceptable (albeit tenuous) symptom control this is an opportune moment for discharge  Patient's wife and I spoke to [facility] cancer care as well as their ER and patient will likely assume cares there as soon as she arrives home  Interval history:       Patient seen with her wife, Sarah Henson, at bedside  Currently in severe pain, mostly in the abdomen  Received 30mg oxy x2 (90 OME) and 4mg PO HM x3 (48 OME) in the past 24 hours  Despite this pain remains very uncontrolled  Slept some overnight but pain has been a significant limiting factor  Endorses some nausea  Appetite poor        MEDICATIONS / ALLERGIES:     all current active meds have been reviewed and current meds:   Current Facility-Administered Medications   Medication Dose Route Frequency   • acetaminophen (TYLENOL) tablet 650 mg  650 mg Oral Q6H PRN   • buPROPion (WELLBUTRIN) tablet 150 mg  150 mg Oral BID   • butalbital-acetaminophen-caffeine (FIORICET,ESGIC) -40 mg per tablet 1 tablet  1 tablet Oral Q4H PRN   • cyanocobalamin (VITAMIN B-12) tablet 500 mcg  500 mcg Oral Daily   • Diclofenac Sodium (VOLTAREN) 1 % topical gel 2 g  2 g Topical 4x Daily PRN   • dicyclomine (BENTYL) capsule 10 mg  10 mg Oral 4x Daily (AC & HS)   • docusate sodium (COLACE) capsule 100 mg  100 mg Oral BID   • ferrous sulfate tablet 325 mg  325 mg Oral Daily With Breakfast   • folic acid (FOLVITE) tablet 400 mcg  400 mcg Oral Daily   • guaiFENesin (MUCINEX) 12 hr tablet 600 mg  600 mg Oral Q12H MAGALI   • heparin (porcine) subcutaneous injection 5,000 Units  5,000 Units Subcutaneous Q8H [facility]   • HYDROmorphone (DILAUDID) injection 1 mg  1 mg Intravenous Once   • HYDROmorphone (DILAUDID) tablet 4 mg  4 mg Oral Q4H PRN   • hydrOXYzine HCL (ATARAX) tablet 25 mg  25 mg Oral TID PRN   • lactulose oral solution 30 g  30 g Oral 4x Daily   • lidocaine (LIDODERM) 5 % patch 1 patch  1 patch Topical Daily   • lidocaine (LIDODERM) 5 % patch 1 patch  1 patch Topical Daily   • losartan (COZAAR) tablet 100 mg  100 mg Oral Daily   • magnesium Oxide (MAG-OX) tablet 400 mg  400 mg Oral BID   • metoclopramide (REGLAN) injection 10 mg  10 mg Intravenous Q6H PRN   • naloxone (NARCAN) 0 04 mg/mL syringe 0 04 mg  0 04 mg Intravenous Q1MIN PRN   • ondansetron (ZOFRAN-ODT) dispersible tablet 8 mg  8 mg Oral TID AC   • oxyCODONE (OxyCONTIN) 12 hr tablet 30 mg  30 mg Oral Q8H [facility]   • pantoprazole (PROTONIX) injection 40 mg  40 mg Intravenous Q24H MAGALI   • polyethylene glycol (MIRALAX) packet 17 g  17 g Oral Daily   • potassium chloride (K-DUR,KLOR-CON) CR tablet 20 mEq  20 mEq Oral Daily   • pravastatin (PRAVACHOL) tablet 80 mg  80 mg Oral Daily With Dinner   • scopolamine (TRANSDERM-SCOP) 1 mg/3 days TD 72 hr patch 1 patch  1 patch Transdermal Q72H   • senna (SENOKOT) tablet 8 6 mg  1 tablet Oral HS       Allergies   Allergen Reactions   • Neosporin [Bacitracin-Polymyxin B] Rash   • Penicillins Rash   • Shellfish-Derived Products - Food Allergy Rash and Facial Swelling       OBJECTIVE:    Physical Exam  Physical Exam  Vitals and nursing note reviewed  Constitutional:       General: She is in acute distress  Appearance: She is ill-appearing  She is not toxic-appearing  Comments: pain   HENT:      Head: Normocephalic and atraumatic  Right Ear: External ear normal       Left Ear: External ear normal       Nose: Nose normal       Mouth/Throat:      Mouth: Mucous membranes are moist    Eyes:      General:         Right eye: No discharge  Left eye: No discharge  Cardiovascular:      Rate and Rhythm: Normal rate  Pulmonary:      Effort: No respiratory distress  Musculoskeletal:         General: No swelling or deformity  Skin:     General: Skin is dry  Coloration: Skin is not jaundiced  Neurological:      General: No focal deficit present  Mental Status: She is alert and oriented to person, place, and time  Psychiatric:         Mood and Affect: Mood normal          Behavior: Behavior normal          Thought Content: Thought content normal          Judgment: Judgment normal          Lab Results:   I have personally reviewed pertinent labs  , CBC:   Lab Results   Component Value Date    HCT 31 4 (L) 06/05/2023    HGB 9 3 (L) 06/05/2023    MCH 23 8 (L) 06/05/2023    MCHC 29 6 (L) 06/05/2023    MCV 81 (L) 06/05/2023    MPV 8 2 (L) 06/05/2023    NRBC 0 06/05/2023     (H) 06/05/2023    RBC 3 90 06/05/2023    RDW 16 6 (H) 06/05/2023    WBC 13 10 (H) 06/05/2023   , CMP:   Lab Results   Component Value Date    BUN 6 06/05/2023    CALCIUM 9 9 06/05/2023    CL 98 06/05/2023    CO2 27 06/05/2023    CREATININE 0 71 06/05/2023    EGFR 91 06/05/2023    K 3 8 06/05/2023    SODIUM 133 (L) 06/05/2023   , BMP:  Lab Results   Component Value Date    AGAP 8 06/05/2023    BUN 6 06/05/2023    CALCIUM 9 9 06/05/2023    CL 98 06/05/2023    CO2 27 06/05/2023    CREATININE 0 71 06/05/2023    EGFR 91 06/05/2023    GLUC 142 (H) 06/05/2023    K 3 8 06/05/2023    SODIUM 133 (L) 06/05/2023       Counseling / Coordination of Care    Total floor / unit time spent today 60+ minutes  Greater than 50% of total time was spent with the patient and / or family counseling and / or coordination of care (09:00-09:35)   A description of the counseling / coordination of care: opioid mgmt, opioid changes, support, phone calls to cancer center in [place], discussions with RNs, CM, and IM provider

## 2023-06-05 NOTE — PLAN OF CARE
Problem: SAFETY ADULT  Goal: Patient will remain free of falls  Description: INTERVENTIONS:  - Educate patient/family on patient safety including physical limitations  - Instruct patient to call for assistance with activity   - Consult OT/PT to assist with strengthening/mobility   - Keep Call bell within reach  - Keep bed low and locked with side rails adjusted as appropriate  - Keep care items and personal belongings within reach  - Initiate and maintain comfort rounds  - Make Fall Risk Sign visible to staff  - Offer Toileting in advance of need  - Initiate/Maintain bed alarm  - Obtain necessary fall risk management equipment  - Apply yellow socks and bracelet for high fall risk patients  - Consider moving patient to room near nurses station  Outcome: Progressing  Goal: Maintain or return to baseline ADL function  Description: INTERVENTIONS:  - Educate patient/family on patient safety including physical limitations  - Instruct patient to call for assistance with activity   - Consult OT/PT to assist with strengthening/mobility   - Keep Call bell within reach  - Keep bed low and locked with side rails adjusted as appropriate  - Keep care items and personal belongings within reach  - Initiate and maintain comfort rounds  - Make Fall Risk Sign visible to staff  - Offer Toileting in advance of need  - Initiate/Maintain bed alarm  - Obtain necessary fall risk management equipment:   - Apply yellow socks and bracelet for high fall risk patients  - Consider moving patient to room near nurses station  Outcome: Progressing

## 2023-06-05 NOTE — PLAN OF CARE
Problem: Potential for Falls  Goal: Patient will remain free of falls  Description: INTERVENTIONS:  - Educate patient/family on patient safety including physical limitations  - Instruct patient to call for assistance with activity   - Consult OT/PT to assist with strengthening/mobility   - Keep Call bell within reach  - Keep bed low and locked with side rails adjusted as appropriate  - Keep care items and personal belongings within reach  - Initiate and maintain comfort rounds  - Make Fall Risk Sign visible to staff  - Offer Toileting in advance of need  - Initiate/Maintain bed alarm  - Obtain necessary fall risk management equipment  - Apply yellow socks and bracelet for high fall risk patients  - Consider moving patient to room near nurses station  Outcome: Progressing     Problem: PAIN - ADULT  Goal: Verbalizes/displays adequate comfort level or baseline comfort level  Description: Interventions:  - Encourage patient to monitor pain and request assistance  - Assess pain using appropriate pain scale  - Administer analgesics based on type and severity of pain and evaluate   Problem: SAFETY ADULT  Goal: Patient will remain free of falls  Description: INTERVENTIONS:  - Educate patient/family on patient safety including physical limitations  - Instruct patient to call for assistance with activity   - Consult OT/PT to assist with strengthening/mobility   - Keep Call bell within reach  - Keep bed low and locked with side rails adjusted as appropriate  - Keep care items and personal belongings within reach  - Initiate and maintain comfort rounds  - Make Fall Risk Sign visible to staff  - Offer Toileting in advance of need  - Initiate/Maintain bed alarm  - Obtain necessary fall risk management equipment  - Apply yellow socks and bracelet for high fall risk patients  - Consider moving patient to room near nurses station  Outcome: Progressing  Goal: Maintain or return to baseline ADL function  Description: INTERVENTIONS:  - Educate patient/family on patient safety including physical limitations  - Instruct patient to call for assistance with activity   - Consult OT/PT to assist with strengthening/mobility   - Keep Call bell within reach  - Keep bed low and locked with side rails adjusted as appropriate  - Keep care items and personal belongings within reach  - Initiate and maintain comfort rounds  - Make Fall Risk Sign visible to staff  - Offer Toileting in advance of need  - Initiate/Maintain bed alarm  - Obtain necessary fall risk management equipment:   - Apply yellow socks and bracelet for high fall risk patients  - Consider moving patient to room near nurses station  Outcome: Progressing   response  - Implement non-pharmacological measures as appropriate and evaluate response  - Consider cultural and social influences on pain and pain management  - Notify physician/advanced practitioner if interventions unsuccessful or patient reports new pain  Outcome: Progressing

## 2023-06-06 ENCOUNTER — TELEPHONE (OUTPATIENT)
Dept: PALLIATIVE MEDICINE | Facility: CLINIC | Age: 63
End: 2023-06-06

## 2023-06-06 NOTE — UTILIZATION REVIEW
NOTIFICATION OF ADMISSION DISCHARGE   This is a Notification of Discharge from [address]  Please be advised that this patient has been discharge from our facility  Below you will find the admission and discharge date and time including the patient’s disposition  UTILIZATION REVIEW CONTACT:  Domo Hoover  Utilization   Network Utilization Review Department  Phone: [phone] carefully listen to the prompts  All voicemails are confidential   Email: [email or web address]     ADMISSION INFORMATION  PRESENTATION DATE: 5/21/2023 12:03 PM  OBERVATION ADMISSION DATE:   INPATIENT ADMISSION DATE: 5/21/23  2:34 PM   DISCHARGE DATE: 6/5/2023  1:05 PM   DISPOSITION:Home/Self Care    IMPORTANT INFORMATION:  Send all requests for admission clinical reviews, approved or denied determinations and any other requests to dedicated fax number below belonging to the campus where the patient is receiving treatment   List of dedicated fax numbers:  [address] DENIALS (Administrative/Medical Necessity) [phone]   [address] (Maternity/NICU/Pediatrics) [phone]   [facility] [phone]   [facility] [phone]   [address] [phone]   220 [facility] [phone]   90 [facility] [phone]   [facility] 119 [phone]   [facility]  [phone]   [address] [facility] [phone] [facility] [address] [facility] [phone]

## 2023-06-06 NOTE — PROGRESS NOTES
Follow up call made to patient's spouse Felipe Brown  Patient made it to [place] uneventfully  Wished both Altagracia Lemons and Felipe Brown the best with ongoing cancer cares

## 2023-06-06 NOTE — TELEPHONE ENCOUNTER
I spoke to patient she stated she was transferred to another hospital and that her wife has  Laine so she will call to schedule

## 2023-06-09 ENCOUNTER — TELEPHONE (OUTPATIENT)
Dept: PALLIATIVE MEDICINE | Facility: CLINIC | Age: 63
End: 2023-06-09

## 2023-06-09 NOTE — TELEPHONE ENCOUNTER
Prior Authorization initiated for   OxyCodone ER 30 mg via [facility]    ID#  Phone#  [id number]: Brendan Garcia

## 2023-07-17 ENCOUNTER — APPOINTMENT (EMERGENCY)
Dept: RADIOLOGY | Facility: HOSPITAL | Age: 63
DRG: 720 | End: 2023-07-17
Payer: MEDICAID

## 2023-07-17 ENCOUNTER — HOSPITAL ENCOUNTER (INPATIENT)
Facility: HOSPITAL | Age: 63
LOS: 3 days | Discharge: HOME | DRG: 720 | End: 2023-07-20
Attending: EMERGENCY MEDICINE | Admitting: INTERNAL MEDICINE
Payer: MEDICAID

## 2023-07-17 ENCOUNTER — APPOINTMENT (EMERGENCY)
Dept: CT IMAGING | Facility: HOSPITAL | Age: 63
DRG: 720 | End: 2023-07-17
Payer: MEDICAID

## 2023-07-17 DIAGNOSIS — R65.20 SEVERE SEPSIS (HCC): ICD-10-CM

## 2023-07-17 DIAGNOSIS — C34.90 METASTATIC LUNG CANCER (METASTASIS FROM LUNG TO OTHER SITE) (HCC): ICD-10-CM

## 2023-07-17 DIAGNOSIS — J18.9 PNEUMONIA: Primary | ICD-10-CM

## 2023-07-17 DIAGNOSIS — K52.9 COLITIS: ICD-10-CM

## 2023-07-17 DIAGNOSIS — C79.9 METASTATIC ADENOCARCINOMA (HCC): ICD-10-CM

## 2023-07-17 DIAGNOSIS — R11.2 NAUSEA AND VOMITING, UNSPECIFIED VOMITING TYPE: ICD-10-CM

## 2023-07-17 DIAGNOSIS — A41.9 SEVERE SEPSIS (HCC): ICD-10-CM

## 2023-07-17 LAB
ACANTHOCYTES BLD QL SMEAR: ABNORMAL
ALBUMIN SERPL BCP-MCNC: 3.3 G/DL (ref 3.5–5)
ALP SERPL-CCNC: 96 U/L (ref 34–104)
ALT SERPL W P-5'-P-CCNC: 6 U/L (ref 7–52)
ANION GAP SERPL CALCULATED.3IONS-SCNC: 13 MMOL/L
ANISOCYTOSIS BLD QL SMEAR: ABNORMAL
APTT PPP: 27 SECONDS (ref 23–37)
AST SERPL W P-5'-P-CCNC: 7 U/L (ref 13–39)
ATRIAL RATE: 115 BPM
BACTERIA UR QL AUTO: ABNORMAL /HPF
BASOPHILS # BLD MANUAL: 0 THOUSAND/UL (ref 0–0.1)
BASOPHILS NFR MAR MANUAL: 0 % (ref 0–1)
BILIRUB SERPL-MCNC: 0.41 MG/DL (ref 0.2–1)
BILIRUB UR QL STRIP: NEGATIVE
BUN SERPL-MCNC: 11 MG/DL (ref 5–25)
CALCIUM ALBUM COR SERPL-MCNC: 10 MG/DL (ref 8.3–10.1)
CALCIUM SERPL-MCNC: 9.4 MG/DL (ref 8.4–10.2)
CHLORIDE SERPL-SCNC: 97 MMOL/L (ref 96–108)
CLARITY UR: ABNORMAL
CO2 SERPL-SCNC: 23 MMOL/L (ref 21–32)
COLOR UR: YELLOW
CREAT SERPL-MCNC: 0.58 MG/DL (ref 0.6–1.3)
EOSINOPHIL # BLD MANUAL: 0 THOUSAND/UL (ref 0–0.4)
EOSINOPHIL NFR BLD MANUAL: 0 % (ref 0–6)
ERYTHROCYTE [DISTWIDTH] IN BLOOD BY AUTOMATED COUNT: 18.9 % (ref 11.6–15.1)
GFR SERPL CREATININE-BSD FRML MDRD: 98 ML/MIN/1.73SQ M
GLUCOSE SERPL-MCNC: 186 MG/DL (ref 65–140)
GLUCOSE UR STRIP-MCNC: NEGATIVE MG/DL
HCT VFR BLD AUTO: 34.9 % (ref 34.8–46.1)
HGB BLD-MCNC: 10.5 G/DL (ref 11.5–15.4)
HGB UR QL STRIP.AUTO: NEGATIVE
INR PPP: 1.19 (ref 0.84–1.19)
KETONES UR STRIP-MCNC: NEGATIVE MG/DL
LACTATE SERPL-SCNC: 0.9 MMOL/L (ref 0.5–2)
LACTATE SERPL-SCNC: 2.1 MMOL/L (ref 0.5–2)
LEUKOCYTE ESTERASE UR QL STRIP: ABNORMAL
LIPASE SERPL-CCNC: 56 U/L (ref 11–82)
LYMPHOCYTES # BLD AUTO: 1.17 THOUSAND/UL (ref 0.6–4.47)
LYMPHOCYTES # BLD AUTO: 7 % (ref 14–44)
MCH RBC QN AUTO: 24.5 PG (ref 26.8–34.3)
MCHC RBC AUTO-ENTMCNC: 30.1 G/DL (ref 31.4–37.4)
MCV RBC AUTO: 81 FL (ref 82–98)
METAMYELOCYTES NFR BLD MANUAL: 1 % (ref 0–1)
MONOCYTES # BLD AUTO: 0.17 THOUSAND/UL (ref 0–1.22)
MONOCYTES NFR BLD: 1 % (ref 4–12)
NEUTROPHILS # BLD MANUAL: 15.19 THOUSAND/UL (ref 1.85–7.62)
NEUTS SEG NFR BLD AUTO: 91 % (ref 43–75)
NITRITE UR QL STRIP: NEGATIVE
NON-SQ EPI CELLS URNS QL MICRO: ABNORMAL /HPF
P AXIS: 55 DEGREES
PH UR STRIP.AUTO: 7.5 [PH]
PLATELET # BLD AUTO: 530 THOUSANDS/UL (ref 149–390)
PLATELET BLD QL SMEAR: ABNORMAL
PMV BLD AUTO: 8.4 FL (ref 8.9–12.7)
POTASSIUM SERPL-SCNC: 4 MMOL/L (ref 3.5–5.3)
PR INTERVAL: 150 MS
PROCALCITONIN SERPL-MCNC: 0.38 NG/ML
PROT SERPL-MCNC: 6.8 G/DL (ref 6.4–8.4)
PROT UR STRIP-MCNC: ABNORMAL MG/DL
PROTHROMBIN TIME: 15.1 SECONDS (ref 11.6–14.5)
QRS AXIS: 69 DEGREES
QRSD INTERVAL: 90 MS
QT INTERVAL: 340 MS
QTC INTERVAL: 470 MS
RBC # BLD AUTO: 4.29 MILLION/UL (ref 3.81–5.12)
RBC #/AREA URNS AUTO: ABNORMAL /HPF
RBC MORPH BLD: PRESENT
SODIUM SERPL-SCNC: 133 MMOL/L (ref 135–147)
SP GR UR STRIP.AUTO: 1.01
T WAVE AXIS: 38 DEGREES
UROBILINOGEN UR QL STRIP.AUTO: 0.2 E.U./DL
VENTRICULAR RATE: 115 BPM
WBC # BLD AUTO: 16.69 THOUSAND/UL (ref 4.31–10.16)
WBC #/AREA URNS AUTO: ABNORMAL /HPF

## 2023-07-17 PROCEDURE — 96375 TX/PRO/DX INJ NEW DRUG ADDON: CPT

## 2023-07-17 PROCEDURE — 96361 HYDRATE IV INFUSION ADD-ON: CPT

## 2023-07-17 PROCEDURE — 99223 1ST HOSP IP/OBS HIGH 75: CPT | Performed by: NURSE PRACTITIONER

## 2023-07-17 PROCEDURE — 36415 COLL VENOUS BLD VENIPUNCTURE: CPT | Performed by: EMERGENCY MEDICINE

## 2023-07-17 PROCEDURE — 96376 TX/PRO/DX INJ SAME DRUG ADON: CPT

## 2023-07-17 PROCEDURE — 87086 URINE CULTURE/COLONY COUNT: CPT | Performed by: EMERGENCY MEDICINE

## 2023-07-17 PROCEDURE — 71260 CT THORAX DX C+: CPT

## 2023-07-17 PROCEDURE — 87040 BLOOD CULTURE FOR BACTERIA: CPT | Performed by: EMERGENCY MEDICINE

## 2023-07-17 PROCEDURE — 99285 EMERGENCY DEPT VISIT HI MDM: CPT

## 2023-07-17 PROCEDURE — 84145 PROCALCITONIN (PCT): CPT | Performed by: EMERGENCY MEDICINE

## 2023-07-17 PROCEDURE — 80053 COMPREHEN METABOLIC PANEL: CPT | Performed by: EMERGENCY MEDICINE

## 2023-07-17 PROCEDURE — 93005 ELECTROCARDIOGRAM TRACING: CPT

## 2023-07-17 PROCEDURE — 85007 BL SMEAR W/DIFF WBC COUNT: CPT | Performed by: EMERGENCY MEDICINE

## 2023-07-17 PROCEDURE — 81001 URINALYSIS AUTO W/SCOPE: CPT | Performed by: EMERGENCY MEDICINE

## 2023-07-17 PROCEDURE — 85027 COMPLETE CBC AUTOMATED: CPT | Performed by: EMERGENCY MEDICINE

## 2023-07-17 PROCEDURE — 85610 PROTHROMBIN TIME: CPT | Performed by: EMERGENCY MEDICINE

## 2023-07-17 PROCEDURE — 83690 ASSAY OF LIPASE: CPT | Performed by: EMERGENCY MEDICINE

## 2023-07-17 PROCEDURE — 96365 THER/PROPH/DIAG IV INF INIT: CPT

## 2023-07-17 PROCEDURE — 83605 ASSAY OF LACTIC ACID: CPT | Performed by: EMERGENCY MEDICINE

## 2023-07-17 PROCEDURE — 71045 X-RAY EXAM CHEST 1 VIEW: CPT

## 2023-07-17 PROCEDURE — NC001 PR NO CHARGE: Performed by: INTERNAL MEDICINE

## 2023-07-17 PROCEDURE — 93010 ELECTROCARDIOGRAM REPORT: CPT | Performed by: INTERNAL MEDICINE

## 2023-07-17 PROCEDURE — G1004 CDSM NDSC: HCPCS

## 2023-07-17 PROCEDURE — 74177 CT ABD & PELVIS W/CONTRAST: CPT

## 2023-07-17 PROCEDURE — 99222 1ST HOSP IP/OBS MODERATE 55: CPT | Performed by: INTERNAL MEDICINE

## 2023-07-17 PROCEDURE — 85730 THROMBOPLASTIN TIME PARTIAL: CPT | Performed by: EMERGENCY MEDICINE

## 2023-07-17 RX ORDER — HYDROMORPHONE HCL/PF 1 MG/ML
2 SYRINGE (ML) INJECTION ONCE
Status: COMPLETED | OUTPATIENT
Start: 2023-07-17 | End: 2023-07-17

## 2023-07-17 RX ORDER — LOSARTAN POTASSIUM 50 MG/1
100 TABLET ORAL DAILY
Status: DISCONTINUED | OUTPATIENT
Start: 2023-07-17 | End: 2023-07-20 | Stop reason: HOSPADM

## 2023-07-17 RX ORDER — SENNOSIDES 8.6 MG
8.6 TABLET ORAL
Status: DISCONTINUED | OUTPATIENT
Start: 2023-07-17 | End: 2023-07-20

## 2023-07-17 RX ORDER — DROPERIDOL 2.5 MG/ML
0.62 INJECTION, SOLUTION INTRAMUSCULAR; INTRAVENOUS ONCE
Status: COMPLETED | OUTPATIENT
Start: 2023-07-17 | End: 2023-07-17

## 2023-07-17 RX ORDER — HYDROMORPHONE HCL/PF 1 MG/ML
1 SYRINGE (ML) INJECTION ONCE
Status: COMPLETED | OUTPATIENT
Start: 2023-07-17 | End: 2023-07-17

## 2023-07-17 RX ORDER — BUPROPION HYDROCHLORIDE 150 MG/1
300 TABLET ORAL EVERY MORNING
Status: DISCONTINUED | OUTPATIENT
Start: 2023-07-18 | End: 2023-07-20 | Stop reason: HOSPADM

## 2023-07-17 RX ORDER — LEVOFLOXACIN 5 MG/ML
750 INJECTION, SOLUTION INTRAVENOUS ONCE
Status: COMPLETED | OUTPATIENT
Start: 2023-07-17 | End: 2023-07-17

## 2023-07-17 RX ORDER — ONDANSETRON 2 MG/ML
4 INJECTION INTRAMUSCULAR; INTRAVENOUS EVERY 6 HOURS PRN
Status: DISCONTINUED | OUTPATIENT
Start: 2023-07-17 | End: 2023-07-20 | Stop reason: HOSPADM

## 2023-07-17 RX ORDER — CEFEPIME HYDROCHLORIDE 2 G/50ML
2000 INJECTION, SOLUTION INTRAVENOUS EVERY 8 HOURS
Status: DISCONTINUED | OUTPATIENT
Start: 2023-07-17 | End: 2023-07-20 | Stop reason: HOSPADM

## 2023-07-17 RX ORDER — FERROUS SULFATE 325(65) MG
325 TABLET ORAL
Status: DISCONTINUED | OUTPATIENT
Start: 2023-07-18 | End: 2023-07-20 | Stop reason: HOSPADM

## 2023-07-17 RX ORDER — ENOXAPARIN SODIUM 100 MG/ML
40 INJECTION SUBCUTANEOUS DAILY
Status: DISCONTINUED | OUTPATIENT
Start: 2023-07-17 | End: 2023-07-20 | Stop reason: HOSPADM

## 2023-07-17 RX ORDER — CEFEPIME HYDROCHLORIDE 2 G/50ML
2000 INJECTION, SOLUTION INTRAVENOUS EVERY 12 HOURS
Status: DISCONTINUED | OUTPATIENT
Start: 2023-07-17 | End: 2023-07-17

## 2023-07-17 RX ORDER — LEVOFLOXACIN 750 MG/1
750 TABLET ORAL EVERY 24 HOURS
Qty: 4 TABLET | Refills: 0 | Status: SHIPPED | OUTPATIENT
Start: 2023-07-18 | End: 2023-07-22

## 2023-07-17 RX ORDER — METRONIDAZOLE 500 MG/100ML
500 INJECTION, SOLUTION INTRAVENOUS EVERY 8 HOURS
Status: DISCONTINUED | OUTPATIENT
Start: 2023-07-17 | End: 2023-07-18

## 2023-07-17 RX ORDER — HYDROMORPHONE HCL/PF 1 MG/ML
2 SYRINGE (ML) INJECTION EVERY 4 HOURS PRN
Status: DISCONTINUED | OUTPATIENT
Start: 2023-07-17 | End: 2023-07-18

## 2023-07-17 RX ORDER — CEFEPIME HYDROCHLORIDE 2 G/50ML
2000 INJECTION, SOLUTION INTRAVENOUS ONCE
Status: COMPLETED | OUTPATIENT
Start: 2023-07-17 | End: 2023-07-17

## 2023-07-17 RX ORDER — ACETAMINOPHEN 325 MG/1
650 TABLET ORAL EVERY 6 HOURS PRN
Status: DISCONTINUED | OUTPATIENT
Start: 2023-07-17 | End: 2023-07-20 | Stop reason: HOSPADM

## 2023-07-17 RX ORDER — DOCUSATE SODIUM 100 MG/1
100 CAPSULE, LIQUID FILLED ORAL 2 TIMES DAILY
Status: DISCONTINUED | OUTPATIENT
Start: 2023-07-17 | End: 2023-07-20 | Stop reason: HOSPADM

## 2023-07-17 RX ORDER — KETOROLAC TROMETHAMINE 30 MG/ML
15 INJECTION, SOLUTION INTRAMUSCULAR; INTRAVENOUS ONCE
Status: COMPLETED | OUTPATIENT
Start: 2023-07-17 | End: 2023-07-17

## 2023-07-17 RX ORDER — LEVOFLOXACIN 5 MG/ML
750 INJECTION, SOLUTION INTRAVENOUS EVERY 24 HOURS
Status: DISCONTINUED | OUTPATIENT
Start: 2023-07-18 | End: 2023-07-17

## 2023-07-17 RX ORDER — LEVOFLOXACIN 750 MG/1
750 TABLET ORAL ONCE
Status: DISCONTINUED | OUTPATIENT
Start: 2023-07-17 | End: 2023-07-17

## 2023-07-17 RX ADMIN — DOCUSATE SODIUM 100 MG: 100 CAPSULE, LIQUID FILLED ORAL at 17:44

## 2023-07-17 RX ADMIN — HYDROMORPHONE HYDROCHLORIDE 2 MG: 1 INJECTION, SOLUTION INTRAMUSCULAR; INTRAVENOUS; SUBCUTANEOUS at 14:48

## 2023-07-17 RX ADMIN — CEFEPIME HYDROCHLORIDE 2000 MG: 2 INJECTION, SOLUTION INTRAVENOUS at 23:37

## 2023-07-17 RX ADMIN — HYDROMORPHONE HYDROCHLORIDE 2 MG: 1 INJECTION, SOLUTION INTRAMUSCULAR; INTRAVENOUS; SUBCUTANEOUS at 07:49

## 2023-07-17 RX ADMIN — LOSARTAN POTASSIUM 100 MG: 50 TABLET, FILM COATED ORAL at 15:32

## 2023-07-17 RX ADMIN — ACETAMINOPHEN 650 MG: 325 TABLET ORAL at 17:37

## 2023-07-17 RX ADMIN — HYDROMORPHONE HYDROCHLORIDE 2 MG: 1 INJECTION, SOLUTION INTRAMUSCULAR; INTRAVENOUS; SUBCUTANEOUS at 19:58

## 2023-07-17 RX ADMIN — LEVOFLOXACIN 750 MG: 750 INJECTION, SOLUTION INTRAVENOUS at 12:15

## 2023-07-17 RX ADMIN — METRONIDAZOLE 500 MG: 500 INJECTION, SOLUTION INTRAVENOUS at 15:35

## 2023-07-17 RX ADMIN — CEFEPIME HYDROCHLORIDE 2000 MG: 2 INJECTION, SOLUTION INTRAVENOUS at 16:07

## 2023-07-17 RX ADMIN — METRONIDAZOLE 500 MG: 500 INJECTION, SOLUTION INTRAVENOUS at 22:21

## 2023-07-17 RX ADMIN — KETOROLAC TROMETHAMINE 15 MG: 30 INJECTION, SOLUTION INTRAMUSCULAR at 07:21

## 2023-07-17 RX ADMIN — DROPERIDOL 0.62 MG: 2.5 INJECTION, SOLUTION INTRAMUSCULAR; INTRAVENOUS at 07:27

## 2023-07-17 RX ADMIN — HYDROMORPHONE HYDROCHLORIDE 1 MG: 1 INJECTION, SOLUTION INTRAMUSCULAR; INTRAVENOUS; SUBCUTANEOUS at 07:20

## 2023-07-17 RX ADMIN — IOHEXOL 100 ML: 350 INJECTION, SOLUTION INTRAVENOUS at 08:36

## 2023-07-17 RX ADMIN — SENNOSIDES 8.6 MG: 8.6 TABLET, FILM COATED ORAL at 22:13

## 2023-07-17 RX ADMIN — HYDROMORPHONE HYDROCHLORIDE 2 MG: 1 INJECTION, SOLUTION INTRAMUSCULAR; INTRAVENOUS; SUBCUTANEOUS at 11:57

## 2023-07-17 RX ADMIN — SODIUM CHLORIDE 1000 ML: 0.9 INJECTION, SOLUTION INTRAVENOUS at 07:10

## 2023-07-17 RX ADMIN — HYDROMORPHONE HYDROCHLORIDE 2 MG: 1 INJECTION, SOLUTION INTRAMUSCULAR; INTRAVENOUS; SUBCUTANEOUS at 08:21

## 2023-07-17 RX ADMIN — CEFEPIME HYDROCHLORIDE 2000 MG: 2 INJECTION, SOLUTION INTRAVENOUS at 08:24

## 2023-07-17 RX ADMIN — ENOXAPARIN SODIUM 40 MG: 40 INJECTION SUBCUTANEOUS at 15:32

## 2023-07-17 NOTE — ED NOTES
Patient friend spoke with this RN stating that patient seemed confused. RN completed neuro assessment. Patient alert and oriented with no deficits. Vital signs within normal limits. NP Denny Fernández sent a tigertext regarding friends concern. Will continue to monitor.       Lillian Velez RN  07/17/23 3029

## 2023-07-17 NOTE — ED NOTES
Patient voiced concerns of shortness of breath. Patient oxygen level maintained at 98% on RA. Patient requesting to be placed on 1L NC for comfort.       Tj Carias RN  07/17/23 [id number]

## 2023-07-17 NOTE — ED NOTES
Patient requesting an additional pillow along with ice chips. Per Dr. Clemente Enriquez, patient may have ice chips at this time. Additional pillow and ice chips provided.       May Posey RN  07/17/23 4125

## 2023-07-17 NOTE — CONSULTS
[facility] - Inpatient Consultation    PATIENT INFORMATION      Devorah Simms 61 y.o. female MRN: [id number]  Unit/Bed#: ED 26 Encounter: [id number]  PCP: No primary care provider on file. Date of Admission:  2023  Date of Consultation: 23  Requesting Physician: Beau Rivera, DO       ASSESSMENT & PLAN   Devorah Simms is a 61 y.o. old female with PMH of pancreatic cancer (unclear what type as not in EMR), metastatic adenocarcinoma with chronic pain and N/V, iron deficiency anemia who presented to [facility] for concern of 1 day of persistent N/V/D with severe abdominal pain. Patient admitted for sepsis in setting of colitis and possible pneumonia for antibiotics and further fluid resuscitation. Abdominal Pain  Nausea with Vomiting  Diarrhea  Patient with chronic N/V since recent diagnosis of malignancy. Has been improving with haldol, but over the last day symptoms have recurred and worsened with associated diarrhea and abdominal pain. Patient visiting the area for camping in RV with friends this weekend. Yesterday developed acute on chronic N/V and diarrhea with uncontrollable pain despite her normal pain regimen (fentanyl patch, oral dilaudid). Denies hematemesis, hematochezia, melena, new foods, sick contact, new medications. · Possibly infectious colitis vs. Associated with known malignancy  · If pancreatic neuroendocrine tumor may be cause of symptoms  · Recommend supportive care with IVF   · Clear liquid diet today - if tolerating can advance  · Pain regimen per primary team    Iron Deficiency Anemia  Patient presenting with Hgb 10.5 with baseline 8-9. Likely hemoconcentrated in setting of dehydration.   · EGD and Colonoscopy completed 23 - negative gastric and duodenal bx, tubulovillous adenoma of sigmoid colon negative for high grade dysplasia  · Hgb currently stable  · Monitor and transfuse for Hgb <7.0    Pancreatic Tumor  Increased 2.4 x 3.4 cm depth by width pancreatic body mass with Increased solid enhancing component with some evidence of new secondary pancreatic duct obstruction. Patient reports lesion has been biopsied by her care team in [place] and has been advised this is an independent malignancy, however, unclear what type of pancreatic malignancy pathology demonstrated. · Labs are all within normal limits and pain distribution not indicative of pancreatic cause of symptoms  · Patient has follow up scheduled in [place] - scheduled for PET scan tomorrow - will need to be rescheduled  · Will attempt to obtain report with pathology from pancreatic lesion     [address] is a 61 y.o. female with PMH significant for metastatic adenocarcinoma with chronic pain and N/V, iron deficiency anemia who presented to [facility] for concern of 1 day of persistent N/V/D with severe abdominal pain. Patient with recent admission 5/21-6/5 for similar symptoms with diagnosis of metastatic lung adenocarcinoma. She was discharged with plan to follow up outpatient in [place], and she has since also been diagnosed with pancreatic cancer (unsure what type). She is planned to have PET scan completed in [place] tomorrow. She was in this area camping with friends this week, however, over the past 24 hours she has had severe abdominal pain with worsening of her N/V and significant diarrhea. Denies hematemesis, melena, hematochezia. Has not vomited or had bowel movement since presenting to the hospital. Tolerating ice chips. Underwent EGD/Colon in may to evaluate CT findings in setting of new diagnosis of malignancy.        REVIEW OF SYSTEMS     CONSTITUTIONAL: Denies any fever, chills, rigors  HEENT: No earache or tinnitus, denies hearing loss or visual disturbances  CARDIOVASCULAR: No chest pain or palpitations   RESPIRATORY: Denies any cough, hemoptysis, shortness of breath or dyspnea on exertion  GASTROINTESTINAL: As noted in the History of Present Illness   GENITOURINARY: frequent urination, denies any hematuria or dysuria  NEUROLOGIC: No dizziness or vertigo, denies headaches   MUSCULOSKELETAL: significant full body pain   SKIN: Denies skin rashes or itching   ENDOCRINE: Denies excessive thirst, denies intolerance to heat or cold  PSYCHOSOCIAL: Denies depression or anxiety, denies any recent memory loss       PAST MEDICAL & SURGICAL HISTORY      Past Medical History:   Diagnosis Date   • Asthma    • Diabetes mellitus ([address])        Past Surgical History:   Procedure Laterality Date   • IR BIOPSY NECK  2023         MEDICATIONS & ALLERGIES       Medications:   (Not in a hospital admission)    Current Facility-Administered Medications   Medication Dose Route Frequency   • levofloxacin (LEVAQUIN) IVPB (premix in dextrose) 750 mg 150 mL  750 mg Intravenous Once       Allergies: Allergies   Allergen Reactions   • Neosporin [Bacitracin-Polymyxin B] Rash   • Penicillins Rash   • Salicylic Acid-Sulfur Rash   • Shellfish-Derived Products - Food Allergy Rash and Facial Swelling         SOCIAL HISTORY      Social History     Marital Status: /Civil Union    Substance Use History:   Social History     Substance and Sexual Activity   Alcohol Use Yes    Comment: couple times a year     Social History     Tobacco Use   Smoking Status Former   • Packs/day: 0.50   • Years: 15.00   • Total pack years: 7.50   • Types: Cigarettes   • Quit date: 2022   • Years since quittin.7   Smokeless Tobacco Never     Social History     Substance and Sexual Activity   Drug Use Yes   • Types: Marijuana    Comment: medical         FAMILY HISTORY      History reviewed. No pertinent family history. PHYSICAL EXAM     Objective   Blood pressure 167/81, pulse (!) 112, temperature 98.1 °F (36.7 °C), temperature source Temporal, resp. rate 14, height 5' 1.5" (1.562 m), weight 49.9 kg (110 lb), SpO2 97 %.  Body mass index is 20.45 kg/m². No intake or output data in the 24 hours ending 07/17/23 1227    General Appearance:   Alert, cooperative, no distress   HEENT:   Normocephalic, atraumatic, anicteric     Neck:   Supple, symmetrical, trachea midline   Lungs:   Equal chest rise, respirations unlabored    Heart:   Regular rate and rhythm   Abdomen:   Soft, diffuse tenderness, nondistended; normal bowel sounds; no masses, no organomegaly    Rectal:   Deferred    Extremities:   No cyanosis, clubbing or edema    Neuro: Moves all 4 extremities    Skin:   No jaundice, rashes, or lesions      ADDITIONAL DATA     Lab Results:     Results from last 7 days   Lab Units 07/17/23  0712   WBC Thousand/uL 16.69*   HEMOGLOBIN g/dL 10.5*   HEMATOCRIT % 34.9   PLATELETS Thousands/uL 530*   LYMPHO PCT % 7*   MONO PCT % 1*   EOS PCT % 0     Results from last 7 days   Lab Units 07/17/23  0712   POTASSIUM mmol/L 4.0   CHLORIDE mmol/L 97   CO2 mmol/L 23   BUN mg/dL 11   CREATININE mg/dL 0.58*   CALCIUM mg/dL 9.4   ALK PHOS U/L 96   ALT U/L 6*   AST U/L 7*     Results from last 7 days   Lab Units 07/17/23  0712   INR  1.19       Imaging:    XR chest 1 view portable    Result Date: 7/17/2023  Narrative: CHEST INDICATION:   abd pain. History of metastatic adenocarcinoma. COMPARISON: 5/29/2023 chest x-ray and 5/21/2023 chest CT EXAM PERFORMED/VIEWS:  XR CHEST PORTABLE FINDINGS: Similar right thoracic opacity with right mediastinal shift compatible with known right lung collapse from perihilar mass. Left lung remains clear. No new effusion or pneumothorax. Visualized cardiomediastinal silhouette is stable in appearance. No acute osseous or soft tissue pathology. Impression: Similar appearance of the chest to 5/29/2023. Please see today's chest CT for further evaluation.  Workstation performed: [id number]     CT chest abdomen pelvis w contrast    Result Date: 7/17/2023  Narrative: CT CHEST, ABDOMEN AND PELVIS WITH IV CONTRAST INDICATION:   Severe lower abdominal pain. Right-sided pleural effusion seen on chest x-ray. Metastatic right lung adenocarcinoma. COMPARISON: 5/21/2023 TECHNIQUE: CT examination of the chest, abdomen and pelvis was performed. Axial, sagittal, and coronal 2D reformatted images were created from the source data and submitted for interpretation. Radiation dose length product (DLP) for this visit:  780.6 mGy-cm . This examination, like all CT scans performed in the [facility], was performed utilizing techniques to minimize radiation dose exposure, including the use of iterative reconstruction and automated exposure control. IV Contrast:  100 mL of iohexol (OMNIPAQUE) Enteric Contrast: Enteric contrast was not administered. FINDINGS: CHEST LUNGS: New, mild patchy peripheral groundglass opacities in the left upper and lower lobes. Single new 0.4 cm consolidative nodular opacity in the lower lobe (3/94). Persistent approximately 6.3 x 4.9 cm depth by width right perihilar mass with evidence of central necrosis with secondary complete right lung collapse. Collapsed right lung has heterogeneous enhancement with tubular low-attenuation areas radiating from the hilum most likely obstructed bronchi with mucus. PLEURA: Trace, decreased right pleural effusion. Normal on the left. HEART/GREAT VESSELS: Atherosclerosis. Normal heart size and aortic caliber and enhancement. Patent aortic branch vessels. Similar trace pericardial effusion. No central PE. Evidence of similar obstructed or severely narrowed right upper lobe pulmonary arteries. Mildly narrowed superior vena cava. No obstruction or collateral vessels. MEDIASTINUM AND CHRISTINA: Persistent right mediastinal shift. Esophagus is within normal limits. Similar pathologic mediastinal and right hilar lymph nodes, e.g. 2.0 cm short axis diameter right precarinal node (2/42.  CHEST WALL AND LOWER NECK:   Persistent right supraclavicular lymphadenopathy, largest node 2.1 cm (2/9, abutting the right thyroid and focally narrowing the right internal jugular vein. ABDOMEN LIVER/BILIARY TREE: Focal fat at the falciform ligament, similar and common finding. No hepatic mass identified. Normal liver morphology and bile duct caliber. GALLBLADDER:  Within normal limits. SPLEEN:  Within normal limits. PANCREAS: Increased 2.4 x 3.4 cm depth by width pancreatic body mass (previously 2.1 x 2.4 cm, measured today on the prior study by this reader). Increased, solid enhancing component. No calcification. Persistent distal pancreatic atrophy and new distal duct dilation, approximately 0.8 cm. ADRENAL GLANDS:  Within normal limits. KIDNEYS/URETERS:  Within normal limits. STOMACH AND BOWEL: Stomach and small bowel are within normal limits. New, mild, diffuse edematous colon wall thickening. No significant diverticulosis. Normal bowel caliber. No mesenteric edema or pneumatosis. APPENDIX:  Within normal limits. ABDOMINOPELVIC CAVITY:  No abnormal air, fluid or enlarged lymph nodes. VESSELS:  Atherosclerosis. Normal aortic caliber. Patent central mesenteric vessels. No portal venous air. PELVIS: REPRODUCTIVE ORGANS: Within normal limits for age. URINARY BLADDER:  Within normal limits. ABDOMINAL WALL/INGUINAL REGIONS:  Within normal limits. OSSEOUS STRUCTURES:  Degenerative changes. The study was marked in [facility] for immediate notification. Impression: Chest: New, mild left lung opacities, likely infectious/inflammatory. Recommend follow-up in 3 months. Similar appearance of known right hilar malignancy, lymphadenopathy and secondary right lung collapse compared to 5/21/2023. Accompanying Trace, decreased right pleural effusion. Other accompanying and stable findings above. Abdomen and pelvis: Increased size of pancreatic mass, primary versus secondary malignancy, with evidence of new secondary pancreatic duct obstruction. New mild diffuse colon wall thickening, most likely infectious/inflammatory.  No evidence of obstruction. Workstation performed: [id number]       EKG, Pathology, and Other Studies Reviewed on Admission:   · EKG: Reviewed      Counseling / Coordination of Care Time: 30 total mins spent n consult. Greater than 50% of total time spent on patient counseling and coordination of care. Adeline Hudson DO  Gastroenterology Fellow  [facility]  Division of Gastroenterology and Hepatology  Available on [employer]  . ..............................................................................................................................................  ** Please Note: This note is constructed using a voice recognition dictation system.  **

## 2023-07-17 NOTE — H&P
[id number] [facility]  H&P  Name: Erlin Andrew 61 y.o. female I MRN: [id number]  Unit/Bed#: ED 32 I Date of Admission: 7/17/2023   Date of Service: 7/17/2023 I Hospital Day: 0      Assessment/Plan      * Severe sepsis [facility])  Assessment & Plan  · Presented for abdominal pain with nausea and vomiting found to be tachycardic with leukocytosis. Of note, during previous admission in May, HR was persistently high 90's to 110's. · CT C/A/P: New mild left lung opacities, likely infectious/inflammatory. Recommend follow-up in 3 months. New mild diffuse colon wall thickening, most likely infectious/inflammatory  · Urinalysis with positive leukocyte esterase, and 10-20 WBC  · Lactic acid 2.1-->0.9 after 1 L normal saline bolus  · Procalcitonin 0.38  · Cefepime and Levaquin IV started in ER, will change Cefepime to Q8 and add Flagyl 500 mg Q8 per pharmacy recommendations  · Blood and urine cultures are pending  · Monitor labs and vital signs    Iron deficiency anemia  Assessment & Plan  · Hemoglobin 10.5  · History of blood transfusion during last admission in May 2023.  Also had EGD and colonoscopy  · Continue iron and B12  · Daily CBC and trend hemoglobin    Abdominal pain  Assessment & Plan  · Presented for abdominal pain, and to have evidence for sepsis/pneumonia and colitis  · CT abdomen and pelvis: Increased size of pancreatic mass, primary versus secondary malignancy, with evidence of new secondary pancreatic duct obstruction  · ER physician discussed with harjit MADDEN for patient to stay here  · Symptom control  · GI consultation    Anxiety  Assessment & Plan  · Currently controlled  · Continue Wellbutrin      Metastatic adenocarcinoma [facility])  Assessment & Plan  · Diagnosed May 2023, primary source is lung with additional pancreatic mass  · Was to have PET scan in [place] tomorrow, presented with abdominal pain today  · Continued care per oncology as an outpatient in [place]       VTE Pharmacologic Prophylaxis: VTE Score: 6 High Risk (Score >/= 5) - Pharmacological DVT Prophylaxis Ordered: enoxaparin (Lovenox). Sequential Compression Devices Ordered. Code Status: Level 1 - Full Code   Discussion with family: patient's wife was updated. .     Anticipated Length of Stay: Patient will be admitted on an inpatient basis with an anticipated length of stay of greater than 2 midnights secondary to severe sepsis. .    Total Time Spent on Date of Encounter in care of patient: 65 minutes This time was spent on one or more of the following: performing physical exam; counseling and coordination of care; obtaining or reviewing history; documenting in the medical record; reviewing/ordering tests, medications or procedures; communicating with other healthcare professionals and discussing with patient's family/caregivers. Chief Complaint: Abdominal pain    History of Present Illness:  Milagro Boudreaux is a 61 y.o. female with a PMH of metastatic adenocarcinoma who presents with abdominal pain, nausea and vomiting. She was diagnosed in May 2023. After a lengthy hospitalization she was discharged home to [place]. She has not had any further oncological care, however she did have a PET scan scheduled in [place] tomorrow. She presented today for abdominal pain and vomiting x 4. Imaging performed in the ER today shows evidence of worsening metastatic disease and possible pneumonia. Urinalysis with evidence for infection. She met sepsis criteria in the ER and received fluid resuscitation and broad-spectrum antibiotics. She was admitted to medicine. She denies fever, chest pain, shortness of breath worse than baseline, flank pain, dizziness, syncope, or focal weakness. Review of Systems:  Review of Systems   Constitutional: Positive for appetite change and unexpected weight change. Negative for chills and fever. HENT: Negative for congestion. Eyes: Negative for visual disturbance.    Respiratory: Negative for cough and shortness of breath. Cardiovascular: Negative for chest pain and leg swelling. Gastrointestinal: Positive for abdominal distention, nausea and vomiting. Negative for abdominal pain, constipation and diarrhea. Genitourinary: Negative for dysuria. Musculoskeletal: Negative for arthralgias and myalgias. Skin: Negative for color change. Neurological: Negative for dizziness, syncope, weakness and headaches. All other systems reviewed and are negative. Past Medical and Surgical History:   Past Medical History:   Diagnosis Date   • Asthma    • Diabetes mellitus [facility])        Past Surgical History:   Procedure Laterality Date   • IR BIOPSY NECK  5/22/2023       Meds/Allergies:  Prior to Admission medications    Medication Sig Start Date End Date Taking? Authorizing Provider   levofloxacin (LEVAQUIN) 750 mg tablet Take 1 tablet (750 mg total) by mouth every 24 hours for 4 days Do not start before July 18, 2023. 7/18/23 7/22/23 Yes Sharri Grigsby MD   buPROPion (WELLBUTRIN XL) 300 mg 24 hr tablet Take 300 mg by mouth every morning 5/6/23   Historical Provider, MD   butalbital-aspirin-caffeine [facility]) -40 mg per capsule Take 1 capsule by mouth every 4 (four) hours as needed for headaches    Historical Provider, MD   cyanocobalamin (VITAMIN B-12) 500 MCG tablet Take 1 tablet (500 mcg total) by mouth daily Do not start before June 6, 2023. 6/6/23   Chitra BRITTNY Balderas   dicyclomine (BENTYL) 10 mg capsule Take 1 capsule (10 mg total) by mouth 4 (four) times a day (before meals and at bedtime) 6/5/23 7/5/23  ChitraBRITTNY Lopes   docusate sodium (COLACE) 100 mg capsule Take 1 capsule (100 mg total) by mouth 2 (two) times a day 6/5/23 7/5/23  ChitraBRITTNY Lopes   ferrous sulfate 325 (65 Fe) mg tablet Take 1 tablet (325 mg total) by mouth daily with breakfast Do not start before June 6, 2023.  6/6/23 7/6/23  ChitraBRITTNY Lopes   folic acid (FOLVITE) 547 mcg tablet Take 1 tablet (400 mcg total) by mouth daily Do not start before June 6, 2023. 6/6/23 7/6/23  BRITTNY Saenz   hydrOXYzine pamoate (VISTARIL) 25 mg capsule Take 25 mg by mouth daily as needed 4/25/23   Historical Provider, MD   lactulose 20 g/30 mL Take 45 mL (30 g total) by mouth 4 (four) times a day 6/5/23 7/5/23  BRITTNY Saenz   losartan (COZAAR) 100 MG tablet Take 1 tablet (100 mg total) by mouth daily Do not start before June 6, 2023. 6/6/23 7/6/23  BRITTNY Saenz   magnesium Oxide (MAG-OX) 400 mg TABS Take 1 tablet (400 mg total) by mouth 2 (two) times a day 6/5/23 7/5/23  BRITTNY Saenz   naloxone [facility]) 4 mg/0.1 mL nasal spray Administer 1 spray into a nostril. If no response after 2-3 minutes, give another dose in the other nostril using a new spray. 6/2/23 6/1/24  Fariha Cox PA-C   ondansetron (ZOFRAN-ODT) 8 mg disintegrating tablet Take 1 tablet (8 mg total) by mouth 3 (three) times a day before meals 6/5/23   BRITTNY Saenz   rosuvastatin (CRESTOR) 10 MG tablet Take 10 mg by mouth daily    Historical Provider, MD   scopolamine (TRANSDERM-SCOP) 1 mg/3 days TD 72 hr patch Place 1 patch on the skin over 72 hours every third day 6/5/23 7/5/23  BRITTNY Saenz   senna (SENOKOT) 8.6 mg Take 1 tablet (8.6 mg total) by mouth daily at bedtime 6/5/23 7/5/23  BRITTNY Saenz     I have reviewed home medications with patient personally. Allergies:    Allergies   Allergen Reactions   • Neosporin [Bacitracin-Polymyxin B] Rash   • Penicillins Rash   • Salicylic Acid-Sulfur Rash   • Shellfish-Derived Products - Food Allergy Rash and Facial Swelling       Social History:  Marital Status: /Civil Union   Occupation: Not employed  Patient Pre-hospital Living Situation: Home  Patient Pre-hospital Level of Mobility: walks with person assist  Patient Pre-hospital Diet Restrictions: None  Substance Use History:   Social History     Substance and Sexual Activity   Alcohol Use Yes Comment: couple times a year     Social History     Tobacco Use   Smoking Status Former   • Packs/day: 0.50   • Years: 15.00   • Total pack years: 7.50   • Types: Cigarettes   • Quit date: 2022   • Years since quittin.7   Smokeless Tobacco Never     Social History     Substance and Sexual Activity   Drug Use Yes   • Types: Marijuana    Comment: medical       Family History:  History reviewed. No pertinent family history. Physical Exam:     Vitals:   Blood Pressure: 131/60 (23 161)  Pulse: (!) 106 (23)  Temperature: 98.6 °F (37 °C) (23)  Temp Source: Temporal (23)  Respirations: 19 (23)  Height: 5' 1.5" (156.2 cm) (23)  Weight - Scale: 49.9 kg (110 lb) (23)  SpO2: 98 % (23)    Physical Exam  Vitals and nursing note reviewed. Constitutional:       Appearance: She is ill-appearing. Comments: Noticeable weight loss since prior assessment   HENT:      Head: Normocephalic and atraumatic. Mouth/Throat:      Pharynx: Oropharynx is clear. Eyes:      Pupils: Pupils are equal, round, and reactive to light. Cardiovascular:      Rate and Rhythm: Tachycardia present. Pulmonary:      Effort: Pulmonary effort is normal. No respiratory distress. Breath sounds: Normal breath sounds. Abdominal:      General: Bowel sounds are normal.      Palpations: Abdomen is soft. Tenderness: There is no abdominal tenderness. Musculoskeletal:      Cervical back: Neck supple. Skin:     General: Skin is warm and dry. Capillary Refill: Capillary refill takes less than 2 seconds. Neurological:      General: No focal deficit present. Mental Status: She is alert and oriented to person, place, and time.           Additional Data:     Lab Results:  Results from last 7 days   Lab Units 23  0712   WBC Thousand/uL 16.69*   HEMOGLOBIN g/dL 10.5*   HEMATOCRIT % 34.9   PLATELETS Thousands/uL 530*   LYMPHO PCT % 7* MONO PCT % 1*   EOS PCT % 0     Results from last 7 days   Lab Units 07/17/23  0712   SODIUM mmol/L 133*   POTASSIUM mmol/L 4.0   CHLORIDE mmol/L 97   CO2 mmol/L 23   BUN mg/dL 11   CREATININE mg/dL 0.58*   ANION GAP mmol/L 13   CALCIUM mg/dL 9.4   ALBUMIN g/dL 3.3*   TOTAL BILIRUBIN mg/dL 0.41   ALK PHOS U/L 96   ALT U/L 6*   AST U/L 7*   GLUCOSE RANDOM mg/dL 186*     Results from last 7 days   Lab Units 07/17/23  0712   INR  1.19             Results from last 7 days   Lab Units 07/17/23  1009 07/17/23  0712   LACTIC ACID mmol/L 0.9 2.1*   PROCALCITONIN ng/ml  --  0.38*       Lines/Drains:  Invasive Devices     Peripheral Intravenous Line  Duration           Peripheral IV 07/17/23 Left;Ventral (anterior) Forearm <1 day          Drain  Duration           External Urinary Catheter <1 day                    Imaging: Reviewed radiology reports from this admission including: chest xray and abdominal/pelvic CT  CT chest abdomen pelvis w contrast   Final Result by Kimberly Rosales MD (07/17 7321)      Chest:   New, mild left lung opacities, likely infectious/inflammatory. Recommend follow-up in 3 months. Similar appearance of known right hilar malignancy, lymphadenopathy and secondary right lung collapse compared to 5/21/2023. Accompanying      Trace, decreased right pleural effusion. Other accompanying and stable findings above. Abdomen and pelvis:   Increased size of pancreatic mass, primary versus secondary malignancy, with evidence of new secondary pancreatic duct obstruction. New mild diffuse colon wall thickening, most likely infectious/inflammatory. No evidence of obstruction. Workstation performed: [id number]         XR chest 1 view portable   Final Result by Kimberly Rosales MD (32/56 1505)   Similar appearance of the chest to 5/29/2023. Please see today's chest CT for further evaluation.                   Workstation performed: [id number]           ** Please Note: This note has been constructed using a voice recognition system.  **

## 2023-07-17 NOTE — DISCHARGE INSTRUCTIONS
-Today you were evaluated for worsening lower abdominal pain in the setting of metastatic lung cancer. It was found that you have a new consolidation in the left lung concerning for possible developing pneumonia. Please take antibiotics as prescribed for this.    -It was also found that you have colitis on CT scan, GI doctors are recommending that you have close follow-up for this. Also, your pancreatic mass seems to be enlarging with concern for possible obstruction of the pancreatic duct.   Please follow-up with oncologist ASAP

## 2023-07-17 NOTE — ASSESSMENT & PLAN NOTE
· Hemoglobin 10.5  · History of blood transfusion during last admission in May 2023.  Also had EGD and colonoscopy  · Continue iron and B12  · Daily CBC and trend hemoglobin

## 2023-07-17 NOTE — ED NOTES
Delay in charting due to patient care. Patient unable to provide urine sample via purwick/bedside commode or bed pan. Will obtain when patient is able.       Hood Guo RN  07/17/23 6284

## 2023-07-17 NOTE — ED NOTES
Patient provided with update regarding patient ongoing care. Patient requested to speak with NP Anamaria Schumacher regarding what the "plan is moving forward" but reports RN provided a sufficient update.       Fermin Garcia RN  07/17/23 0971

## 2023-07-17 NOTE — Clinical Note
Date: 7/17/2023  Patient: Juana Rosa  Admitted: 7/17/2023  7:07 AM  Attending Provider: Laura Fine MD    Juana Rosa or her authorized caregiver has made the decision for the patient to leave the emergency department against the ad vice of her attending physician. She or her authorized caregiver has been informed and understands the inherent risks, including death, permanent disability, pain and suffering. She or her authorized caregiver has decided to accept the [address] for this decision. Juana Rosa and all necessary parties have been advised that she may return for further evaluation or treatment. Her condition at time of discharge was stable.   Juana Rosa had current vital signs as follows:  /81    Pulse (!) 112   Temp 98.1 °F (36.7 °C) (Temporal)   Resp 14   Ht 5' 1.5" (1.562 m)   Wt 49.9 kg (110 lb)

## 2023-07-17 NOTE — ED PROVIDER NOTES
History  Chief Complaint   Patient presents with   • Abdominal Pain     Pt reports severe abd pain, n/v/d; hx lung and pancreatic CA     Patient is a 71-year-old female with history of metastatic lung cancer who presents for evaluation of abdominal pain. She was diagnosed here 2 months ago with metastatic lung cancer with a pancreatic mass as well. She has not had any further work-up for her cancer diagnosis or treatment. She does have a PET scan scheduled for tomorrow in [place] where she is from. She has been. Camping for the past couple months. She complains of severe sharp stabbing lower abdominal pain radiating into her back. She been taking fentanyl patch, p.o. Dilaudid for pain without much improvement. She has been nauseous and vomited 4 times today. Also with some diarrhea over the past several days. She denies chest pain or dyspnea associate with her symptoms. No known fevers. Prior to Admission Medications   Prescriptions Last Dose Informant Patient Reported? Taking? buPROPion (WELLBUTRIN XL) 300 mg 24 hr tablet   Yes No   Sig: Take 300 mg by mouth every morning   butalbital-aspirin-caffeine (FIORINAL) -40 mg per capsule  Self Yes No   Sig: Take 1 capsule by mouth every 4 (four) hours as needed for headaches   cyanocobalamin (VITAMIN B-12) 500 MCG tablet   No No   Sig: Take 1 tablet (500 mcg total) by mouth daily Do not start before June 6, 2023. dicyclomine (BENTYL) 10 mg capsule   No No   Sig: Take 1 capsule (10 mg total) by mouth 4 (four) times a day (before meals and at bedtime)   docusate sodium (COLACE) 100 mg capsule   No No   Sig: Take 1 capsule (100 mg total) by mouth 2 (two) times a day   ferrous sulfate 325 (65 Fe) mg tablet   No No   Sig: Take 1 tablet (325 mg total) by mouth daily with breakfast Do not start before June 6, 2023. folic acid (FOLVITE) 021 mcg tablet   No No   Sig: Take 1 tablet (400 mcg total) by mouth daily Do not start before June 6, 2023. hydrOXYzine pamoate (VISTARIL) 25 mg capsule   Yes No   Sig: Take 25 mg by mouth daily as needed   lactulose 20 g/30 mL   No No   Sig: Take 45 mL (30 g total) by mouth 4 (four) times a day   losartan (COZAAR) 100 MG tablet   No No   Sig: Take 1 tablet (100 mg total) by mouth daily Do not start before 2023. magnesium Oxide (MAG-OX) 400 mg TABS   No No   Sig: Take 1 tablet (400 mg total) by mouth 2 (two) times a day   naloxone (NARCAN) 4 mg/0.1 mL nasal spray   No No   Sig: Administer 1 spray into a nostril. If no response after 2-3 minutes, give another dose in the other nostril using a new spray. ondansetron (ZOFRAN-ODT) 8 mg disintegrating tablet   No No   Sig: Take 1 tablet (8 mg total) by mouth 3 (three) times a day before meals   rosuvastatin (CRESTOR) 10 MG tablet  Self Yes No   Sig: Take 10 mg by mouth daily   scopolamine (TRANSDERM-SCOP) 1 mg/3 days TD 72 hr patch   No No   Sig: Place 1 patch on the skin over 72 hours every third day   senna (SENOKOT) 8.6 mg   No No   Sig: Take 1 tablet (8.6 mg total) by mouth daily at bedtime      Facility-Administered Medications: None       Past Medical History:   Diagnosis Date   • Asthma    • Diabetes mellitus ([address])        Past Surgical History:   Procedure Laterality Date   • IR BIOPSY NECK  2023       History reviewed. No pertinent family history. I have reviewed and agree with the history as documented.     E-Cigarette/Vaping   • E-Cigarette Use Former User      E-Cigarette/Vaping Substances     Social History     Tobacco Use   • Smoking status: Former     Packs/day: 0.50     Years: 15.00     Total pack years: 7.50     Types: Cigarettes     Quit date: 2022     Years since quittin.7   • Smokeless tobacco: Never   Vaping Use   • Vaping Use: Former   Substance Use Topics   • Alcohol use: Yes     Comment: couple times a year   • Drug use: Yes     Types: Marijuana     Comment: medical       Review of Systems   Constitutional: Positive for fatigue. Negative for fever. HENT: Negative for sore throat. Respiratory: Negative for shortness of breath. Cardiovascular: Negative for chest pain. Gastrointestinal: Positive for abdominal pain, diarrhea, nausea and vomiting. Genitourinary: Negative for dysuria. Musculoskeletal: Negative for back pain. Skin: Negative for rash. Neurological: Negative for light-headedness. Psychiatric/Behavioral: Negative for agitation. All other systems reviewed and are negative. Physical Exam  Physical Exam  Vitals reviewed. Constitutional:       General: She is not in acute distress. Appearance: She is well-developed. HENT:      Head: Normocephalic. Eyes:      Pupils: Pupils are equal, round, and reactive to light. Cardiovascular:      Rate and Rhythm: Regular rhythm. Tachycardia present. Heart sounds: Normal heart sounds. Pulmonary:      Effort: Pulmonary effort is normal.      Breath sounds: Normal breath sounds. Abdominal:      General: Bowel sounds are normal. There is no distension. Palpations: Abdomen is soft. Tenderness: There is no abdominal tenderness. There is no guarding. Musculoskeletal:         General: No tenderness or deformity. Normal range of motion. Cervical back: Normal range of motion and neck supple. Skin:     General: Skin is warm and dry. Capillary Refill: Capillary refill takes less than 2 seconds. Neurological:      Mental Status: She is alert and oriented to person, place, and time. Cranial Nerves: No cranial nerve deficit. Sensory: No sensory deficit. Psychiatric:         Behavior: Behavior normal.         Thought Content:  Thought content normal.         Judgment: Judgment normal.         Vital Signs  ED Triage Vitals [07/17/23 0708]   Temperature Pulse Respirations Blood Pressure SpO2   98.1 °F (36.7 °C) (!) 117 (!) 24 168/77 96 %      Temp Source Heart Rate Source Patient Position - Orthostatic VS BP Location FiO2 (%) Temporal Monitor Lying Right arm --      Pain Score       10 - Worst Possible Pain           Vitals:    07/17/23 1000 07/17/23 1115 07/17/23 1230 07/17/23 1330   BP: 167/84 167/81 128/61 151/67   Pulse: (!) 110 (!) 112 (!) 108 (!) 109   Patient Position - Orthostatic VS:             Visual Acuity      ED Medications  Medications   levofloxacin (LEVAQUIN) IVPB (premix in dextrose) 750 mg 150 mL (750 mg Intravenous New Bag 7/17/23 1215)   sodium chloride 0.9 % bolus 1,000 mL (0 mL Intravenous Stopped 7/17/23 0909)   HYDROmorphone (DILAUDID) injection 1 mg (1 mg Intravenous Given 7/17/23 0720)   droperidol (INAPSINE) injection 0.625 mg (0.625 mg Intravenous Given 7/17/23 0727)   ketorolac (TORADOL) injection 15 mg (15 mg Intravenous Given 7/17/23 0721)   HYDROmorphone (DILAUDID) injection 2 mg (2 mg Intravenous Given 7/17/23 0749)   HYDROmorphone (DILAUDID) injection 2 mg (2 mg Intravenous Given 7/17/23 0821)   cefepime (MAXIPIME) IVPB (premix in dextrose) 2,000 mg 50 mL (0 mg Intravenous Stopped 7/17/23 0909)   iohexol (OMNIPAQUE) 350 MG/ML injection (SINGLE-DOSE) 100 mL (100 mL Intravenous Given 7/17/23 0836)   HYDROmorphone (DILAUDID) injection 2 mg (2 mg Intravenous Given 7/17/23 1157)       Diagnostic Studies  Results Reviewed     Procedure Component Value Units Date/Time    Lactic acid 2 Hours [[id number]]  (Normal) Collected: 07/17/23 1009    Lab Status: Final result Specimen: Blood from Arm, Left Updated: 07/17/23 1031     LACTIC ACID 0.9 mmol/L     Narrative:      Result may be elevated if tourniquet was used during collection. Urine Microscopic [[id number]]  (Abnormal) Collected: 07/17/23 0910    Lab Status: Final result Specimen: Urine, Other Updated: 07/17/23 1002     RBC, UA 2-4 /hpf      WBC, UA 10-20 /hpf      Epithelial Cells Moderate /hpf      Bacteria, UA Occasional /hpf     Urine culture [[id number]] Collected: 07/17/23 0910    Lab Status:  In process Specimen: Urine, Other Updated: 07/17/23 1002 RBC Morphology Reflex Test [[id number]] Collected: 07/17/23 6452    Lab Status: Final result Specimen: Blood from Arm, Left Updated: 07/17/23 0931    UA w Reflex to Microscopic w Reflex to Culture [[id number]]  (Abnormal) Collected: 07/17/23 0910    Lab Status: Final result Specimen: Urine, Other Updated: 07/17/23 0930     Color, UA Yellow     Clarity, UA Cloudy     Specific Gravity, UA 1.010     pH, UA 7.5     Leukocytes, UA 1+     Nitrite, UA Negative     Protein, UA Trace mg/dl      Glucose, UA Negative mg/dl      Ketones, UA Negative mg/dl      Urobilinogen, UA 0.2 E.U./dl      Bilirubin, UA Negative     Occult Blood, UA Negative    CBC and differential [[id number]]  (Abnormal) Collected: 07/17/23 0712    Lab Status: Final result Specimen: Blood from Arm, Left Updated: 07/17/23 0925     WBC 16.69 Thousand/uL      RBC 4.29 Million/uL      Hemoglobin 10.5 g/dL      Hematocrit 34.9 %      MCV 81 fL      MCH 24.5 pg      MCHC 30.1 g/dL      RDW 18.9 %      MPV 8.4 fL      Platelets 511 Thousands/uL     Narrative: This is an appended report. These results have been appended to a previously verified report.     Manual Differential(PHLEBS Do Not Order) [[id number]]  (Abnormal) Collected: 07/17/23 0712    Lab Status: Final result Specimen: Blood from Arm, Left Updated: 07/17/23 0925     Segmented % 91 %      Lymphocytes % 7 %      Monocytes % 1 %      Eosinophils, % 0 %      Basophils % 0 %      Metamyelocytes% 1 %      Absolute Neutrophils 15.19 Thousand/uL      Lymphocytes Absolute 1.17 Thousand/uL      Monocytes Absolute 0.17 Thousand/uL      Eosinophils Absolute 0.00 Thousand/uL      Basophils Absolute 0.00 Thousand/uL      Total Counted --     RBC Morphology Present     Platelet Estimate Increased     Acanthocytes 1+     Anisocytosis 1+    Protime-INR [[id number]]  (Abnormal) Collected: 07/17/23 0712    Lab Status: Final result Specimen: Blood from Arm, Left Updated: 07/17/23 0820     Protime 15.1 seconds      INR 1. 19    APTT [[id number]]  (Normal) Collected: 07/17/23 0712    Lab Status: Final result Specimen: Blood from Arm, Left Updated: 07/17/23 0820     PTT 27 seconds     Lactic acid [[id number]]  (Abnormal) Collected: 07/17/23 4030    Lab Status: Final result Specimen: Blood from Arm, Left Updated: 07/17/23 0805     LACTIC ACID 2.1 mmol/L     Narrative:      Result may be elevated if tourniquet was used during collection.     Procalcitonin [[id number]]  (Abnormal) Collected: 07/17/23 0712    Lab Status: Final result Specimen: Blood from Arm, Left Updated: 07/17/23 0754     Procalcitonin 0.38 ng/ml     Comprehensive metabolic panel [[id number]]  (Abnormal) Collected: 07/17/23 0712    Lab Status: Final result Specimen: Blood from Arm, Left Updated: 07/17/23 0747     Sodium 133 mmol/L      Potassium 4.0 mmol/L      Chloride 97 mmol/L      CO2 23 mmol/L      ANION GAP 13 mmol/L      BUN 11 mg/dL      Creatinine 0.58 mg/dL      Glucose 186 mg/dL      Calcium 9.4 mg/dL      Corrected Calcium 10.0 mg/dL      AST 7 U/L      ALT 6 U/L      Alkaline Phosphatase 96 U/L      Total Protein 6.8 g/dL      Albumin 3.3 g/dL      Total Bilirubin 0.41 mg/dL      eGFR 98 ml/min/1.73sq m     Narrative:      [facility] guidelines for Chronic Kidney Disease (CKD):   •  Stage 1 with normal or high GFR (GFR > 90 mL/min/1.73 square meters)  •  Stage 2 Mild CKD (GFR = 60-89 mL/min/1.73 square meters)  •  Stage 3A Moderate CKD (GFR = 45-59 mL/min/1.73 square meters)  •  Stage 3B Moderate CKD (GFR = 30-44 mL/min/1.73 square meters)  •  Stage 4 Severe CKD (GFR = 15-29 mL/min/1.73 square meters)  •  Stage 5 End Stage CKD (GFR <15 mL/min/1.73 square meters)  Note: GFR calculation is accurate only with a steady state creatinine    Lipase [[id number]]  (Normal) Collected: 07/17/23 0712    Lab Status: Final result Specimen: Blood from Arm, Left Updated: 07/17/23 0747     Lipase 56 u/L     Blood culture #2 [[id number]] Collected: 07/17/23 4966    Lab Status: In process Specimen: Blood from Arm, Left Updated: 07/17/23 0719    Blood culture #1 [[id number]] Collected: 07/17/23 0716    Lab Status: In process Specimen: Blood from Hand, Right Updated: 07/17/23 0719                 CT chest abdomen pelvis w contrast   Final Result by Celia Bailon MD (07/17 0935)      Chest:   New, mild left lung opacities, likely infectious/inflammatory. Recommend follow-up in 3 months. Similar appearance of known right hilar malignancy, lymphadenopathy and secondary right lung collapse compared to 5/21/2023. Accompanying      Trace, decreased right pleural effusion. Other accompanying and stable findings above. Abdomen and pelvis:   Increased size of pancreatic mass, primary versus secondary malignancy, with evidence of new secondary pancreatic duct obstruction. New mild diffuse colon wall thickening, most likely infectious/inflammatory. No evidence of obstruction. Workstation performed: [id number]         XR chest 1 view portable   Final Result by Celia Bailon MD (56/06 7461)   Similar appearance of the chest to 5/29/2023. Please see today's chest CT for further evaluation. Workstation performed: [id number]                    Procedures  Procedures         ED Course                               SBIRT 22yo+    Flowsheet Row Most Recent Value   Initial Alcohol Screen: US AUDIT-C     1. How often do you have a drink containing alcohol? 0 Filed at: 07/17/2023 0706   2. How many drinks containing alcohol do you have on a typical day you are drinking? 0 Filed at: 07/17/2023 0706   3a. Male UNDER 65: How often do you have five or more drinks on one occasion? 0 Filed at: 07/17/2023 0706   3b. FEMALE Any Age, or MALE 65+: How often do you have 4 or more drinks on one occassion? 0 Filed at: 07/17/2023 0706   Audit-C Score 0 Filed at: 07/17/2023 4137   NENA: How many times in the past year have you. ..     Used an illegal drug or used a prescription medication for non-medical reasons? Never Filed at: 07/17/2023 7447                    Medical Decision Making  Patient is a 79-year-old female who presents for evaluation of worsening abdominal pain vomiting and diarrhea. Found to have both colitis and worsening metastasis. Blood work reviewed showing leukocytosis along with persistent tachycardia concerning for sepsis. Imaging does reveal pneumonia as well. Discussed with both general surgery and GI who believe the patient can stay here at this time for antibiotics and further work-up. Blood work imaging EKG reviewed by myself. Amount and/or Complexity of Data Reviewed  Labs: ordered. Radiology: ordered. Risk  Prescription drug management. Decision regarding hospitalization.           Disposition  Final diagnoses:   Pneumonia   Metastatic lung cancer (metastasis from lung to other site) [facility])   Colitis     Time reflects when diagnosis was documented in both MDM as applicable and the Disposition within this note     Time User Action Codes Description Comment    7/17/2023 11:48 AM Adilene Nice Add [J18.9] Pneumonia     7/17/2023 11:48 AM Cherry Barillas Add [C34.90] Metastatic lung cancer (metastasis from lung to other site) [facility])     7/17/2023 11:48 AM Adilene Nice Add [K52.9] Colitis       ED Disposition     ED Disposition   Admit    Condition   Stable    Date/Time   Mon Jul 17, 2023 11:58 AM    Comment              Follow-up Information     Follow up With Specialties Details Why Contact Info Additional [address] [facility] Emergency Department Emergency Medicine  If symptoms worsen [address] [facility] [address] [place] [id number]  [address] [place] [address] Emergency Department, [address] [facility], [facility], [address]          Patient's Medications   Discharge Prescriptions    LEVOFLOXACIN (LEVAQUIN) 750 MG TABLET    Take 1 tablet (750 mg total) by mouth every 24 hours for 4 days Do not start before July 18, 2023. Start Date: 7/18/2023 End Date: 7/22/2023       Order Dose: 750 mg       Quantity: 4 tablet    Refills: 0       No discharge procedures on file.     PDMP Review       Value Time User    PDMP Reviewed  Yes 6/2/2023  4:43 PM Ann Prince PA-C          ED Provider  Electronically Signed by           Suni Cummins MD  07/17/23 0754

## 2023-07-17 NOTE — ASSESSMENT & PLAN NOTE
· Presented for abdominal pain with nausea and vomiting found to be tachycardic with leukocytosis. Of note, during previous admission in May, HR was persistently high 90's to 110's. · CT C/A/P: New mild left lung opacities, likely infectious/inflammatory. Recommend follow-up in 3 months.   New mild diffuse colon wall thickening, most likely infectious/inflammatory  · Urinalysis with positive leukocyte esterase, and 10-20 WBC  · Lactic acid 2.1-->0.9 after 1 L normal saline bolus  · Procalcitonin 0.38  · Cefepime and Levaquin IV started in ER, will change Cefepime to Q8 and add Flagyl 500 mg Q8 per pharmacy recommendations  · Blood and urine cultures are pending  · Monitor labs and vital signs

## 2023-07-17 NOTE — ASSESSMENT & PLAN NOTE
· Presented for abdominal pain, and to have evidence for sepsis/pneumonia and colitis  · CT abdomen and pelvis: Increased size of pancreatic mass, primary versus secondary malignancy, with evidence of new secondary pancreatic duct obstruction  · ER physician discussed with GI ok for patient to stay here  · Symptom control  · GI consultation

## 2023-07-17 NOTE — ASSESSMENT & PLAN NOTE
· Diagnosed May 2023, primary source is lung with additional pancreatic mass  · Was to have PET scan in [place] tomorrow, presented with abdominal pain today  · Continued care per oncology as an outpatient in [place]

## 2023-07-18 PROBLEM — E87.6 HYPOKALEMIA: Status: ACTIVE | Noted: 2023-07-18

## 2023-07-18 LAB
ANION GAP SERPL CALCULATED.3IONS-SCNC: 11 MMOL/L
BASOPHILS # BLD AUTO: 0.07 THOUSANDS/ÂΜL (ref 0–0.1)
BASOPHILS NFR BLD AUTO: 0 % (ref 0–1)
BUN SERPL-MCNC: 9 MG/DL (ref 5–25)
CALCIUM SERPL-MCNC: 8.6 MG/DL (ref 8.4–10.2)
CHLORIDE SERPL-SCNC: 98 MMOL/L (ref 96–108)
CO2 SERPL-SCNC: 22 MMOL/L (ref 21–32)
CREAT SERPL-MCNC: 0.49 MG/DL (ref 0.6–1.3)
EOSINOPHIL # BLD AUTO: 0.06 THOUSAND/ÂΜL (ref 0–0.61)
EOSINOPHIL NFR BLD AUTO: 0 % (ref 0–6)
ERYTHROCYTE [DISTWIDTH] IN BLOOD BY AUTOMATED COUNT: 18.5 % (ref 11.6–15.1)
GFR SERPL CREATININE-BSD FRML MDRD: 104 ML/MIN/1.73SQ M
GLUCOSE SERPL-MCNC: 147 MG/DL (ref 65–140)
HCT VFR BLD AUTO: 32.1 % (ref 34.8–46.1)
HGB BLD-MCNC: 9.8 G/DL (ref 11.5–15.4)
IMM GRANULOCYTES # BLD AUTO: 0.12 THOUSAND/UL (ref 0–0.2)
IMM GRANULOCYTES NFR BLD AUTO: 1 % (ref 0–2)
LYMPHOCYTES # BLD AUTO: 0.99 THOUSANDS/ÂΜL (ref 0.6–4.47)
LYMPHOCYTES NFR BLD AUTO: 6 % (ref 14–44)
MCH RBC QN AUTO: 24.9 PG (ref 26.8–34.3)
MCHC RBC AUTO-ENTMCNC: 30.5 G/DL (ref 31.4–37.4)
MCV RBC AUTO: 82 FL (ref 82–98)
MONOCYTES # BLD AUTO: 1.32 THOUSAND/ÂΜL (ref 0.17–1.22)
MONOCYTES NFR BLD AUTO: 7 % (ref 4–12)
NEUTROPHILS # BLD AUTO: 15.23 THOUSANDS/ÂΜL (ref 1.85–7.62)
NEUTS SEG NFR BLD AUTO: 86 % (ref 43–75)
NRBC BLD AUTO-RTO: 0 /100 WBCS
PLATELET # BLD AUTO: 514 THOUSANDS/UL (ref 149–390)
PMV BLD AUTO: 8.1 FL (ref 8.9–12.7)
POTASSIUM SERPL-SCNC: 3.1 MMOL/L (ref 3.5–5.3)
PROCALCITONIN SERPL-MCNC: 0.26 NG/ML
RBC # BLD AUTO: 3.93 MILLION/UL (ref 3.81–5.12)
SODIUM SERPL-SCNC: 131 MMOL/L (ref 135–147)
WBC # BLD AUTO: 17.79 THOUSAND/UL (ref 4.31–10.16)

## 2023-07-18 PROCEDURE — 80048 BASIC METABOLIC PNL TOTAL CA: CPT | Performed by: NURSE PRACTITIONER

## 2023-07-18 PROCEDURE — 84145 PROCALCITONIN (PCT): CPT | Performed by: NURSE PRACTITIONER

## 2023-07-18 PROCEDURE — 99232 SBSQ HOSP IP/OBS MODERATE 35: CPT | Performed by: INTERNAL MEDICINE

## 2023-07-18 PROCEDURE — 99233 SBSQ HOSP IP/OBS HIGH 50: CPT | Performed by: INTERNAL MEDICINE

## 2023-07-18 PROCEDURE — 85025 COMPLETE CBC W/AUTO DIFF WBC: CPT | Performed by: NURSE PRACTITIONER

## 2023-07-18 PROCEDURE — 99255 IP/OBS CONSLTJ NEW/EST HI 80: CPT | Performed by: PHYSICIAN ASSISTANT

## 2023-07-18 RX ORDER — HYDROMORPHONE HCL/PF 1 MG/ML
2 SYRINGE (ML) INJECTION EVERY 4 HOURS PRN
Status: DISCONTINUED | OUTPATIENT
Start: 2023-07-18 | End: 2023-07-18

## 2023-07-18 RX ORDER — HYDROMORPHONE HYDROCHLORIDE 2 MG/ML
2 INJECTION, SOLUTION INTRAMUSCULAR; INTRAVENOUS; SUBCUTANEOUS
Status: DISCONTINUED | OUTPATIENT
Start: 2023-07-18 | End: 2023-07-20 | Stop reason: HOSPADM

## 2023-07-18 RX ORDER — HALOPERIDOL 5 MG/ML
2 INJECTION INTRAMUSCULAR EVERY 6 HOURS PRN
Status: DISCONTINUED | OUTPATIENT
Start: 2023-07-18 | End: 2023-07-20 | Stop reason: HOSPADM

## 2023-07-18 RX ORDER — POTASSIUM CHLORIDE 14.9 MG/ML
20 INJECTION INTRAVENOUS
Status: COMPLETED | OUTPATIENT
Start: 2023-07-18 | End: 2023-07-18

## 2023-07-18 RX ORDER — KETOROLAC TROMETHAMINE 30 MG/ML
15 INJECTION, SOLUTION INTRAMUSCULAR; INTRAVENOUS ONCE
Status: COMPLETED | OUTPATIENT
Start: 2023-07-18 | End: 2023-07-18

## 2023-07-18 RX ORDER — KETOROLAC TROMETHAMINE 30 MG/ML
15 INJECTION, SOLUTION INTRAMUSCULAR; INTRAVENOUS EVERY 6 HOURS PRN
Status: DISCONTINUED | OUTPATIENT
Start: 2023-07-18 | End: 2023-07-20 | Stop reason: HOSPADM

## 2023-07-18 RX ORDER — POTASSIUM CHLORIDE 20 MEQ/1
40 TABLET, EXTENDED RELEASE ORAL
Status: DISCONTINUED | OUTPATIENT
Start: 2023-07-18 | End: 2023-07-18

## 2023-07-18 RX ORDER — FENTANYL 100 UG/H
100 PATCH TRANSDERMAL
Status: DISCONTINUED | OUTPATIENT
Start: 2023-07-18 | End: 2023-07-20 | Stop reason: HOSPADM

## 2023-07-18 RX ADMIN — BUPROPION HYDROCHLORIDE 300 MG: 150 TABLET, EXTENDED RELEASE ORAL at 08:03

## 2023-07-18 RX ADMIN — CYANOCOBALAMIN TAB 500 MCG 500 MCG: 500 TAB at 08:02

## 2023-07-18 RX ADMIN — ONDANSETRON 4 MG: 2 INJECTION INTRAMUSCULAR; INTRAVENOUS at 07:54

## 2023-07-18 RX ADMIN — ENOXAPARIN SODIUM 40 MG: 40 INJECTION SUBCUTANEOUS at 08:03

## 2023-07-18 RX ADMIN — CEFEPIME HYDROCHLORIDE 2000 MG: 2 INJECTION, SOLUTION INTRAVENOUS at 16:52

## 2023-07-18 RX ADMIN — HYDROMORPHONE HYDROCHLORIDE 2 MG: 1 INJECTION, SOLUTION INTRAMUSCULAR; INTRAVENOUS; SUBCUTANEOUS at 01:04

## 2023-07-18 RX ADMIN — POTASSIUM CHLORIDE 40 MEQ: 1500 TABLET, EXTENDED RELEASE ORAL at 08:05

## 2023-07-18 RX ADMIN — POTASSIUM CHLORIDE 20 MEQ: 14.9 INJECTION, SOLUTION INTRAVENOUS at 14:42

## 2023-07-18 RX ADMIN — METRONIDAZOLE 500 MG: 500 INJECTION, SOLUTION INTRAVENOUS at 06:15

## 2023-07-18 RX ADMIN — ONDANSETRON 4 MG: 2 INJECTION INTRAMUSCULAR; INTRAVENOUS at 13:37

## 2023-07-18 RX ADMIN — KETOROLAC TROMETHAMINE 15 MG: 30 INJECTION, SOLUTION INTRAMUSCULAR; INTRAVENOUS at 04:05

## 2023-07-18 RX ADMIN — FERROUS SULFATE TAB 325 MG (65 MG ELEMENTAL FE) 325 MG: 325 (65 FE) TAB at 07:58

## 2023-07-18 RX ADMIN — HYDROMORPHONE HYDROCHLORIDE 2 MG: 1 INJECTION, SOLUTION INTRAMUSCULAR; INTRAVENOUS; SUBCUTANEOUS at 07:57

## 2023-07-18 RX ADMIN — ONDANSETRON 4 MG: 2 INJECTION INTRAMUSCULAR; INTRAVENOUS at 00:58

## 2023-07-18 RX ADMIN — FENTANYL TRANSDERMAL 100 MCG: 100 PATCH, EXTENDED RELEASE TRANSDERMAL at 14:42

## 2023-07-18 RX ADMIN — HYDROMORPHONE HYDROCHLORIDE 2 MG: 1 INJECTION, SOLUTION INTRAMUSCULAR; INTRAVENOUS; SUBCUTANEOUS at 13:37

## 2023-07-18 RX ADMIN — HYDROMORPHONE HYDROCHLORIDE 2 MG: 2 INJECTION, SOLUTION INTRAMUSCULAR; INTRAVENOUS; SUBCUTANEOUS at 17:03

## 2023-07-18 RX ADMIN — HALOPERIDOL LACTATE 2 MG: 5 INJECTION, SOLUTION INTRAMUSCULAR at 17:18

## 2023-07-18 RX ADMIN — DOCUSATE SODIUM 100 MG: 100 CAPSULE, LIQUID FILLED ORAL at 08:03

## 2023-07-18 RX ADMIN — CEFEPIME HYDROCHLORIDE 2000 MG: 2 INJECTION, SOLUTION INTRAVENOUS at 07:51

## 2023-07-18 RX ADMIN — LOSARTAN POTASSIUM 100 MG: 50 TABLET, FILM COATED ORAL at 08:03

## 2023-07-18 RX ADMIN — SENNOSIDES 8.6 MG: 8.6 TABLET, FILM COATED ORAL at 21:49

## 2023-07-18 RX ADMIN — POTASSIUM CHLORIDE 20 MEQ: 14.9 INJECTION, SOLUTION INTRAVENOUS at 13:06

## 2023-07-18 RX ADMIN — KETOROLAC TROMETHAMINE 15 MG: 30 INJECTION, SOLUTION INTRAMUSCULAR; INTRAVENOUS at 17:03

## 2023-07-18 NOTE — CONSULTS
Consultation - Palliative and Supportive Care   Flavio Mack 61 y.o. female [id number]  Assessment  Present on Admission:  • Metastatic adenocarcinoma [facility])  • Abdominal pain  • Iron deficiency anemia       Active issues addressed today:  Metastatic adenocarcinoma  Cancer-related pain  Abdominal pain  Palliative care consult  Goals of care counseling    Plan  1. Symptom management  • Cancer-related pain  o Patient/spouse reported that she had not been taking her Dilaudid and some other PO medications at home prior to coming to the hospital.  o Patient in constant pain, was taking PO dilaudid q3h at home in addition to Fentanyl patch.   o Fentanyl patch just replaced 2 hours ago and will start to have full effect in about 10 hours. - Increase frequency of Dilaudid 2mg IV q3h     2.   Goals  Level 1 code status. Full code. Disease focused care. No limits placed. • Patient is focused on treating her cancer, she intends to follow up with her oncologist in [place]  • She continues to follow with palliative care in [facility] to contact palliative treatment team in [place] for updates and continued symptom management after discharge from hospital  • Hospice revisited, patient is not ready for hospice at this time. 3.  Social support  • Patient is supported by spouse, John Matias  • Currently they are staying at their campground [place]" with intentions of going back to [place] for continued care when possible  · Supportive listening provided  · Normalized experience of patient/family  · Provided anxiety containment  · Provided anticipatory guidance    4. Follow up  • Palliative Care will continue to follow for symptom management and goals of care discussions will be ongoing. • Please reach out to on-call provider via [employer] if questions or concerns arise.               I have reviewed the patient's controlled substance dispensing history in the Prescription Drug Monitoring Program in compliance with the [facility] regulations before prescribing any controlled substances. Last refills  Filled  Written  Sold  ID  Drug  QTY  Days  Prescriber  RX #  Dispenser  Refill  Daily Dose*  Pymt Type      06/19/2023 06/19/2023   1  Fentanyl 50 Mcg/hr Patch 5.00  15  Na [place]  [id number]   Chr (5715)  0  120.00 MME  Comm Ins  [place]    06/19/2023 06/19/2023   1  Hydromorphone 4 Mg Tablet 84.00  9  Na Byron  [id number]   Chr (6815)  0  149.33 MME  [place] Ins  [place]      Decisional apparatus:  Patient is competent on exam today. If competency is lost, patient's substitute decision maker would default to spouse by [place] Act 169. Advance Directive/Living Will: No  POLST: No  POA Forms: No    We appreciate the invitation to be involved in this patient's care. We will continue to follow throughout this hospitalization. Please do not hesitate to reach our on call provider through our clinic answering service at [phone] should you have acute symptom control concerns. Hood Ray PA-C  Palliative and Supportive Care  Clinic/Answering Service: [phone]  You can find me on [employer]! Identification:  Inpatient consult to Palliative Care  Consult performed by: Hood Ray PA-C  Consult ordered by: Albina Cogan, DO      Physician Requesting Consult:Gwen Wilson DO  Reason for Consult / Principal Problem: GOC counseling and SM secondary to Metastatic adenocarcinoma  Date of admission:7/17/2023  Length of stay:1    History of Present Illness    Elin Sommer is a a  58-year-old female with a history of metastatic lung cancer presented to the Emergency Department on 07/17/23 due to abdominal pain. She was diagnosed with cancer two months ago and has been receiving oncology care in [place], where she resides. She and her wife frequently visit a permanent campground in the area on weekends.  The patient has an upcoming evaluation for port replacement and is discussing chemotherapy, immunotherapy, and radiation with her oncologist.     Over the past few days, the patient has not been taking her oral medications as prescribed, including her pain medication, Dilaudid. Her Fentanyl patch was removed at the hospital and was just restarted today. She reports minimal relief from the IV Dilaudid and Toradol received in the hospital. At home, she had been taking her oral pain medication every 3 hours, which effectively controlled her pain. However, she is currently unable to tolerate any medications orally due to nausea and vomiting. The patient's plan, once she is discharged, is to return to [place] to begin her scheduled chemotherapy treatments. We discussed hospice and comfort care options, but the patient is not ready for hospice at this time. We emphasized the importance of maintaining close follow-up with her outpatient palliative care providers for ongoing management of her symptoms and adherence to the prescribed drug regimen. Review of Systems   Constitutional: Positive for appetite change and fatigue. Negative for chills and fever. HENT: Negative for ear pain and sore throat. Eyes: Negative for pain and visual disturbance. Respiratory: Negative for cough and shortness of breath. Cardiovascular: Negative for chest pain and palpitations. Gastrointestinal: Positive for abdominal pain, nausea and vomiting. Negative for constipation and diarrhea. Genitourinary: Negative for dysuria and hematuria. Musculoskeletal: Negative for arthralgias and back pain. Skin: Negative for color change and rash. Neurological: Negative for seizures and syncope. Hematological: Negative. Psychiatric/Behavioral: Negative. All other systems reviewed and are negative.     Past Medical History:   Diagnosis Date   • Asthma    • Diabetes mellitus [facility])      Past Surgical History:   Procedure Laterality Date   • IR BIOPSY NECK  5/22/2023     Social History     Socioeconomic History   • Marital status: /Civil Union     Spouse name: Not on file   • Number of children: Not on file   • Years of education: Not on file   • Highest education level: Not on file   Occupational History   • Not on file   Tobacco Use   • Smoking status: Former     Packs/day: 0.50     Years: 15.00     Total pack years: 7.50     Types: Cigarettes     Quit date: 2022     Years since quittin.7   • Smokeless tobacco: Never   Vaping Use   • Vaping Use: Former   Substance and Sexual Activity   • Alcohol use: Yes     Comment: couple times a year   • Drug use: Yes     Types: Marijuana     Comment: medical   • Sexual activity: Not on file   Other Topics Concern   • Not on file   Social History Narrative   • Not on file     Social Determinants of Health     Financial Resource Strain: Not on file   Food Insecurity: No Food Insecurity (2023)    Hunger Vital Sign    • Worried About Running Out of Food in the Last Year: Never true    • Ran Out of Food in the Last Year: Never true   Transportation Needs: No Transportation Needs (2023)    PRAPARE - Transportation    • Lack of Transportation (Medical): No    • Lack of Transportation (Non-Medical): No   Physical Activity: Not on file   Stress: Not on file   Social Connections: Not on file   Intimate Partner Violence: Not on file   Housing Stability: Low Risk  (2023)    Housing Stability Vital Sign    • Unable to Pay for Housing in the Last Year: No    • Number of Places Lived in the Last Year: 1    • Unstable Housing in the Last Year: No     History reviewed. No pertinent family history.     Medications:  all current active meds have been reviewed and current meds:   Current Facility-Administered Medications   Medication Dose Route Frequency   • acetaminophen (TYLENOL) tablet 650 mg  650 mg Oral Q6H PRN   • buPROPion (WELLBUTRIN XL) 24 hr tablet 300 mg  300 mg Oral QAM   • cefepime (MAXIPIME) IVPB (premix in dextrose) 2,000 mg 50 mL  2,000 mg Intravenous Q8H   • cyanocobalamin (VITAMIN B-12) tablet 500 mcg  500 mcg Oral Daily   • docusate sodium (COLACE) capsule 100 mg  100 mg Oral BID   • enoxaparin (LOVENOX) subcutaneous injection 40 mg  40 mg Subcutaneous Daily   • fentaNYL (DURAGESIC) 100 mcg/hr TD 72 hr patch 100 mcg  100 mcg Transdermal Q72H   • ferrous sulfate tablet 325 mg  325 mg Oral Daily With Breakfast   • haloperidol lactate (HALDOL) injection 2 mg  2 mg Intramuscular Q6H PRN   • HYDROmorphone (DILAUDID) injection 2 mg  2 mg Intravenous Q3H PRN   • ketorolac (TORADOL) injection 15 mg  15 mg Intravenous Q6H PRN   • losartan (COZAAR) tablet 100 mg  100 mg Oral Daily   • ondansetron (ZOFRAN) injection 4 mg  4 mg Intravenous Q6H PRN   • senna (SENOKOT) tablet 8.6 mg  8.6 mg Oral HS       Allergies   Allergen Reactions   • Neosporin [Bacitracin-Polymyxin B] Rash   • Penicillins Rash   • Salicylic Acid-Sulfur Rash   • Shellfish-Derived Products - Food Allergy Rash and Facial Swelling       Objective:  /73   Pulse 104   Temp 98.8 °F (37.1 °C)   Resp 20   Ht 5' 1.5" (1.562 m)   Wt 49.9 kg (110 lb)   SpO2 98%   BMI 20.45 kg/m²     Physical Exam  Vitals and nursing note reviewed. Constitutional:       General: She is not in acute distress. Appearance: She is well-developed. She is ill-appearing. HENT:      Head: Normocephalic and atraumatic. Right Ear: External ear normal.      Left Ear: External ear normal.      Mouth/Throat:      Pharynx: Oropharynx is clear. Eyes:      Conjunctiva/sclera: Conjunctivae normal.   Cardiovascular:      Rate and Rhythm: Normal rate. Pulmonary:      Effort: Pulmonary effort is normal. No respiratory distress. Musculoskeletal:         General: No swelling. Cervical back: Neck supple. Skin:     General: Skin is warm and dry. Capillary Refill: Capillary refill takes less than 2 seconds. Coloration: Skin is not jaundiced. Findings: No rash.    Neurological:      Mental Status: She is alert and oriented to person, place, and time. Psychiatric:         Mood and Affect: Mood normal.         Behavior: Behavior normal.         Thought Content: Thought content normal.       Lab Results:   I have personally reviewed pertinent labs. , CBC:   Lab Results   Component Value Date    WBC 17.79 (H) 07/18/2023    HGB 9.8 (L) 07/18/2023    HCT 32.1 (L) 07/18/2023    MCV 82 07/18/2023     (H) 07/18/2023    RBC 3.93 07/18/2023    MCH 24.9 (L) 07/18/2023    MCHC 30.5 (L) 07/18/2023    RDW 18.5 (H) 07/18/2023    MPV 8.1 (L) 07/18/2023    NRBC 0 07/18/2023   , CMP:   Lab Results   Component Value Date    SODIUM 131 (L) 07/18/2023    K 3.1 (L) 07/18/2023    CL 98 07/18/2023    CO2 22 07/18/2023    BUN 9 07/18/2023    CREATININE 0.49 (L) 07/18/2023    CALCIUM 8.6 07/18/2023    EGFR 104 07/18/2023     Imaging Studies: I have personally reviewed pertinent reports. XR chest 1 view portable  Result Date: 7/17/2023  Impression: Similar appearance of the chest to 5/29/2023. Please see today's chest CT for further evaluation. Workstation performed: [id number]     CT chest abdomen pelvis w contrast  Result Date: 7/17/2023  Impression: Chest: New, mild left lung opacities, likely infectious/inflammatory. Recommend follow-up in 3 months. Similar appearance of known right hilar malignancy, lymphadenopathy and secondary right lung collapse compared to 5/21/2023. Accompanying Trace, decreased right pleural effusion. Other accompanying and stable findings above. Abdomen and pelvis: Increased size of pancreatic mass, primary versus secondary malignancy, with evidence of new secondary pancreatic duct obstruction. New mild diffuse colon wall thickening, most likely infectious/inflammatory. No evidence of obstruction. Workstation performed: [id number]      EKG, Pathology, and Other Studies: I have personally reviewed pertinent     Counseling / Coordination of Care  Total floor / unit time spent today 45 minutes.  Greater than 50% of total time was spent with the patient and / or family counseling and / or coordination of care. A description of the counseling / coordination of care: Reviewed chart, provided medical updates, determined goals of care, discussed palliative care and symptom management, discussed comfort care and hospice care, discussed code status, provided anticipatory guidance, determined competency and POA/HCA, determined social/family support, provided psychosocial support. Reviewed with SLIM. Portions of this document may have been created using dictation software and as such some "sound alike" terms may have been generated by the system. Do not hesitate to contact me with any questions or clarifications.

## 2023-07-18 NOTE — ASSESSMENT & PLAN NOTE
· Diagnosed May 2023, primary source is lung with additional pancreatic mass  · Supposed to have port placed in [place] later this week to begin treatment  · As the patient is hospitalized, we will ask for inpatient port placement prior to discharge

## 2023-07-18 NOTE — ASSESSMENT & PLAN NOTE
· Patient has chronic abdominal pain with intermittent nausea, vomiting, and diarrhea  · Continues to report abdominal pain at this time with nausea and vomiting but no further diarrhea  · CT abdomen and pelvis: Increased size of pancreatic mass, primary versus secondary malignancy, with evidence of new secondary pancreatic duct obstruction  · GI evaluation appreciated  · Patient follows with palliative care in the outpatient setting, will ask for consultation to aid in pain control

## 2023-07-18 NOTE — PROGRESS NOTES
[facility]  Progress Note  Name: Estefani Wilks  MRN: [id number]  Unit/Bed#: -01 I Date of Admission: 7/17/2023   Date of Service: 7/18/2023 I Hospital Day: 1    Assessment/Plan   * Severe sepsis [facility])  Assessment & Plan  · POA as evidenced by tachycardic with leukocytosis  · With evidence of pneumonia and possible infectious colitis on CT  · BClx (7/17): NGTD  · Lactic acid 2.1 on arrival which improved to 0.9 after 1 L normal saline bolus  · CT C/A/P (7/18): New mild left lung opacities, likely infectious/inflammatory. New mild diffuse colon wall thickening, most likely infectious/inflammatory. · As no antibiotics are required from a GI perspective, will discontinue Flagyl and continue cefepime     Abdominal pain  Assessment & Plan  · Patient has chronic abdominal pain with intermittent nausea, vomiting, and diarrhea  · Continues to report abdominal pain at this time with nausea and vomiting but no further diarrhea  · CT abdomen and pelvis: Increased size of pancreatic mass, primary versus secondary malignancy, with evidence of new secondary pancreatic duct obstruction  · GI evaluation appreciated  · Patient follows with palliative care in the outpatient setting, will ask for consultation to aid in pain control    Hypokalemia  Assessment & Plan  · K: 3.1  · Replete and recheck    Metastatic adenocarcinoma [facility])  Assessment & Plan  · Diagnosed May 2023, primary source is lung with additional pancreatic mass  · Supposed to have port placed in [place] later this week to begin treatment  · As the patient is hospitalized, we will ask for inpatient port placement prior to discharge    Iron deficiency anemia  Assessment & Plan  · Hemoglobin is currently stable  · Continue to monitor and transfuse for hemoglobin less than 7            VTE Pharmacologic Prophylaxis: VTE Score: 6 High Risk (Score >/= 5) - Pharmacological DVT Prophylaxis Ordered: enoxaparin (Lovenox).  Sequential Compression Devices Ordered. Patient Centered Rounds: I performed bedside rounds with nursing staff today. Discussions with Specialists or Other Care Team Provider: GI    Education and Discussions with Family / Patient: Updated  (significant other) at bedside. Total Time Spent on Date of Encounter in care of patient: 45 minutes This time was spent on one or more of the following: performing physical exam; counseling and coordination of care; obtaining or reviewing history; documenting in the medical record; reviewing/ordering tests, medications or procedures; communicating with other healthcare professionals and discussing with patient's family/caregivers. Current Length of Stay: 1 day(s)  Current Patient Status: Inpatient   Certification Statement: The patient will continue to require additional inpatient hospital stay due to sepsis due to pneumonia and possible colitis  Discharge Plan: TBD based on clinical improvement    Code Status: Level 1 - Full Code    Subjective:   Patient is resting in bed with a continued complaint of abdominal discomfort. She notes very little to no improvement since admission. She continues to report nausea and vomiting but denies any diarrhea at this time. No significant overnight events reported by nursing. Objective:     Vitals:   Temp (24hrs), Av °F (36.7 °C), Min:97.4 °F (36.3 °C), Max:98.6 °F (37 °C)    Temp:  [97.4 °F (36.3 °C)-98.6 °F (37 °C)] 98.4 °F (36.9 °C)  HR:  [] 98  Resp:  [16-20] 18  BP: (129-171)/(60-86) 129/68  SpO2:  [94 %-98 %] 97 %  Body mass index is 20.45 kg/m². Input and Output Summary (last 24 hours): Intake/Output Summary (Last 24 hours) at 2023 1417  Last data filed at 2023 0200  Gross per 24 hour   Intake 160 ml   Output 125 ml   Net 35 ml       Physical Exam:   Physical Exam  Vitals and nursing note reviewed. Constitutional:       General: She is not in acute distress.      Appearance: She is well-developed. Comments: Chronically ill-appearing   HENT:      Head: Normocephalic and atraumatic. Mouth/Throat:      Mouth: Mucous membranes are moist.      Pharynx: Oropharynx is clear. Eyes:      Extraocular Movements: Extraocular movements intact. Conjunctiva/sclera: Conjunctivae normal.   Cardiovascular:      Rate and Rhythm: Normal rate and regular rhythm. Pulses: Normal pulses. Heart sounds: Normal heart sounds. No murmur heard. Pulmonary:      Effort: Pulmonary effort is normal. No respiratory distress. Breath sounds: Normal breath sounds. No wheezing. Abdominal:      General: Bowel sounds are normal. There is no distension. Palpations: Abdomen is soft. Tenderness: There is abdominal tenderness. Musculoskeletal:         General: No swelling. Normal range of motion. Cervical back: Normal range of motion and neck supple. Skin:     General: Skin is warm and dry. Capillary Refill: Capillary refill takes less than 2 seconds. Neurological:      General: No focal deficit present. Mental Status: She is alert and oriented to person, place, and time. Mental status is at baseline.    Psychiatric:         Mood and Affect: Mood normal.         Behavior: Behavior normal.         Judgment: Judgment normal.          Labs:  Results from last 7 days   Lab Units 07/18/23  0458   WBC Thousand/uL 17.79*   HEMOGLOBIN g/dL 9.8*   HEMATOCRIT % 32.1*   PLATELETS Thousands/uL 514*   NEUTROS PCT % 86*   LYMPHS PCT % 6*   MONOS PCT % 7   EOS PCT % 0     Results from last 7 days   Lab Units 07/18/23  0458 07/17/23  0712   SODIUM mmol/L 131* 133*   POTASSIUM mmol/L 3.1* 4.0   CHLORIDE mmol/L 98 97   CO2 mmol/L 22 23   BUN mg/dL 9 11   CREATININE mg/dL 0.49* 0.58*   ANION GAP mmol/L 11 13   CALCIUM mg/dL 8.6 9.4   ALBUMIN g/dL  --  3.3*   TOTAL BILIRUBIN mg/dL  --  0.41   ALK PHOS U/L  --  96   ALT U/L  --  6*   AST U/L  --  7*   GLUCOSE RANDOM mg/dL 147* 186*     Results from last 7 days   Lab Units 07/17/23  0712   INR  1.19             Results from last 7 days   Lab Units 07/18/23  0458 07/17/23  1009 07/17/23  0712   LACTIC ACID mmol/L  --  0.9 2.1*   PROCALCITONIN ng/ml 0.26*  --  0.38*       Lines/Drains:  Invasive Devices     Peripheral Intravenous Line  Duration           Peripheral IV 07/17/23 Left;Ventral (anterior) Forearm 1 day                      Imaging: Reviewed radiology reports from this admission including: chest CT scan and abdominal/pelvic CT    Recent Cultures (last 7 days):   Results from last 7 days   Lab Units 07/17/23  0910 07/17/23  0716 07/17/23  5660   BLOOD CULTURE   --  Received in Microbiology Lab. Culture in Progress. Received in Microbiology Lab. Culture in Progress.    URINE CULTURE  Culture too young- will reincubate  --   --        Last 24 Hours Medication List:   Current Facility-Administered Medications   Medication Dose Route Frequency Provider Last Rate   • acetaminophen  650 mg Oral Q6H PRN BRITTNY Steiner     • buPROPion  300 mg Oral QAM BRITTNY Steiner     • cefepime  2,000 mg Intravenous Q8H BRITTNY Steiner 2,000 mg (07/18/23 0751)   • cyanocobalamin  500 mcg Oral Daily BRITTNY Steiner     • docusate sodium  100 mg Oral BID BRITTNY Steiner     • enoxaparin  40 mg Subcutaneous Daily BRITTNY Steiner     • fentaNYL  100 mcg Transdermal Q72H [address], DO     • ferrous sulfate  325 mg Oral Daily With Breakfast BRITTNY Steiner     • HYDROmorphone  2 mg Intravenous Q4H PRN [facility], DO     • ketorolac  15 mg Intravenous Q6H PRN [facility], DO     • losartan  100 mg Oral Daily BRITTNY Steiner     • ondansetron  4 mg Intravenous Q6H PRN BRITTNY Steiner     • potassium chloride  20 mEq Intravenous Q2H [address], DO 20 mEq (07/18/23 1306)   • senna  8.6 mg Oral HS BRITTNY Steiner          Today, Patient Was Seen By: [address], DO    **Please Note: This note may have been constructed using a voice recognition system. **

## 2023-07-18 NOTE — TELEMEDICINE
INTERPROFESSIONAL (ELECTRONIC OR PHONE) CONSULTATION - Interventional Radiology  Herb Montiel 61 y.o. female MRN: [id number]  Unit/Bed#: -01 Encounter: [id number]    IR has been consulted regarding the care of Herb Montiel. We were consulted by the primary team concerning this patient with malignancy and plans for chemotherapy    Inpatient Consult to IR  Consult performed by: Rochelle Motta MD  Consult ordered by: Mahesh Green DO        07/18/23      Assessment/Recommendation:     80-year-old with severe sepsis, pneumonia, possible infectious colitis and abdominal pain    Has a pancreatic mass and reportedly there are plans for chemotherapy with scheduled port placement in the state of [address] [place] [address] for a few days from now    Patient's family has requested that we place a port while admitted at this institution    I do not recommend port placement at this time  -Overall port placement in the inpatient setting is associated with a higher infection rate and at our institution we prefer to place ports as outpatient procedures  -Patient currently has active infection which is a contraindication to port placement  -Also with leukocytosis which is rising which raises the possibility of uncontrolled infection  -I suspect that she will not be a candidate for chemotherapy in the short-term given above issues in which case there is a longer time window to schedule port placement electively    If patient has clinical improvement and chooses to pursue port placement at this institution we can schedule this as an outpatient. Please call us for any questions or concerns    Discussed with primary team    Total time spent in review of data, discussion with requesting provider and rendering advice was 16 minutes     Thank you for allowing Interventional Radiology to participate in the care of Herb Montiel. Please don't hesitate to call or TigerText us with any questions.      Rochelle Motta MD      Past Medical History:  Past Medical History:   Diagnosis Date   • Asthma    • Diabetes mellitus ([address])        Past Surgical History:  Past Surgical History:   Procedure Laterality Date   • IR BIOPSY NECK  2023       Social History:  Social History     Substance and Sexual Activity   Alcohol Use Yes    Comment: couple times a year     Social History     Substance and Sexual Activity   Drug Use Yes   • Types: Marijuana    Comment: medical     Social History     Tobacco Use   Smoking Status Former   • Packs/day: 0.50   • Years: 15.00   • Total pack years: 7.50   • Types: Cigarettes   • Quit date: 2022   • Years since quittin.7   Smokeless Tobacco Never       Family History:  History reviewed. No pertinent family history. Allergies:   Allergies   Allergen Reactions   • Neosporin [Bacitracin-Polymyxin B] Rash   • Penicillins Rash   • Salicylic Acid-Sulfur Rash   • Shellfish-Derived Products - Food Allergy Rash and Facial Swelling       Medications:  Medications Prior to Admission   Medication   • buPROPion (WELLBUTRIN XL) 300 mg 24 hr tablet   • butalbital-aspirin-caffeine (FIORINAL) -40 mg per capsule   • cyanocobalamin (VITAMIN B-12) 500 MCG tablet   • dicyclomine (BENTYL) 10 mg capsule   • docusate sodium (COLACE) 100 mg capsule   • ferrous sulfate 325 (65 Fe) mg tablet   • folic acid (FOLVITE) 540 mcg tablet   • hydrOXYzine pamoate (VISTARIL) 25 mg capsule   • lactulose 20 g/30 mL   • losartan (COZAAR) 100 MG tablet   • magnesium Oxide (MAG-OX) 400 mg TABS   • naloxone (NARCAN) 4 mg/0.1 mL nasal spray   • ondansetron (ZOFRAN-ODT) 8 mg disintegrating tablet   • rosuvastatin (CRESTOR) 10 MG tablet   • scopolamine (TRANSDERM-SCOP) 1 mg/3 days TD 72 hr patch   • senna (SENOKOT) 8.6 mg     Current Facility-Administered Medications   Medication Dose Route Frequency   • acetaminophen (TYLENOL) tablet 650 mg  650 mg Oral Q6H PRN   • buPROPion (WELLBUTRIN XL) 24 hr tablet 300 mg  300 mg Oral QAM   • cefepime (MAXIPIME) IVPB (premix in dextrose) 2,000 mg 50 mL  2,000 mg Intravenous Q8H   • cyanocobalamin (VITAMIN B-12) tablet 500 mcg  500 mcg Oral Daily   • docusate sodium (COLACE) capsule 100 mg  100 mg Oral BID   • enoxaparin (LOVENOX) subcutaneous injection 40 mg  40 mg Subcutaneous Daily   • fentaNYL (DURAGESIC) 100 mcg/hr TD 72 hr patch 100 mcg  100 mcg Transdermal Q72H   • ferrous sulfate tablet 325 mg  325 mg Oral Daily With Breakfast   • haloperidol lactate (HALDOL) injection 2 mg  2 mg Intramuscular Q6H PRN   • HYDROmorphone (DILAUDID) injection 2 mg  2 mg Intravenous Q3H PRN   • ketorolac (TORADOL) injection 15 mg  15 mg Intravenous Q6H PRN   • losartan (COZAAR) tablet 100 mg  100 mg Oral Daily   • ondansetron (ZOFRAN) injection 4 mg  4 mg Intravenous Q6H PRN   • senna (SENOKOT) tablet 8.6 mg  8.6 mg Oral HS       Vitals:  /73   Pulse 104   Temp 98.8 °F (37.1 °C)   Resp 20   Ht 5' 1.5" (1.562 m)   Wt 49.9 kg (110 lb)   SpO2 98%   BMI 20.45 kg/m²   Body mass index is 20.45 kg/m².   Weight (last 2 days)     Date/Time Weight    07/17/23 0708 49.9 (110)          I/Os:    Intake/Output Summary (Last 24 hours) at 7/18/2023 1732  Last data filed at 7/18/2023 0200  Gross per 24 hour   Intake 160 ml   Output 125 ml   Net 35 ml       Lab Results and Cultures:   CBC with diff:   Lab Results   Component Value Date    WBC 17.79 (H) 07/18/2023    HGB 9.8 (L) 07/18/2023    HCT 32.1 (L) 07/18/2023    MCV 82 07/18/2023     (H) 07/18/2023    RBC 3.93 07/18/2023    MCH 24.9 (L) 07/18/2023    MCHC 30.5 (L) 07/18/2023    RDW 18.5 (H) 07/18/2023    MPV 8.1 (L) 07/18/2023    NRBC 0 07/18/2023      BMP/CMP:  Lab Results   Component Value Date    K 3.1 (L) 07/18/2023    CL 98 07/18/2023    CO2 22 07/18/2023    BUN 9 07/18/2023    CREATININE 0.49 (L) 07/18/2023    CALCIUM 8.6 07/18/2023    AST 7 (L) 07/17/2023    ALT 6 (L) 07/17/2023    ALKPHOS 96 07/17/2023    EGFR 104 07/18/2023   ,     Coags:   Lab Results Component Value Date    PTT 27 07/17/2023    INR 1.19 07/17/2023   ,   Results from last 7 days   Lab Units 07/17/23  0712   PTT seconds 27   INR  1.19        HgbA1c: No results found for: "HGBA1C"    Blood Culture:   Lab Results   Component Value Date    BLOODCX No Growth at 24 hrs. 07/17/2023   ,   Urinalysis:   Lab Results   Component Value Date    COLORU Yellow 07/17/2023    CLARITYU Cloudy (A) 07/17/2023    SPECGRAV 1.010 07/17/2023    PHUR 7.5 07/17/2023    LEUKOCYTESUR 1+ (A) 07/17/2023    NITRITE Negative 07/17/2023    GLUCOSEU Negative 07/17/2023    KETONESU Negative 07/17/2023    BILIRUBINUR Negative 07/17/2023    BLOODU Negative 07/17/2023   ,   Urine Culture:   Lab Results   Component Value Date    URINECX Culture too young- will reincubate 07/17/2023   ,   Wound Culure:  No results found for: "WOUNDCULT"    Imaging Studies: I have personally reviewed pertinent reports.

## 2023-07-18 NOTE — CASE MANAGEMENT
Case Management Assessment & Discharge Planning Note    Patient name Angelo Simpson  Location [address] [facility] [place] 208/-87 MRN [id number]  : 1960 Date 2023       Current Admission Date: 2023  Current Admission Diagnosis:Severe sepsis [facility])   Patient Active Problem List    Diagnosis Date Noted   • Hypokalemia 2023   • Severe sepsis ([address]) 2023   • Anxiety 2023   • Leukocytosis    • Anemia    • Iron deficiency anemia 2023   • Abdominal pain 2023   • Nausea & vomiting 2023   • Diarrhea 2023   • Hypomagnesemia 2023   • Metastatic adenocarcinoma ([address]) 2023      LOS (days): 1  Geometric Mean LOS (GMLOS) (days):   Days to GMLOS:     OBJECTIVE:    Risk of Unplanned Readmission Score: 16.98         Current admission status: Inpatient  Referral Reason: Other (d/c planning)    Preferred Pharmacy:   [facility] DR JUDAH ESTRADA [address] [place] [address], [address] [place] [address]  Phone: [phone] Fax: [phone]    Primary Care Provider: No primary care provider on file.     Primary Insurance: [place] MEDICAID  Secondary Insurance:     ASSESSMENT:  [address], [address] Representative - Spouse   Primary Phone: [phone] (Mobile)  Home Phone: [phone]               Advance Directives  Does patient have a [address]?: Yes (family needs to bring in paperwork)  Does patient have Advance Directives?: Yes (family needs to bring in paperwork)         Readmission Root Cause  30 Day Readmission: No (pt did have an admission in [place])    Patient Information  Admitted from[de-identified] Home  Mental Status: Alert  During Assessment patient was accompanied by: Spouse  Assessment information provided by[de-identified] Spouse  Primary Caregiver: Spouse  Caregiver's Name[de-identified] Max Earlene Relationship to Patient[de-identified] Significant Other  [facility] Telephone Number[de-identified] [phone]  Support Systems: Spouse/significant other  [place] of Residence: Other (specify in comment box)  What city do you live in?: [facility] entry access options.  Select all that apply.: No steps to enter home  Type of Current Residence: [place]  In the last 12 months, was there a time when you were not able to pay the mortgage or rent on time?: No  In the last 12 months, how many places have you lived?: 1  In the last 12 months, was there a time when you did not have a steady place to sleep or slept in a shelter (including now)?: No  Homeless/housing insecurity resource given?: N/A  Living Arrangements: Lives w/ Spouse/significant other  Is patient a ?: No    Activities of Daily Living Prior to Admission  Functional Status: Assistance (cooking, cleaning, meds, laundry, shopping,driving)  Completes ADLs independently?: No  Level of ADL dependence: Assistance  Ambulates independently?: Yes  Does patient use assisted devices?: No  Does patient currently own DME?: Yes  What DME does the patient currently own?: Rollator, [place], Bedside Commode, Shower Chair, [place], Other (Comment) (grab bars-glucometer)  Does patient have a history of Outpatient Therapy (PT/OT)?: No  Does the patient have a history of Short-Term Rehab?: No  Does patient have a history of HHC?: No  Does patient currently have [facility]?: No         Patient Information Continued  Does patient have prescription coverage?: Yes (MAVERICK Garcia)  Within the past 12 months, you worried that your food would run out before you got the money to buy more.: Never true  Within the past 12 months, the food you bought just didn't last and you didn't have money to get more.: Never true  Food insecurity resource given?: N/A  Does patient receive dialysis treatments?: No  Does patient have a history of substance abuse?: No  Does patient have a history of Mental Health Diagnosis?: Yes (anxiety)  Is patient receiving treatment for mental health?: Yes (medication from pcp)         Means of Transportation  Means of Transport to Appts[de-identified] Family transport  In the past 12 months, has lack of transportation kept you from medical appointments or from getting medications?: No  In the past 12 months, has lack of transportation kept you from meetings, work, or from getting things needed for daily living?: No  Was application for public transport provided?: N/A        DISCHARGE DETAILS:    Discharge planning discussed with[de-identified] pt and spouse at the bedside  Freedom of Choice: Yes  Comments - Freedom of Choice: pt denies any needs- cm will continue to follow  CM contacted family/caregiver?: Yes             Contacts  Patient Contacts: Sebastian RAI  Relationship to Patient[de-identified] Family  Contact Method:  In Person  Reason/Outcome: Discharge [address]         Is the patient interested in [address] at discharge?: No    DME Referral Provided  Referral made for DME?: No    Other Referral/Resources/Interventions Provided:  Referral Comments: pt denies any d/c needs    Would you like to participate in our [address] [facility] [employer] service program?  : No - Declined    Treatment Team Recommendation: Home (home with SO and outpt follow up- SO)                                            Additional Comments: pt comes to [facility] to camp on the weekends- SO states she deos better when camping- pt was d/c from here on 6/5/2023 and she was d/c back to [place] and she was  hospitalized- SO stated she has 2 ed visits prior to this visit-  palliative conult ordered & IR consult- cm will continue to follow for any additonal d/c needs

## 2023-07-18 NOTE — UTILIZATION REVIEW
NOTIFICATION OF INPATIENT ADMISSION   AUTHORIZATION REQUEST   SERVICING FACILITY:   [address] [place] [address]  Tax ID: [id number]  NPI: [id number]   ATTENDING PROVIDER:  Attending Name and NPI#: Barbara Antunez [[id number]]  Address: [address] [place] [address]  Phone: [phone]     ADMISSION INFORMATION:  [facility]   Place of Service Code: 21  Inpatient Admission Date/Time: 7/17/23 12:04 PM  Discharge Date/Time: No discharge date for patient encounter. Admitting Diagnosis Code/Description:  Colitis [K52.9]  Pneumonia [J18.9]  Abdominal pain [R10.9]  Metastatic adenocarcinoma ([facility]) [C79.9]  Metastatic lung cancer (metastasis from lung to other site) [facility]) [C34.90]  Severe sepsis ([facility]) [A41.9, R65.20]     UTILIZATION REVIEW CONTACT:  Bouchra Ryan Utilization   Network Utilization Review Department  Phone: [phone]  Fax [phone]  Email: [email or web address]  Contact for approvals/pending authorizations, clinical reviews, and discharge. PHYSICIAN ADVISORY SERVICES:  Medical Necessity Denial & Zhlm-[employer]-Tv[employer] Review  Phone: [phone]  Fax: [phone]  Email: [email or web address]

## 2023-07-18 NOTE — UTILIZATION REVIEW
Initial Clinical Review    Admission: Date/Time/Statement:   Admission Orders (From admission, onward)     Ordered        07/17/23 1204  INPATIENT ADMISSION  Once                      Orders Placed This Encounter   Procedures   • INPATIENT ADMISSION     Standing Status:   Standing     Number of Occurrences:   1     Order Specific Question:   Level of Care     Answer:   Med Surg [16]     Order Specific Question:   Estimated length of stay     Answer:   More than 2 Midnights     Order Specific Question:   Certification     Answer:   I certify that inpatient services are medically necessary for this patient for a duration of greater than two midnights. See H&P and MD Progress Notes for additional information about the patient's course of treatment. ED Arrival Information     Expected   -    Arrival   7/17/2023 06:49    Acuity   Emergent            Means of arrival   Walk-In    Escorted by   Spouse    Service   Hospitalist    Admission type   Emergency            Arrival complaint   major abdominal pain, vomiting, diarrhea           Chief Complaint   Patient presents with   • Abdominal Pain     Pt reports severe abd pain, n/v/d; hx lung and pancreatic CA       Initial Presentation: 61 y.o. female to the ED from home with complaints of abdominal pain despite po dilaudid, fentanyl patch, n/v/d. Admitted to inpatient for severe sepsis, iron deficiency anemia, abdominal pain. H/O metastatic adenocarcinoma . Diagnosed with malignancy 2 months prior and has not had further oncological care. Due for PET scan tomorrow in [place]. Arrives tachycardic, tachypneic. WBCs 16.69, procal elevated, lactic acid 2.1. CT c/a/p shows: Increased size of pancreatic mass, primary versus secondary malignancy, with evidence of new secondary pancreatic duct obstruction. New left lung opacity, similar appearance known right hilar malignancy, lymphadenopathy. Blood/urine cultures pending. Started on IV abx. Given IV fluids in the ED. GI consult: Has had chronic N/v since malignancy diagnosis. Possible infectious colitis vs pancreatic neuroendocrine tumor may be causing symptoms. Clear liquid diet. Labs are all within normal limits and pain distribution not indicative of pancreatic cause of symptoms. Attempt to obtain report from pancreatic lesion. Date: 7/18   Day 2: CT C/A/P   New mild left lung opacities, likely infectious/inflammatory. New mild diffuse colon wall thickening, most likely infectious/inflammatory. As per GI, no abx needed at this time. She has continued abdominal tenderness. GI consult: N/V improved. Still with some abdominal pain. Tolerating po liquids.      ED Triage Vitals [07/17/23 0708]   Temperature Pulse Respirations Blood Pressure SpO2   98.1 °F (36.7 °C) (!) 117 (!) 24 168/77 96 %      Temp Source Heart Rate Source Patient Position - Orthostatic VS BP Location FiO2 (%)   Temporal Monitor Lying Right arm --      Pain Score       10 - Worst Possible Pain          Wt Readings from Last 1 Encounters:   07/17/23 49.9 kg (110 lb)     Additional Vital Signs:   Date/Time Temp Pulse Resp BP MAP (mmHg) SpO2 Calculated FIO2 (%) - Nasal Cannula Nasal Cannula O2 Flow Rate (L/min) O2 Device Patient Position - Orthostatic VS   07/18/23 06:56:10 98.4 °F (36.9 °C) 98 18 129/68 88 97 % -- -- -- --   07/18/23 0300 -- 112 Abnormal  -- -- -- 97 % -- -- None (Room air) --   07/18/23 0100 -- 104 -- -- -- 94 % 28 2 L/min Nasal cannula --   07/17/23 2300 -- 113 Abnormal  -- -- -- 96 % 28 2 L/min Nasal cannula --   07/17/23 2255 98.1 °F (36.7 °C) 111 Abnormal  20 149/80 103 94 % -- -- None (Room air) Lying   07/17/23 22:41:32 98.2 °F (36.8 °C) 102 18 141/71 94 97 % -- -- -- Lying   07/17/23 2000 -- 107 Abnormal  16 134/73 93 98 % 24 1 L/min Nasal cannula Lying   07/17/23 19:55:30 97.5 °F (36.4 °C) 101 20 134/73 93 98 % -- -- -- --   07/17/23 1941 97.4 °F (36.3 °C) Abnormal  107 Abnormal  20 136/86 -- 98 % 28 2 L/min Nasal cannula -- 07/17/23 1830 -- 104 -- -- -- 98 % -- -- -- --   07/17/23 1745 -- 110 Abnormal  19 171/81 Abnormal  116 97 % -- -- -- --   07/17/23 1615 98.6 °F (37 °C) 106 Abnormal  19 131/60 86 98 % -- -- -- --   07/17/23 0900 -- 117 Abnormal  22 155/72 103 96 % -- -- -- --   07/17/23 0845 -- 119 Abnormal  22 164/89 115 95 % -- -- -- --   07/17/23 0800 -- 116 Abnormal  24 Abnormal  167/81 116 95 % -- -- -- --   07/17/23 0708 98.1 °F (36.7 °C) 117 Abnormal  24 Abnormal  168/77 110 96 % -- -- None (Room air) Lying       Pertinent Labs/Diagnostic Test Results:   7/17 EKG:  Sinus tachycardia with Premature supraventricular complexes  Nonspecific T wave abnormality  When compared with ECG of 21-MAY-2023 12:08,  Premature supraventricular complexes are now Present  CT chest abdomen pelvis w contrast   Final Result by Augustus Echevarria MD (07/17 0951)      Chest:   New, mild left lung opacities, likely infectious/inflammatory. Recommend follow-up in 3 months. Similar appearance of known right hilar malignancy, lymphadenopathy and secondary right lung collapse compared to 5/21/2023. Accompanying      Trace, decreased right pleural effusion. Other accompanying and stable findings above. Abdomen and pelvis:   Increased size of pancreatic mass, primary versus secondary malignancy, with evidence of new secondary pancreatic duct obstruction. New mild diffuse colon wall thickening, most likely infectious/inflammatory. No evidence of obstruction. Workstation performed: [id number]         XR chest 1 view portable   Final Result by Augustus Echevarria MD (30/34 9906)   Similar appearance of the chest to 5/29/2023. Please see today's chest CT for further evaluation.                   Workstation performed: [id number]               Results from last 7 days   Lab Units 07/18/23  0458 07/17/23  0712   WBC Thousand/uL 17.79* 16.69*   HEMOGLOBIN g/dL 9.8* 10.5*   HEMATOCRIT % 32.1* 34.9   PLATELETS Thousands/uL 514* 530*   NEUTROS ABS Thousands/µL 15.23*  --          Results from last 7 days   Lab Units 07/18/23  0458 07/17/23  0712   SODIUM mmol/L 131* 133*   POTASSIUM mmol/L 3.1* 4.0   CHLORIDE mmol/L 98 97   CO2 mmol/L 22 23   ANION GAP mmol/L 11 13   BUN mg/dL 9 11   CREATININE mg/dL 0.49* 0.58*   EGFR ml/min/1.73sq m 104 98   CALCIUM mg/dL 8.6 9.4     Results from last 7 days   Lab Units 07/17/23  0712   AST U/L 7*   ALT U/L 6*   ALK PHOS U/L 96   TOTAL PROTEIN g/dL 6.8   ALBUMIN g/dL 3.3*   TOTAL BILIRUBIN mg/dL 0.41         Results from last 7 days   Lab Units 07/18/23  0458 07/17/23  0712   GLUCOSE RANDOM mg/dL 147* 186*       Results from last 7 days   Lab Units 07/17/23  0712   PROTIME seconds 15.1*   INR  1.19   PTT seconds 27       Results from last 7 days   Lab Units 07/18/23  0458 07/17/23  0712   PROCALCITONIN ng/ml 0.26* 0.38*     Results from last 7 days   Lab Units 07/17/23  1009 07/17/23  0712   LACTIC ACID mmol/L 0.9 2.1*       Results from last 7 days   Lab Units 07/17/23  0712   LIPASE u/L 56     Results from last 7 days   Lab Units 07/17/23  0910   CLARITY UA  Cloudy*   COLOR UA  Yellow   SPEC GRAV UA  1.010   PH UA  7.5   GLUCOSE UA mg/dl Negative   KETONES UA mg/dl Negative   BLOOD UA  Negative   PROTEIN UA mg/dl Trace*   NITRITE UA  Negative   BILIRUBIN UA  Negative   UROBILINOGEN UA E.U./dl 0.2   LEUKOCYTES UA  1+*   WBC UA /hpf 10-20*   RBC UA /hpf 2-4   BACTERIA UA /hpf Occasional   EPITHELIAL CELLS WET PREP /hpf Moderate*       ED Treatment:   Medication Administration from 07/17/2023 0649 to 07/17/2023 1944       Date/Time Order Dose Route Action Comments     07/17/2023 0710 EDT sodium chloride 0.9 % bolus 1,000 mL 1,000 mL Intravenous New Bag --     07/17/2023 0720 EDT HYDROmorphone (DILAUDID) injection 1 mg 1 mg Intravenous Given --     07/17/2023 0727 EDT droperidol (INAPSINE) injection 0.625 mg 0.625 mg Intravenous Given --     07/17/2023 0721 EDT ketorolac (TORADOL) injection 15 mg 15 mg Intravenous Given -- 07/17/2023 0749 EDT HYDROmorphone (DILAUDID) injection 2 mg 2 mg Intravenous Given --     07/17/2023 5490 EDT HYDROmorphone (DILAUDID) injection 2 mg 2 mg Intravenous Given --     07/17/2023 0824 EDT cefepime (MAXIPIME) IVPB (premix in dextrose) 2,000 mg 50 mL 2,000 mg Intravenous New Bag --     07/17/2023 1157 EDT HYDROmorphone (DILAUDID) injection 2 mg 2 mg Intravenous Given --     07/17/2023 1215 EDT levofloxacin (LEVAQUIN) IVPB (premix in dextrose) 750 mg 150 mL 750 mg Intravenous New Bag --     07/17/2023 1448 EDT HYDROmorphone (DILAUDID) injection 2 mg 2 mg Intravenous Given --     07/17/2023 1744 EDT docusate sodium (COLACE) capsule 100 mg 100 mg Oral Given --     07/17/2023 1532 EDT losartan (COZAAR) tablet 100 mg 100 mg Oral Given --     07/17/2023 1737 EDT acetaminophen (TYLENOL) tablet 650 mg 650 mg Oral Given --     07/17/2023 1532 EDT enoxaparin (LOVENOX) subcutaneous injection 40 mg 40 mg Subcutaneous Given --     07/17/2023 1607 EDT cefepime (MAXIPIME) IVPB (premix in dextrose) 2,000 mg 50 mL 2,000 mg Intravenous New Bag --     07/17/2023 1535 EDT metroNIDAZOLE (FLAGYL) IVPB (premix) 500 mg 100 mL 500 mg Intravenous New Bag --        Past Medical History:   Diagnosis Date   • Asthma    • Diabetes mellitus ([address])      Present on Admission:  • Metastatic adenocarcinoma ([address])  • Abdominal pain  • Iron deficiency anemia  • Anxiety      Admitting Diagnosis: Colitis [K52.9]  Pneumonia [J18.9]  Abdominal pain [R10.9]  Metastatic adenocarcinoma ([address]) [C79.9]  Metastatic lung cancer (metastasis from lung to other site) ([address]) [C34.90]  Severe sepsis ([address]) [A41.9, R65.20]  Age/Sex: 61 y.o. female  Admission Orders:  UP and oob  Scd, I/o  Scheduled Medications:  buPROPion, 300 mg, Oral, QAM  cefepime, 2,000 mg, Intravenous, Q8H  cyanocobalamin, 500 mcg, Oral, Daily  docusate sodium, 100 mg, Oral, BID  enoxaparin, 40 mg, Subcutaneous, Daily  ferrous sulfate, 325 mg, Oral, Daily With Breakfast  losartan, 100 mg, Oral, Daily  metroNIDAZOLE, 500 mg, Intravenous, Q8H  potassium chloride, 40 mEq, Oral, Q2H  senna, 8.6 mg, Oral, HS      Continuous IV Infusions:     PRN Meds:  acetaminophen, 650 mg, Oral, Q6H PRN  HYDROmorphone, 2 mg, Intravenous, Q4H PRN x 2 7/17, 7/18  ondansetron, 4 mg, Intravenous, Q6H PRN x 2 7/18        IP CONSULT TO GASTROENTEROLOGY  IP CONSULT TO GASTROENTEROLOGY    Network Utilization Review Department  ATTENTION: Please call with any questions or concerns to [phone] and carefully listen to the prompts so that you are directed to the right person. All voicemails are confidential.  Gwendolyn Canviridiana all requests for admission clinical reviews, approved or denied determinations and any other requests to dedicated fax number below belonging to the campus where the patient is receiving treatment.  List of dedicated fax numbers for the Facilities:  [place] DENIALS (Administrative/Medical Necessity) [phone] [facility] [phone]   [address] [place] [address] [phone]   [facility] [phone]   [address] [facility] [address] [facility] [address] [phone]   [address] [facility] [address] [facility] [address] [place] [address] [phone]

## 2023-07-18 NOTE — ASSESSMENT & PLAN NOTE
· POA as evidenced by tachycardic with leukocytosis  · With evidence of pneumonia and possible infectious colitis on CT  · BClx (7/17): NGTD  · Lactic acid 2.1 on arrival which improved to 0.9 after 1 L normal saline bolus  · CT C/A/P (7/18): New mild left lung opacities, likely infectious/inflammatory. New mild diffuse colon wall thickening, most likely infectious/inflammatory.   · As no antibiotics are required from a GI perspective, will discontinue Flagyl and continue cefepime

## 2023-07-18 NOTE — PROGRESS NOTES
[facility] - Inpatient Progress Note    PATIENT INFORMATION      Azeb Rojas 61 y.o. female MRN: [id number]  Unit/Bed#: -01 Encounter: [id number]    ASSESSMENT & PLAN     Azeb Rojas is a 61 y.o. old female with PMH of pancreatic cancer (unclear what type as not in EMR), metastatic adenocarcinoma with chronic pain and N/V, iron deficiency anemia who presented to [facility] for concern of 1 day of persistent N/V/D with severe abdominal pain. Patient admitted for sepsis in setting of colitis and pneumonia for antibiotics and further fluid resuscitation.      Abdominal Pain  Patient with chronic N/V since recent diagnosis of malignancy. Has been improving with haldol, but over the day prior to presentation symptoms recurred and worsened with associated diarrhea and abdominal pain. Currently N/V/D resolved with ongoing abdominal pain, however, improved from previously. · Etiology of symptoms likely infectious colitis/viral gastroenteritis  · Symptoms have improved and patient tolerating PO intake  · Abdominal pain persists  · Pain regimen per primary team - patient reports being managed with fentanyl patch and oral dilaudid in outpatient setting - PDMP not reviewed as unable to access [address] [place] [address] records  • Stool studies pending-patient has not had bowel movement since admission  • Continue supportive care with antiemetics and fluid resuscitation  • Recommend lactose-free diet in setting of recent infectious colitis  • No need for continued antibiotics from GI standpoint for colitis - defer to primary team in regard to pneumonia  • Will need outpatient follow-up with GI physician in [address] [place] [address] confirm resolution of colitis    Iron Deficiency Anemia  Patient presenting with Hgb 10.5 with baseline 8-9. Likely hemoconcentrated in setting of dehydration.   • EGD and Colonoscopy completed 5/25/23 - negative gastric and duodenal bx, tubulovillous adenoma of sigmoid colon negative for high grade dysplasia  • Hgb currently stable 9.8  • Monitor and transfuse for Hgb <7.0     Pancreatic Tumor  Increased 2.4 x 3.4 cm depth by width pancreatic body mass with Increased solid enhancing component with some evidence of new secondary pancreatic duct obstruction. Patient reports lesion has been biopsied by her care team in [place] and has been advised this is an independent malignancy, however, unclear what type of pancreatic malignancy pathology demonstrated. • Labs are all within normal limits and pain distribution not indicative of pancreatic cause of symptoms  • Recommend follow up with GI and Oncology in [place] for management    SUBJECTIVE     Patient seen and evaluated at bedside. N/V/D have resolved. Continues to have moderate abdominal pain, improved from previously. Tolerating PO intake of liquids and light solids. Patient does note that fentanyl patch was removed and not replaced.      MEDICATIONS & ALLERGIES       Medications:   Medications Prior to Admission   Medication   • buPROPion (WELLBUTRIN XL) 300 mg 24 hr tablet   • butalbital-aspirin-caffeine (FIORINAL) -40 mg per capsule   • cyanocobalamin (VITAMIN B-12) 500 MCG tablet   • dicyclomine (BENTYL) 10 mg capsule   • docusate sodium (COLACE) 100 mg capsule   • ferrous sulfate 325 (65 Fe) mg tablet   • folic acid (FOLVITE) 673 mcg tablet   • hydrOXYzine pamoate (VISTARIL) 25 mg capsule   • lactulose 20 g/30 mL   • losartan (COZAAR) 100 MG tablet   • magnesium Oxide (MAG-OX) 400 mg TABS   • naloxone (NARCAN) 4 mg/0.1 mL nasal spray   • ondansetron (ZOFRAN-ODT) 8 mg disintegrating tablet   • rosuvastatin (CRESTOR) 10 MG tablet   • scopolamine (TRANSDERM-SCOP) 1 mg/3 days TD 72 hr patch   • senna (SENOKOT) 8.6 mg     Current Facility-Administered Medications   Medication Dose Route Frequency   • acetaminophen (TYLENOL) tablet 650 mg  650 mg Oral Q6H PRN   • buPROPion (WELLBUTRIN XL) 24 hr tablet 300 mg  300 mg Oral QAM   • cefepime (MAXIPIME) IVPB (premix in dextrose) 2,000 mg 50 mL  2,000 mg Intravenous Q8H   • cyanocobalamin (VITAMIN B-12) tablet 500 mcg  500 mcg Oral Daily   • docusate sodium (COLACE) capsule 100 mg  100 mg Oral BID   • enoxaparin (LOVENOX) subcutaneous injection 40 mg  40 mg Subcutaneous Daily   • ferrous sulfate tablet 325 mg  325 mg Oral Daily With Breakfast   • HYDROmorphone (DILAUDID) injection 2 mg  2 mg Intravenous Q4H PRN   • losartan (COZAAR) tablet 100 mg  100 mg Oral Daily   • metroNIDAZOLE (FLAGYL) IVPB (premix) 500 mg 100 mL  500 mg Intravenous Q8H   • ondansetron (ZOFRAN) injection 4 mg  4 mg Intravenous Q6H PRN   • potassium chloride (K-DUR,KLOR-CON) CR tablet 40 mEq  40 mEq Oral Q2H   • senna (SENOKOT) tablet 8.6 mg  8.6 mg Oral HS       Allergies: Allergies   Allergen Reactions   • Neosporin [Bacitracin-Polymyxin B] Rash   • Penicillins Rash   • Salicylic Acid-Sulfur Rash   • Shellfish-Derived Products - Food Allergy Rash and Facial Swelling       PHYSICAL EXAM     Objective   Blood pressure 129/68, pulse 98, temperature 98.4 °F (36.9 °C), resp. rate 18, height 5' 1.5" (1.562 m), weight 49.9 kg (110 lb), SpO2 97 %. Body mass index is 20.45 kg/m². Intake/Output Summary (Last 24 hours) at 7/18/2023 0752  Last data filed at 7/18/2023 0200  Gross per 24 hour   Intake 160 ml   Output 125 ml   Net 35 ml       General Appearance:   Alert, cooperative   HEENT:   Normocephalic, atraumatic, anicteric     Neck:   Supple, symmetrical, trachea midline   Lungs:   Equal chest rise, respirations unlabored    Heart:   Regular rate and rhythm   Abdomen:   Soft, +diffuse tenderness, non-distended; normal bowel sounds; no masses, no organomegaly    Rectal:   Deferred    Extremities:   No cyanosis, clubbing or edema    Neuro:    Moves all 4 extremities, AAOx3    Skin:   No jaundice, rashes, or lesions      ADDITIONAL DATA     Lab Results:     Results from last 7 days   Lab Units 07/18/23  0458   WBC Thousand/uL 17.79*   HEMOGLOBIN g/dL 9.8*   HEMATOCRIT % 32.1*   PLATELETS Thousands/uL 514*   NEUTROS PCT % 86*   LYMPHS PCT % 6*   MONOS PCT % 7   EOS PCT % 0     Results from last 7 days   Lab Units 07/18/23  0458 07/17/23  0712   POTASSIUM mmol/L 3.1* 4.0   CHLORIDE mmol/L 98 97   CO2 mmol/L 22 23   BUN mg/dL 9 11   CREATININE mg/dL 0.49* 0.58*   CALCIUM mg/dL 8.6 9.4   ALK PHOS U/L  --  96   ALT U/L  --  6*   AST U/L  --  7*     Results from last 7 days   Lab Units 07/17/23  0712   INR  1.19       Imaging:    XR chest 1 view portable    Result Date: 7/17/2023  Narrative: CHEST INDICATION:   abd pain. History of metastatic adenocarcinoma. COMPARISON: 5/29/2023 chest x-ray and 5/21/2023 chest CT EXAM PERFORMED/VIEWS:  XR CHEST PORTABLE FINDINGS: Similar right thoracic opacity with right mediastinal shift compatible with known right lung collapse from perihilar mass. Left lung remains clear. No new effusion or pneumothorax. Visualized cardiomediastinal silhouette is stable in appearance. No acute osseous or soft tissue pathology. Impression: Similar appearance of the chest to 5/29/2023. Please see today's chest CT for further evaluation. Workstation performed: [id number]     CT chest abdomen pelvis w contrast    Result Date: 7/17/2023  Narrative: CT CHEST, ABDOMEN AND PELVIS WITH IV CONTRAST INDICATION:   Severe lower abdominal pain. Right-sided pleural effusion seen on chest x-ray. Metastatic right lung adenocarcinoma. COMPARISON: 5/21/2023 TECHNIQUE: CT examination of the chest, abdomen and pelvis was performed. Axial, sagittal, and coronal 2D reformatted images were created from the source data and submitted for interpretation. Radiation dose length product (DLP) for this visit:  780.6 mGy-cm .   This examination, like all CT scans performed in the [facility], was performed utilizing techniques to minimize radiation dose exposure, including the use of iterative reconstruction and automated exposure control. IV Contrast:  100 mL of iohexol (OMNIPAQUE) Enteric Contrast: Enteric contrast was not administered. FINDINGS: CHEST LUNGS: New, mild patchy peripheral groundglass opacities in the left upper and lower lobes. Single new 0.4 cm consolidative nodular opacity in the lower lobe (3/94). Persistent approximately 6.3 x 4.9 cm depth by width right perihilar mass with evidence of central necrosis with secondary complete right lung collapse. Collapsed right lung has heterogeneous enhancement with tubular low-attenuation areas radiating from the hilum most likely obstructed bronchi with mucus. PLEURA: Trace, decreased right pleural effusion. Normal on the left. HEART/GREAT VESSELS: Atherosclerosis. Normal heart size and aortic caliber and enhancement. Patent aortic branch vessels. Similar trace pericardial effusion. No central PE. Evidence of similar obstructed or severely narrowed right upper lobe pulmonary arteries. Mildly narrowed superior vena cava. No obstruction or collateral vessels. MEDIASTINUM AND CHRISTINA: Persistent right mediastinal shift. Esophagus is within normal limits. Similar pathologic mediastinal and right hilar lymph nodes, e.g. 2.0 cm short axis diameter right precarinal node (2/42. CHEST WALL AND LOWER NECK:   Persistent right supraclavicular lymphadenopathy, largest node 2.1 cm (2/9, abutting the right thyroid and focally narrowing the right internal jugular vein. ABDOMEN LIVER/BILIARY TREE: Focal fat at the falciform ligament, similar and common finding. No hepatic mass identified. Normal liver morphology and bile duct caliber. GALLBLADDER:  Within normal limits. SPLEEN:  Within normal limits. PANCREAS: Increased 2.4 x 3.4 cm depth by width pancreatic body mass (previously 2.1 x 2.4 cm, measured today on the prior study by this reader). Increased, solid enhancing component. No calcification. Persistent distal pancreatic atrophy and new distal duct dilation, approximately 0.8 cm.  ADRENAL GLANDS: Within normal limits. KIDNEYS/URETERS:  Within normal limits. STOMACH AND BOWEL: Stomach and small bowel are within normal limits. New, mild, diffuse edematous colon wall thickening. No significant diverticulosis. Normal bowel caliber. No mesenteric edema or pneumatosis. APPENDIX:  Within normal limits. ABDOMINOPELVIC CAVITY:  No abnormal air, fluid or enlarged lymph nodes. VESSELS:  Atherosclerosis. Normal aortic caliber. Patent central mesenteric vessels. No portal venous air. PELVIS: REPRODUCTIVE ORGANS: Within normal limits for age. URINARY BLADDER:  Within normal limits. ABDOMINAL WALL/INGUINAL REGIONS:  Within normal limits. OSSEOUS STRUCTURES:  Degenerative changes. The study was marked in [facility] for immediate notification. Impression: Chest: New, mild left lung opacities, likely infectious/inflammatory. Recommend follow-up in 3 months. Similar appearance of known right hilar malignancy, lymphadenopathy and secondary right lung collapse compared to 5/21/2023. Accompanying Trace, decreased right pleural effusion. Other accompanying and stable findings above. Abdomen and pelvis: Increased size of pancreatic mass, primary versus secondary malignancy, with evidence of new secondary pancreatic duct obstruction. New mild diffuse colon wall thickening, most likely infectious/inflammatory. No evidence of obstruction. Workstation performed: [id number]       EKG, Pathology, and Other Studies Reviewed on Admission:   · EKG: Reviewed      Counseling / Coordination of Care Time: 30 total mins spent n consult. Greater than 50% of total time spent on patient counseling and coordination of care. Nathaly Huynh DO  Gastroenterology Fellow  [address] [facility]  Division of Gastroenterology and Hepatology  Available on At[employer]  . ..............................................................................................................................................  ** Please Note: This note is constructed using a voice recognition dictation system.  **

## 2023-07-18 NOTE — PLAN OF CARE
Problem: Potential for Falls  Goal: Patient will remain free of falls  Description: INTERVENTIONS:  - Educate patient/family on patient safety including physical limitations  - Instruct patient to call for assistance with activity   - Consult OT/PT to assist with strengthening/mobility   - Keep Call bell within reach  - Keep bed low and locked with side rails adjusted as appropriate  - Keep care items and personal belongings within reach  - Initiate and maintain comfort rounds  - Make Fall Risk Sign visible to staff  - Offer Toileting every 2 Hours, in advance of need  - Initiate/Maintain bed alarm  - Obtain necessary fall risk management equipment  - Apply yellow socks and bracelet for high fall risk patients  - Consider moving patient to room near nurses station  Outcome: Progressing     Problem: Nutrition/Hydration-ADULT  Goal: Nutrient/Hydration intake appropriate for improving, restoring or maintaining nutritional needs  Description: Monitor and assess patient's nutrition/hydration status for malnutrition. Collaborate with interdisciplinary team and initiate plan and interventions as ordered. Monitor patient's weight and dietary intake as ordered or per policy. Utilize nutrition screening tool and intervene as necessary. Determine patient's food preferences and provide high-protein, high-caloric foods as appropriate.      INTERVENTIONS:  - Monitor oral intake, urinary output, labs, and treatment plans  - Assess nutrition and hydration status and recommend course of action  - Evaluate amount of meals eaten  - Assist patient with eating if necessary   - Allow adequate time for meals  - Recommend/ encourage appropriate diets, oral nutritional supplements, and vitamin/mineral supplements  - Order, calculate, and assess calorie counts as needed  - Recommend, monitor, and adjust tube feedings and TPN/PPN based on assessed needs  - Assess need for intravenous fluids  - Provide specific nutrition/hydration education as appropriate  - Include patient/family/caregiver in decisions related to nutrition  Outcome: Progressing     Problem: NEUROSENSORY - ADULT  Goal: Achieves stable or improved neurological status  Description: INTERVENTIONS  - Monitor and report changes in neurological status  - Monitor vital signs such as temperature, blood pressure, glucose, and any other labs ordered   - Initiate measures to prevent increased intracranial pressure  - Monitor for seizure activity and implement precautions if appropriate      Outcome: Progressing     Problem: CARDIOVASCULAR - ADULT  Goal: Maintains optimal cardiac output and hemodynamic stability  Description: INTERVENTIONS:  - Monitor I/O, vital signs and rhythm  - Monitor for S/S and trends of decreased cardiac output  - Administer and titrate ordered vasoactive medications to optimize hemodynamic stability  - Assess quality of pulses, skin color and temperature  - Assess for signs of decreased coronary artery perfusion  - Instruct patient to report change in severity of symptoms  Outcome: Progressing     Problem: RESPIRATORY - ADULT  Goal: Achieves optimal ventilation and oxygenation  Description: INTERVENTIONS:  - Assess for changes in respiratory status  - Assess for changes in mentation and behavior  - Position to facilitate oxygenation and minimize respiratory effort  - Oxygen administered by appropriate delivery if ordered  - Initiate smoking cessation education as indicated  - Encourage broncho-pulmonary hygiene including cough, deep breathe, Incentive Spirometry  - Assess the need for suctioning and aspirate as needed  - Assess and instruct to report SOB or any respiratory difficulty  - Respiratory Therapy support as indicated  Outcome: Progressing     Problem: GASTROINTESTINAL - ADULT  Goal: Minimal or absence of nausea and/or vomiting  Description: INTERVENTIONS:  - Administer IV fluids if ordered to ensure adequate hydration  - Maintain NPO status until nausea and vomiting are resolved  - Nasogastric tube if ordered  - Administer ordered antiemetic medications as needed  - Provide nonpharmacologic comfort measures as appropriate  - Advance diet as tolerated, if ordered  - Consider nutrition services referral to assist patient with adequate nutrition and appropriate food choices  Outcome: Progressing

## 2023-07-19 LAB
ANION GAP SERPL CALCULATED.3IONS-SCNC: 9 MMOL/L
BACTERIA UR CULT: NORMAL
BUN SERPL-MCNC: 8 MG/DL (ref 5–25)
CALCIUM SERPL-MCNC: 8.2 MG/DL (ref 8.4–10.2)
CHLORIDE SERPL-SCNC: 102 MMOL/L (ref 96–108)
CO2 SERPL-SCNC: 21 MMOL/L (ref 21–32)
CREAT SERPL-MCNC: 0.51 MG/DL (ref 0.6–1.3)
ERYTHROCYTE [DISTWIDTH] IN BLOOD BY AUTOMATED COUNT: 18.6 % (ref 11.6–15.1)
GFR SERPL CREATININE-BSD FRML MDRD: 102 ML/MIN/1.73SQ M
GLUCOSE SERPL-MCNC: 98 MG/DL (ref 65–140)
HCT VFR BLD AUTO: 30.1 % (ref 34.8–46.1)
HGB BLD-MCNC: 9.1 G/DL (ref 11.5–15.4)
MAGNESIUM SERPL-MCNC: 1.5 MG/DL (ref 1.9–2.7)
MCH RBC QN AUTO: 24.6 PG (ref 26.8–34.3)
MCHC RBC AUTO-ENTMCNC: 30.2 G/DL (ref 31.4–37.4)
MCV RBC AUTO: 81 FL (ref 82–98)
PLATELET # BLD AUTO: 482 THOUSANDS/UL (ref 149–390)
PMV BLD AUTO: 8 FL (ref 8.9–12.7)
POTASSIUM SERPL-SCNC: 4 MMOL/L (ref 3.5–5.3)
RBC # BLD AUTO: 3.7 MILLION/UL (ref 3.81–5.12)
SODIUM SERPL-SCNC: 132 MMOL/L (ref 135–147)
WBC # BLD AUTO: 10.63 THOUSAND/UL (ref 4.31–10.16)

## 2023-07-19 PROCEDURE — 99232 SBSQ HOSP IP/OBS MODERATE 35: CPT | Performed by: INTERNAL MEDICINE

## 2023-07-19 PROCEDURE — 80048 BASIC METABOLIC PNL TOTAL CA: CPT | Performed by: INTERNAL MEDICINE

## 2023-07-19 PROCEDURE — 83735 ASSAY OF MAGNESIUM: CPT | Performed by: INTERNAL MEDICINE

## 2023-07-19 PROCEDURE — 85027 COMPLETE CBC AUTOMATED: CPT | Performed by: INTERNAL MEDICINE

## 2023-07-19 RX ORDER — MAGNESIUM SULFATE HEPTAHYDRATE 40 MG/ML
2 INJECTION, SOLUTION INTRAVENOUS ONCE
Status: COMPLETED | OUTPATIENT
Start: 2023-07-19 | End: 2023-07-19

## 2023-07-19 RX ADMIN — KETOROLAC TROMETHAMINE 15 MG: 30 INJECTION, SOLUTION INTRAMUSCULAR; INTRAVENOUS at 10:02

## 2023-07-19 RX ADMIN — HALOPERIDOL LACTATE 2 MG: 5 INJECTION, SOLUTION INTRAMUSCULAR at 19:21

## 2023-07-19 RX ADMIN — CEFEPIME HYDROCHLORIDE 2000 MG: 2 INJECTION, SOLUTION INTRAVENOUS at 01:30

## 2023-07-19 RX ADMIN — DOCUSATE SODIUM 100 MG: 100 CAPSULE, LIQUID FILLED ORAL at 08:41

## 2023-07-19 RX ADMIN — HYDROMORPHONE HYDROCHLORIDE 2 MG: 2 INJECTION, SOLUTION INTRAMUSCULAR; INTRAVENOUS; SUBCUTANEOUS at 19:17

## 2023-07-19 RX ADMIN — LOSARTAN POTASSIUM 100 MG: 50 TABLET, FILM COATED ORAL at 08:41

## 2023-07-19 RX ADMIN — DOCUSATE SODIUM 100 MG: 100 CAPSULE, LIQUID FILLED ORAL at 17:26

## 2023-07-19 RX ADMIN — MAGNESIUM SULFATE HEPTAHYDRATE 2 G: 40 INJECTION, SOLUTION INTRAVENOUS at 08:41

## 2023-07-19 RX ADMIN — KETOROLAC TROMETHAMINE 15 MG: 30 INJECTION, SOLUTION INTRAMUSCULAR; INTRAVENOUS at 22:24

## 2023-07-19 RX ADMIN — CYANOCOBALAMIN TAB 500 MCG 500 MCG: 500 TAB at 08:41

## 2023-07-19 RX ADMIN — CEFEPIME HYDROCHLORIDE 2000 MG: 2 INJECTION, SOLUTION INTRAVENOUS at 16:43

## 2023-07-19 RX ADMIN — ENOXAPARIN SODIUM 40 MG: 40 INJECTION SUBCUTANEOUS at 08:41

## 2023-07-19 RX ADMIN — FERROUS SULFATE TAB 325 MG (65 MG ELEMENTAL FE) 325 MG: 325 (65 FE) TAB at 08:41

## 2023-07-19 RX ADMIN — HYDROMORPHONE HYDROCHLORIDE 2 MG: 2 INJECTION, SOLUTION INTRAMUSCULAR; INTRAVENOUS; SUBCUTANEOUS at 12:21

## 2023-07-19 RX ADMIN — ONDANSETRON 4 MG: 2 INJECTION INTRAMUSCULAR; INTRAVENOUS at 01:30

## 2023-07-19 RX ADMIN — SENNOSIDES 8.6 MG: 8.6 TABLET, FILM COATED ORAL at 22:21

## 2023-07-19 RX ADMIN — KETOROLAC TROMETHAMINE 15 MG: 30 INJECTION, SOLUTION INTRAMUSCULAR; INTRAVENOUS at 16:43

## 2023-07-19 RX ADMIN — CEFEPIME HYDROCHLORIDE 2000 MG: 2 INJECTION, SOLUTION INTRAVENOUS at 08:41

## 2023-07-19 RX ADMIN — KETOROLAC TROMETHAMINE 15 MG: 30 INJECTION, SOLUTION INTRAMUSCULAR; INTRAVENOUS at 01:27

## 2023-07-19 RX ADMIN — BUPROPION HYDROCHLORIDE 300 MG: 150 TABLET, EXTENDED RELEASE ORAL at 08:41

## 2023-07-19 RX ADMIN — ONDANSETRON 4 MG: 2 INJECTION INTRAMUSCULAR; INTRAVENOUS at 16:49

## 2023-07-19 NOTE — PLAN OF CARE
Problem: GASTROINTESTINAL - ADULT  Goal: Minimal or absence of nausea and/or vomiting  Description: INTERVENTIONS:  - Administer IV fluids if ordered to ensure adequate hydration  - Maintain NPO status until nausea and vomiting are resolved  - Nasogastric tube if ordered  - Administer ordered antiemetic medications as needed  - Provide nonpharmacologic comfort measures as appropriate  - Advance diet as tolerated, if ordered  - Consider nutrition services referral to assist patient with adequate nutrition and appropriate food choices  Outcome: Progressing  Goal: Maintains or returns to baseline bowel function  Description: INTERVENTIONS:  - Assess bowel function  - Encourage oral fluids to ensure adequate hydration  - Administer IV fluids if ordered to ensure adequate hydration  - Administer ordered medications as needed  - Encourage mobilization and activity  - Consider nutritional services referral to assist patient with adequate nutrition and appropriate food choices  Outcome: Progressing  Goal: Maintains adequate nutritional intake  Description: INTERVENTIONS:  - Monitor percentage of each meal consumed  - Identify factors contributing to decreased intake, treat as appropriate  - Assist with meals as needed  - Monitor I&O, weight, and lab values if indicated  - Obtain nutrition services referral as needed  Outcome: Progressing  Goal: Establish and maintain optimal ostomy function  Description: INTERVENTIONS:  - Assess bowel function  - Encourage oral fluids to ensure adequate hydration  - Administer IV fluids if ordered to ensure adequate hydration   - Administer ordered medications as needed  - Encourage mobilization and activity  - Nutrition services referral to assist patient with appropriate food choices  - Assess stoma site  - Consider wound care consult   Outcome: Progressing  Goal: Oral mucous membranes remain intact  Description: INTERVENTIONS  - Assess oral mucosa and hygiene practices  - Implement preventative oral hygiene regimen  - Implement oral medicated treatments as ordered  - Initiate Nutrition services referral as needed  Outcome: Progressing   Continues with nausea/vomitting; Haldol/Zofran per PRN  Encourage PO  May require Nutritional consult

## 2023-07-19 NOTE — PROGRESS NOTES
[address]  Progress Note  Name: Lex Garcia  MRN: [id number]  Unit/Bed#: -01 I Date of Admission: 2023   Date of Service: 2023 I Hospital Day: 2    Assessment/Plan   * Severe sepsis [facility])  Assessment & Plan  · POA as evidenced by tachycardic with leukocytosis  · With evidence of pneumonia and possible infectious colitis on CT  · BClx (): NGTD  · CT C/A/P (): New mild left lung opacities, likely infectious/inflammatory. New mild diffuse colon wall thickening, most likely infectious/inflammatory. · As no antibiotics are required from a GI perspective  · Continue Cefepime; plan for de-escalation once patient is comfort with PO intake     Abdominal pain  Assessment & Plan  · Patient has chronic abdominal pain with intermittent nausea, vomiting, and diarrhea  · Reports improvement in her nausea and abdominal pain this AM; no diarrhea noted  · CT abdomen and pelvis: Increased size of pancreatic mass, primary versus secondary malignancy, with evidence of new secondary pancreatic duct obstruction  · GI and Palliative care evaluation appreciated    Hypomagnesemia  Assessment & Plan  · Ma.5  · Replete and recheck    Metastatic adenocarcinoma [facility])  Assessment & Plan  · Diagnosed May 2023, primary source is lung with additional pancreatic mass (concern for second primary)  · Supposed to have port placed in [place] later this week to begin treatment  · IR not recommending inpatient Port placement at this time    Iron deficiency anemia  Assessment & Plan  · Hemoglobin is currently stable  · Continue to monitor and transfuse for hemoglobin less than 7          VTE Pharmacologic Prophylaxis: VTE Score: 6 High Risk (Score >/= 5) - Pharmacological DVT Prophylaxis Ordered: enoxaparin (Lovenox). Sequential Compression Devices Ordered. Patient Centered Rounds: I performed bedside rounds with nursing staff today.   Discussions with Specialists or Other Care Team Provider: GI    Education and Discussions with Family / Patient: Patient updated on plan of care. Total Time Spent on Date of Encounter in care of patient: 35 minutes This time was spent on one or more of the following: performing physical exam; counseling and coordination of care; obtaining or reviewing history; documenting in the medical record; reviewing/ordering tests, medications or procedures; communicating with other healthcare professionals and discussing with patient's family/caregivers. Current Length of Stay: 2 day(s)  Current Patient Status: Inpatient   Certification Statement: The patient will continue to require additional inpatient hospital stay due to abdominal pain and pneumonia; poor PO intake  Discharge Plan: Anticipate discharge later today or tomorrow to home. Code Status: Level 1 - Full Code    Subjective:   Patient sitting up in bed without any acute complaints. She notes feeling better this morning but does not have complete resolution of her symptoms. We discussed that she would be medically cleared for discharge once tolerating PO intake so that she could complete abx course at home. Objective:     Vitals:   Temp (24hrs), Av.5 °F (36.9 °C), Min:98.3 °F (36.8 °C), Max:98.8 °F (37.1 °C)    Temp:  [98.3 °F (36.8 °C)-98.8 °F (37.1 °C)] 98.3 °F (36.8 °C)  HR:  [100-104] 100  Resp:  [18-20] 20  BP: (124-155)/(64-82) 155/82  SpO2:  [95 %-98 %] 95 %  Body mass index is 20.45 kg/m². Input and Output Summary (last 24 hours): Intake/Output Summary (Last 24 hours) at 2023 1049  Last data filed at 2023  Gross per 24 hour   Intake 240 ml   Output --   Net 240 ml       Physical Exam:   Physical Exam  Vitals and nursing note reviewed. Constitutional:       General: She is not in acute distress. Appearance: She is well-developed. Comments: Chronically ill-appearing   HENT:      Head: Normocephalic and atraumatic.       Mouth/Throat:      Mouth: Mucous membranes are moist.      Pharynx: Oropharynx is clear. Eyes:      Extraocular Movements: Extraocular movements intact. Conjunctiva/sclera: Conjunctivae normal.   Cardiovascular:      Rate and Rhythm: Normal rate and regular rhythm. Pulses: Normal pulses. Heart sounds: Normal heart sounds. No murmur heard. Pulmonary:      Effort: Pulmonary effort is normal. No respiratory distress. Breath sounds: Normal breath sounds. No wheezing. Abdominal:      General: Bowel sounds are normal. There is no distension. Palpations: Abdomen is soft. Tenderness: There is abdominal tenderness. Musculoskeletal:         General: No swelling. Normal range of motion. Cervical back: Normal range of motion and neck supple. Skin:     General: Skin is warm and dry. Capillary Refill: Capillary refill takes less than 2 seconds. Neurological:      General: No focal deficit present. Mental Status: She is alert and oriented to person, place, and time. Mental status is at baseline.    Psychiatric:         Mood and Affect: Mood normal.         Behavior: Behavior normal.         Judgment: Judgment normal.          Labs:  Results from last 7 days   Lab Units 07/19/23  0456 07/18/23  0458   WBC Thousand/uL 10.63* 17.79*   HEMOGLOBIN g/dL 9.1* 9.8*   HEMATOCRIT % 30.1* 32.1*   PLATELETS Thousands/uL 482* 514*   NEUTROS PCT %  --  86*   LYMPHS PCT %  --  6*   MONOS PCT %  --  7   EOS PCT %  --  0     Results from last 7 days   Lab Units 07/19/23  0456 07/18/23  0458 07/17/23  0712   SODIUM mmol/L 132*   < > 133*   POTASSIUM mmol/L 4.0   < > 4.0   CHLORIDE mmol/L 102   < > 97   CO2 mmol/L 21   < > 23   BUN mg/dL 8   < > 11   CREATININE mg/dL 0.51*   < > 0.58*   ANION GAP mmol/L 9   < > 13   CALCIUM mg/dL 8.2*   < > 9.4   ALBUMIN g/dL  --   --  3.3*   TOTAL BILIRUBIN mg/dL  --   --  0.41   ALK PHOS U/L  --   --  96   ALT U/L  --   --  6*   AST U/L  --   --  7*   GLUCOSE RANDOM mg/dL 98   < > 186*    < > = values in this interval not displayed. Results from last 7 days   Lab Units 07/17/23  0712   INR  1.19             Results from last 7 days   Lab Units 07/18/23  0458 07/17/23  1009 07/17/23  0712   LACTIC ACID mmol/L  --  0.9 2.1*   PROCALCITONIN ng/ml 0.26*  --  0.38*       Lines/Drains:  Invasive Devices     Peripheral Intravenous Line  Duration           Peripheral IV 07/18/23 Dorsal (posterior); Right Forearm <1 day                Imaging: No new imaging. Recent Cultures (last 7 days):   Results from last 7 days   Lab Units 07/17/23  0910 07/17/23  0716 07/17/23  0712   BLOOD CULTURE   --  No Growth at 24 hrs. No Growth at 24 hrs. URINE CULTURE  >100,000 cfu/ml  --   --        Last 24 Hours Medication List:   Current Facility-Administered Medications   Medication Dose Route Frequency Provider Last Rate   • acetaminophen  650 mg Oral Q6H PRN BRITTNY Marie     • buPROPion  300 mg Oral QAM BRITTNY Marie     • cefepime  2,000 mg Intravenous Q8H BRITTNY Marie 2,000 mg (07/19/23 0841)   • cyanocobalamin  500 mcg Oral Daily BRITTNY Marie     • docusate sodium  100 mg Oral BID BRITTNY Marie     • enoxaparin  40 mg Subcutaneous Daily BRITTNY Marie     • fentaNYL  100 mcg Transdermal Q72H [address], DO     • ferrous sulfate  325 mg Oral Daily With Breakfast BRITTNY Marie     • haloperidol lactate  2 mg Intramuscular Q6H PRN Meryle Monarch Ward, DO     • HYDROmorphone  2 mg Intravenous Q3H PRN Rodger Hernandez PA-C     • ketorolac  15 mg Intravenous Q6H PRN Meryle Monarch Ward, DO     • losartan  100 mg Oral Daily BRITTNY Marie     • ondansetron  4 mg Intravenous Q6H PRN BRITTNY Marie     • senna  8.6 mg Oral HS BRITTNY Marie          Today, Patient Was Seen By: [address], DO    **Please Note: This note may have been constructed using a voice recognition system. **

## 2023-07-19 NOTE — NURSING NOTE
Patient resting in bed at present. Visitor at bedside. Complains of pain in abdomen, Toradol IV administered. Patient ambulated with bathroom without assistance, tolerated well. Offers no other complaints at this time. Call bell and belongings within reach.

## 2023-07-19 NOTE — CASE MANAGEMENT
Case Management Discharge Planning Note    Patient name Ignacia Aranda  Location [address] [facility] Karth[address] 208/-71 MRN [id number]  : 1960 Date 2023       Current Admission Date: 2023  Current Admission Diagnosis:Severe sepsis [facility])   Patient Active Problem List    Diagnosis Date Noted   • Hypokalemia 2023   • Severe sepsis ([address]) 2023   • Anxiety 2023   • Leukocytosis    • Anemia    • Iron deficiency anemia 2023   • Abdominal pain 2023   • Nausea & vomiting 2023   • Diarrhea 2023   • Hypomagnesemia 2023   • Metastatic adenocarcinoma ([address]) 2023      LOS (days): 2  Geometric Mean LOS (GMLOS) (days):   Days to GMLOS:     OBJECTIVE:  Risk of Unplanned Readmission Score: 22.79         Current admission status: Inpatient   Preferred Pharmacy:   [address] [place] [address]  [address] [place] [address]  Phone: [phone] Fax: [phone]    Primary Care Provider: No primary care provider on file.     Primary Insurance: [place] MEDICAID  Secondary Insurance:     DISCHARGE DETAILS:    Discharge planning discussed with[de-identified] patient  Freedom of Choice: Yes  Comments - Freedom of Choice: pt denies any d/c needs                               Other Referral/Resources/Interventions Provided:  Referral Comments: pt nto staobe for d/c - pt still has vomiting , pt has poor PO intake    Would you like to participate in our [id number] [employer] service program?  : No - Declined    Treatment Team Recommendation: Home (home with Spouse & outpt folllow up- spouse)

## 2023-07-19 NOTE — ASSESSMENT & PLAN NOTE
· Diagnosed May 2023, primary source is lung with additional pancreatic mass (concern for second primary)  · Supposed to have port placed in [place] later this week to begin treatment  · IR not recommending inpatient Port placement at this time

## 2023-07-19 NOTE — ASSESSMENT & PLAN NOTE
· Patient has chronic abdominal pain with intermittent nausea, vomiting, and diarrhea  · Reports improvement in her nausea and abdominal pain this AM; no diarrhea noted  · CT abdomen and pelvis: Increased size of pancreatic mass, primary versus secondary malignancy, with evidence of new secondary pancreatic duct obstruction  · GI and Palliative care evaluation appreciated

## 2023-07-19 NOTE — ASSESSMENT & PLAN NOTE
· POA as evidenced by tachycardic with leukocytosis  · With evidence of pneumonia and possible infectious colitis on CT  · BClx (7/17): NGTD  · CT C/A/P (7/18): New mild left lung opacities, likely infectious/inflammatory. New mild diffuse colon wall thickening, most likely infectious/inflammatory.   · As no antibiotics are required from a GI perspective  · Continue Cefepime; plan for de-escalation once patient is comfort with PO intake

## 2023-07-20 ENCOUNTER — TELEPHONE (OUTPATIENT)
Dept: PALLIATIVE MEDICINE | Facility: CLINIC | Age: 63
End: 2023-07-20

## 2023-07-20 VITALS
BODY MASS INDEX: 20.24 KG/M2 | HEART RATE: 81 BPM | RESPIRATION RATE: 18 BRPM | SYSTOLIC BLOOD PRESSURE: 149 MMHG | TEMPERATURE: 98.7 F | WEIGHT: 110 LBS | DIASTOLIC BLOOD PRESSURE: 81 MMHG | HEIGHT: 62 IN | OXYGEN SATURATION: 97 %

## 2023-07-20 LAB
ANION GAP SERPL CALCULATED.3IONS-SCNC: 8 MMOL/L
BUN SERPL-MCNC: 8 MG/DL (ref 5–25)
CALCIUM SERPL-MCNC: 8.3 MG/DL (ref 8.4–10.2)
CHLORIDE SERPL-SCNC: 102 MMOL/L (ref 96–108)
CO2 SERPL-SCNC: 22 MMOL/L (ref 21–32)
CREAT SERPL-MCNC: 0.5 MG/DL (ref 0.6–1.3)
ERYTHROCYTE [DISTWIDTH] IN BLOOD BY AUTOMATED COUNT: 18.7 % (ref 11.6–15.1)
GFR SERPL CREATININE-BSD FRML MDRD: 103 ML/MIN/1.73SQ M
GLUCOSE SERPL-MCNC: 85 MG/DL (ref 65–140)
HCT VFR BLD AUTO: 32.4 % (ref 34.8–46.1)
HGB BLD-MCNC: 9.8 G/DL (ref 11.5–15.4)
MAGNESIUM SERPL-MCNC: 2 MG/DL (ref 1.9–2.7)
MCH RBC QN AUTO: 24.7 PG (ref 26.8–34.3)
MCHC RBC AUTO-ENTMCNC: 30.2 G/DL (ref 31.4–37.4)
MCV RBC AUTO: 82 FL (ref 82–98)
PLATELET # BLD AUTO: 624 THOUSANDS/UL (ref 149–390)
PMV BLD AUTO: 8.4 FL (ref 8.9–12.7)
POTASSIUM SERPL-SCNC: 3.9 MMOL/L (ref 3.5–5.3)
RBC # BLD AUTO: 3.96 MILLION/UL (ref 3.81–5.12)
SODIUM SERPL-SCNC: 132 MMOL/L (ref 135–147)
WBC # BLD AUTO: 8.32 THOUSAND/UL (ref 4.31–10.16)

## 2023-07-20 PROCEDURE — 80048 BASIC METABOLIC PNL TOTAL CA: CPT | Performed by: INTERNAL MEDICINE

## 2023-07-20 PROCEDURE — 99232 SBSQ HOSP IP/OBS MODERATE 35: CPT | Performed by: PHYSICIAN ASSISTANT

## 2023-07-20 PROCEDURE — 83735 ASSAY OF MAGNESIUM: CPT | Performed by: INTERNAL MEDICINE

## 2023-07-20 PROCEDURE — 99239 HOSP IP/OBS DSCHRG MGMT >30: CPT | Performed by: INTERNAL MEDICINE

## 2023-07-20 PROCEDURE — 85027 COMPLETE CBC AUTOMATED: CPT | Performed by: INTERNAL MEDICINE

## 2023-07-20 RX ORDER — HALOPERIDOL 0.5 MG/1
2 TABLET ORAL EVERY 6 HOURS
Status: DISCONTINUED | OUTPATIENT
Start: 2023-07-20 | End: 2023-07-20 | Stop reason: HOSPADM

## 2023-07-20 RX ORDER — HYDROMORPHONE HYDROCHLORIDE 2 MG/1
8 TABLET ORAL 4 TIMES DAILY
Status: DISCONTINUED | OUTPATIENT
Start: 2023-07-20 | End: 2023-07-20 | Stop reason: HOSPADM

## 2023-07-20 RX ORDER — SENNOSIDES 8.6 MG
2 TABLET ORAL
Status: DISCONTINUED | OUTPATIENT
Start: 2023-07-20 | End: 2023-07-20 | Stop reason: HOSPADM

## 2023-07-20 RX ORDER — BISACODYL 10 MG
10 SUPPOSITORY, RECTAL RECTAL DAILY PRN
Status: DISCONTINUED | OUTPATIENT
Start: 2023-07-20 | End: 2023-07-20 | Stop reason: HOSPADM

## 2023-07-20 RX ORDER — HALOPERIDOL 2 MG/1
2 TABLET ORAL EVERY 6 HOURS
Qty: 20 TABLET | Refills: 0 | Status: SHIPPED | OUTPATIENT
Start: 2023-07-20

## 2023-07-20 RX ORDER — POLYETHYLENE GLYCOL 3350 17 G/17G
17 POWDER, FOR SOLUTION ORAL DAILY
Status: DISCONTINUED | OUTPATIENT
Start: 2023-07-20 | End: 2023-07-20 | Stop reason: HOSPADM

## 2023-07-20 RX ADMIN — HALOPERIDOL 2 MG: 0.5 TABLET ORAL at 10:29

## 2023-07-20 RX ADMIN — KETOROLAC TROMETHAMINE 15 MG: 30 INJECTION, SOLUTION INTRAMUSCULAR; INTRAVENOUS at 04:55

## 2023-07-20 RX ADMIN — ENOXAPARIN SODIUM 40 MG: 40 INJECTION SUBCUTANEOUS at 08:59

## 2023-07-20 RX ADMIN — HALOPERIDOL 2 MG: 0.5 TABLET ORAL at 15:29

## 2023-07-20 RX ADMIN — CYANOCOBALAMIN TAB 500 MCG 500 MCG: 500 TAB at 08:59

## 2023-07-20 RX ADMIN — HYDROMORPHONE HYDROCHLORIDE 8 MG: 2 TABLET ORAL at 10:29

## 2023-07-20 RX ADMIN — LOSARTAN POTASSIUM 100 MG: 50 TABLET, FILM COATED ORAL at 08:59

## 2023-07-20 RX ADMIN — CEFEPIME HYDROCHLORIDE 2000 MG: 2 INJECTION, SOLUTION INTRAVENOUS at 08:03

## 2023-07-20 RX ADMIN — HALOPERIDOL LACTATE 2 MG: 5 INJECTION, SOLUTION INTRAMUSCULAR at 05:17

## 2023-07-20 RX ADMIN — FERROUS SULFATE TAB 325 MG (65 MG ELEMENTAL FE) 325 MG: 325 (65 FE) TAB at 08:03

## 2023-07-20 RX ADMIN — CEFEPIME HYDROCHLORIDE 2000 MG: 2 INJECTION, SOLUTION INTRAVENOUS at 01:24

## 2023-07-20 RX ADMIN — HYDROMORPHONE HYDROCHLORIDE 2 MG: 2 INJECTION, SOLUTION INTRAMUSCULAR; INTRAVENOUS; SUBCUTANEOUS at 13:40

## 2023-07-20 RX ADMIN — POLYETHYLENE GLYCOL 3350 17 G: 17 POWDER, FOR SOLUTION ORAL at 10:29

## 2023-07-20 RX ADMIN — ONDANSETRON 4 MG: 2 INJECTION INTRAMUSCULAR; INTRAVENOUS at 08:00

## 2023-07-20 RX ADMIN — KETOROLAC TROMETHAMINE 15 MG: 30 INJECTION, SOLUTION INTRAMUSCULAR; INTRAVENOUS at 15:28

## 2023-07-20 RX ADMIN — BUPROPION HYDROCHLORIDE 300 MG: 150 TABLET, EXTENDED RELEASE ORAL at 08:59

## 2023-07-20 RX ADMIN — DOCUSATE SODIUM 100 MG: 100 CAPSULE, LIQUID FILLED ORAL at 08:59

## 2023-07-20 NOTE — UTILIZATION REVIEW
Initial Clinical Review    Admission: Date/Time/Statement:   Admission Orders (From admission, onward)     Ordered        07/17/23 1204  INPATIENT ADMISSION  Once                      Orders Placed This Encounter   Procedures   • INPATIENT ADMISSION     Standing Status:   Standing     Number of Occurrences:   1     Order Specific Question:   Level of Care     Answer:   Med Surg [16]     Order Specific Question:   Estimated length of stay     Answer:   More than 2 Midnights     Order Specific Question:   Certification     Answer:   I certify that inpatient services are medically necessary for this patient for a duration of greater than two midnights. See H&P and MD Progress Notes for additional information about the patient's course of treatment. ED Arrival Information     Expected   -    Arrival   7/17/2023 06:49    Acuity   Emergent            Means of arrival   Walk-In    Escorted by   Spouse    Service   Hospitalist    Admission type   Emergency            Arrival complaint   major abdominal pain, vomiting, diarrhea           Chief Complaint   Patient presents with   • Abdominal Pain     Pt reports severe abd pain, n/v/d; hx lung and pancreatic CA       Initial Presentation: 61 y.o. female to the ED from home with complaints of abdominal pain despite po dilaudid, fentanyl patch, n/v/d. Admitted to inpatient for severe sepsis, iron deficiency anemia, abdominal pain. H/O metastatic adenocarcinoma . Diagnosed with malignancy 2 months prior and has not had further oncological care. Due for PET scan tomorrow in [place]. Arrives tachycardic, tachypneic. WBCs 16.69, procal elevated, lactic acid 2.1. CT c/a/p shows: Increased size of pancreatic mass, primary versus secondary malignancy, with evidence of new secondary pancreatic duct obstruction. New left lung opacity, similar appearance known right hilar malignancy, lymphadenopathy. Blood/urine cultures pending. Started on IV abx. Given IV fluids in the ED. GI consult: Has had chronic N/v since malignancy diagnosis. Possible infectious colitis vs pancreatic neuroendocrine tumor may be causing symptoms. Clear liquid diet. Labs are all within normal limits and pain distribution not indicative of pancreatic cause of symptoms. Attempt to obtain report from pancreatic lesion. Date: 7/18   Day 2: CT C/A/P   New mild left lung opacities, likely infectious/inflammatory. New mild diffuse colon wall thickening, most likely infectious/inflammatory. As per GI, no abx needed at this time. She has continued abdominal tenderness. GI consult: N/V improved. Still with some abdominal pain. Tolerating po liquids.      ED Triage Vitals [07/17/23 0708]   Temperature Pulse Respirations Blood Pressure SpO2   98.1 °F (36.7 °C) (!) 117 (!) 24 168/77 96 %      Temp Source Heart Rate Source Patient Position - Orthostatic VS BP Location FiO2 (%)   Temporal Monitor Lying Right arm --      Pain Score       10 - Worst Possible Pain          Wt Readings from Last 1 Encounters:   07/17/23 49.9 kg (110 lb)     Additional Vital Signs:   Date/Time Temp Pulse Resp BP MAP (mmHg) SpO2 Calculated FIO2 (%) - Nasal Cannula Nasal Cannula O2 Flow Rate (L/min) O2 Device Patient Position - Orthostatic VS   07/18/23 06:56:10 98.4 °F (36.9 °C) 98 18 129/68 88 97 % -- -- -- --   07/18/23 0300 -- 112 Abnormal  -- -- -- 97 % -- -- None (Room air) --   07/18/23 0100 -- 104 -- -- -- 94 % 28 2 L/min Nasal cannula --   07/17/23 2300 -- 113 Abnormal  -- -- -- 96 % 28 2 L/min Nasal cannula --   07/17/23 2255 98.1 °F (36.7 °C) 111 Abnormal  20 149/80 103 94 % -- -- None (Room air) Lying   07/17/23 22:41:32 98.2 °F (36.8 °C) 102 18 141/71 94 97 % -- -- -- Lying   07/17/23 2000 -- 107 Abnormal  16 134/73 93 98 % 24 1 L/min Nasal cannula Lying   07/17/23 19:55:30 97.5 °F (36.4 °C) 101 20 134/73 93 98 % -- -- -- --   07/17/23 1941 97.4 °F (36.3 °C) Abnormal  107 Abnormal  20 136/86 -- 98 % 28 2 L/min Nasal cannula -- 07/17/23 1830 -- 104 -- -- -- 98 % -- -- -- --   07/17/23 1745 -- 110 Abnormal  19 171/81 Abnormal  116 97 % -- -- -- --   07/17/23 1615 98.6 °F (37 °C) 106 Abnormal  19 131/60 86 98 % -- -- -- --   07/17/23 0900 -- 117 Abnormal  22 155/72 103 96 % -- -- -- --   07/17/23 0845 -- 119 Abnormal  22 164/89 115 95 % -- -- -- --   07/17/23 0800 -- 116 Abnormal  24 Abnormal  167/81 116 95 % -- -- -- --   07/17/23 0708 98.1 °F (36.7 °C) 117 Abnormal  24 Abnormal  168/77 110 96 % -- -- None (Room air) Lying       Pertinent Labs/Diagnostic Test Results:   7/17 EKG:  Sinus tachycardia with Premature supraventricular complexes  Nonspecific T wave abnormality  When compared with ECG of 21-MAY-2023 12:08,  Premature supraventricular complexes are now Present  CT chest abdomen pelvis w contrast   Final Result by Kimberly Rosales MD (07/17 1339)      Chest:   New, mild left lung opacities, likely infectious/inflammatory. Recommend follow-up in 3 months. Similar appearance of known right hilar malignancy, lymphadenopathy and secondary right lung collapse compared to 5/21/2023. Accompanying      Trace, decreased right pleural effusion. Other accompanying and stable findings above. Abdomen and pelvis:   Increased size of pancreatic mass, primary versus secondary malignancy, with evidence of new secondary pancreatic duct obstruction. New mild diffuse colon wall thickening, most likely infectious/inflammatory. No evidence of obstruction. Workstation performed: [id number]         XR chest 1 view portable   Final Result by Kimberly Rosales MD (16/15 5052)   Similar appearance of the chest to 5/29/2023. Please see today's chest CT for further evaluation.                   Workstation performed: [id number]               Results from last 7 days   Lab Units 07/20/23  0457 07/19/23  0456 07/18/23  0458 07/17/23  0712   WBC Thousand/uL 8.32 10.63* 17.79* 16.69*   HEMOGLOBIN g/dL 9.8* 9.1* 9.8* 10.5*   HEMATOCRIT % 32.4* 30.1* 32.1* 34.9   PLATELETS Thousands/uL 624* 482* 514* 530*   NEUTROS ABS Thousands/µL  --   --  15.23*  --          Results from last 7 days   Lab Units 07/20/23  0457 07/19/23  0456 07/18/23  0458 07/17/23  0712   SODIUM mmol/L 132* 132* 131* 133*   POTASSIUM mmol/L 3.9 4.0 3.1* 4.0   CHLORIDE mmol/L 102 102 98 97   CO2 mmol/L 22 21 22 23   ANION GAP mmol/L 8 9 11 13   BUN mg/dL 8 8 9 11   CREATININE mg/dL 0.50* 0.51* 0.49* 0.58*   EGFR ml/min/1.73sq m 103 102 104 98   CALCIUM mg/dL 8.3* 8.2* 8.6 9.4   MAGNESIUM mg/dL 2.0 1.5*  --   --      Results from last 7 days   Lab Units 07/17/23  0712   AST U/L 7*   ALT U/L 6*   ALK PHOS U/L 96   TOTAL PROTEIN g/dL 6.8   ALBUMIN g/dL 3.3*   TOTAL BILIRUBIN mg/dL 0.41         Results from last 7 days   Lab Units 07/20/23  0457 07/19/23  0456 07/18/23  0458 07/17/23  0712   GLUCOSE RANDOM mg/dL 85 98 147* 186*       Results from last 7 days   Lab Units 07/17/23  0712   PROTIME seconds 15.1*   INR  1.19   PTT seconds 27       Results from last 7 days   Lab Units 07/18/23  0458 07/17/23  0712   PROCALCITONIN ng/ml 0.26* 0.38*     Results from last 7 days   Lab Units 07/17/23  1009 07/17/23  0712   LACTIC ACID mmol/L 0.9 2.1*       Results from last 7 days   Lab Units 07/17/23  0712   LIPASE u/L 56     Results from last 7 days   Lab Units 07/17/23  0910   CLARITY UA  Cloudy*   COLOR UA  Yellow   SPEC GRAV UA  1.010   PH UA  7.5   GLUCOSE UA mg/dl Negative   KETONES UA mg/dl Negative   BLOOD UA  Negative   PROTEIN UA mg/dl Trace*   NITRITE UA  Negative   BILIRUBIN UA  Negative   UROBILINOGEN UA E.U./dl 0.2   LEUKOCYTES UA  1+*   WBC UA /hpf 10-20*   RBC UA /hpf 2-4   BACTERIA UA /hpf Occasional   EPITHELIAL CELLS WET PREP /hpf Moderate*       ED Treatment:   Medication Administration from 07/17/2023 0649 to 07/17/2023 1944       Date/Time Order Dose Route Action Comments     07/17/2023 0710 EDT sodium chloride 0.9 % bolus 1,000 mL 1,000 mL Intravenous New Bag --     07/17/2023 0749 EDT HYDROmorphone (DILAUDID) injection 1 mg 1 mg Intravenous Given --     07/17/2023 0727 EDT droperidol (INAPSINE) injection 0.625 mg 0.625 mg Intravenous Given --     07/17/2023 0721 EDT ketorolac (TORADOL) injection 15 mg 15 mg Intravenous Given --     07/17/2023 0749 EDT HYDROmorphone (DILAUDID) injection 2 mg 2 mg Intravenous Given --     07/17/2023 6231 EDT HYDROmorphone (DILAUDID) injection 2 mg 2 mg Intravenous Given --     07/17/2023 0824 EDT cefepime (MAXIPIME) IVPB (premix in dextrose) 2,000 mg 50 mL 2,000 mg Intravenous New Bag --     07/17/2023 1157 EDT HYDROmorphone (DILAUDID) injection 2 mg 2 mg Intravenous Given --     07/17/2023 1215 EDT levofloxacin (LEVAQUIN) IVPB (premix in dextrose) 750 mg 150 mL 750 mg Intravenous New Bag --     07/17/2023 1448 EDT HYDROmorphone (DILAUDID) injection 2 mg 2 mg Intravenous Given --     07/17/2023 1744 EDT docusate sodium (COLACE) capsule 100 mg 100 mg Oral Given --     07/17/2023 1532 EDT losartan (COZAAR) tablet 100 mg 100 mg Oral Given --     07/17/2023 1737 EDT acetaminophen (TYLENOL) tablet 650 mg 650 mg Oral Given --     07/17/2023 1532 EDT enoxaparin (LOVENOX) subcutaneous injection 40 mg 40 mg Subcutaneous Given --     07/17/2023 1607 EDT cefepime (MAXIPIME) IVPB (premix in dextrose) 2,000 mg 50 mL 2,000 mg Intravenous New Bag --     07/17/2023 1535 EDT metroNIDAZOLE (FLAGYL) IVPB (premix) 500 mg 100 mL 500 mg Intravenous New Bag --        Past Medical History:   Diagnosis Date   • Asthma    • Diabetes mellitus ([address])      Present on Admission:  • Metastatic adenocarcinoma ([address])  • Abdominal pain  • Iron deficiency anemia  • Hypomagnesemia      Admitting Diagnosis: Colitis [K52.9]  Pneumonia [J18.9]  Abdominal pain [R10.9]  Metastatic adenocarcinoma ([address]) [C79.9]  Metastatic lung cancer (metastasis from lung to other site) ([address]) [C34.90]  Severe sepsis ([address]) [A41.9, R65.20]  Age/Sex: 61 y.o. female  Admission Orders:  UP and oob  Scd, I/o  Scheduled Medications:  buPROPion, 300 mg, Oral, QAM  cefepime, 2,000 mg, Intravenous, Q8H  cyanocobalamin, 500 mcg, Oral, Daily  docusate sodium, 100 mg, Oral, BID  enoxaparin, 40 mg, Subcutaneous, Daily  ferrous sulfate, 325 mg, Oral, Daily With Breakfast  losartan, 100 mg, Oral, Daily  metroNIDAZOLE, 500 mg, Intravenous, Q8H  potassium chloride, 40 mEq, Oral, Q2H  senna, 8.6 mg, Oral, HS      Continuous IV Infusions:     PRN Meds:  acetaminophen, 650 mg, Oral, Q6H PRN  HYDROmorphone, 2 mg, Intravenous, Q4H PRN x 2 7/17, 7/18  ondansetron, 4 mg, Intravenous, Q6H PRN x 2 7/18        IP CONSULT TO GASTROENTEROLOGY  IP CONSULT TO GASTROENTEROLOGY  IP CONSULT TO PALLIATIVE CARE  INPATIENT CONSULT TO IR    Network Utilization Review Department  ATTENTION: Please call with any questions or concerns to [phone] and carefully listen to the prompts so that you are directed to the right person. All voicemails are confidential.  Ashely Aguilar all requests for admission clinical reviews, approved or denied determinations and any other requests to dedicated fax number below belonging to the campus where the patient is receiving treatment.  List of dedicated fax numbers for the Facilities:  [place] DENIALS (Administrative/Medical Necessity) [phone] [address] [facility] (Maternity/NICU/Pediatrics) [phone]   [address] [place] [address] [phone]   [facility] [address] [facility] [phone]   [facility] [address] [place] [address] [phone]   [address] [facility] [address] [facility] [address] [phone]

## 2023-07-20 NOTE — NURSING NOTE
Awake  and alert, oriented. Medicated for pain and nausea earlier. Room air; denies shortness of breath/dyspnea.

## 2023-07-20 NOTE — ASSESSMENT & PLAN NOTE
· Diagnosed May 2023, primary source is lung with additional pancreatic mass (concern for second primary)  · IR not recommending inpatient Port placement at this time  · Maintain scheduled appointments with radiologist and oncologist

## 2023-07-20 NOTE — UTILIZATION REVIEW
NOTIFICATION OF INPATIENT ADMISSION   AUTHORIZATION REQUEST   SERVICING FACILITY:   [address] [place] [address] AFFILIATED WITH [address]  Tax ID: [id number]  NPI: [id number]   ATTENDING PROVIDER:  Attending Name and NPI#: Issac Franco [[id number]]  Address: [address] [place] [address] AFFILIATED WITH [address]  Phone: [phone]     ADMISSION INFORMATION:  [facility]patient [address] [facility]   Place of Service Code: 21  Inpatient Admission Date/Time: 7/17/23 12:04 PM  Discharge Date/Time: No discharge date for patient encounter. Admitting Diagnosis Code/Description:  Colitis [K52.9]  Pneumonia [J18.9]  Abdominal pain [R10.9]  Metastatic adenocarcinoma ([address] [facility]) [C79.9]  Metastatic lung cancer (metastasis from lung to other site) [facility]) [C34.90]  Severe sepsis ([address] [facility]) [A41.9, R65.20]     UTILIZATION REVIEW CONTACT:  David Carrington Utilization   Network Utilization Review Department  Phone: [phone]  Fax [phone]  Email: [email or web address]  Contact for approvals/pending authorizations, clinical reviews, and discharge. PHYSICIAN ADVISORY SERVICES:  Medical Necessity Denial & Fele-jw-Gsgj Review  Phone: [phone]  Fax: [phone]  Email: [email or web address]

## 2023-07-20 NOTE — PROGRESS NOTES
Progress Note - Palliative & Supportive Care  Zoran Hubbard  61 y.o.  female  /-01   MRN: [id number]  Encounter: [id number]     Assessment  Present on Admission:  • Metastatic adenocarcinoma [facility])  • Abdominal pain  • Iron deficiency anemia       Active issues addressed today:  Metastatic adenocarcinoma  Cancer-related pain  Abdominal pain  Palliative care consult  Goals of care counseling    Plan  1. Symptom management  • Pain - better today. o Schedule Dilaudid 8mg tab PO QID  o Continue Dilaudid IV 2mg q3h PRN breakthrough  o Continue Toradol 15mg IV q6h PRN mod pain  • Nausea  o Schedule PO haldol, IV prn if not tolerating PO  - Haldol sent to pharmacy in anticipation of discharge  o Continue zofran IV PRN  • Bowel regimen  o Increase senna 2 tabs qhs  o Start miralax daily  o Add Dulcolax supp prn    2. Goals  Level 1 code status. Full code. Disease focused care. No limits placed. • Patient is focused on treating her cancer, she intends to follow up with her oncologist in [place]  • She continues to follow with palliative care in [place]  • Patient feels better and is tolerating PO and believes she could be discharged home today. • Patient has enough of her pain medicines on hand for her transport back to [place]. 3.  Social support  • Patient is supported by spouse, Park [place]  • Currently they are staying at their campground [place] which thye have a permanent residence they typically us itzel the weekends  • They live in [address]  · Supportive listening provided  · Normalized experience of patient/family  · Provided anxiety containment  · Provided anticipatory guidance    4. Follow up  • Palliative Care will continue to follow for symptom management and goals of care discussions will be ongoing. • If patient is not discharged, will follow up next week. Palliative will be in the hospital next Thursday, but can provide remote assistance if needed.    • Please reach out to on-call provider via [employer] if questions or concerns arise. Interval History  Chart reviewed before visit. She is feeling much better today. Her pain is better controlled now that the Fentanyl has kicked in  She feels she still needs better pain control and was not aware she had to ask for the Dilaudid, asking if this could be scheduled. She is tolerating PO now that her nausea has improved with haldol. She feels she can start taking some medicines by mouth today. Last BM was Sunday. Colace and senna not effective. She does also use miralax at home with juice.      Current pain location(s): abd  Pain Scale:   8  PRN Use of Pain Medications: Dil IV x 2, Toradol IV x4  24 hour Total OME for 07.19-07/20: 80    Review of Systems  All other systems negative    Medications    Current Facility-Administered Medications:   •  acetaminophen (TYLENOL) tablet 650 mg, 650 mg, Oral, Q6H PRN, Denisnice Polly, CRNP, 650 mg at 07/17/23 1737  •  buPROPion (WELLBUTRIN XL) 24 hr tablet 300 mg, 300 mg, Oral, QAM, Lennice Ashkans, CRNP, 300 mg at 07/20/23 2350  •  cefepime (MAXIPIME) IVPB (premix in dextrose) 2,000 mg 50 mL, 2,000 mg, Intravenous, Q8H, Denisnice Polly, CRNP, Last Rate: 100 mL/hr at 07/20/23 0803, 2,000 mg at 07/20/23 0803  •  cyanocobalamin (VITAMIN B-12) tablet 500 mcg, 500 mcg, Oral, Daily, Lennice Ashkans, CRNP, 500 mcg at 07/20/23 2363  •  docusate sodium (COLACE) capsule 100 mg, 100 mg, Oral, BID, Lennice Manners, CRNP, 100 mg at 07/20/23 4307  •  enoxaparin (LOVENOX) subcutaneous injection 40 mg, 40 mg, Subcutaneous, Daily, Lennice Manners, CRNP, 40 mg at 07/20/23 0859  •  fentaNYL (DURAGESIC) 100 mcg/hr TD 72 hr patch 100 mcg, 100 mcg, Transdermal, Q72H, Jose Eduardo Siegel DO, 100 mcg at 07/18/23 1442  •  ferrous sulfate tablet 325 mg, 325 mg, Oral, Daily With Breakfast, BRITTNY Lockwood, 325 mg at 07/20/23 6831  •  haloperidol lactate (HALDOL) injection 2 mg, 2 mg, Intramuscular, Q6H PRN, Jose Eduardo Siegel, DO, 2 mg at 07/20/23 0515  •  HYDROmorphone (DILAUDID) injection 2 mg, 2 mg, Intravenous, Q3H PRN, Bandar Hartmann PA-C, 2 mg at 07/19/23 9638  •  ketorolac (TORADOL) injection 15 mg, 15 mg, Intravenous, Q6H PRN, [place] Siegel, DO, 15 mg at 07/20/23 0455  •  losartan (COZAAR) tablet 100 mg, 100 mg, Oral, Daily, JOYCE RosarioNP, 100 mg at 07/20/23 6462  •  [facility]ron [facility]) injection 4 mg, 4 mg, Intravenous, Q6H PRN, Cheryl Leone CRNP, 4 mg at 07/20/23 0800  •  senna (SENOKOT) tablet 8.6 mg, 8.6 mg, Oral, HS, Cheryl Vasu, CRNP, 8.6 mg at 07/19/23 2221    Objective  /81 (BP Location: Left arm)   Pulse 81   Temp 98.7 °F (37.1 °C) (Oral)   Resp 18   Ht 5' 1.5" (1.562 m)   Wt 49.9 kg (110 lb)   SpO2 97%   BMI 20.45 kg/m²   Physical Exam  Vitals and nursing note reviewed. Constitutional:       General: She is not in acute distress. Appearance: She is ill-appearing. HENT:      Head: Normocephalic and atraumatic. Right Ear: External ear normal.      Left Ear: External ear normal.      Nose: Nose normal.      Mouth/Throat:      Pharynx: Oropharynx is clear. Eyes:      Conjunctiva/sclera: Conjunctivae normal.   Cardiovascular:      Rate and Rhythm: Normal rate. Pulmonary:      Effort: Pulmonary effort is normal.   Musculoskeletal:         General: Normal range of motion. Skin:     Coloration: Skin is not jaundiced. Findings: No erythema or rash. Neurological:      Mental Status: She is alert and oriented to person, place, and time. Psychiatric:         Mood and Affect: Mood normal.         Behavior: Behavior normal.         Thought Content: Thought content normal.         Judgment: Judgment normal.       Lab Results:   I have personally reviewed pertinent labs. , CBC:   Lab Results   Component Value Date    WBC 8.32 07/20/2023    HGB 9.8 (L) 07/20/2023    HCT 32.4 (L) 07/20/2023    MCV 82 07/20/2023     (H) 07/20/2023    RBC 3.96 07/20/2023    MCH 24.7 (L) 07/20/2023    MCHC 30.2 (L) 07/20/2023    RDW 18.7 (H) 07/20/2023    MPV 8.4 (L) 07/20/2023   , CMP:   Lab Results   Component Value Date    SODIUM 132 (L) 07/20/2023    K 3.9 07/20/2023     07/20/2023    CO2 22 07/20/2023    BUN 8 07/20/2023    CREATININE 0.50 (L) 07/20/2023    CALCIUM 8.3 (L) 07/20/2023    EGFR 103 07/20/2023     Imaging Studies: I have personally reviewed pertinent reports. No new studies. EKG, Pathology, and Other Studies: I have personally reviewed pertinent reports. Counseling / Coordination of Care  Total floor / unit time spent today 40 minutes. Greater than 50% of total time was spent with the patient and / or family counseling and / or coordinating of care. A description of the counseling / coordination of care: Chart reviewed, provided medical updates, determined goals of care, discussed palliative care and symptom management, provided anticipatory guidance, determined competency and POA/HCA, determined social/family support, provided psychosocial support. Reviewed with AHSAN GUADARRAMA. Deborah Estevez PA-C  Palliative and Supportive Care  Clinic/Answering Service: [phone]  You can find me on [employer]! Portions of this document may have been created using dictation software and as such some "sound alike" terms may have been generated by the system. Do not hesitate to contact me with any questions or clarifications.

## 2023-07-20 NOTE — ASSESSMENT & PLAN NOTE
· POA as evidenced by tachycardic with leukocytosis  · With evidence of pneumonia and possible infectious colitis on CT  · BClx (7/17): NGTD  · CT C/A/P (7/18): New mild left lung opacities, likely infectious/inflammatory. New mild diffuse colon wall thickening, most likely infectious/inflammatory.   · As no antibiotics are required from a GI perspective  · Discharge home with an additional 4 days of oral Levaquin complete treatment course

## 2023-07-20 NOTE — ASSESSMENT & PLAN NOTE
· Patient has chronic abdominal pain with intermittent nausea, vomiting, and diarrhea  · Ports improvement in her symptoms this morning and is tolerating a regular diet  · CT abdomen and pelvis: Increased size of pancreatic mass, primary versus secondary malignancy, with evidence of new secondary pancreatic duct obstruction  · GI and Palliative care evaluation appreciated  · Continue outpatient follow-up with previously established palliative care physician

## 2023-07-20 NOTE — CASE MANAGEMENT
Case Management Discharge Planning Note    Patient name Sari Humphrey  Location [address] [facility] [place] 208/-78 MRN [id number]  : 1960 Date 2023       Current Admission Date: 2023  Current Admission Diagnosis:Severe sepsis [facility])   Patient Active Problem List    Diagnosis Date Noted   • Hypokalemia 2023   • Severe sepsis ([address]) 2023   • Anxiety 2023   • Leukocytosis    • Anemia    • Iron deficiency anemia 2023   • Abdominal pain 2023   • Nausea & vomiting 2023   • Diarrhea 2023   • Hypomagnesemia 2023   • Metastatic adenocarcinoma ([address]) 2023      LOS (days): 3  Geometric Mean LOS (GMLOS) (days):   Days to GMLOS:     OBJECTIVE:  Risk of Unplanned Readmission Score: 23.72         Current admission status: Inpatient   Preferred Pharmacy:   [address] [place] [address], [address] [place] [id number]  Phone: [phone] Fax: [phone]    Primary Care Provider: No primary care provider on file. Primary Insurance: [place] [employer]ICA[employer]  Secondary Insurance:     DISCHARGE DETAILS:    Discharge planning discussed with[de-identified] patient and spouse at the bedside  Freedom of Choice: Yes  Comments - Freedom of Choice: pt denies any d/c needs-  pt and spouse are in agreement with the d/c and d/c plan  - pt will be d/c to her camper for the weekend and then she will return back to [address] [place] [address] contacted family/caregiver?: Yes  Were Treatment Team discharge recommendations reviewed with patient/caregiver?: Yes  Did patient/caregiver verbalize understanding of patient care needs?: Yes  Were patient/caregiver advised of the risks associated with not following Treatment Team discharge recommendations?: Yes    Contacts  Patient Contacts: Nick RAI  Relationship to Patient[de-identified] Family  Contact Method:  In Person  Reason/Outcome: Discharge  [facility]         Is the patient interested in [facility] at discharge?: No    DME Referral Provided  Referral made for DME?: No    Other Referral/Resources/Interventions Provided:  Interventions:  Other (Specify)  Referral Comments: pt will return to home after the weekend and then she will return to her home and continue with her outpt care    Would you like to participate in our [address] [facility] [address] service program?  : No - Declined    Treatment Team Recommendation: Home (home with spouse & outpt follow up- spouse)  Discharge Destination Plan[de-identified] Home (home with spouse & outp follow up)  Transport at Discharge : Automobile, Family                       Accompanied by: Family member

## 2023-07-20 NOTE — DISCHARGE SUMMARY
[address]  Discharge- Sander Maldonado 1960, 61 y.o. female MRN: [id number]  Unit/Bed#: -01 Encounter: [id number]  Primary Care Provider: No primary care provider on file. Date and time admitted to hospital: 7/17/2023  7:07 AM    * Severe sepsis [facility])  Assessment & Plan  · POA as evidenced by tachycardic with leukocytosis  · With evidence of pneumonia and possible infectious colitis on CT  · BClx (7/17): NGTD  · CT C/A/P (7/18): New mild left lung opacities, likely infectious/inflammatory. New mild diffuse colon wall thickening, most likely infectious/inflammatory. · As no antibiotics are required from a GI perspective  · Discharge home with an additional 4 days of oral Levaquin complete treatment course    Abdominal pain  Assessment & Plan  · Patient has chronic abdominal pain with intermittent nausea, vomiting, and diarrhea  · Ports improvement in her symptoms this morning and is tolerating a regular diet  · CT abdomen and pelvis: Increased size of pancreatic mass, primary versus secondary malignancy, with evidence of new secondary pancreatic duct obstruction  · GI and Palliative care evaluation appreciated  · Continue outpatient follow-up with previously established palliative care physician    Metastatic adenocarcinoma [facility])  Assessment & Plan  · Diagnosed May 2023, primary source is lung with additional pancreatic mass (concern for second primary)  · IR not recommending inpatient Port placement at this time  · Maintain scheduled appointments with radiologist and oncologist    Iron deficiency anemia  Assessment & Plan  · Hemoglobin is currently stable  · Continue to monitor and transfuse for hemoglobin less than 7      Medical Problems     Resolved Problems  Date Reviewed: 6/5/2023   None       Discharging Physician / Practitioner: Vidal Quiroz DO  PCP: No primary care provider on file.   Admission Date:   Admission Orders (From admission, onward)     Ordered 07/17/23 826  [address] [place] [address]  Once                      Discharge Date: 07/20/23    Consultations During Hospital Stay:  · GI, Palliative care, IR    Procedures Performed:   · None    Significant Findings / Test Results:   · CT C/A/P (7/18): New mild left lung opacities, likely infectious/inflammatory. New mild diffuse colon wall thickening, most likely infectious/inflammatory. · CT abdomen and pelvis: Increased size of pancreatic mass, primary versus secondary malignancy, with evidence of new secondary pancreatic duct obstruction    Incidental Findings:   · None     Test Results Pending at Discharge (will require follow up): · None     Outpatient Tests Requested:  · To be determined upon outpatient follow-up with primary care physician, IR provider, and oncology    Complications:  None    Reason for Admission: Sepsis due to Pneumonia    Hospital Course:   Zeke Ashby is a 61 y.o. female patient who originally presented to the hospital on 7/17/2023 due to abdominal pain. Please see H&P as documented by Mal Score for complete details regarding history of presenting illness. In brief, the patient has a past medical history of metastatic adenocarcinoma who presented with a complaint of abdominal pain, nausea, and vomiting. She noted that symptoms began the day of arrival and she vomited 4 times. This is somewhat of a chronic/recurrent issue for her since diagnosis of her adenocarcinoma. Upon arrival to the emergency department she did meet severe sepsis criteria and imaging was consistent with lung opacities concerning for pneumonia and colonic thickening. She was initiated on broad-spectrum antibiotics and ultimately admitted to the medical floor for further observation and management. She was evaluated by GI regarding her abdominal complaints which seemed more likely due to her underlying carcinoma and no antibiotics were required from a GI perspective.   She was maintained on IV antibiotics during her hospitalization for treatment of pneumonia with improvement in her symptoms and sepsis parameters. When she was able to tolerate oral diet and pain was better controlled, she was discharged home with oral Levaquin to complete antibiotic course. She was discharged in stable condition and all of her questions were answered prior to departure. This is a brief discharge summary please see full medical record for more details. Please see above list of diagnoses and related plan for additional information. Condition at Discharge: stable    Discharge Day Visit / Exam:   Subjective: Patient resting comfortably in bed without any acute complaints. She notes improvement in her appetite, pain, nausea and denies any further vomiting. No significant overnight events reported by nursing. Vitals: Blood Pressure: 149/81 (07/20/23 0736)  Pulse: 81 (07/20/23 0736)  Temperature: 98.7 °F (37.1 °C) (07/20/23 0736)  Temp Source: Oral (07/20/23 0736)  Respirations: 18 (07/20/23 0736)  Height: 5' 1.5" (156.2 cm) (07/17/23 0708)  Weight - Scale: 49.9 kg (110 lb) (07/17/23 0708)  SpO2: 97 % (07/20/23 0736)     Exam:   Physical Exam  Vitals and nursing note reviewed. Constitutional:       General: She is not in acute distress. Appearance: She is well-developed. Comments: Chronically ill-appearing   HENT:      Head: Normocephalic and atraumatic. Mouth/Throat:      Mouth: Mucous membranes are moist.      Pharynx: Oropharynx is clear. Eyes:      Extraocular Movements: Extraocular movements intact. Conjunctiva/sclera: Conjunctivae normal.   Cardiovascular:      Rate and Rhythm: Normal rate and regular rhythm. Pulses: Normal pulses. Heart sounds: Normal heart sounds. No murmur heard. Pulmonary:      Effort: Pulmonary effort is normal. No respiratory distress. Breath sounds: Normal breath sounds. Abdominal:      General: Bowel sounds are normal. There is no distension. Palpations: Abdomen is soft. Tenderness: There is abdominal tenderness. Musculoskeletal:         General: No swelling. Normal range of motion. Cervical back: Normal range of motion and neck supple. Skin:     General: Skin is warm and dry. Capillary Refill: Capillary refill takes less than 2 seconds. Neurological:      General: No focal deficit present. Mental Status: She is alert and oriented to person, place, and time. Mental status is at baseline. Psychiatric:         Mood and Affect: Mood normal.         Behavior: Behavior normal.         Judgment: Judgment normal.          Discussion with Family: Updated  (wife) at bedside. Discharge instructions/Information to patient and family:   See after visit summary for information provided to patient and family. Provisions for Follow-Up Care:  See after visit summary for information related to follow-up care and any pertinent home health orders. Disposition:   Home    Planned Readmission: None     Discharge Statement:  I spent > 35 minutes discharging the patient. This time was spent on the day of discharge. I had direct contact with the patient on the day of discharge. Greater than 50% of the total time was spent examining patient, answering all patient questions, arranging and discussing plan of care with patient as well as directly providing post-discharge instructions. Additional time then spent on discharge activities. Discharge Medications:  See after visit summary for reconciled discharge medications provided to patient and/or family.       **Please Note: This note may have been constructed using a voice recognition system**

## 2023-07-20 NOTE — TELEPHONE ENCOUNTER
Pharmacist calling to ask if the hydromorphone script from June is still valid. I informed her that patient is currently in the hospital and Dr Thania Cisneros just renewed her hydromorphone while in hospital and she should void that June script. Please advise.

## 2023-07-21 NOTE — UTILIZATION REVIEW
NOTIFICATION OF ADMISSION DISCHARGE   This is a Notification of Discharge from [address] Please be advised that this patient has been discharge from our facility. Below you will find the admission and discharge date and time including the patient’s disposition. UTILIZATION REVIEW CONTACT:  Michell Bustillo  Utilization   Network Utilization Review Department  Phone: [phone] carefully listen to the prompts. All voicemails are confidential.  Email: [email or web address]     ADMISSION INFORMATION  PRESENTATION DATE: 7/17/2023  7:07 AM  OBERVATION ADMISSION DATE:   INPATIENT ADMISSION DATE: 7/17/23 12:04 PM   DISCHARGE DATE: 7/20/2023  4:00 PM   DISPOSITION:Home/Self Care    IMPORTANT INFORMATION:  Send all requests for admission clinical reviews, approved or denied determinations and any other requests to dedicated fax number below belonging to the campus where the patient is receiving treatment.  List of dedicated fax numbers:  [place] DENIALS (Administrative/Medical Necessity) [phone] [address] [facility] (Maternity/NICU/Pediatrics) [phone]   [facility] [phone]   [facility] [phone] [address] [facility] [phone]   [address] [place] [address] [phone]   [facility] [phone]   [address] [place], [place] [address] 608 [facility] [phone]   [address] [place] [address] [phone] [address] [facility] [phone] 2720 [facility] [address] [phone]

## 2023-07-22 LAB
BACTERIA BLD CULT: NORMAL
BACTERIA BLD CULT: NORMAL

## 2023-07-31 ENCOUNTER — LAB REQUISITION (OUTPATIENT)
Dept: LAB | Facility: HOSPITAL | Age: 63
End: 2023-07-31

## 2023-07-31 DIAGNOSIS — C78.00 SECONDARY MALIGNANT NEOPLASM OF UNSPECIFIED LUNG (HCC): ICD-10-CM

## 2023-07-31 LAB — SCAN RESULT: NORMAL
